# Patient Record
Sex: FEMALE | Race: WHITE | Employment: OTHER | URBAN - METROPOLITAN AREA
[De-identification: names, ages, dates, MRNs, and addresses within clinical notes are randomized per-mention and may not be internally consistent; named-entity substitution may affect disease eponyms.]

---

## 2017-02-08 PROBLEM — F51.01 PRIMARY INSOMNIA: Status: ACTIVE | Noted: 2017-02-08

## 2017-02-08 PROBLEM — E78.2 MIXED HYPERLIPIDEMIA: Status: ACTIVE | Noted: 2017-02-08

## 2017-02-08 PROBLEM — M85.80 OSTEOPENIA: Status: ACTIVE | Noted: 2017-02-08

## 2017-02-08 PROBLEM — I10 ESSENTIAL HYPERTENSION: Status: ACTIVE | Noted: 2017-02-08

## 2017-02-08 PROBLEM — I25.10 CORONARY ARTERY DISEASE INVOLVING NATIVE CORONARY ARTERY OF NATIVE HEART WITHOUT ANGINA PECTORIS: Status: ACTIVE | Noted: 2017-02-08

## 2017-02-08 PROBLEM — R73.03 PREDIABETES: Status: ACTIVE | Noted: 2017-02-08

## 2017-02-08 PROBLEM — E03.8 SUBCLINICAL HYPOTHYROIDISM: Status: ACTIVE | Noted: 2017-02-08

## 2017-02-10 ENCOUNTER — HOSPITAL ENCOUNTER (OUTPATIENT)
Dept: ULTRASOUND IMAGING | Age: 77
Discharge: HOME | End: 2017-02-10
Attending: INTERNAL MEDICINE
Payer: MEDICARE

## 2017-02-10 DIAGNOSIS — E03.8 SUBCLINICAL HYPOTHYROIDISM: ICD-10-CM

## 2017-02-10 PROCEDURE — 76536 US EXAM OF HEAD AND NECK: CPT

## 2017-02-24 ENCOUNTER — HOSPITAL ENCOUNTER (OUTPATIENT)
Dept: MAMMOGRAPHY | Age: 77
Discharge: HOME | End: 2017-02-24
Attending: INTERNAL MEDICINE
Payer: MEDICARE

## 2017-02-24 DIAGNOSIS — M85.80 OSTEOPENIA: ICD-10-CM

## 2017-02-24 DIAGNOSIS — M85.9 DISORDER OF BONE DENSITY AND STRUCTURE, UNSPECIFIED: ICD-10-CM

## 2017-02-24 PROCEDURE — 77080 DXA BONE DENSITY AXIAL: CPT

## 2017-08-08 PROBLEM — R73.9 HYPERGLYCEMIA: Status: ACTIVE | Noted: 2017-02-08

## 2017-08-09 ENCOUNTER — PATIENT OUTREACH (OUTPATIENT)
Dept: CASE MANAGEMENT | Age: 77
End: 2017-08-09

## 2017-08-10 ENCOUNTER — PATIENT OUTREACH (OUTPATIENT)
Dept: CASE MANAGEMENT | Age: 77
End: 2017-08-10

## 2017-08-10 NOTE — PROGRESS NOTES
[employer] Work [facility] [employer] received referral from [facility] / [facility] BRIGID re: options for respite for caring for spouse. CM reviewed record and also discussed case with RN BRIGID yesterday. CM attempted pt on both home and mobile phone listed. Left general vm on home number requesting returned call. CM will attempt tmrw if no call by that time. This note will not be viewable in [address]

## 2017-08-11 ENCOUNTER — PATIENT OUTREACH (OUTPATIENT)
Dept: CASE MANAGEMENT | Age: 77
End: 2017-08-11

## 2017-08-18 ENCOUNTER — PATIENT OUTREACH (OUTPATIENT)
Dept: CASE MANAGEMENT | Age: 77
End: 2017-08-18

## 2017-08-18 NOTE — Clinical Note
DELMYI only. Can't really do too much at this point other than support it seems. Let me know of any questions!

## 2017-08-18 NOTE — PROGRESS NOTES
Social Work CM followed up with pt as planned re: thoughts on options with spouse caregiving. Pt shared she did receive a vm from the [address] [place] [address] on Aging but missed their call and will have to call them back. Pt is hopeful it may be news that they are working through the waitlist and pt is eligible. Cm advised did get verification with [employer] that due to her spouse serving in reserves only, not eligible for any senior care services (aid  & attendance). Pt's granddaughter has continued to be of help and pt reports she has talked with the family about need support as things move forward. Pt did not have any additional concerns or questions at this time, only noting that the challenge is the spouse worrying anytime pt is absent. CM verified that pt has contact info and encouraged to call if can assist further. Cm not planning additional outreaches but will keep on caseload for another week or two in case call comes in from pt. This note will not be viewable in [address]

## 2017-08-23 ENCOUNTER — PATIENT OUTREACH (OUTPATIENT)
Dept: CASE MANAGEMENT | Age: 77
End: 2017-08-23

## 2017-08-23 NOTE — PROGRESS NOTES
CM reviewed record. CM noted appointment with cardiologist on 9/21; which patient reported is when it would be determined if she could have eye surgery or not. Patient has not called back [employer] in regards to respite voucher. CM informed patient if she could help by calling herself, she would be, but the agencies do not allow us to call, patient stated understanding. Patient reported she would help patient lay down for a nap and try to call today. CM encouraged patient to call, because it may take some time to arrange the agency to provide the respite and if the surgery is planned for next month it is best to start now. Patient agreed to call CM back if issues. Patient thanked CM for calling. This note will not be viewable in [address]

## 2018-02-08 PROBLEM — M85.89 OSTEOPENIA OF MULTIPLE SITES: Status: ACTIVE | Noted: 2017-02-08

## 2018-02-08 PROBLEM — F51.01 PRIMARY INSOMNIA: Status: RESOLVED | Noted: 2017-02-08 | Resolved: 2018-02-08

## 2019-03-13 ENCOUNTER — HOSPITAL ENCOUNTER (OUTPATIENT)
Dept: MAMMOGRAPHY | Age: 79
Discharge: HOME | End: 2019-03-13
Attending: INTERNAL MEDICINE
Payer: MEDICARE

## 2019-03-13 DIAGNOSIS — M85.89 OSTEOPENIA OF MULTIPLE SITES: ICD-10-CM

## 2019-03-13 PROCEDURE — 77080 DXA BONE DENSITY AXIAL: CPT

## 2019-06-28 NOTE — PROGRESS NOTES
Ambulatory Care Coordination  Social Work Assessment   Referral from which TONY CM: Oscar Perez   Previously referred? If so, reason and brief outcome No   Reason for current referral: Any assistance for pt that is primary caregiver for spouse who has dementia. Pt needs to have her cataract surgery and concerned about care for spouse while done. Income information (if needed): Didnt get exact amount but denied any financial hardships at this time   Sources of Support: Pt and spouse have family nearby and reports their granddaughter is a big help to them. Also good [employer] family   ACP set up? Needs information? Didnt inquire   Medication Cost assistance needed? Denied   Referral to CLTC/Medicaid needed? Not at this time   Referral to Medicare Extra Help/LIS program needed? n/a   Small home repair needed? n/a   MOW referral? No   Any other concerns/questions? After talking with pt it seems the barrier to getting help/respite for caring for spouse is that spouse is not comfortable with anyone else besides pt, particularly when it comes to bathing and toileting. Granddaughter assist as much as possible but spouse is resistant with personal hygiene items. [employer] members have come to sit with spouse to offer some respite but spouse still worries when pt is absent from the room at any time and will obsess over this. Pt is currently on the waitlist through the Caregiver support program for an aide. Next steps: CM will check with [employer] to verify not eligible for benefits due to spouse being in [employer], [employer] and [employer] reserves only. CM offered idea of having both pt and granddaughter help with bathing or dressing; this allows pt to still be present but spouse to adjust to someone else helping and then could slowly ease more time with the granddaughter in and see how spouse reacts. Pt reports she will attempt this. CM provided contact information.   CM will follow up in one week to touch base and see if has any Delroy Andrews calling for status of orders. The nurse is now saying they want venous doppler to rule out DVT. Call transfer to nurse.    questions or other ideas to assist.  At this time, pt still plans to have her surgery but needs to sort out care first.        This note will not be viewable in [address]

## 2020-02-13 PROBLEM — E03.8 SUBCLINICAL HYPOTHYROIDISM: Status: RESOLVED | Noted: 2017-02-08 | Resolved: 2020-02-13

## 2021-05-18 PROBLEM — M12.811 RIGHT ROTATOR CUFF TEAR ARTHROPATHY: Status: ACTIVE | Noted: 2021-05-18

## 2021-05-18 PROBLEM — M75.101 RIGHT ROTATOR CUFF TEAR ARTHROPATHY: Status: ACTIVE | Noted: 2021-05-18

## 2021-06-23 PROBLEM — M19.011 OSTEOARTHRITIS OF RIGHT SHOULDER: Status: ACTIVE | Noted: 2021-06-23

## 2021-06-30 ENCOUNTER — HOSPITAL ENCOUNTER (OUTPATIENT)
Dept: SURGERY | Age: 81
Discharge: HOME | End: 2021-06-30
Attending: ORTHOPAEDIC SURGERY
Payer: MEDICARE

## 2021-06-30 VITALS
TEMPERATURE: 97 F | BODY MASS INDEX: 23.76 KG/M2 | SYSTOLIC BLOOD PRESSURE: 116 MMHG | HEIGHT: 62 IN | WEIGHT: 129.1 LBS | DIASTOLIC BLOOD PRESSURE: 63 MMHG | OXYGEN SATURATION: 95 % | RESPIRATION RATE: 16 BRPM | HEART RATE: 63 BPM

## 2021-06-30 LAB
ANION GAP SERPL CALC-SCNC: 3 MMOL/L (ref 7–16)
BACTERIA SPEC CULT: ABNORMAL
BUN SERPL-MCNC: 21 MG/DL (ref 8–23)
CALCIUM SERPL-MCNC: 9.3 MG/DL (ref 8.3–10.4)
CHLORIDE SERPL-SCNC: 108 MMOL/L (ref 98–107)
CO2 SERPL-SCNC: 31 MMOL/L (ref 21–32)
CREAT SERPL-MCNC: 0.96 MG/DL (ref 0.6–1)
ERYTHROCYTE [DISTWIDTH] IN BLOOD BY AUTOMATED COUNT: 14.8 % (ref 11.9–14.6)
GLUCOSE SERPL-MCNC: 115 MG/DL (ref 65–100)
HCT VFR BLD AUTO: 37.3 % (ref 35.8–46.3)
HGB BLD-MCNC: 11.6 G/DL (ref 11.7–15.4)
MCH RBC QN AUTO: 27.2 PG (ref 26.1–32.9)
MCHC RBC AUTO-ENTMCNC: 31.1 G/DL (ref 31.4–35)
MCV RBC AUTO: 87.6 FL (ref 79.6–97.8)
NRBC # BLD: 0 K/UL (ref 0–0.2)
PLATELET # BLD AUTO: 230 K/UL (ref 150–450)
PMV BLD AUTO: 10.4 FL (ref 9.4–12.3)
POTASSIUM SERPL-SCNC: 4.3 MMOL/L (ref 3.5–5.1)
RBC # BLD AUTO: 4.26 M/UL (ref 4.05–5.2)
SERVICE CMNT-IMP: ABNORMAL
SODIUM SERPL-SCNC: 142 MMOL/L (ref 136–145)
WBC # BLD AUTO: 4.3 K/UL (ref 4.3–11.1)

## 2021-06-30 PROCEDURE — 80048 BASIC METABOLIC PNL TOTAL CA: CPT

## 2021-06-30 PROCEDURE — 36415 COLL VENOUS BLD VENIPUNCTURE: CPT

## 2021-06-30 PROCEDURE — 77030027138 HC INCENT SPIROMETER -A

## 2021-06-30 PROCEDURE — 87641 MR-STAPH DNA AMP PROBE: CPT

## 2021-06-30 PROCEDURE — 85027 COMPLETE CBC AUTOMATED: CPT

## 2021-06-30 NOTE — PERIOP NOTES
The following records have been requested from [facility]:       [address]    Please send Last 2 EKG tracings via fax to [phone]. Thank you!

## 2021-06-30 NOTE — PERIOP NOTES
PLEASE CONTINUE TAKING ALL PRESCRIPTION MEDICATIONS UP TO THE DAY OF SURGERY UNLESS OTHERWISE DIRECTED BELOW. DISCONTINUE all vitamins and supplements 7 days prior to surgery. DISCONTINUE Non-Steriodal Anti-Inflammatory (NSAIDS) such as Advil and Aleve 5 days prior to surgery. Home Medications to take  the day of surgery   Carvedilol, omeprazole if needed            Home Medications   to Hold   Stop vitamins and supplements 7 days prior to surgery         Comments   Bring Incentive spirometer and Elizabeth-hex wash to the hospital on the day of surgery. On the day before surgery please take Acetaminophen 1000mg in the morning and then again before bed. You may substitute for Tylenol 650 mg. Please do not bring home medications with you on the day of surgery unless otherwise directed by your nurse. If you are instructed to bring home medications, please give them to your nurse as they will be administered by the nursing staff. If you have any questions, please call Doyle Magaña [phone]. A copy of this note was provided to the patient for reference.

## 2021-06-30 NOTE — ADVANCED PRACTICE NURSE
Total Joint Surgery Preoperative Chart Review      Patient ID:  Beata Lester  [id number]  50 y.o.  1940  Surgeon: Dr Hi Amaya  Date of Surgery: 7/9/2021  Procedure: Total Right Shoulder Arthroplasty  Primary Care Physician: Rafi Marques [phone]  Specialty Physician(s):      Subjective:   Beata Lester is a 78 y.o. WHITE/NON- female who presents for preoperative evaluation for Total Right Shoulder arthroplasty. This is a preoperative chart review note based on data collected by the nurse at the surgical Pre-Assessment visit. Past Medical History:   Diagnosis Date    Adverse effect of anesthesia     Difficulty awakening     Coronary artery disease     1 stent in 2009, Dr. Bernabe Diamond follows     COVID-19 vaccine series completed 02/26/2021    Pfizer vaccine     GERD (gastroesophageal reflux disease)     Managed with as needed meds     Hyperlipidemia     Hypertension     managed with meds     Insomnia     LBBB (left bundle branch block)     Multiple thyroid nodules     No meds     Multiple thyroid nodules     Osteopenia     Prediabetes     Managed with diet       Past Surgical History:   Procedure Laterality Date    HX APPENDECTOMY  age 13   [de-identified] BLADDER REPAIR      bladder tac    HX COLONOSCOPY  2004 and 2014    HX CORONARY STENT PLACEMENT  2009    HX HYSTERECTOMY  in her 46s    ovaries intact    HX ORTHOPAEDIC      right foot ORIF     Family History   Problem Relation Age of Onset    Diabetes Mother     Coronary Artery Disease Father     Thyroid Disease Maternal Grandmother     Thyroid Cancer Neg Hx       Social History     Tobacco Use    Smoking status: Never Smoker    Smokeless tobacco: Never Used   Substance Use Topics    Alcohol use: No       Prior to Admission medications    Medication Sig Start Date End Date Taking? Authorizing Provider   omeprazole (PRILOSEC) 40 mg capsule Take 1 Capsule by mouth daily for 30 days.   Patient taking differently: Take 40 mg by mouth daily as needed. 6/7/21 7/7/21 Yes Ricci WONG PA-C   simvastatin (ZOCOR) 40 mg tablet TAKE ONE TABLET BY MOUTH EACH NIGHT AT BEDTIME 8/13/20  Yes Flora Woodruff MD   carvediloL (COREG) 12.5 mg tablet TAKE ONE TABLET BY MOUTH TWICE A DAY WITH MEALS  Patient taking differently: 6.25 mg. TAKE ONE TABLET BY MOUTH TWICE A DAY WITH MEALS 2/13/20  Yes Flora Woodruff MD   calcium-cholecalciferol, D3, (CALTRATE 600+D) tablet Take 1 Tablet by mouth daily. 9/29/15  Yes Provider, Historical   aspirin delayed-release 81 mg tablet Take 81 mg by mouth nightly. Yes Provider, Historical     Allergies   Allergen Reactions    Norvasc [Amlodipine] Shortness of Breath and Cough    Lisinopril Cough          Objective:     Physical Exam:   Patient Vitals for the past 24 hrs:   Temp Pulse Resp BP SpO2   06/30/21 1026 97 °F (36.1 °C) 63 16 116/63 95 %       ECG:    EKG Results     None          Data Review:   Labs:   No results found for this or any previous visit (from the past 24 hour(s)).     Respiratory meds:  DENIES     FAMILY PRESENT:             NO     PAST SLEEP STUDY:                   DENIES  HX OF ARLEN:                                      DENIES  ARLEN assessment:                                               SLEEPS ON SIDE            Loud snoring:                                                           DENIES  Witnessed apnea or wakening gasping or choking:        DENIES         Awakens with headaches:                                               DENIES  Morning or daytime tiredness/ sleepiness:                          TIRED  Dry mouth or sore throat in morning:            YES                                              Davila stage:  4                                   SACS score:4  Stop Bang   STOP-BANG  Does the patient snore loudly (louder than talking or loud enough to be heard through closed doors)?: No  Does the patient often feel tired, fatigued, or sleepy during the daytime, even after a \"good\" night's sleep?: Yes  Has anyone ever observed the patient stop breathing during their sleep? : No  Does the patient have or are they being treated for high blood pressure?: No  Is the patient's BMI greater than 35?: Yes  Is your neck circumference greater than 17 inches (Male) or 16 inches (Female)?: No  Is the patient older than 48?: Yes  Is the patient male?: No  ARLEN Score: 3  Has the patient been referred to Sleep Medicine?: No  Has the patient previously been diagnosed with Obstructive Sleep Apnea?: No                              Problem List:  )  Patient Active Problem List   Diagnosis Code    Coronary artery disease involving native coronary artery of native heart without angina pectoris I25.10    Essential hypertension I10    Mixed hyperlipidemia E78.2    Prediabetes R73.03    Osteopenia of multiple sites M85.89    Multiple thyroid nodules E04.2    Right rotator cuff tear arthropathy M75.101, M12.811    Osteoarthritis of right shoulder M19.011       Total Joint Surgery Pre-Assessment Recommendations:           Continuous saturation monitoring UPON ARRIVAL TO FLOOR. Heated high flow lung expansion x 24 hours and prn.     Signed By: TO Titus    June 30, 2021

## 2021-06-30 NOTE — PERIOP NOTES
Patient verified name and . Order for consent not found in EHR. Type 3 surgery, PAT walk in assessment complete. Labs per surgeon: no orders received. Labs per anesthesia protocol: CBC,BMP; results pending. Type and Screen DOS. EKG: Found in care everywhere done 21. Last EKG tracings x 2 requested from [address] [place] [address] cardiology. Patient's second Covid vaccine done 21. Copy of Covid vaccination card scanned into EHR. Cardiology clearance office note dated 21 and Echo dated 10/3/17 found in care everywhere if needed for anesthesia reference. MRSA/MSSA swab collected; pharmacy to review and dose antibiotic as appropriate. Hospital approved surgical skin cleanser and instructions to return bottle on DOS given per hospital policy. Patient provided with handouts including Guide to Surgery, Pain Management, Hand Hygiene, Blood Transfusion Education, and [facility] Brochure. Patient answered medical/surgical history questions at their best of ability. All prior to admission medications documented in [facility]. Original medication prescription bottle not visualized during patient appointment. Patient instructed to hold all vitamins 3 weeks prior to surgery and NSAIDS 5 days prior to surgery. Patient teach back successful and patient demonstrates knowledge of instruction.

## 2021-06-30 NOTE — PERIOP NOTES
Received EKG's dated 6/14/21 & 9/21/17 from [facility]. Anesthesia to review EKG's. Records scanned in under media tab in EHR.

## 2021-07-01 ENCOUNTER — HOSPITAL ENCOUNTER (OUTPATIENT)
Dept: MAMMOGRAPHY | Age: 81
Discharge: HOME | End: 2021-07-01
Attending: INTERNAL MEDICINE
Payer: MEDICARE

## 2021-07-01 DIAGNOSIS — M85.89 OSTEOPENIA OF MULTIPLE SITES: ICD-10-CM

## 2021-07-01 PROCEDURE — 77080 DXA BONE DENSITY AXIAL: CPT

## 2021-07-01 NOTE — PROGRESS NOTES
Spoke with patient advised per  Bones have worsened and her fracture risk is now high enough to warrant treatment to lower the risk for a fracture. If she's interested the options are a weekly pill called Fosamax or a twice a year injection called Prolia. Pt expressed understanding and chose Fosamax informed her would be sent to her pharmacy.

## 2021-07-01 NOTE — PROGRESS NOTES
Bones have worsened and her fracture risk is now high enough to warrant treatment to lower the risk for a fracture. If she's interested the options are a weekly pill called Fosamax or a twice a year injection called Prolia.

## 2021-07-08 ENCOUNTER — ANESTHESIA EVENT (OUTPATIENT)
Dept: SURGERY | Age: 81
DRG: 483 | End: 2021-07-08
Payer: MEDICARE

## 2021-07-09 ENCOUNTER — HOSPITAL ENCOUNTER (INPATIENT)
Age: 81
LOS: 2 days | Discharge: HOME | DRG: 483 | End: 2021-07-11
Attending: ORTHOPAEDIC SURGERY | Admitting: ORTHOPAEDIC SURGERY
Payer: MEDICARE

## 2021-07-09 ENCOUNTER — APPOINTMENT (OUTPATIENT)
Dept: GENERAL RADIOLOGY | Age: 81
DRG: 483 | End: 2021-07-09
Attending: ORTHOPAEDIC SURGERY
Payer: MEDICARE

## 2021-07-09 ENCOUNTER — ANESTHESIA (OUTPATIENT)
Dept: SURGERY | Age: 81
DRG: 483 | End: 2021-07-09
Payer: MEDICARE

## 2021-07-09 PROBLEM — M19.019 SHOULDER ARTHRITIS: Status: ACTIVE | Noted: 2021-07-09

## 2021-07-09 LAB
ABO + RH BLD: NORMAL
BLOOD GROUP ANTIBODIES SERPL: NORMAL
SPECIMEN EXP DATE BLD: NORMAL

## 2021-07-09 PROCEDURE — 74011250636 HC RX REV CODE- 250/636: Performed by: ANESTHESIOLOGY

## 2021-07-09 PROCEDURE — 74011000258 HC RX REV CODE- 258: Performed by: NURSE ANESTHETIST, CERTIFIED REGISTERED

## 2021-07-09 PROCEDURE — 74011000258 HC RX REV CODE- 258: Performed by: ORTHOPAEDIC SURGERY

## 2021-07-09 PROCEDURE — 23472 RECONSTRUCT SHOULDER JOINT: CPT | Performed by: ORTHOPAEDIC SURGERY

## 2021-07-09 PROCEDURE — 77030036638 HC ACC KT GPS KNE V2 EXAC -D: Performed by: ORTHOPAEDIC SURGERY

## 2021-07-09 PROCEDURE — 74011000250 HC RX REV CODE- 250: Performed by: NURSE ANESTHETIST, CERTIFIED REGISTERED

## 2021-07-09 PROCEDURE — 23472 RECONSTRUCT SHOULDER JOINT: CPT | Performed by: PHYSICIAN ASSISTANT

## 2021-07-09 PROCEDURE — 77030011283 HC ELECTRD NDL COVD -A: Performed by: ORTHOPAEDIC SURGERY

## 2021-07-09 PROCEDURE — 77010033711 HC HIGH FLOW OXYGEN

## 2021-07-09 PROCEDURE — 76060000037 HC ANESTHESIA 3 TO 3.5 HR: Performed by: ORTHOPAEDIC SURGERY

## 2021-07-09 PROCEDURE — 76010000133 HC OR TIME 3 TO 3.5 HR: Performed by: ORTHOPAEDIC SURGERY

## 2021-07-09 PROCEDURE — C1776 JOINT DEVICE (IMPLANTABLE): HCPCS | Performed by: ORTHOPAEDIC SURGERY

## 2021-07-09 PROCEDURE — 74011250637 HC RX REV CODE- 250/637: Performed by: ORTHOPAEDIC SURGERY

## 2021-07-09 PROCEDURE — 76010010054 HC POST OP PAIN BLOCK: Performed by: ORTHOPAEDIC SURGERY

## 2021-07-09 PROCEDURE — 77030031139 HC SUT VCRL2 J&J -A: Performed by: ORTHOPAEDIC SURGERY

## 2021-07-09 PROCEDURE — 77030039147 HC PWDR HEMSTS SURGICEL JNJ -D: Performed by: ORTHOPAEDIC SURGERY

## 2021-07-09 PROCEDURE — 74011000250 HC RX REV CODE- 250: Performed by: ANESTHESIOLOGY

## 2021-07-09 PROCEDURE — 77030037088 HC TUBE ENDOTRACH ORAL NSL COVD-A: Performed by: NURSE ANESTHETIST, CERTIFIED REGISTERED

## 2021-07-09 PROCEDURE — 76210000006 HC OR PH I REC 0.5 TO 1 HR: Performed by: ORTHOPAEDIC SURGERY

## 2021-07-09 PROCEDURE — 77030021668 HC NEB PREFIL KT VYRM -A

## 2021-07-09 PROCEDURE — 77030008841 HC WRE K MCRA -A: Performed by: ORTHOPAEDIC SURGERY

## 2021-07-09 PROCEDURE — 74011250637 HC RX REV CODE- 250/637: Performed by: ANESTHESIOLOGY

## 2021-07-09 PROCEDURE — 86901 BLOOD TYPING SEROLOGIC RH(D): CPT

## 2021-07-09 PROCEDURE — 74011250636 HC RX REV CODE- 250/636: Performed by: NURSE ANESTHETIST, CERTIFIED REGISTERED

## 2021-07-09 PROCEDURE — 2709999900 HC NON-CHARGEABLE SUPPLY: Performed by: ORTHOPAEDIC SURGERY

## 2021-07-09 PROCEDURE — 77030002933 HC SUT MCRYL J&J -A: Performed by: ORTHOPAEDIC SURGERY

## 2021-07-09 PROCEDURE — 73030 X-RAY EXAM OF SHOULDER: CPT

## 2021-07-09 PROCEDURE — 3E0T33Z INTRODUCTION OF ANTI-INFLAMMATORY INTO PERIPHERAL NERVES AND PLEXI, PERCUTANEOUS APPROACH: ICD-10-PCS | Performed by: ANESTHESIOLOGY

## 2021-07-09 PROCEDURE — 77010033678 HC OXYGEN DAILY

## 2021-07-09 PROCEDURE — 77030018673: Performed by: ORTHOPAEDIC SURGERY

## 2021-07-09 PROCEDURE — 77030040830 HC CATH URETH FOL MDII -A: Performed by: ORTHOPAEDIC SURGERY

## 2021-07-09 PROCEDURE — 77030018547 HC SUT ETHBND1 J&J -B: Performed by: ORTHOPAEDIC SURGERY

## 2021-07-09 PROCEDURE — 77030039266 HC ADH SKN EXOFIN S2SG -A: Performed by: ORTHOPAEDIC SURGERY

## 2021-07-09 PROCEDURE — 65270000029 HC RM PRIVATE

## 2021-07-09 PROCEDURE — 0RRJ00Z REPLACEMENT OF RIGHT SHOULDER JOINT WITH REVERSE BALL AND SOCKET SYNTHETIC SUBSTITUTE, OPEN APPROACH: ICD-10-PCS | Performed by: ORTHOPAEDIC SURGERY

## 2021-07-09 PROCEDURE — 3E0T3BZ INTRODUCTION OF ANESTHETIC AGENT INTO PERIPHERAL NERVES AND PLEXI, PERCUTANEOUS APPROACH: ICD-10-PCS | Performed by: ANESTHESIOLOGY

## 2021-07-09 PROCEDURE — 77030039425 HC BLD LARYNG TRULITE DISP TELE -A: Performed by: NURSE ANESTHETIST, CERTIFIED REGISTERED

## 2021-07-09 PROCEDURE — 76942 ECHO GUIDE FOR BIOPSY: CPT | Performed by: ORTHOPAEDIC SURGERY

## 2021-07-09 PROCEDURE — 77030006835 HC BLD SAW SAG STRY -B: Performed by: ORTHOPAEDIC SURGERY

## 2021-07-09 PROCEDURE — 77030003602 HC NDL NRV BLK BBMI -B: Performed by: NURSE ANESTHETIST, CERTIFIED REGISTERED

## 2021-07-09 PROCEDURE — 74011000250 HC RX REV CODE- 250: Performed by: ORTHOPAEDIC SURGERY

## 2021-07-09 PROCEDURE — 74011250636 HC RX REV CODE- 250/636: Performed by: ORTHOPAEDIC SURGERY

## 2021-07-09 PROCEDURE — 77030040922 HC BLNKT HYPOTHRM STRY -A: Performed by: NURSE ANESTHETIST, CERTIFIED REGISTERED

## 2021-07-09 PROCEDURE — 77030002934 HC SUT MCRYL J&J -B: Performed by: ORTHOPAEDIC SURGERY

## 2021-07-09 PROCEDURE — 94762 N-INVAS EAR/PLS OXIMTRY CONT: CPT

## 2021-07-09 DEVICE — EQUINOXE REVERSE 36MM HUMERAL LINER +0: Type: IMPLANTABLE DEVICE | Site: SHOULDER | Status: FUNCTIONAL

## 2021-07-09 DEVICE — SHOULDER S3 TOT ADV REVRS -- IMPL CAPPED S3: Type: IMPLANTABLE DEVICE | Status: FUNCTIONAL

## 2021-07-09 DEVICE — KIT BNE SCR L22MM DIA4.5MM GLEN SHLDR BLK COMPR LOK CAP REV: Type: IMPLANTABLE DEVICE | Site: SHOULDER | Status: FUNCTIONAL

## 2021-07-09 DEVICE — SCR BNE LCK GLENOSPHERE -- EQUINOXE: Type: IMPLANTABLE DEVICE | Site: SHOULDER | Status: FUNCTIONAL

## 2021-07-09 DEVICE — IMPLANTABLE DEVICE
Type: IMPLANTABLE DEVICE | Site: SHOULDER | Status: FUNCTIONAL
Brand: EQUINOXE

## 2021-07-09 DEVICE — BASEPLATE GLEN AUG REVERSED SM RT SUP/POST SHLDR: Type: IMPLANTABLE DEVICE | Site: SHOULDER | Status: FUNCTIONAL

## 2021-07-09 DEVICE — SCR TORQUE DEFINING KIT -- EQUINOXE: Type: IMPLANTABLE DEVICE | Site: SHOULDER | Status: FUNCTIONAL

## 2021-07-09 DEVICE — TRAY HUM ADPT REV +0 -- EQUINOXE: Type: IMPLANTABLE DEVICE | Site: SHOULDER | Status: FUNCTIONAL

## 2021-07-09 DEVICE — STEM HUM DIA11MM SHLDR PRI PRESSFIT EQUINOXE: Type: IMPLANTABLE DEVICE | Site: SHOULDER | Status: FUNCTIONAL

## 2021-07-09 DEVICE — SPHERE GLEN SM 36 MM SHLDR GLENOSPHERE RVS EXP EQUINOXE: Type: IMPLANTABLE DEVICE | Site: SHOULDER | Status: FUNCTIONAL

## 2021-07-09 RX ORDER — CARVEDILOL 6.25 MG/1
6.25 TABLET ORAL 2 TIMES DAILY WITH MEALS
Status: DISCONTINUED | OUTPATIENT
Start: 2021-07-09 | End: 2021-07-11 | Stop reason: HOSPADM

## 2021-07-09 RX ORDER — LIDOCAINE HYDROCHLORIDE 10 MG/ML
0.1 INJECTION INFILTRATION; PERINEURAL AS NEEDED
Status: DISCONTINUED | OUTPATIENT
Start: 2021-07-09 | End: 2021-07-09 | Stop reason: HOSPADM

## 2021-07-09 RX ORDER — SODIUM CHLORIDE 0.9 % (FLUSH) 0.9 %
5-40 SYRINGE (ML) INJECTION EVERY 8 HOURS
Status: DISCONTINUED | OUTPATIENT
Start: 2021-07-09 | End: 2021-07-11 | Stop reason: HOSPADM

## 2021-07-09 RX ORDER — HYDROMORPHONE HYDROCHLORIDE 2 MG/ML
0.5 INJECTION, SOLUTION INTRAMUSCULAR; INTRAVENOUS; SUBCUTANEOUS
Status: DISCONTINUED | OUTPATIENT
Start: 2021-07-09 | End: 2021-07-09 | Stop reason: HOSPADM

## 2021-07-09 RX ORDER — BUPIVACAINE HYDROCHLORIDE 5 MG/ML
INJECTION, SOLUTION EPIDURAL; INTRACAUDAL
Status: COMPLETED | OUTPATIENT
Start: 2021-07-09 | End: 2021-07-09

## 2021-07-09 RX ORDER — MIDAZOLAM HYDROCHLORIDE 1 MG/ML
2 INJECTION, SOLUTION INTRAMUSCULAR; INTRAVENOUS ONCE
Status: DISCONTINUED | OUTPATIENT
Start: 2021-07-09 | End: 2021-07-09 | Stop reason: HOSPADM

## 2021-07-09 RX ORDER — PROPOFOL 10 MG/ML
INJECTION, EMULSION INTRAVENOUS AS NEEDED
Status: DISCONTINUED | OUTPATIENT
Start: 2021-07-09 | End: 2021-07-09 | Stop reason: HOSPADM

## 2021-07-09 RX ORDER — SODIUM CHLORIDE 9 MG/ML
100 INJECTION, SOLUTION INTRAVENOUS CONTINUOUS
Status: DISCONTINUED | OUTPATIENT
Start: 2021-07-09 | End: 2021-07-11 | Stop reason: HOSPADM

## 2021-07-09 RX ORDER — SODIUM CHLORIDE, SODIUM LACTATE, POTASSIUM CHLORIDE, CALCIUM CHLORIDE 600; 310; 30; 20 MG/100ML; MG/100ML; MG/100ML; MG/100ML
50 INJECTION, SOLUTION INTRAVENOUS CONTINUOUS
Status: DISCONTINUED | OUTPATIENT
Start: 2021-07-09 | End: 2021-07-09 | Stop reason: HOSPADM

## 2021-07-09 RX ORDER — ROPIVACAINE HYDROCHLORIDE 5 MG/ML
INJECTION, SOLUTION EPIDURAL; INFILTRATION; PERINEURAL AS NEEDED
Status: DISCONTINUED | OUTPATIENT
Start: 2021-07-09 | End: 2021-07-09 | Stop reason: HOSPADM

## 2021-07-09 RX ORDER — DIPHENHYDRAMINE HCL 25 MG
25 CAPSULE ORAL
Status: DISCONTINUED | OUTPATIENT
Start: 2021-07-09 | End: 2021-07-11 | Stop reason: HOSPADM

## 2021-07-09 RX ORDER — EPINEPHRINE 1 MG/ML
INJECTION, SOLUTION, CONCENTRATE INTRAVENOUS AS NEEDED
Status: DISCONTINUED | OUTPATIENT
Start: 2021-07-09 | End: 2021-07-09 | Stop reason: HOSPADM

## 2021-07-09 RX ORDER — CEFAZOLIN SODIUM/WATER 2 G/20 ML
2 SYRINGE (ML) INTRAVENOUS ONCE
Status: DISCONTINUED | OUTPATIENT
Start: 2021-07-09 | End: 2021-07-09 | Stop reason: HOSPADM

## 2021-07-09 RX ORDER — ATORVASTATIN CALCIUM 10 MG/1
20 TABLET, FILM COATED ORAL
Status: DISCONTINUED | OUTPATIENT
Start: 2021-07-09 | End: 2021-07-11 | Stop reason: HOSPADM

## 2021-07-09 RX ORDER — ONDANSETRON 2 MG/ML
INJECTION INTRAMUSCULAR; INTRAVENOUS AS NEEDED
Status: DISCONTINUED | OUTPATIENT
Start: 2021-07-09 | End: 2021-07-09 | Stop reason: HOSPADM

## 2021-07-09 RX ORDER — OXYCODONE HYDROCHLORIDE 5 MG/1
5 TABLET ORAL
Status: DISCONTINUED | OUTPATIENT
Start: 2021-07-09 | End: 2021-07-09 | Stop reason: HOSPADM

## 2021-07-09 RX ORDER — AMOXICILLIN 250 MG
1 CAPSULE ORAL
Status: DISCONTINUED | OUTPATIENT
Start: 2021-07-09 | End: 2021-07-11 | Stop reason: HOSPADM

## 2021-07-09 RX ORDER — ONDANSETRON 4 MG/1
4 TABLET, ORALLY DISINTEGRATING ORAL
Status: DISCONTINUED | OUTPATIENT
Start: 2021-07-09 | End: 2021-07-09

## 2021-07-09 RX ORDER — HYDROMORPHONE HYDROCHLORIDE 1 MG/ML
.5-1 INJECTION, SOLUTION INTRAMUSCULAR; INTRAVENOUS; SUBCUTANEOUS
Status: DISCONTINUED | OUTPATIENT
Start: 2021-07-09 | End: 2021-07-11 | Stop reason: HOSPADM

## 2021-07-09 RX ORDER — ALBUTEROL SULFATE 0.83 MG/ML
2.5 SOLUTION RESPIRATORY (INHALATION) AS NEEDED
Status: DISCONTINUED | OUTPATIENT
Start: 2021-07-09 | End: 2021-07-09 | Stop reason: HOSPADM

## 2021-07-09 RX ORDER — MIDAZOLAM HYDROCHLORIDE 1 MG/ML
2 INJECTION, SOLUTION INTRAMUSCULAR; INTRAVENOUS
Status: COMPLETED | OUTPATIENT
Start: 2021-07-09 | End: 2021-07-09

## 2021-07-09 RX ORDER — ACETAMINOPHEN 650 MG/1
650 SUPPOSITORY RECTAL ONCE
Status: DISCONTINUED | OUTPATIENT
Start: 2021-07-09 | End: 2021-07-09 | Stop reason: SDUPTHER

## 2021-07-09 RX ORDER — ACETAMINOPHEN 325 MG/1
975 TABLET ORAL ONCE
Status: DISCONTINUED | OUTPATIENT
Start: 2021-07-09 | End: 2021-07-09 | Stop reason: SDUPTHER

## 2021-07-09 RX ORDER — AMOXICILLIN 250 MG
1 CAPSULE ORAL DAILY
Status: DISCONTINUED | OUTPATIENT
Start: 2021-07-10 | End: 2021-07-09

## 2021-07-09 RX ORDER — ROCURONIUM BROMIDE 10 MG/ML
INJECTION, SOLUTION INTRAVENOUS AS NEEDED
Status: DISCONTINUED | OUTPATIENT
Start: 2021-07-09 | End: 2021-07-09 | Stop reason: HOSPADM

## 2021-07-09 RX ORDER — CEFAZOLIN SODIUM/WATER 2 G/20 ML
2 SYRINGE (ML) INTRAVENOUS EVERY 8 HOURS
Status: COMPLETED | OUTPATIENT
Start: 2021-07-09 | End: 2021-07-10

## 2021-07-09 RX ORDER — VANCOMYCIN HYDROCHLORIDE 1 G/20ML
INJECTION, POWDER, LYOPHILIZED, FOR SOLUTION INTRAVENOUS AS NEEDED
Status: DISCONTINUED | OUTPATIENT
Start: 2021-07-09 | End: 2021-07-09 | Stop reason: HOSPADM

## 2021-07-09 RX ORDER — NALOXONE HYDROCHLORIDE 0.4 MG/ML
0.4 INJECTION, SOLUTION INTRAMUSCULAR; INTRAVENOUS; SUBCUTANEOUS
Status: DISCONTINUED | OUTPATIENT
Start: 2021-07-09 | End: 2021-07-11 | Stop reason: HOSPADM

## 2021-07-09 RX ORDER — ASPIRIN 325 MG
325 TABLET, DELAYED RELEASE (ENTERIC COATED) ORAL DAILY
Status: DISCONTINUED | OUTPATIENT
Start: 2021-07-10 | End: 2021-07-11 | Stop reason: HOSPADM

## 2021-07-09 RX ORDER — SODIUM CHLORIDE, SODIUM LACTATE, POTASSIUM CHLORIDE, CALCIUM CHLORIDE 600; 310; 30; 20 MG/100ML; MG/100ML; MG/100ML; MG/100ML
75 INJECTION, SOLUTION INTRAVENOUS CONTINUOUS
Status: DISCONTINUED | OUTPATIENT
Start: 2021-07-09 | End: 2021-07-09 | Stop reason: HOSPADM

## 2021-07-09 RX ORDER — OXYCODONE AND ACETAMINOPHEN 7.5; 325 MG/1; MG/1
1-2 TABLET ORAL
Status: DISCONTINUED | OUTPATIENT
Start: 2021-07-09 | End: 2021-07-11 | Stop reason: HOSPADM

## 2021-07-09 RX ORDER — ONDANSETRON 2 MG/ML
4 INJECTION INTRAMUSCULAR; INTRAVENOUS
Status: DISCONTINUED | OUTPATIENT
Start: 2021-07-09 | End: 2021-07-11 | Stop reason: HOSPADM

## 2021-07-09 RX ORDER — LIDOCAINE HYDROCHLORIDE 20 MG/ML
INJECTION, SOLUTION EPIDURAL; INFILTRATION; INTRACAUDAL; PERINEURAL AS NEEDED
Status: DISCONTINUED | OUTPATIENT
Start: 2021-07-09 | End: 2021-07-09 | Stop reason: HOSPADM

## 2021-07-09 RX ORDER — FENTANYL CITRATE 50 UG/ML
100 INJECTION, SOLUTION INTRAMUSCULAR; INTRAVENOUS ONCE
Status: COMPLETED | OUTPATIENT
Start: 2021-07-09 | End: 2021-07-09

## 2021-07-09 RX ORDER — ACETAMINOPHEN 500 MG
1000 TABLET ORAL ONCE
Status: COMPLETED | OUTPATIENT
Start: 2021-07-09 | End: 2021-07-09

## 2021-07-09 RX ORDER — EPINEPHRINE 1 MG/ML
INJECTION, SOLUTION, CONCENTRATE INTRAVENOUS
Status: COMPLETED | OUTPATIENT
Start: 2021-07-09 | End: 2021-07-09

## 2021-07-09 RX ORDER — SODIUM CHLORIDE 0.9 % (FLUSH) 0.9 %
5-40 SYRINGE (ML) INJECTION AS NEEDED
Status: DISCONTINUED | OUTPATIENT
Start: 2021-07-09 | End: 2021-07-11 | Stop reason: HOSPADM

## 2021-07-09 RX ADMIN — ONDANSETRON 4 MG: 2 INJECTION INTRAMUSCULAR; INTRAVENOUS at 14:16

## 2021-07-09 RX ADMIN — PHENYLEPHRINE HYDROCHLORIDE 50 MCG: 10 INJECTION INTRAVENOUS at 14:06

## 2021-07-09 RX ADMIN — ROCURONIUM BROMIDE 20 MG: 10 INJECTION, SOLUTION INTRAVENOUS at 14:45

## 2021-07-09 RX ADMIN — MIDAZOLAM 1 MG: 1 INJECTION INTRAMUSCULAR; INTRAVENOUS at 11:37

## 2021-07-09 RX ADMIN — Medication 10 ML: at 23:00

## 2021-07-09 RX ADMIN — ROCURONIUM BROMIDE 50 MG: 10 INJECTION, SOLUTION INTRAVENOUS at 12:32

## 2021-07-09 RX ADMIN — TRANEXAMIC ACID 1 G: 100 INJECTION, SOLUTION INTRAVENOUS at 13:00

## 2021-07-09 RX ADMIN — SODIUM CHLORIDE, SODIUM LACTATE, POTASSIUM CHLORIDE, AND CALCIUM CHLORIDE: 600; 310; 30; 20 INJECTION, SOLUTION INTRAVENOUS at 13:50

## 2021-07-09 RX ADMIN — Medication 10 ML: at 17:19

## 2021-07-09 RX ADMIN — PHENYLEPHRINE HYDROCHLORIDE 100 MCG: 10 INJECTION INTRAVENOUS at 12:56

## 2021-07-09 RX ADMIN — ACETAMINOPHEN 1000 MG: 500 TABLET, FILM COATED ORAL at 10:36

## 2021-07-09 RX ADMIN — PHENYLEPHRINE HYDROCHLORIDE 10 MCG/MIN: 10 INJECTION INTRAVENOUS at 13:01

## 2021-07-09 RX ADMIN — OXYCODONE HYDROCHLORIDE AND ACETAMINOPHEN 1 TABLET: 7.5; 325 TABLET ORAL at 22:20

## 2021-07-09 RX ADMIN — LIDOCAINE HYDROCHLORIDE 60 MG: 20 INJECTION, SOLUTION EPIDURAL; INFILTRATION; INTRACAUDAL; PERINEURAL at 12:32

## 2021-07-09 RX ADMIN — PHENYLEPHRINE HYDROCHLORIDE 100 MCG: 10 INJECTION INTRAVENOUS at 13:44

## 2021-07-09 RX ADMIN — CARVEDILOL 6.25 MG: 6.25 TABLET, FILM COATED ORAL at 17:16

## 2021-07-09 RX ADMIN — FENTANYL CITRATE 50 MCG: 50 INJECTION INTRAMUSCULAR; INTRAVENOUS at 11:38

## 2021-07-09 RX ADMIN — ATORVASTATIN CALCIUM 20 MG: 10 TABLET, FILM COATED ORAL at 22:20

## 2021-07-09 RX ADMIN — EPINEPHRINE 0.1 MG: 1 INJECTION, SOLUTION, CONCENTRATE INTRAVENOUS at 11:41

## 2021-07-09 RX ADMIN — SODIUM CHLORIDE, SODIUM LACTATE, POTASSIUM CHLORIDE, AND CALCIUM CHLORIDE 75 ML/HR: 600; 310; 30; 20 INJECTION, SOLUTION INTRAVENOUS at 11:03

## 2021-07-09 RX ADMIN — PROPOFOL 130 MG: 10 INJECTION, EMULSION INTRAVENOUS at 12:32

## 2021-07-09 RX ADMIN — Medication 3 AMPULE: at 10:36

## 2021-07-09 RX ADMIN — CEFAZOLIN SODIUM 2 G: 100 INJECTION, POWDER, LYOPHILIZED, FOR SOLUTION INTRAVENOUS at 17:16

## 2021-07-09 RX ADMIN — BUPIVACAINE HYDROCHLORIDE 20 ML: 5 INJECTION, SOLUTION EPIDURAL; INTRACAUDAL; PERINEURAL at 11:41

## 2021-07-09 RX ADMIN — SUGAMMADEX 200 MG: 100 INJECTION, SOLUTION INTRAVENOUS at 15:30

## 2021-07-09 NOTE — PROGRESS NOTES
07/09/21 1748   Oxygen Therapy   O2 Sat (%) 96 %   Pulse via Oximetry 84 beats per minute   O2 Device Nasal cannula   O2 Flow Rate (L/min) 2 l/min    Pt working on IS. Pt encouraged to do 10 breaths per hour while awake on IS. Good NPC. No respiratory distress noted at this time. No complications noted at this time.

## 2021-07-09 NOTE — PROGRESS NOTES
Received to room from PACU. Alert and oriented x3. RUE honeycomb dressing clean dry and intact with ecchymosis noted. Numbness, finger extension weak in RUE. Discussed diet and pain control. Oriented to room and bed functions. Bed locked, in lowest position, call light within reach.

## 2021-07-09 NOTE — H&P
History and Physical    Patient: Kym Ritter MRN: [id number]  SSN: [id number]    YOB: 1940  Age: 78 y.o. Sex: female      Subjective:      Kym Ritter is a 78 y.o. female who Has failed conservative care including activity modification, injections and other medication treatments. After failure of conservative treatment and exhausting all of those options she would like to proceed with shoulder replacement. .     Past Medical History:   Diagnosis Date    Adverse effect of anesthesia     Difficulty awakening     Coronary artery disease     1 stent in 2009, Dr. Audrey Pierre follows     COVID-19 vaccine series completed 02/26/2021    Pfizer vaccine     GERD (gastroesophageal reflux disease)     Managed with as needed meds     Hyperlipidemia     Hypertension     managed with meds     Insomnia     LBBB (left bundle branch block)     Multiple thyroid nodules     per endocrinology office note 8/19/19- nodules have resolved, pt has a small thyroid cyst    Multiple thyroid nodules     Osteopenia     Prediabetes     Managed with diet      Past Surgical History:   Procedure Laterality Date    HX APPENDECTOMY  age 13   [de-identified] BLADDER REPAIR      bladder tac    HX COLONOSCOPY  2004 and 2014    HX CORONARY STENT PLACEMENT  2009    HX HYSTERECTOMY  in her 46s    ovaries intact    HX ORTHOPAEDIC      right foot ORIF      Family History   Problem Relation Age of Onset    Diabetes Mother     Coronary Artery Disease Father     Thyroid Disease Maternal Grandmother     Thyroid Cancer Neg Hx      Social History     Tobacco Use    Smoking status: Never Smoker    Smokeless tobacco: Never Used   Substance Use Topics    Alcohol use: No      Prior to Admission medications    Medication Sig Start Date End Date Taking?  Authorizing Provider   simvastatin (ZOCOR) 40 mg tablet TAKE ONE TABLET BY MOUTH EACH NIGHT AT BEDTIME 8/13/20  Yes Andrea Ravi MD   carvediloL (COREG) 12.5 mg tablet TAKE ONE TABLET BY MOUTH TWICE A DAY WITH MEALS  Patient taking differently: 6.25 mg. TAKE ONE TABLET BY MOUTH TWICE A DAY WITH MEALS 2/13/20  Yes Ryan Zhao MD   calcium-cholecalciferol, D3, (CALTRATE 600+D) tablet Take 1 Tablet by mouth daily. 9/29/15  Yes Provider, Historical   aspirin delayed-release 81 mg tablet Take 81 mg by mouth nightly. Yes Provider, Historical   aspirin (ASPIRIN) 325 mg tablet Take 1 Tablet by mouth two (2) times a day for 7 days. To start after surgery  Patient not taking: Reported on 7/9/2021 7/7/21 7/14/21  Yesy Easley MD   naloxegoL (MOVANTIK) 25 mg tab tablet Take 1 Tablet by mouth Daily (before breakfast). To start after surgery  Indications: opiate pain medication causing severe constipation  Patient not taking: Reported on 7/9/2021 7/7/21   Yesy Easley MD   senna-docusate (PERICOLACE) 8.6-50 mg per tablet Take 1 Tablet by mouth daily. To start after surgery  Indications: constipation  Patient not taking: Reported on 7/9/2021 7/7/21   Yesy Easley MD   ondansetron (ZOFRAN ODT) 4 mg disintegrating tablet 1 Tablet by SubLINGual route every six (6) hours as needed for Nausea or Nausea or Vomiting. To start after surgery  Indications: prevent nausea and vomiting after surgery  Patient not taking: Reported on 7/9/2021 7/7/21   Yesy Easley MD   alendronate (FOSAMAX) 70 mg tablet Take 1 Tablet by mouth every seven (7) days. Patient not taking: Reported on 7/9/2021 7/1/21   Ryan Zhao MD        Allergies   Allergen Reactions    Norvasc [Amlodipine] Shortness of Breath and Cough    Lisinopril Cough       Review of Systems:  Pertinent items are noted in the History of Present Illness.     Objective:     Vitals:    07/09/21 1145 07/09/21 1150 07/09/21 1155 07/09/21 1200   BP: (!) 155/67 (!) 154/71 (!) 166/74 (!) 193/84   Pulse: (!) 58 60 68 70   Resp: 16 16 16 16   Temp:       SpO2: 96% 96% 96% 96%   Weight:            Physical Exam:  GEN: General appearance is that of a healthy patient, alert and oriented, in no distress. WDWN. HEENT:  Normocephalic, atraumatic. Extraocular muscles are intact. Neck:  Supple, no lymphadenopathy. Heart:  Regular pulse exam on all extremities. Good color and warmth noted. Lungs:  Normal non-labored breathing with no obvious shortness of breath. Abdomen:  WNL's. Skin:  No signs of rash or bruising. Pysch: Answers questions appropriately, AO X 3. MSK:  Cervical spine: No tenderness. Appropriate active motion. Negative Spurling's maneuver. SHOULDER   Right (involved)  Left   Skin Intact Intact   Radial Pulses 2+ symmetrical  2+ symmetrical   Myotomes Normal Normal   Dermatomes  Normal Normal   ROM Almost symmetric. Still has good active motion Full   Strength Weakness in the scapular plane otherwise fairly normal No weakness   Atrophy None noted None noted   Effusion/Swelling  None None   Palpation Mild lateral shoulder and crepitance No Tenderness   Bicep Tendon Rupture  Negative Negative   Bear Hug, Belly Press Negative Not tested   Crossed Arm Adduction Test Not tested Not tested   Instability/Ant. Apprehension Test None  None   Impingement Limited Mildly limited              Assessment:     Hospital Problems  Date Reviewed: 6/9/2021        Codes Class Noted POA    * (Principal) Right rotator cuff tear arthropathy ICD-10-CM: M75.101, M12.811  ICD-9-CM: 716.81  5/18/2021 Yes              Plan:     The patient desires a right reverse total shoulder replacement. I talked with them extensively about the risks, benefits, reasonable expectations and expected recovery time as well as possible complications including but not limited to bleeding, infection, wear of the components requiring revision, neurovascular injury, instability/dislocations, cosmetic deformity, stiffness, pain, continued problems, DVT, PE, hardware failure,  fracture, heterotopic ossification, MI and other anesthesia related risks, etc.  They seem to understand and wish to proceed.   I gave them education literature and answered their questions.        Signed By: Khushbu Koenig MD     July 9, 2021

## 2021-07-09 NOTE — DISCHARGE INSTRUCTIONS
Total Shoulder Replacement Postoperative Instructions    Returning Home    Care of your incisions: You have absorbable stitches which do not need to be removed. Your incision will be checked at your first visit. 1) Moderate bleeding may occur at the incision sites. This should decrease quickly over time. 2) Leave the dressings in place for 5-7 days. Then dressings may be removed and replaced with fresh gauze if there is any drainage. You may bath or shower but the incisions must be kept clean and dry. You may take your arm out of the sling for showering or bathing but being careful to avoid use of the arm. You may let your arm also hang at your side during showering or at your side during bathing. 3) Watch the wound for increasing redness, tenderness, swelling and pus drainage daily. These can be early signs of infection. Please communicate any concerns. 4) A slight temperature the first few days after surgery is not uncommon. This can be treated with deep breathing and coughing to clear the lungs. However, fevers (anything over 101°F), increasing pain, and swelling at the incisions should be reported immediately. 5) Keep the wounds dry until your first follow-up visit. 6) It is fine to loosen the sling and do elbow straightening and bending with the arm at the side. It is also good to move the wrist and hand. This can be done as much as you desire, but at least three times a day. You are to wear sling at all times except when doing the exercises as above and showers. 7) Deep vein thrombosis (DVT) is a condition in which a blood clot has formed in a deep vein of the leg or arm. This may result in redness, swelling, warmth and/or pain of the leg/arm. Although these are very rare, there are things that can be done to lessen the risk.   It is recommended by your surgeon unless indicated otherwise, after arthroscopy; take an adult aspirin once a day for three weeks after surgery to help prevent the formation of blood clots. (Do not take aspirin against the advice of your medical doctor). Pain Management:    Pain after the surgery will vary from patient to patient. A certain amount of discomfort is normal and should not be alarming. We do recommend starting your prescription pain medications once you begin to experience a moderate amount of pain. If no relief is obtained call the office number on this sheet. Many pain medications contain Tylenol. Do not take additional Tylenol while taking the prescribed pain medication. Pain medications can cause constipation, so keep hydrated and consider over the counter additions like Metamucil or stool softener. Pain medications and antibiotics that are given during the lisa-operative time can cause itching and hives; over the counter Benadryl (25mg every 6 hours) can be helpful in treating this. If your pain is not adequately controlled, contact your physician at the numbers on this sheet. For the first week:  1) Icing for 15 minutes at a time may reduce your pain. Allow at least 30 minutes between ice applications. Some patients may have a cryotherapy cooler which can be used as it was during your stay at the hospital. It is fine to wear a light shirt underneath if needed to prevent skin irritation. 2) Sleeping might be difficult. Some individuals find sleeping in a recliner or inclined with pillows or a foam wedge helpful. 3) Putting a mitten on the hand of the affected shoulder can be helpful since some individuals find that warm hands help decrease pain. During this time nausea and light-headedness are common and should improve in 2-5 days. Drinking fluids and eating may help. If nausea persists, medicine can be prescribed by calling your doctor on the number(s) listed. Follow-up visits  Doctor  Plan on seeing your surgeon 10 days or so after surgery.     Physical Therapy   Physical therapy will be set up on an individualized basis. ? You will want to line up a helper to assist you with your home therapy program for approximately the first 6 weeks. Optimally, this person can come to your first physical therapy visit to have the physical therapist show them how to do the home therapy. The home therapy exercises will need to be done daily for approximately 20 minutes. Your helper doesnt need any medical training; the therapist will show them what they need to know. If you call the office please have us paged instead of leaving a voice mail so that we can immediately take care of your needs.     [address]                 Phone [phone]  [place] [address]                 Fax [phone]            Odessa Jenkins

## 2021-07-09 NOTE — PERIOP NOTES
TRANSFER - OUT REPORT:    Verbal report given to Tere RN(name) on Monserrat Emerson  being transferred to [facility](unit) for routine progression of care       Report consisted of patients Situation, Background, Assessment and   Recommendations(SBAR). Information from the following report(s) SBAR, Kardex, OR Summary, Intake/Output, MAR and Cardiac Rhythm NSR was reviewed with the receiving nurse. Lines:   Peripheral IV 07/09/21 Left;Posterior Hand (Active)   Site Assessment Clean, dry, & intact 07/09/21 1550   Phlebitis Assessment 0 07/09/21 1550   Infiltration Assessment 0 07/09/21 1550   Dressing Status Clean, dry, & intact 07/09/21 1550   Dressing Type Transparent;Tape 07/09/21 1550   Hub Color/Line Status Pink; Infusing 07/09/21 1550        Opportunity for questions and clarification was provided.       Patient transported with:   O2 @ 2 liters

## 2021-07-09 NOTE — PROGRESS NOTES
TRANSFER - IN REPORT:    Verbal report received from Cecilia Blanchard, [facility] on Joce Latham  being received from PACU for routine post - op      Report consisted of patients Situation, Background, Assessment and   Recommendations(SBAR). Information from the following report(s) SBAR, Kardex, OR Summary, Procedure Summary, Intake/Output and MAR was reviewed with the receiving nurse. Opportunity for questions and clarification was provided. Assessment completed upon patients arrival to unit and care assumed.

## 2021-07-09 NOTE — ANESTHESIA PREPROCEDURE EVALUATION
Relevant Problems   No relevant active problems       Anesthetic History   No history of anesthetic complications       Comments: Slow to awaken     Review of Systems / Medical History  Patient summary reviewed and pertinent labs reviewed    Pulmonary  Within defined limits                 Neuro/Psych   Within defined limits           Cardiovascular    Hypertension        Dysrhythmias   CAD and cardiac stents (2009 LAD SHERLYN)    Exercise tolerance: >4 METS     GI/Hepatic/Renal     GERD: well controlled           Endo/Other      Hypothyroidism       Other Findings              Physical Exam    Airway  Mallampati: II  TM Distance: 4 - 6 cm  Neck ROM: normal range of motion   Mouth opening: Normal     Cardiovascular    Rhythm: regular  Rate: normal         Dental    Dentition: Lower partial plate and Full upper dentures     Pulmonary  Breath sounds clear to auscultation               Abdominal         Other Findings            Anesthetic Plan    ASA: 3  Anesthesia type: general      Post-op pain plan if not by surgeon: peripheral nerve block single    Induction: Intravenous  Anesthetic plan and risks discussed with: Patient and Family

## 2021-07-09 NOTE — BRIEF OP NOTE
Brief Postoperative Note    Patient: Kleley Gruber  YOB: 1940  MRN: [id number]    Date of Procedure: 7/9/2021     Pre-Op Diagnosis: Right rotator cuff tear arthropathy [M75.101, M12.811]  Primary osteoarthritis of right shoulder [M19.011]    Post-Op Diagnosis: Same as preoperative diagnosis. Procedure(s):  RIGHT SHOULDER ARTHROPLASTY TOTAL REVERSE      Surgeon(s):  Norma Cedillo MD    Surgical Assistant: Physician Assistant: MARILEE Mcdaniels  Surg Asst-1: Rigoberto Xiong    Anesthesia: General     Estimated Blood Loss (mL): less than 319     Complications: None    Specimens: * No specimens in log *     Implants:   Implant Name Type Inv.  Item Serial No.  Lot No. LRB No. Used Action   STEM HUM [id number] SHLDR AMANDA PRESSFIT EQUINOXE - [id number]  STEM HUM [id number] SHLDR AMANDA PRESSFIT EQUINOXE [id number] [employer]  Right 1 Implanted   BASEPLATE RAAD AUG REVERSED SM RT SUP/POST SHLDR - [id number]  BASEPLATE RAAD AUG REVERSED SM RT SUP/POST [facility] [id number] [employer]WD  Right 1 Implanted   SCR LCK CAP KIT 4.5X30MM RUPERTO -- EQUINOXE - [id number]  SCR LCK CAP KIT 4.5X30MM RUPERTO -- EQUIN[employer]E [id number] [employer]  Right 1 Implanted   KIT BNE SCR L34MM DIA4.5MM RAAD SHLDR RED COMPR ELLIE CAP REV - [id number]  KIT BNE SCR L34MM DIA4.5MM RAAD SHLDR RED COMPR ELLIE CAP REV [id number] [employer]WD  Right 1 Implanted   SCR LCK CAP KIT 4.5X22MM BLK -- EQUINOXE - [id number]  SCR LCK CAP KIT 4.5X22MM BLK -- EQUINOXE [id number] [employer]  Right 1 Implanted   SCR BNE LCK GLENOSPHERE -- EQUINOXE - [id number]  SCR BNE LCK GLENOSPHERE -- [employer]E [id number] [employer]  Right 1 Implanted   SPHERE RAAD SM 36 MM SHLDR GLENOSPHERE RVS EXP EQUINOXE - [id number]  SPHERE RAAD SM 36 MM SHLDR GLENOSPHERE RVS EXP E[employer]IN[employer]E [id number] [employer]  Right 1 Implanted   SCR TORQUE DEFINING KIT -- EQUINOXE - [id number]  SCR TORQUE DEFINING KIT -- [place] [id number] [employer]  Right 1 Implanted   EQUINOXE REVERSE 36MM HUMERAL LINER +0 - [id number]  [facility] REVERSE 36MM HUMERAL LINER +0 [id number] [facility] INC_WD  Right 1 Implanted   TRAY HUM ADPT REV +0 -- E[employer]IN[employer] - [id number]  TRAY HUM ADPT REV +0 -- [facility] [id number] [employer]  Right 1 Implanted       Drains: * No LDAs found *    Findings: See operative note    Electronically Signed by Frank Acuña MD on 7/9/2021 at 3:22 PM

## 2021-07-09 NOTE — OP NOTES
Operative Note    Date of Surgery: 7/9/2021       Preoperative diagnosis:  Right rotator cuff tear arthropathy [M75.101, M12.811]  Primary osteoarthritis of right shoulder [M19.011]    Postoperative diagnosis: Right rotator cuff tear arthropathy [M75.101, M12.811]    Surgeon(s) and Role:     Kevin Villalpando MD - Primary     Assistant: Moni HUNT, assisted during the procedure. She was necessary for patient positioning, wound closure, and assistance with the major portions of the operation. Her presence decreased the operative time and potential complication rate. Anesthesia: General, regional block    Antibiotics:  Ancef 2 grams IV, vancomycin powder 1 gram.    Procedures:  Procedure(s):  RIGHT SHOULDER ARTHROPLASTY TOTAL REVERSE    right Reverse Total Shoulder [id number]    Findings:  1. EUA -flexion passively was 90, abduction 60, external rotation 10  2. Joint -severe glenohumeral arthritic change with rotator cuff tear arthropathy. The subscapularis was intact and of good quality but the supraspinatus and infraspinatus were chronically torn. There was severe superior glenoid wear and loss of cartilage over the humeral head. The biceps tendon was also fairly tendinopathic. Indications / Consent: This is a patient who has continued to have poor function and pain of the right shoulder and at this point it was felt that a reverse shoulder prosthesis was a reasonable option. After previous discussions and treatments using both conservative and/or non-operative treatment options the patient elected to proceed with surgery due to continued symptoms. A review of the risks and benefits, including but not limited to infection, stiffness, injury to nerves and vessels, DVT, PE, MI, need for further operations and other anesthesia related risks was performed with the patient.  After this review and the review of the likely outcome and potential complications of the procedure, preoperative verbal and written consents were obtained. The operative procedure and postoperative course were discussed with the patient in detail and the extremity was marked by the patient and myself. Procedure: The patient was given an anesthetic, placed semi sitting, the head was held in a neutral position, the legs were flexed and pillows were placed under the legs. A canales was placed. Txa 1 gram IV was given by anesthesia. An EUA was performed and noted above. They were then padded and the operative shoulder was prepped with ChloraPrep and draped in the usual fashion. Prior to the beginning of the procedure, a time-out was performed for correct surgical site identification as was marked during the pre-operative meeting. This was confirmed using the written consent and history/physical. Time-out for antibiotic dosing, timing and selection was also performed. The operative arm was connected to an arm london. A longitudinal incision was made from the clavicle over the coracoid and down in the deltopectoral interval. Dissection was carried down through the deltopectoral interval with bovie cauterization as needed. The cephalic vein was identified and freed up and retracted medially. Dissection was carried down to the clavipectoral fascia and conjoined tendon. The subacromial space was then opened and a retractor was then placed under the acromion and under the deltoid. The conjoined tendon was freed up from the subscapularis anteriorly. A finger was placed on the axillary nerve and this position was confirmed. At this point a tonsil was placed into the rotator interval and the area of the subscap was identified superiorly. The pectoralis tendon was then partially incised about 1 cm from its superior border. The biceps tendon was then identified and released from the bicipital groove in an inferior to superior direction. The rotator interval was also released.  The biceps tendon was then secured to the pectoralis tendon with #2 non-absorbable suture and then tenodesed. The three sisters or vessels were coagulated and the subscapularis and capsule was resected away from the proximal humerus with progressive external rotation. A posterior glenoid retractor was placed to push the proximal humerus posteriorly. The anterior and inferior capsule was then dissected from the subscapularis. The subscapularis was pulled up and with a finger on the axillary nerve the capsule on the deep side of the subscapularis was cut so that the subscapularis was freed from the anterior glenoid rim. The capsule was then cut at the inferior glenoid, again with care to protect the axillary nerve running posteriorly. The inferior humeral osteophytes were removed with the rongeur and curved osteotome. Attention was then turned to the proximal humeral cut. With retraction around the proximal humerus a cut was made in the proximal humerus with the CrossLoop cutting guide to remove the articular surface. It was felt that this cut was well placed After the cut had been performed any other further trimming of osteophytes was then accomplished. The proximal humerus canal then had T handled reamers placed until a snug fit was felt in line with the axis of the humerus. At this point the appropriate broaches were then placed. The version was verified on the the handles and it was felt that appropriate version was presentat around 20 degrees. The broach was then removed. At this point the canal was copiously irrigated. A 11 mm stem was selected and hammered into position. A protective plate was then placed over the implant. After this had been done a Darrach retractor was placed on the proximal humerus levering on the posterior glenoid. After this had been done a posterior glenoid retractor was placed. The arm was externally rotated and extended and an anterior glenoid neck retractor was placed. Excessive labrum was removed at this point from around the glenoid.  The glenoid was found to have worn cartilage. The rest of the residual labrum was then removed with a 15 blade and electrocautery. The inferior aspect of the scapula was freed up. The glenoid at this point was able to be seen very clearly front and back. Soft tissue resection was adequate around the glenoid and coracoid for the GPS tracking system preparation. The tracker was then placed on the coracoid and screwed into position with the two screws from the set. It was felt to have good purchase and was stable. The tracker tip was then used to sync the system. Once all points were obtained and felt to be a good representation of the anatomy the center drill was used. Using the targeting guide from the GPS system, the center hole was drilled according to the preoperative plan. The gps reamer was then used to prepare the glenoid for the preplanned implant position. The glenoid was prepared using the GPS system for the preplanned position of the implant. The glenoid implant was selected and bone graft from the humeral head was placed into the peg. The implant was then placed with appropriate orientation on the glenoid. The locking screw guide was then placed over the baseplate. The locking screw holes were then drilled with the appropriate drill bit, using the GPS system. The locking screws were then placed. 3 screws were placed into the base plate with good purchase. Once the baseplate was secured the glenosphere was placed and secured with the center screw. It was screwed down into position and felt to have a very secure bite at this point. The baseplate was then copiously irrigated. Attention was then returned to the humeral component. The humeral component trials were placed and the shoulder reduced. The shoulder reduction was felt to be satisfactorily stable, allowing good motion, with no evidence of instability. The final implant stem, baseplate and poly component were then fixed together.  A small amount of bone graft from the humeral head was placed at the calcar. Vancomycin powder was placed into the humeral shaft. The implant was correctly placed in the humerus with appropriate retroversion. The shoulder was reduced and it was felt an appropriate tension was obtained at this point while still maintaining good range of motion of the shoulder without impinging anywhere. The wound was irrigated well. The soft tissues were injected with a mixture of epi and Naropin along the posterior capsule and along the subcutaneous tissue. The interval was loosely approximated with 2-0 ethibond and then Txa was injected into the empty space. The subcutaneous tissue was closed with 2-0 Monocryl, skin was closed with subcuticular 2-0 monocryl. Derma bond and a sterile dressing was applied on the shoulder. The patient was put in a slight abduction sling and returned to the recovery room in satisfactory condition. There were no known intraoperative complications. All counts were correct. Post-operative plan: Will begin reverse TSA post op protocol. Estimated Blood Loss:   100 ml    Fluids:    see anesthesia notes. Implant:   Implant Name Type Inv.  Item Serial No.  Lot No. LRB No. Used Action   STEM HUM [id number] SHLDR AMANDA PRESSFIT EQUINOXE - [id number]  STEM HUM [id number] SHLDR AMANDA PRESSFIT EQUINOXE [id number] [employer]WD  Right 1 Implanted   BASEPLATE RAAD AUG REVERSED SM RT SUP/POST SHLDR - [id number]  BASEPLATE RAAD AUG REVERSED SM RT SUP/POST [facility] [id number] [employer]_WD  Right 1 Implanted   SCR LCK CAP KIT 4.5X30MM RUPERTO -- EQUINOXE - [id number]  SCR LCK CAP KIT 4.5X30MM RUPERTO -- EQUIN[employer]E [id number] [employer]  Right 1 Implanted   KIT BNE SCR L34MM DIA4.5MM RAAD SHLDR RED COMPR ELLIE CAP REV - [id number]  KIT BNE SCR L34MM DIA4.5MM RAAD SHLDR RED COMPR ELLIE CAP REV [id number] [employer]WD  Right 1 Implanted   SCR LCK CAP KIT 4.5X22MM BLK -- EQUINOXE - [id number]  SCR LCK CAP KIT 4.5X22MM BLK -- EQUINOXE [id number] [employer]  Right 1 Implanted   SCR BNE LCK GLENOSPHERE -- EQUINOXE - [id number]  SCR BNE LCK GLENOSPHERE -- E[employer]INOXE [id number] [employer]  Right 1 Implanted   SPHERE RAAD SM 36 MM SHLDR GLENOSPHERE RVS EXP EQUINOXE - [id number]  SPHERE RAAD SM 36 MM SHLDR GLENOSPHERE RVS EXP [employer] [id number] [employer] [facility]  Right 1 Implanted   SCR TORQUE DEFINING KIT -- EQUINOXE - [id number]  SCR TORQUE DEFINING KIT -- Dwyane Lown [id number] [employer]  Right 1 Implanted   EQUINOXE REVERSE 36MM HUMERAL LINER +0 - [id number]  EQUINOXE REVERSE 36MM HUMERAL LINER +0 [id number] [employer] [facility]  Right 1 Implanted   TRAY HUM ADPT REV +0 -- E[employer]E - [id number]  TRAY HUM ADPT REV +0 -- Dwyane Lown [id number] [employer]  Right 1 Implanted       Closure: Primary    Complications: None    Signed By: Marce Barron MD

## 2021-07-09 NOTE — ANESTHESIA PROCEDURE NOTES
Peripheral Block    Start time: 7/9/2021 11:37 AM  End time: 7/9/2021 11:41 AM  Performed by: Chelly Quintanilla MD  Authorized by: Chelly Quintanilla MD       Pre-procedure: Indications: at surgeon's request and post-op pain management    Preanesthetic Checklist: patient identified, risks and benefits discussed, site marked, timeout performed, anesthesia consent given and patient being monitored    Timeout Time: 11:37 EDT          Block Type:   Block Type:   Interscalene  Laterality:  Right  Monitoring:  Continuous pulse ox, frequent vital sign checks, heart rate, responsive to questions and oxygen  Injection Technique:  Single shot  Procedures: ultrasound guided    Patient Position: supine (head elevated 45 degrees)  Prep: chlorhexidine    Location:  Interscalene  Needle Type:  Stimuplex  Needle Gauge:  20 G  Needle Localization:  Ultrasound guidance  Medication Injected:  Bupivacaine (PF) (MARCAINE) 0.5% injection, 20 mL  EPINEPHrine HCl (PF) (ADRENALIN) 1 mg/mL (1 mL) injection, 0.1 mg  Med Admin Time: 7/9/2021 11:41 AM    Assessment:  Number of attempts:  1  Injection Assessment:  Incremental injection every 5 mL, local visualized surrounding nerve on ultrasound, negative aspiration for blood, no paresthesia, ultrasound image on chart and no intravascular symptoms  Patient tolerance:  Patient tolerated the procedure well with no immediate complications

## 2021-07-09 NOTE — ANESTHESIA POSTPROCEDURE EVALUATION
Procedure(s):  RIGHT SHOULDER ARTHROPLASTY TOTAL REVERSE  .    general    Anesthesia Post Evaluation      Multimodal analgesia: multimodal analgesia used between 6 hours prior to anesthesia start to PACU discharge  Patient location during evaluation: PACU  Patient participation: complete - patient participated  Level of consciousness: awake and alert  Pain management: adequate  Airway patency: patent  Anesthetic complications: no  Cardiovascular status: acceptable  Respiratory status: acceptable  Hydration status: acceptable  Post anesthesia nausea and vomiting:  none      INITIAL Post-op Vital signs:   Vitals Value Taken Time   /72 07/09/21 1605   Temp 36.7 °C (98 °F) 07/09/21 1550   Pulse 65 07/09/21 1605   Resp 16 07/09/21 1605   SpO2 95 % 07/09/21 1605

## 2021-07-10 LAB
HCT VFR BLD AUTO: 32 % (ref 35.8–46.3)
HGB BLD-MCNC: 9.9 G/DL (ref 11.7–15.4)

## 2021-07-10 PROCEDURE — 2709999900 HC NON-CHARGEABLE SUPPLY

## 2021-07-10 PROCEDURE — 36415 COLL VENOUS BLD VENIPUNCTURE: CPT

## 2021-07-10 PROCEDURE — 97165 OT EVAL LOW COMPLEX 30 MIN: CPT

## 2021-07-10 PROCEDURE — 97530 THERAPEUTIC ACTIVITIES: CPT

## 2021-07-10 PROCEDURE — 85018 HEMOGLOBIN: CPT

## 2021-07-10 PROCEDURE — 97535 SELF CARE MNGMENT TRAINING: CPT

## 2021-07-10 PROCEDURE — 74011250637 HC RX REV CODE- 250/637: Performed by: ORTHOPAEDIC SURGERY

## 2021-07-10 PROCEDURE — 65270000029 HC RM PRIVATE

## 2021-07-10 PROCEDURE — 97162 PT EVAL MOD COMPLEX 30 MIN: CPT

## 2021-07-10 PROCEDURE — 74011250636 HC RX REV CODE- 250/636: Performed by: ORTHOPAEDIC SURGERY

## 2021-07-10 PROCEDURE — 99024 POSTOP FOLLOW-UP VISIT: CPT | Performed by: PHYSICIAN ASSISTANT

## 2021-07-10 PROCEDURE — 97110 THERAPEUTIC EXERCISES: CPT

## 2021-07-10 RX ADMIN — ASPIRIN 325 MG: 325 TABLET, COATED ORAL at 08:13

## 2021-07-10 RX ADMIN — OXYCODONE HYDROCHLORIDE AND ACETAMINOPHEN 2 TABLET: 7.5; 325 TABLET ORAL at 03:15

## 2021-07-10 RX ADMIN — Medication 10 ML: at 21:18

## 2021-07-10 RX ADMIN — OXYCODONE HYDROCHLORIDE AND ACETAMINOPHEN 0.5 TABLET: 7.5; 325 TABLET ORAL at 15:46

## 2021-07-10 RX ADMIN — ATORVASTATIN CALCIUM 20 MG: 10 TABLET, FILM COATED ORAL at 21:17

## 2021-07-10 RX ADMIN — OXYCODONE HYDROCHLORIDE AND ACETAMINOPHEN 1 TABLET: 7.5; 325 TABLET ORAL at 21:17

## 2021-07-10 RX ADMIN — CEFAZOLIN SODIUM 2 G: 100 INJECTION, POWDER, LYOPHILIZED, FOR SOLUTION INTRAVENOUS at 02:00

## 2021-07-10 RX ADMIN — OXYCODONE HYDROCHLORIDE AND ACETAMINOPHEN 1 TABLET: 7.5; 325 TABLET ORAL at 08:13

## 2021-07-10 RX ADMIN — Medication 10 ML: at 01:23

## 2021-07-10 NOTE — PROGRESS NOTES
Problem: Mobility Impaired (Adult and Pediatric)  Goal: *Acute Goals and Plan of Care (Insert Text)  Outcome: Progressing Towards Goal  Note: GOALS (1-4 days):  (1.)  Patient will move from supine to sit and sit to supine  in bed with SUPERVISION. (2.)  Patient will transfer from bed to chair and chair to bed with SUPERVISION using the least restrictive device. (3.)  Patient will ambulate with SUPERVISION for 300 feet with the least restrictive device. (4.)  Patient will be safe with shoulder HEP, with caregiver's help, to increase range of motion per MD orders. ________________________________________________________________________________________________      PHYSICAL THERAPY: Initial Assessment, Treatment Day: Day of Assessment, and AM 7/10/2021  INPATIENT: Hospital Day: 2  [place] / [place] / Product Type: Medicare /      NAME/AGE/GENDER: Jennifer Lopez is a 78 y.o. female   PRIMARY DIAGNOSIS: Right rotator cuff tear arthropathy [M75.101, M12.811]  Primary osteoarthritis of right shoulder [M19.011]  Shoulder arthritis [M19.019] Right rotator cuff tear arthropathy Right rotator cuff tear arthropathy  Procedure(s) (LRB):  RIGHT SHOULDER ARTHROPLASTY TOTAL REVERSE   (Right)  1 Day Post-Op  ICD-10: Treatment Diagnosis:   Pain in Right Shoulder (M25.511)  Stiffness of Right Shoulder, Not elsewhere classified (M25.611)  Generalized Muscle Weakness (M62.81)  Other lack of cordination (R27.8)  Difficulty in walking, Not elsewhere classified (R26.2)   Precaution/Allergies:  Norvasc [amlodipine] and Lisinopril      ASSESSMENT:     Ms. Willy Moore presents with stiff & sore right UE along with mildly unsteady functional  mobility. This pt will benefit from follow up therapy to help restore safe function & to establish HEP. This pt has a good DC plan to home with a caregiver.     This section established at most recent assessment   PROBLEM LIST (Impairments causing functional limitations):  Decreased Thomas with Bed Mobility  Decreased Thomas with Transfers  Decreased Thomas with Ambulation   Decreased Thomas with shoulder HEP   INTERVENTIONS PLANNED: (Benefits and precautions of physical therapy have been discussed with the patient.)  Bed Mobility Training  Transfer Training  Gait Training  Therapeutic Exercises per MD orders  Modalities for Pain     TREATMENT PLAN: Frequency/Duration: twice daily for duration of hospital stay  Rehabilitation Potential For Stated Goals: Good     RECOMMENDED REHABILITATION/EQUIPMENT: (at time of discharge pending progress): Continue Skilled Therapy and Outpatient: Physical Therapy. HISTORY:   History of Present Injury/Illness (Reason for Referral):  Reverse right TSA  Past Medical History/Comorbidities:   Ms. Jonathan Orantes  has a past medical history of Adverse effect of anesthesia, Coronary artery disease, COVID-19 vaccine series completed (02/26/2021), GERD (gastroesophageal reflux disease), Hyperlipidemia, Hypertension, Insomnia, LBBB (left bundle branch block), Multiple thyroid nodules, Multiple thyroid nodules, Osteopenia, and Prediabetes. Ms. Jonathan Orantes  has a past surgical history that includes hx hysterectomy (in her 46s); hx appendectomy (age 13); hx bladder repair; hx coronary stent placement (2009); hx colonoscopy (2004 and 2014); and hx orthopaedic.   Social History/Living Environment:   Home Environment: Private residence  # Steps to Enter: 0  One/Two Story Residence: One story  Living Alone: No  Support Systems: Child(dandy)  Patient Expects to be Discharged to[de-identified] House  Current DME Used/Available at Home: None  Prior Level of Function/Work/Activity:  Pt was independent without an assistive device prior to this admission   Number of Personal Factors/Comorbidities that affect the Plan of Care: 3+: HIGH COMPLEXITY   EXAMINATION:   Most Recent Physical Functioning:   Gross Assessment:  AROM: Within functional limits (left UE & both LE's)  Strength: Within functional limits (left UE & both LE's)  Coordination: Within functional limits (left UE & both LE's)                    Balance:  Sitting: Intact; Without support  Standing: Impaired; Without support Bed Mobility:  Supine to Sit: Contact guard assistance  Sit to Supine:  (NT)  Scooting: Contact guard assistance       Transfers:  Sit to Stand: Contact guard assistance  Stand to Sit: Contact guard assistance  Bed to Chair: Contact guard assistance  Gait:     Speed/Gertrudis: Delayed  Gait Abnormalities: Decreased step clearance (mild sway)  Distance (ft): 150 Feet (ft)  Ambulation - Level of Assistance: Contact guard assistance   Functional Mobility:         Gait/Ambulation:  cga        Transfers:  cga        Bed Mobility:  cga   Body Structures Involved:  Joints  Muscles Body Functions Affected:  Sensory/Pain  Movement Related Activities and Participation Affected:  General Tasks and Demands  Mobility   Number of elements that affect the Plan of Care: 4+: HIGH COMPLEXITY   CLINICAL PRESENTATION:   Presentation: Evolving clinical presentation with changing clinical characteristics: MODERATE COMPLEXITY   CLINICAL DECISION MAKIN [facility] [address] [address] AM-PAC 6 Clicks   Basic Mobility Inpatient Short Form  How much difficulty does the patient currently have. .. Unable A Lot A Little None   1. Turning over in bed (including adjusting bedclothes, sheets and blankets)? [] 1   [] 2   [x] 3   [] 4   2. Sitting down on and standing up from a chair with arms ( e.g., wheelchair, bedside commode, etc.)   [] 1   [] 2   [x] 3   [] 4   3. Moving from lying on back to sitting on the side of the bed? [] 1   [] 2   [x] 3   [] 4   How much help from another person does the patient currently need. .. Total A Lot A Little None   4. Moving to and from a bed to a chair (including a wheelchair)? [] 1   [] 2   [x] 3   [] 4   5. Need to walk in hospital room? [] 1   [] 2   [x] 3   [] 4   6.   Climbing 3-5 steps with a railing? [x] 1   [] 2   [] 3   [] 4   © 2007, Trustees of [address] [place] [address] [address] [address], under license to [employer]. All rights reserved      Score:  Initial: 16 Most Recent: X (Date: -- )    Interpretation of Tool:  Represents activities that are increasingly more difficult (i.e. Bed mobility, Transfers, Gait). Medical Necessity:     Patient is expected to demonstrate progress in   strength, range of motion, balance, coordination, and functional technique   to   decrease assistance required with bed mobility, transfers & gait  . Reason for Services/Other Comments:  Patient continues to require skilled intervention due to   Pt not safe with functional mobility & HEP  . Use of outcome tool(s) and clinical judgement create a POC that gives a: Questionable prediction of patient's progress: MODERATE COMPLEXITY            TREATMENT:   (In addition to Assessment/Re-Assessment sessions the following treatments were rendered)   Pre-treatment Symptoms/Complaints:  fatigue  Pain: Initial: numeric scale  Pain Intensity 1: 3  Pain Location 1: Shoulder  Pain Orientation 1: Right  Pain Intervention(s) 1: Cold pack, Repositioned  Post Session:  3/10     Therapeutic Exercise: (12 Minutes):  Exercises per grid below to improve mobility and dynamic movement of arm - right to improve functional endurance . Required minimal verbal cues to promote proper body alignment and promote proper body mechanics.     Assessment     Date:  7/10 Date:   Date:     ACTIVITY/EXERCISE AM PM AM PM AM PM   Gripping 15        Wrist Flexion/Extension 15        Wrist Ulnar/Radial Deviation         Pronation/Supination 15aa        Elbow Flexion/Extension 15aa        Shoulder Flexion/Extension         Shoulder AB/ADduction         Shoulder IR/ER         Pulleys         Pendulums 15p        Shrugs         Isometric:                 Flexion         Extension         ABduction         ADduction         Biceps/Triceps                  B = bilateral; AA = active assistive; A = active; P = passive  Education:  [x]  Home Exercises  [x]  Sling Application   [x]  Movement Precautions   []  Pulleys   []  Use of Ice   []  Other:   Treatment/Session Assessment:    Response to Treatment:  pt was easily fatigued with activity but participated well  Interdisciplinary Collaboration:   Registered Nurse  After treatment position/precautions:   Up in chair  Bed/Chair-wheels locked  Caregiver at bedside  Call light within reach  RN notified  Family at bedside   Compliance with Program/Exercises: Will assess as treatment progresses. Recommendations/Intent for next treatment session:  Treatment next visit will focus on increasing Ms. Meyer's independence with bed mobility, transfers, gait training, strength/ROM exercises, modalities for pain, and patient education.    Total Treatment Duration:  PT Patient Time In/Time Out  Time In: 1050  Time Out: [address], PT

## 2021-07-10 NOTE — PROGRESS NOTES
Pt ambulated to chair for breakfast, pt feeling weak and dizzy, BP taken and stable, pt back to bed with help of OT and feeling better.

## 2021-07-10 NOTE — PROGRESS NOTES
07/09/21 2037   Oxygen Therapy   O2 Sat (%) 96 %   Pulse via Oximetry 70 beats per minute   O2 Device Heated; Hi flow nasal cannula   O2 Flow Rate (L/min) 35 l/min   O2 Temperature 87.8 °F (31 °C)   FIO2 (%) 21 %   Pt connected to c/s monitor. Alarms on and functioning- set per protocol. Pt working on IS, encourage to keep practicing. Set up on Airvo at settings above, patient could not tolerate 40L stated it was too strong of flow. No distress noted at this time.

## 2021-07-10 NOTE — PROGRESS NOTES
July 10, 2021         Post Op day: 1 Day Post-OpProcedure(s) (LRB):  RIGHT SHOULDER ARTHROPLASTY TOTAL REVERSE   (Right)      Admit Date: 2021  Admit Diagnosis: Right rotator cuff tear arthropathy [M75.101, M12.811]  Primary osteoarthritis of right shoulder [M19.011]  Shoulder arthritis [M19.019]    LAB:    Recent Results (from the past 24 hour(s))   TYPE & SCREEN    Collection Time: 21 11:01 AM   Result Value Ref Range    Crossmatch Expiration 2021,2359     ABO/Rh(D) A POSITIVE     Antibody screen NEG    HGB & HCT    Collection Time: 07/10/21  4:34 AM   Result Value Ref Range    HGB 9.9 (L) 11.7 - 15.4 g/dL    HCT 32.0 (L) 35.8 - 46.3 %     Vital Signs:    Patient Vitals for the past 8 hrs:   BP Temp Pulse Resp SpO2   07/10/21 0733 (!) 101/49 98.1 °F (36.7 °C) 68 16 91 %   07/10/21 0323 123/63 98.7 °F (37.1 °C) 73 18 93 %     Temp (24hrs), Av.2 °F (36.8 °C), Min:97.5 °F (36.4 °C), Max:98.7 °F (37.1 °C)    Body mass index is 23.41 kg/m². Pain Control:   Pain Assessment  Pain Scale 1: Numeric (0 - 10)  Pain Intensity 1: 4  Pain Location 1: Shoulder  Pain Orientation 1: Right  Pain Description 1: Aching  Pain Intervention(s) 1: Rest    Subjective: Doing well, No complaints, No SOB, No Chest Pain, No nausea or vomiting     Objective: Vital Signs are Stable, No Acute Distress, Alert and Oriented, Dressing is dry,  Neurovascular exam is normal.       PT/OT:         Activity Response:  Tolerated well  Assistive Device: Fall prevention device                Wieght Bearing Status: WBAT    Meds:  [unfilled]  [unfilled]  [unfilled]    Assessment:   Patient Active Problem List   Diagnosis Code    Coronary artery disease involving native coronary artery of native heart without angina pectoris I25.10    Essential hypertension I10    Mixed hyperlipidemia E78.2    Prediabetes R73.03    Osteopenia of multiple sites M85.89    Multiple thyroid nodules E04.2    Right rotator cuff tear arthropathy M75.101, M12.811    Osteoarthritis of right shoulder M19.011    Shoulder arthritis M19.019             Plan: Continue Physical Therapy, Monitor labs, try ambulating about every hour with assistance to prevent tingling in her feet. This was present prior to her surgery and is not new. Hopefully home tomorrow.         Signed By: MARILEE Lacy

## 2021-07-10 NOTE — PROGRESS NOTES
Problem: Self Care Deficits Care Plan (Adult)  Goal: *Acute Goals and Plan of Care (Insert Text)  Outcome: Resolved/Met  Note: 1. Patient will maintain NWB status and shoulder precautions per MD orders for entire treatment session with min verbal/tactile cues from therapist. Bandar Gallardo MET 7/10/2021    2. Patient will complete upper body bathing and dressing with mod to max  assist using adaptive dressing techniques as needed. GOAL MET 7/10/2021    3. Patient and family will demonstrate understanding of sling don and doffing. GOAL MET 7/10/2021    4. Patient will perform ADL functional mobility in room with CGA. Blake Gama GOAL MET 7/10/2021      Timeframe: 2 visits        OCCUPATIONAL THERAPY: Initial Assessment, Daily Note, Discharge, and PM 7/10/2021  INPATIENT: OT Visit Days: 1  [place] / Product Type: Medicare /      NAME/AGE/GENDER: Kym Ritter is a 78 y.o. female   PRIMARY DIAGNOSIS:  Right rotator cuff tear arthropathy [M75.101, M12.811]  Primary osteoarthritis of right shoulder [M19.011]  Shoulder arthritis [M19.019] Right rotator cuff tear arthropathy Right rotator cuff tear arthropathy  Procedure(s) (LRB):  RIGHT SHOULDER ARTHROPLASTY TOTAL REVERSE   (Right)  1 Day Post-Op  ICD-10: Treatment Diagnosis:    Pain in Right Shoulder (M25.511)  Stiffness of Right Shoulder, Not elsewhere classified (M25.611)  Other lack of cordination (R27.8)  Difficulty in walking, Not elsewhere classified (R26.2)  Other abnormalities of gait and mobility (R26.89)   Precautions/Allergies:     Norvasc [amlodipine] and Lisinopril      ASSESSMENT:     Ms. Cordelia Stone presents sitting up in recliner wanting to do her shower. She stood and ambulated to the bathroom with hand held assist. She sat on toilet and urinated. She did her own hygiene. She was min assist sit to stand from the low commode. She transferred into the shower. Her 2 daughters were present. OT educated on doffing sling. Patient showered, dried and dressed using one handed technique. She donned her sling max assist. She wanted to walk around the room a bit she was initiatlly hand held assist but then SBA. She returned to recliner. OT answered all questions from Ms. Meyer and her daughters. They have a raised toilet seat at home and a tranfers tub bench for her tub. She met all her goals. Will discharge OT. This section established at most recent assessment   PROBLEM LIST (Impairments causing functional limitations):  Decreased ADL/Functional Activities  Decreased Transfer Abilities  Decreased Ambulation Ability/Technique  Decreased Balance  Decreased Flexibility/Joint Mobility  Decreased Knowledge of Precautions   INTERVENTIONS PLANNED: (Benefits and precautions of occupational therapy have been discussed with the patient.)  Activities of daily living training  Adaptive equipment training  Balance training  Clothing management  Donning&doffing training  [place] tech training  Hygiene training     TREATMENT PLAN: Frequency/Duration: Follow patient 1 time to address above goals. Rehabilitation Potential For Stated Goals: Good     REHAB RECOMMENDATIONS (at time of discharge pending progress):    Placement: It is my opinion, based on this patient's performance to date, that Ms. Meyer may benefit from being discharged with NO further skilled Occupational therapy indicated at this time. Equipment:   None at this time              OCCUPATIONAL PROFILE AND HISTORY:   History of Present Injury/Illness (Reason for Referral):  See H and p  Past Medical History/Comorbidities:   Ms. Houston Kennedy  has a past medical history of Adverse effect of anesthesia, Coronary artery disease, COVID-19 vaccine series completed (02/26/2021), GERD (gastroesophageal reflux disease), Hyperlipidemia, Hypertension, Insomnia, LBBB (left bundle branch block), Multiple thyroid nodules, Multiple thyroid nodules, Osteopenia, and Prediabetes.   Ms. Houston Kennedy  has a past surgical history that includes hx hysterectomy (in her 46s); hx appendectomy (age 13); hx bladder repair; hx coronary stent placement (2009); hx colonoscopy (2004 and 2014); and hx orthopaedic. Social History/Living Environment:   Home Environment: Private residence  # Steps to Enter: 0  One/Two Story Residence: One story  Living Alone: No  Support Systems: Child(dandy)  Patient Expects to be Discharged to[de-identified] [employer]  Current DME Used/Available at Home: Raised toilet seat, Tub transfer bench  Tub or Shower Type: Tub/Shower combination  Prior Level of Function/Work/Activity:  Mod I     Number of Personal Factors/Comorbidities that affect the Plan of Care: Brief history (0):  LOW COMPLEXITY   ASSESSMENT OF OCCUPATIONAL PERFORMANCE[de-identified]   Activities of Daily Living:   Basic ADLs (From Assessment) Complex ADLs (From Assessment)   Feeding: Setup  Oral Facial Hygiene/Grooming: Minimum assistance  Bathing: Moderate assistance  Upper Body Dressing: Maximum assistance  Lower Body Dressing: Maximum assistance  Toileting: Moderate assistance     Grooming/Bathing/Dressing Activities of Daily Living   Grooming  Grooming Assistance: Supervision  Position Performed: Seated in chair  Washing Face: Supervision Cognitive Retraining  Safety/Judgement: Awareness of environment; Fall prevention   Upper Body Bathing  Bathing Assistance: Minimum assistance; Moderate assistance  Position Performed: Seated in chair  Adaptive Equipment: Tub bench     Lower Body Bathing  Bathing Assistance: Contact guard assistance;Minimum assistance; Moderate assistance  Perineal  : Contact guard assistance  Position Performed: Standing  Lower Body : Minimum assistance; Moderate assistance  Position Performed: Seated in chair  Adaptive Equipment: Grab bar;Tub bench     Upper Body Dressing Assistance  Dressing Assistance: Maximum assistance  Orthotics(Brace): Maximum assistance; Total assistance (dependent) (sling don and doff)  Front Opened Shirt: Maximum assistance Functional Transfers  Bathroom Mobility: Contact guard assistance  Toilet Transfer : Contact guard assistance;Minimum assistance  Shower Transfer: Contact guard assistance   Lower Body Dressing Assistance  Dressing Assistance: Moderate assistance;Maximum assistance  Underpants: Moderate assistance;Maximum assistance Bed/Mat Mobility  Supine to Sit: Contact guard assistance  Sit to Supine:  (NT)  Sit to Stand: Contact guard assistance  Stand to Sit: Contact guard assistance  Bed to Chair: Contact guard assistance  Scooting: Contact guard assistance     Most Recent Physical Functioning:   Gross Assessment:                  Posture:     Balance:  Sitting: Intact  Standing: Impaired;Pull to stand; With support  Standing - Static: Good  Standing - Dynamic : Fair Bed Mobility:  Supine to Sit: Contact guard assistance  Sit to Supine:  (NT)  Scooting: Contact guard assistance  Wheelchair Mobility:     Transfers:  Sit to Stand: Contact guard assistance  Stand to Sit: Contact guard assistance  Bed to Chair: Contact guard assistance            Patient Vitals for the past 6 hrs:   BP SpO2 Pulse   07/10/21 1316 (!) 123/52 90 % 76       Mental Status  Neurologic State: Alert, Appropriate for age  Orientation Level: Appropriate for age  Cognition: Appropriate decision making, Appropriate for age attention/concentration, Appropriate safety awareness, Follows commands  Perception: Appears intact  Perseveration: No perseveration noted  Safety/Judgement: Awareness of environment, Fall prevention            LLE Assessment  LLE Assessment (WDL): Exception to WDL RLE Assessment  RLE Assessment (WDL): Exceptions to WDL           Physical Skills Involved:  Strength  Activity Tolerance  Pain (acute) Cognitive Skills Affected (resulting in the inability to perform in a timely and safe manner):  none Psychosocial Skills Affected:  Habits/Routines  Self-Awareness   Number of elements that affect the Plan of Care: 3-5:  MODERATE COMPLEXITY   CLINICAL DECISION MAKIN[address] [place] [address] AM-PAC 6 Clicks   Daily Activity Inpatient Short Form  How much help from another person does the patient currently need. .. Total A Lot A Little None   1. Putting on and taking off regular lower body clothing? [] 1   [x] 2   [] 3   [] 4   2. Bathing (including washing, rinsing, drying)? [] 1   [x] 2   [] 3   [] 4   3. Toileting, which includes using toilet, bedpan or urinal?   [] 1   [x] 2   [] 3   [] 4   4. Putting on and taking off regular upper body clothing? [] 1   [x] 2   [] 3   [] 4   5. Taking care of personal grooming such as brushing teeth? [] 1   [] 2   [x] 3   [] 4   6. Eating meals? [] 1   [] 2   [] 3   [x] 4   © , Trustees of [address] [place] [address], under license to [employer]. All rights reserved      Score:  Initial: 15 Most Recent:  15 discharge    Interpretation of Tool:  Represents activities that are increasingly more difficult (i.e. Bed mobility, Transfers, Gait). Use of outcome tool(s) and clinical judgement create a POC that gives a: LOW COMPLEXITY         TREATMENT:   (In addition to Assessment/Re-Assessment sessions the following treatments were rendered)     Pre-treatment Symptoms/Complaints:    Pain: Initial:   Pain Intensity 1: 4  Pain Location 1: Shoulder  Pain Orientation 1: Right  Pain Intervention(s) 1: Shower  Post Session:  4/10 nurse notified     Self Care: (40): Procedure(s) (per grid) utilized to improve and/or restore self-care/home management as related to dressing, bathing, toileting, grooming, and functional mobility and oswald dressing techniques .  Required moderate visual, verbal, manual, and tactile cueing to facilitate activities of daily living skills, compensatory activities, and   .    Assessment/Reassessment  completed    Braces/Orthotics/Lines/Etc:   O2 Device: None (Room air)  Treatment/Session Assessment:    Response to Treatment:  tolerated well  Interdisciplinary Collaboration:   Physical Therapist  Occupational Therapist  Registered Nurse  After treatment position/precautions:   Up in chair  Bed/Chair-wheels locked  Bed in low position  Call light within reach  RN notified  Family at bedside   Compliance with Program/Exercises: Compliant all of the time.    Discharge OT  Total Treatment Duration:40  OT Patient Time In/Time Out  Time In: 1450  Time Out: [address]  OT

## 2021-07-10 NOTE — PROGRESS NOTES
OT note: late entry, attempted to see patient 3 times this am. She declined shower. Will check back this afternoon for potential shower.  She plans to spend the night.   hannah dumas OTR/ELMA

## 2021-07-10 NOTE — PROGRESS NOTES
Care Management Interventions  PCP Verified by CM: Yes  Current Support Network: Other  Discharge Location  Discharge Placement: Home with family assistance  78-yr-old F with TSA with Dr. Lashonda Cruz. Chart screened by  for discharge planning. Pt will follow-up with Dr. Lashonda Cruz after discharge. Dr. Lashonda Cruz will arrange any needed outpatient therapy. No needs identified at this time. Please consult  if any new issues arise.

## 2021-07-10 NOTE — PROGRESS NOTES
Problem: Mobility Impaired (Adult and Pediatric)  Goal: *Acute Goals and Plan of Care (Insert Text)  Outcome: Progressing Towards Goal  Note: GOALS (1-4 days):  (1.)  Patient will move from supine to sit and sit to supine  in bed with SUPERVISION. (2.)  Patient will transfer from bed to chair and chair to bed with SUPERVISION using the least restrictive device. (3.)  Patient will ambulate with SUPERVISION for 300 feet with the least restrictive device. (4.)  Patient will be safe with shoulder HEP, with caregiver's help, to increase range of motion per MD orders. ________________________________________________________________________________________________      PHYSICAL THERAPY: Daily Note, Treatment Day: Day of Assessment and PM 7/10/2021  INPATIENT: Hospital Day: 2  [place] / Product Type: Medicare /      NAME/AGE/GENDER: Joce Latham is a 78 y.o. female   PRIMARY DIAGNOSIS: Right rotator cuff tear arthropathy [M75.101, M12.811]  Primary osteoarthritis of right shoulder [M19.011]  Shoulder arthritis [M19.019] Right rotator cuff tear arthropathy Right rotator cuff tear arthropathy  Procedure(s) (LRB):  RIGHT SHOULDER ARTHROPLASTY TOTAL REVERSE   (Right)  1 Day Post-Op  ICD-10: Treatment Diagnosis:   · Pain in Right Shoulder (M25.511)  · Stiffness of Right Shoulder, Not elsewhere classified (M25.611)  · Generalized Muscle Weakness (M62.81)  · Other lack of cordination (R27.8)  · Difficulty in walking, Not elsewhere classified (R26.2)   Precaution/Allergies:  Norvasc [amlodipine] and Lisinopril      ASSESSMENT:     Ms. Juan Luis Contreras presents with stiff & sore right UE along with mildly unsteady functional  mobility. This pt will benefit from follow up therapy to help restore safe function & to establish HEP. This pt has a good DC plan to home with a caregiver. In pm session pt reviewed HEP with caregiver.  Pt's daughter was shown how to assist pt with elbow, forearm & pendulum exercises. Daughter also reviewed how to apply ultra-sling to pt & encouraged daughter to help pt through 1 more session of exercises later this pm.  Pt's gait still unsteady. This section established at most recent assessment   PROBLEM LIST (Impairments causing functional limitations):  1. Decreased [place] with Bed Mobility  2. Decreased [place] with Transfers  3. Decreased [place] with Ambulation   4. Decreased [place] with shoulder HEP   INTERVENTIONS PLANNED: (Benefits and precautions of physical therapy have been discussed with the patient.)  1. Bed Mobility Training  2. Transfer Training  3. Gait Training  4. Therapeutic Exercises per MD orders  5. Modalities for Pain     TREATMENT PLAN: Frequency/Duration: twice daily for duration of hospital stay  Rehabilitation Potential For Stated Goals: Good     RECOMMENDED REHABILITATION/EQUIPMENT: (at time of discharge pending progress): Continue Skilled Therapy and Outpatient: Physical Therapy. HISTORY:   History of Present Injury/Illness (Reason for Referral):  Reverse right TSA  Past Medical History/Comorbidities:   Ms. Edy Titus  has a past medical history of Adverse effect of anesthesia, Coronary artery disease, COVID-19 vaccine series completed (02/26/2021), GERD (gastroesophageal reflux disease), Hyperlipidemia, Hypertension, Insomnia, LBBB (left bundle branch block), Multiple thyroid nodules, Multiple thyroid nodules, Osteopenia, and Prediabetes. Ms. Edy Titus  has a past surgical history that includes hx hysterectomy (in her 46s); hx appendectomy (age 13); hx bladder repair; hx coronary stent placement (2009); hx colonoscopy (2004 and 2014); and hx orthopaedic.   Social History/Living Environment:   Home Environment: Private residence  # Steps to Enter: 0  One/Two Story Residence: One story  Living Alone: No  Support Systems: Child(dandy)  Patient Expects to be Discharged to[de-identified] House  Current DME Used/Available at Home: Raised toilet seat, Tub transfer bench  Tub or Shower Type: Tub/Shower combination  Prior Level of Function/Work/Activity:  Pt was independent without an assistive device prior to this admission   Number of Personal Factors/Comorbidities that affect the Plan of Care: 3+: HIGH COMPLEXITY   EXAMINATION:   Most Recent Physical Functioning:   Gross Assessment:  AROM: Within functional limits (left UE & both LE's)  Strength: Within functional limits (left UE & both LE's)  Coordination: Within functional limits (left UE & both LE's)                    Balance:  Sitting: Intact; Without support  Standing: Impaired; With support (walker) Bed Mobility:  Supine to Sit:  (NT)  Sit to Supine:  (NT)  Scooting: Contact guard assistance       Transfers:  Sit to Stand: Contact guard assistance  Stand to Sit: Contact guard assistance  Bed to Chair: Contact guard assistance  Duration: 23 Minutes (extra time to work through activity noted)  Gait:     Speed/Gertrudis: Delayed  Step Length: Left shortened;Right shortened  Gait Abnormalities: Decreased step clearance (mild sway)  Distance (ft): 200 Feet (ft)  Assistive Device:  (none)  Ambulation - Level of Assistance: Contact guard assistance   Functional Mobility:         Gait/Ambulation:  cga        Transfers:  cga        Bed Mobility:  NT   Body Structures Involved:  1. Joints  2. Muscles Body Functions Affected:  1. Sensory/Pain  2. Movement Related Activities and Participation Affected:  1. General Tasks and Demands  2. Mobility   Number of elements that affect the Plan of Care: 4+: HIGH COMPLEXITY   CLINICAL PRESENTATION:   Presentation: Evolving clinical presentation with changing clinical characteristics: MODERATE COMPLEXITY   CLINICAL DECISION MAKIN [facility] Short Form  How much difficulty does the patient currently have. .. Unable A Lot A Little None   1. Turning over in bed (including adjusting bedclothes, sheets and blankets)? [] 1   [] 2   [x] 3   [] 4   2. Sitting down on and standing up from a chair with arms ( e.g., wheelchair, bedside commode, etc.)   [] 1   [] 2   [x] 3   [] 4   3. Moving from lying on back to sitting on the side of the bed? [] 1   [] 2   [x] 3   [] 4   How much help from another person does the patient currently need. .. Total A Lot A Little None   4. Moving to and from a bed to a chair (including a wheelchair)? [] 1   [] 2   [x] 3   [] 4   5. Need to walk in hospital room? [] 1   [] 2   [x] 3   [] 4   6. Climbing 3-5 steps with a railing? [x] 1   [] 2   [] 3   [] 4   © 2007, Trustees of [address] [place] [address], under license to [employer]. All rights reserved      Score:  Initial: 16 Most Recent: X (Date: -- )    Interpretation of Tool:  Represents activities that are increasingly more difficult (i.e. Bed mobility, Transfers, Gait). Medical Necessity:     · Patient is expected to demonstrate progress in   · strength, range of motion, balance, coordination, and functional technique  ·  to   · decrease assistance required with bed mobility, transfers & gait  · .  Reason for Services/Other Comments:  · Patient continues to require skilled intervention due to   · Pt not safe with functional mobility & HEP  · .    Use of outcome tool(s) and clinical judgement create a POC that gives a: Questionable prediction of patient's progress: MODERATE COMPLEXITY            TREATMENT:   (In addition to Assessment/Re-Assessment sessions the following treatments were rendered)   Pre-treatment Symptoms/Complaints:  \" my back hurts more than my shoulder \"  Pain: Initial: numeric scale  Pain Intensity 1: 3  Pain Location 1: Shoulder  Pain Orientation 1: Right  Pain Intervention(s) 1: Cold pack, Repositioned  Post Session:  3/10     Therapeutic Activity: (  23 Minutes (extra time to work through activity noted) ):  Therapeutic activities including exercises noted (written guidelines provided) along with family education, transfers & progressive gait training to improve mobility, strength, balance, coordination and dynamic movement of arm - right and leg - bilateral to improve functional endurance & ROM. Date:  7/10 Date:   Date:     ACTIVITY/EXERCISE AM PM AM PM AM PM   Gripping 15 20       Wrist Flexion/Extension 15 20       Wrist Ulnar/Radial Deviation         Pronation/Supination 15aa 20aa       Elbow Flexion/Extension 15aa 20aa       Shoulder Flexion/Extension         Shoulder AB/ADduction         Shoulder IR/ER         Pulleys         Pendulums 15p 20p       Shrugs         Isometric:                 Flexion         Extension         ABduction         ADduction         Biceps/Triceps                  B = bilateral; AA = active assistive; A = active; P = passive  Education:  [x]  Home Exercises  [x]  Sling Application   [x]  Movement Precautions   []  Pulleys   [x]  Use of Ice   []  Other:   Treatment/Session Assessment:    · Response to Treatment:  Pt tolerated exercises better this pm  · Interdisciplinary Collaboration:   o Registered Nurse  · After treatment position/precautions:   o Up in chair  o Bed/Chair-wheels locked  o Caregiver at bedside  o Call light within reach  o RN notified  o Family at bedside   · Compliance with Program/Exercises: Will assess as treatment progresses. · Recommendations/Intent for next treatment session:  Treatment next visit will focus on increasing Ms. Meyer's independence with bed mobility, transfers, gait training, strength/ROM exercises, modalities for pain, and patient education.    Total Treatment Duration:  PT Patient Time In/Time Out  Time In: 1556  Time Out: [address] David Trinidad PT

## 2021-07-11 VITALS
OXYGEN SATURATION: 93 % | RESPIRATION RATE: 16 BRPM | TEMPERATURE: 98.2 F | DIASTOLIC BLOOD PRESSURE: 56 MMHG | SYSTOLIC BLOOD PRESSURE: 107 MMHG | HEART RATE: 80 BPM | WEIGHT: 128 LBS | BODY MASS INDEX: 23.41 KG/M2

## 2021-07-11 PROBLEM — Z96.611 S/P REVERSE TOTAL SHOULDER ARTHROPLASTY, RIGHT: Status: ACTIVE | Noted: 2021-07-11

## 2021-07-11 LAB
HCT VFR BLD AUTO: 31.3 % (ref 35.8–46.3)
HGB BLD-MCNC: 10.2 G/DL (ref 11.7–15.4)

## 2021-07-11 PROCEDURE — 85018 HEMOGLOBIN: CPT

## 2021-07-11 PROCEDURE — 97530 THERAPEUTIC ACTIVITIES: CPT

## 2021-07-11 PROCEDURE — 74011250637 HC RX REV CODE- 250/637: Performed by: ORTHOPAEDIC SURGERY

## 2021-07-11 PROCEDURE — 74011250636 HC RX REV CODE- 250/636: Performed by: ORTHOPAEDIC SURGERY

## 2021-07-11 PROCEDURE — 36415 COLL VENOUS BLD VENIPUNCTURE: CPT

## 2021-07-11 RX ADMIN — Medication 10 ML: at 06:00

## 2021-07-11 RX ADMIN — OXYCODONE HYDROCHLORIDE AND ACETAMINOPHEN 1 TABLET: 7.5; 325 TABLET ORAL at 08:56

## 2021-07-11 RX ADMIN — HYDROMORPHONE HYDROCHLORIDE 1 MG: 1 INJECTION, SOLUTION INTRAMUSCULAR; INTRAVENOUS; SUBCUTANEOUS at 00:27

## 2021-07-11 RX ADMIN — CARVEDILOL 6.25 MG: 6.25 TABLET, FILM COATED ORAL at 08:56

## 2021-07-11 RX ADMIN — ASPIRIN 325 MG: 325 TABLET, COATED ORAL at 08:56

## 2021-07-11 NOTE — PROGRESS NOTES
2021         Post Op day: 2 Days Post-Op   Admit Diagnosis: Right rotator cuff tear arthropathy [M75.101, M12.811]; Primary osteoarthritis of right shoulder [M19.011]; Shoulder arthritis [M19.019]  LAB:    Recent Results (from the past 24 hour(s))   HGB & HCT    Collection Time: 21  4:54 AM   Result Value Ref Range    HGB 10.2 (L) 11.7 - 15.4 g/dL    HCT 31.3 (L) 35.8 - 46.3 %     Vital Signs:    Patient Vitals for the past 8 hrs:   BP Temp Pulse Resp SpO2   21 0300 123/73 99.3 °F (37.4 °C) 95 16 95 %     Temp (24hrs), Av.8 °F (37.1 °C), Min:97.6 °F (36.4 °C), Max:99.7 °F (37.6 °C)    Pain Control:   Pain Assessment  Pain Scale 1: FLACC  Pain Intensity 1: 0  Pain Onset 1: at rest  Pain Location 1: Shoulder  Pain Orientation 1: Right  Pain Description 1: Aching  Pain Intervention(s) 1: Medication (see MAR)  Subjective: Doing well, normal recovery experienced. Objective:  No Acute Distress, Alert and Oriented, Neurovascular exam is normal       Assessment:   Patient Active Problem List   Diagnosis Code    Coronary artery disease involving native coronary artery of native heart without angina pectoris I25.10    Essential hypertension I10    Mixed hyperlipidemia E78.2    Prediabetes R73.03    Osteopenia of multiple sites M85.89    Multiple thyroid nodules E04.2    Right rotator cuff tear arthropathy M75.101, M12.811    Osteoarthritis of right shoulder M19.011    Shoulder arthritis M19.019       Status Post Procedure(s) (LRB):  RIGHT SHOULDER ARTHROPLASTY TOTAL REVERSE   (Right)        Plan: Continue Physical Therapy, discharge home anticipated today.    Signed By: Summer Jauregui MD

## 2021-07-11 NOTE — DISCHARGE SUMMARY
DC Summary:    Patient ID:  Kym Query  [id number]   1 y.o.  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 Disposition: Home    Patient Instructions:   Current Discharge Medication List      CONTINUE these medications which have NOT CHANGED    Details   simvastatin (ZOCOR) 40 mg tablet TAKE ONE TABLET BY MOUTH EACH NIGHT AT BEDTIME  Qty: 90 Tab, Refills: 2    Associated Diagnoses: Mixed hyperlipidemia; Coronary artery disease involving native coronary artery of native heart without angina pectoris      carvediloL (COREG) 12.5 mg tablet TAKE ONE TABLET BY MOUTH TWICE A DAY WITH MEALS  Qty: 180 Tab, Refills: 2    Associated Diagnoses: Coronary artery disease involving native coronary artery of native heart without angina pectoris; Essential hypertension      calcium-cholecalciferol, D3, (CALTRATE 600+D) tablet Take 1 Tablet by mouth daily. aspirin delayed-release 81 mg tablet Take 81 mg by mouth nightly. Associated Diagnoses: Essential hypertension, hypertension with unspecified goal      oxyCODONE-acetaminophen (PERCOCET 7.5) 7.5-325 mg per tablet Take 1-2 Tablets by mouth every four to six (4-6) hours as needed for Pain for up to 3 days. Max Daily Amount: 12 Tablets.   Qty: 40 Tablet, Refills: 0    Associated Diagnoses: Status post reverse total shoulder replacement, right      naloxegoL (MOVANTIK) 25 mg tab tablet Take 1 Tablet by mouth Daily (before breakfast). To start after surgery  Indications: opiate pain medication causing severe constipation  Qty: 7 Tablet, Refills: 1    Associated Diagnoses: Constipation due to pain medication      senna-docusate (PERICOLACE) 8.6-50 mg per tablet Take 1 Tablet by mouth daily. To start after surgery  Indications: constipation  Qty: 21 Tablet, Refills: 0    Associated Diagnoses: Constipation due to pain medication      ondansetron (ZOFRAN ODT) 4 mg disintegrating tablet 1 Tablet by SubLINGual route every six (6) hours as needed for Nausea or Nausea or Vomiting. To start after surgery  Indications: prevent nausea and vomiting after surgery  Qty: 20 Tablet, Refills: 0    Associated Diagnoses: Nausea after anesthesia, initial encounter      alendronate (FOSAMAX) 70 mg tablet Take 1 Tablet by mouth every seven (7) days. Qty: 12 Tablet, Refills: 3         STOP taking these medications       aspirin (ASPIRIN) 325 mg tablet Comments:   Reason for Stopping:               Diet: Reference my discharge instructions. Activity: Reference my discharge instructions. Follow-up Appointments   Procedures    FOLLOW UP VISIT Appointment in: Two Weeks     Standing Status:   Standing     Number of Occurrences:   1     Order Specific Question:   Appointment in     Answer: Two Weeks        Total time discharging patient took greater than 30 minutes.     SignedTiti Fournier MD  July 11, 2021  10:45 AM

## 2021-07-11 NOTE — PROGRESS NOTES
Problem: Mobility Impaired (Adult and Pediatric)  Goal: *Acute Goals and Plan of Care (Insert Text)  Outcome: Progressing Towards Goal  Note: GOALS (1-4 days):  (1.)  Patient will move from supine to sit and sit to supine  in bed with SUPERVISION. (2.)  Patient will transfer from bed to chair and chair to bed with SUPERVISION using the least restrictive device. (3.)  Patient will ambulate with SUPERVISION for 300 feet with the least restrictive device. (4.)  Patient will be safe with shoulder HEP, with caregiver's help, to increase range of motion per MD orders. Met 7/11  ________________________________________________________________________________________________      PHYSICAL THERAPY: Daily Note and AM 7/11/2021  INPATIENT: Hospital Day: 3  [place] / Product Type: Medicare /      NAME/AGE/GENDER: Sage Patrick is a 78 y.o. female   PRIMARY DIAGNOSIS: Right rotator cuff tear arthropathy [M75.101, M12.811]  Primary osteoarthritis of right shoulder [M19.011]  Shoulder arthritis [M19.019] S/P reverse total shoulder arthroplasty, right S/P reverse total shoulder arthroplasty, right  Procedure(s) (LRB):  RIGHT SHOULDER ARTHROPLASTY TOTAL REVERSE   (Right)  2 Days Post-Op  ICD-10: Treatment Diagnosis:   · Pain in Right Shoulder (M25.511)  · Stiffness of Right Shoulder, Not elsewhere classified (M25.611)  · Generalized Muscle Weakness (M62.81)  · Other lack of cordination (R27.8)  · Difficulty in walking, Not elsewhere classified (R26.2)   Precaution/Allergies:  Norvasc [amlodipine] and Lisinopril      ASSESSMENT:     Ms. Jeannette Rodriguez presents with stiff & sore right UE along with mildly unsteady functional  mobility. This pt will benefit from follow up therapy to help restore safe function & to establish HEP. This pt has a good DC plan to home with a caregiver.   Pt with improved gait steadiness this AM. PT provided cueing and suggestions while caregiver practiced HEP with pt. Inc cueing for body mechanics, positioning, and repetitions required. Inc time to work through exercises while discussing home mgmt and form. Pt with fair tolerance, limited most today by low back pain. Educated on mgmt of pain using heat/ice/exercise. Also educated on discussing medical mgmt of this with RN at discharge. This section established at most recent assessment   PROBLEM LIST (Impairments causing functional limitations):  1. Decreased [place] with Bed Mobility  2. Decreased [place] with Transfers  3. Decreased [place] with Ambulation   4. Decreased [place] with shoulder HEP   INTERVENTIONS PLANNED: (Benefits and precautions of physical therapy have been discussed with the patient.)  1. Bed Mobility Training  2. Transfer Training  3. Gait Training  4. Therapeutic Exercises per MD orders  5. Modalities for Pain     TREATMENT PLAN: Frequency/Duration: twice daily for duration of hospital stay  Rehabilitation Potential For Stated Goals: Good     RECOMMENDED REHABILITATION/EQUIPMENT: (at time of discharge pending progress): Continue Skilled Therapy and Outpatient: Physical Therapy. HISTORY:   History of Present Injury/Illness (Reason for Referral):  Reverse right TSA  Past Medical History/Comorbidities:   Ms. Alice Marie  has a past medical history of Adverse effect of anesthesia, Coronary artery disease, COVID-19 vaccine series completed (02/26/2021), GERD (gastroesophageal reflux disease), Hyperlipidemia, Hypertension, Insomnia, LBBB (left bundle branch block), Multiple thyroid nodules, Multiple thyroid nodules, Osteopenia, and Prediabetes. Ms. Alice Marie  has a past surgical history that includes hx hysterectomy (in her 46s); hx appendectomy (age 13); hx bladder repair; hx coronary stent placement (2009); hx colonoscopy (2004 and 2014); and hx orthopaedic.   Social History/Living Environment:   Home Environment: Private residence  # Steps to Enter: 0  One/Two Story Residence: One story  Living Alone: No  Support Systems: Child(dandy)  Patient Expects to be Discharged to[de-identified] House  Current DME Used/Available at Home: Raised toilet seat, Tub transfer bench  Tub or Shower Type: Tub/Shower combination  Prior Level of Function/Work/Activity:  Pt was independent without an assistive device prior to this admission   Number of Personal Factors/Comorbidities that affect the Plan of Care: 3+: HIGH COMPLEXITY   EXAMINATION:   Most Recent Physical Functioning:   Gross Assessment:  AROM: Generally decreased, functional (L UE and B LEs)  Strength: Generally decreased, functional (L UE and B LEs)                    Balance:  Sitting: Intact  Standing: Intact  Standing - Static: Good  Standing - Dynamic : Fair Bed Mobility:          Transfers:  Sit to Stand: Minimum assistance  Stand to Sit: Contact guard assistance  Gait:     Speed/Gertrudis: Slow  Step Length: Right shortened;Left shortened  Gait Abnormalities: Decreased step clearance  Distance (ft): 200 Feet (ft)  Ambulation - Level of Assistance: Stand-by assistance   Functional Mobility:         Gait/Ambulation:  cga        Transfers:  cga        Bed Mobility:  NT   Body Structures Involved:  1. Joints  2. Muscles Body Functions Affected:  1. Sensory/Pain  2. Movement Related Activities and Participation Affected:  1. General Tasks and Demands  2. Mobility   Number of elements that affect the Plan of Care: 4+: HIGH COMPLEXITY   CLINICAL PRESENTATION:   Presentation: Evolving clinical presentation with changing clinical characteristics: MODERATE COMPLEXITY   CLINICAL DECISION MAKIN [facility] Short Form  How much difficulty does the patient currently have. .. Unable A Lot A Little None   1. Turning over in bed (including adjusting bedclothes, sheets and blankets)? [] 1   [] 2   [x] 3   [] 4   2.   Sitting down on and standing up from a chair with arms ( e.g., wheelchair, bedside commode, etc.) [] 1   [] 2   [x] 3   [] 4   3. Moving from lying on back to sitting on the side of the bed? [] 1   [] 2   [x] 3   [] 4   How much help from another person does the patient currently need. .. Total A Lot A Little None   4. Moving to and from a bed to a chair (including a wheelchair)? [] 1   [] 2   [x] 3   [] 4   5. Need to walk in hospital room? [] 1   [] 2   [x] 3   [] 4   6. Climbing 3-5 steps with a railing? [x] 1   [] 2   [] 3   [] 4   © 2007, Trustees of [address] [place] [address] [address] [address], under license to [employer]. All rights reserved      Score:  Initial: 16 Most Recent: X (Date: -- )    Interpretation of Tool:  Represents activities that are increasingly more difficult (i.e. Bed mobility, Transfers, Gait). Medical Necessity:     · Patient is expected to demonstrate progress in   · strength, range of motion, balance, coordination, and functional technique  ·  to   · decrease assistance required with bed mobility, transfers & gait  · .  Reason for Services/Other Comments:  · Patient continues to require skilled intervention due to   · Pt not safe with functional mobility & HEP  · . Use of outcome tool(s) and clinical judgement create a POC that gives a: Questionable prediction of patient's progress: MODERATE COMPLEXITY            TREATMENT:   (In addition to Assessment/Re-Assessment sessions the following treatments were rendered)   Pre-treatment Symptoms/Complaints:  \" my back hurts more than my shoulder \"  Pain: Initial: numeric scale  Pain Intensity 1: 6  Pain Location 1: Back  Pain Orientation 1: Lower  Pain Intervention(s) 1: Exercise (education)  Post Session:  5/10 (low back)     Therapeutic Activity: (    25):   Therapeutic activities including exercises noted (written guidelines provided) along with family education to include body mechanics, exercise form, frequency of HEP, and progressive gait training to improve mobility, strength, balance, coordination and dynamic movement of arm - right and leg - bilateral to improve functional endurance & ROM. Date:  7/10 Date:  7/11 Date:     ACTIVITY/EXERCISE AM PM AM PM AM PM   Gripping 15 20 20      Wrist Flexion/Extension 15 20 20      Wrist Ulnar/Radial Deviation         Pronation/Supination 15aa 20aa 20aa      Elbow Flexion/Extension 15aa 20aa 20aa      Shoulder Flexion/Extension         Shoulder AB/ADduction         Shoulder IR/ER         Pulleys         Pendulums 15p 20p 20p      Shrugs   20      Isometric:                 Flexion         Extension         ABduction         ADduction         Biceps/Triceps                  B = bilateral; AA = active assistive; A = active; P = passive  Education:  [x]  Home Exercises  [x]  Sling Application   [x]  Movement Precautions   []  Pulleys   [x]  Use of Ice   []  Other:   Treatment/Session Assessment:    · Response to Treatment:  Pt tolerated exercises better this pm  · Interdisciplinary Collaboration:   o Physical Therapist  o Registered Nurse  · After treatment position/precautions:   o Up in chair  o Bed/Chair-wheels locked  o Caregiver at bedside  o Call light within reach  o RN notified  o Family at bedside   · Compliance with Program/Exercises: Will assess as treatment progresses. · Recommendations/Intent for next treatment session:  Treatment next visit will focus on increasing Ms. Meyer's independence with bed mobility, transfers, gait training, strength/ROM exercises, modalities for pain, and patient education.    Total Treatment Duration:  PT Patient Time In/Time Out  Time In: 0942  Time Out: [address] AMANDA Savage

## 2021-07-11 NOTE — PROGRESS NOTES
Patient resting quietly, alert and oriented, no distress noted. Right shoulder dressing c/d/i, sling in place. Neurovascular and peripheral vascular checks WNL. Bed low and locked position. Fresh ice placed on shoulder. Call light within reach. Patient instructed to call for assistance, verbalizes understanding. Nursing assessment complete.

## 2021-07-11 NOTE — PROGRESS NOTES
Has follow up appt scheduled with Dr Danette Samuels. Prescriptions were sent to pharmacy. I have reviewed discharge instructions with the patient and daughter. The patient and daughter verbalized understanding. Taken to car via wheelchair.

## 2021-07-20 PROBLEM — Z01.810 PREOP CARDIOVASCULAR EXAM: Status: ACTIVE | Noted: 2021-06-10

## 2021-07-29 NOTE — PROGRESS NOTES
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 Frequency/Duration: 2 times a week for 90 Day(s) MEDICAL/REFERRING DIAGNOSIS:  Osteoarthritis of right shoulder, unspecified osteoarthritis type [M19.011]  S/P reverse total shoulder arthroplasty, right [Z96.611]   DATE OF ONSET: Patient underwent R reverse total shoulder on 2021  REFERRING PHYSICIAN: Juana Hall MD MD Orders: Evaluate and Treat   Reverse Total Shoulder Replacement- Dr. Marleni Long    1-3 weeks (2021- 2021): sling 24 hours unless sitting still- wean out of sling at 4 weeks as tolerated; pendulums; AROM elbow, wrist, and hand; PROM shoulder; supine external rotation 20-30 degrees, supine forward elevation 120-130 degrees; may progress to AAROM;  No internal rotation   4-6 weeks (2021 - 2021): sling when out of home/ traveling/ sleeping; pendulums; AAROM with passive stretching to above limits; supine external rotation gradually increase to full; supine forward elevation as tolerated; internal rotation progress to full; progress full AROM as tolerated; begin isometric deltoid contraction, postural work, upper trapezius relaxation, active scapular retraction and depression  6-8 weeks (2021 - 9/3/2021): pendulums; scapular mobilizations; internal rotation progress to full; begin resistance strengthening, maintain ROM.    Return MD Appointment: 8/17/2021     Pre-treatment Symptoms/Complaints:  Patient with minimal to no R shoulder pain, but reports pain in R flank from abduction pillow. MD removed pillow at last follow up appointment and pain is starting to get some better. Pain: Initial: Pain Intensity 1: 2  Pain Location 1: Flank  Pain Orientation 1: Right  Post Session:  4/10 R flank   Medications Last Reviewed:  7/30/2021  Updated Objective Findings:  See evaluation note from today   TREATMENT:   THERAPEUTIC EXERCISE: (10 minutes):  Exercises per grid below to improve mobility, strength, coordination and dynamic movement of shoulder - right to improve functional lifting, reaching and overhead activites. Required moderate visual, verbal and manual cues to promote proper body alignment, promote proper body posture and promote proper body mechanics. Progressed resistance, range, repetitions and complexity of movement as indicated. Date:  7/30/2021 Date:   Date:     Activity/Exercise Parameters Parameters Parameters   Wand external rotation Supine, x 10     AAROM shoulder flexion Supine, hands clasped, x 10     Scapular retraction X 10     Shrugs X 10     Pendulums reviewed                   Time spent with patient reviewing proper muscle recruitment and technique with exercises. MANUAL THERAPY: (15 minutes): Joint mobilization and Soft tissue mobilization was utilized and necessary because of the patient's restricted joint motion, painful spasm, loss of articular motion and restricted motion of soft tissue   Supine PROM to R shoulder all directions per MD guidelines   Gentle oscillation to decrease pain and improve relaxation    MODALITIES: (14 minutes):      vasopneumatic compression to R shoulder with patient in sitting to decrease pain and swelling     HEP: As above; handouts given to patient for all exercises.     Treatment/Session Summary:    · Response to Treatment: Patient with good understanding of HEP and progression of therapy. .  · Baseline vitals (7/30/2021): BP: 125/82  · Communication/Consultation:  Spoke at length with patient and her daughter about plan of care, precautions with surgery, use of R UE, and progression of care. · Equipment provided today:  Patient given handout with above exercises for home  · Recommendations/Intent for next treatment session: Next visit will focus on pain control, manual therapy and modalities as needed, progression per MD guidelines.     Total Treatment Billable Duration:  59 minutes: 20 evaluation, 15 manual, 10 therapeutic exercise, 14 vasopneumatic compression  PT Patient Time In/Time Out  Time In: 1100  Time Out: [address], PT

## 2021-07-29 NOTE — THERAPY EVALUATION
Zoie Meyer  : 1940  [place]  Secondary: [address] [facility] at [facility]  [address], [address] [place] [address], [address]  [phone]   [phone]       OUTPATIENT PHYSICAL THERAPY:Initial Assessment 2021    ICD-10: Treatment Diagnosis: pain in right shoulder (M25.511)                Treatment Diagnosis 2: osteoarthritis right shoulder (M19.011)                Treatment Diagnosis 3: s/p reverse total shoulder arthroplasty (Q72.130)  Precautions: hypertension, follow MD guidelines  Allergies: Norvasc [amlodipine] and Lisinopril   TREATMENT PLAN:  Effective Dates: 2021 TO 10/28/2021 (90 days). Frequency/Duration: 2 times a week for 90 Day(s) MEDICAL/REFERRING DIAGNOSIS:  Osteoarthritis of right shoulder, unspecified osteoarthritis type [M19.011]  S/P reverse total shoulder arthroplasty, right [Z96.611]   DATE OF ONSET: Patient underwent R reverse total shoulder on 2021  REFERRING PHYSICIAN: Katelyn Chatterjee MD MD Orders: Evaluate and Treat   Reverse Total Shoulder Replacement- Dr. Davis Lines    1-3 weeks (2021- 2021): sling 24 hours unless sitting still- wean out of sling at 4 weeks as tolerated; pendulums; AROM elbow, wrist, and hand; PROM shoulder; supine external rotation 20-30 degrees, supine forward elevation 120-130 degrees; may progress to AAROM;  No internal rotation   4-6 weeks (2021 - 2021): sling when out of home/ traveling/ sleeping; pendulums; AAROM with passive stretching to above limits; supine external rotation gradually increase to full; supine forward elevation as tolerated; internal rotation progress to full; progress full AROM as tolerated; begin isometric deltoid contraction, postural work, upper trapezius relaxation, active scapular retraction and depression  6-8 weeks (2021 - 9/3/2021): pendulums; scapular mobilizations; internal rotation progress to full; begin resistance strengthening, maintain ROM. Return MD Appointment: 8/17/2021     INITIAL ASSESSMENT:  Ms. Lucero Callejas is a 78 y.o. female presenting to physical therapy with complaints of R shoulder pain and stiffness s/p R reverse total shoulder arthroplasty on 7/9/2021. She has minimal to no complaints of R shoulder pain, but reports R flank pain from wearing the abduction pillow. Patient compliant with sling and MD removed abduction pillow at follow up appointment. Patient lives by herself and is eager to return to her prior level of activities including caring for herself, cleaning her house, and gardening. Patient presents with increased pain, decreased strength, decreased ROM, decreased flexibility, impaired posture, impaired overhead reach, impaired transfer ability, decreased activity tolerance, and overall impaired functional mobility. Patient is a good candidate for skilled physical therapy interventions to include manual therapy, therapeutic exercise, transfer training, postural re-education, body mechanics training, and pain modalities as needed. PROBLEM LIST (Impacting functional limitations):  1. Decreased Strength  2. Decreased ADL/Functional Activities  3. Increased Pain  4. Decreased Activity Tolerance  5. Decreased Pacing Skills  6. Decreased Work Simplification/Energy Conservation Techniques  7. Decreased Flexibility/Joint Mobility  8. Edema/Girth INTERVENTIONS PLANNED: (Treatment may consist of any combination of the following)  1. Bed Mobility  2. Cold  3. Cryotherapy  4. Electrical Stimulation  5. Family Education  6. Heat  7. Home Exercise Program (HEP)  8. Manual Therapy  9. Neuromuscular Re-education/Strengthening  10. Range of Motion (ROM)  11. Therapeutic Activites  12. Therapeutic Exercise/Strengthening  13. Transfer Training     GOALS: (Goals have been discussed and agreed upon with patient.)  Short-Term Functional Goals: Time Frame: 7/30/2021 to 9/10/2021  1.  Patient demonstrates independence with home exercise program without verbal cueing provided by therapist.   2. Patient will report no more than 1/10 R shoulder or flank pain at rest in order to demonstrate improved self pain control and tolerance. 3. Patient will be educated in and demonstrate improved upright posture including decreased forward head and shoulders to improve clearance for overhead activities. 4. Patient will improve R shoulder passive external rotation to at least 45 degrees in order to demonstrate progress towards discharge goals. Discharge Goals: Time Frame: 7/30/2021 to 10/28/2021  1. Patient will improve R shoulder forward elevation to 145 degrees in order to improve ability to reach overhead/ into cabinets at home. 2. Patient will improve R shoulder functional internal and external rotation to L5 and C7, respectively, in order to improve ability to wash hair and don pants. 3. Patient will report minimal to no R shoulder pain with daily activities and self care in order to demonstrate improved activity tolerance. 4. Patient will be able to return to light housework and gardening with minimal to no complaints in order to return to prior hobbies. 5. Patient will improve Disability of the Arm, Shoulder, and Hand score to 30/55 from 45/55. Outcome Measure: Tool Used: Disabilities of the Arm, Shoulder and Hand (DASH) Questionnaire - Quick Version  Score:  Initial: 45/55  Most Recent: X/55 (Date: -- )   Interpretation of Score: The DASH is designed to measure the activities of daily living in person's with upper extremity dysfunction or pain. Each section is scored on a 1-5 scale, 5 representing the greatest disability. The scores of each section are added together for a total score of 55.         Medical Necessity:   · Patient is expected to demonstrate progress in strength, range of motion, coordination and functional technique to increase independence with self care and daily activities and improve safety during reaching, lifting, and overhead activities. · Skilled intervention continues to be required due to R shoulder pain and stiffness s/p reverse TSA. Reason for Services/Other Comments:  · Patient continues to require skilled intervention due to decreased strength and function s/p reverse total shoulder. Total Evaluation Duration: 20 minutes    Rehabilitation Potential For Stated Goals: Good  Regarding Calos Meyer's therapy, I certify that the treatment plan above will be carried out by a therapist or under their direction. Thank you for this referral,  Chapis Jade PT     Referring Physician Signature: Alice Ayoub MD _______________________________ Date _____________             PAIN/SUBJECTIVE:    Initial: Pain Intensity 1: 2  Pain Location 1: Flank  Pain Orientation 1: Right  Post Session:  4/10 R flank    HISTORY:    History of Injury/Illness (Reason for Referral):  Ms. Sunny Bob is a 78 y.o. female presenting to physical therapy with complaints of R shoulder pain and stiffness s/p R reverse total shoulder arthroplasty on 7/9/2021. She has minimal to no complaints of R shoulder pain, but reports R flank pain from wearing the abduction pillow. Patient compliant with sling and MD removed abduction pillow at follow up appointment. Patient lives by herself and is eager to return to her prior level of activities including caring for herself, cleaning her house, and gardening. Patient presents with increased pain, decreased strength, decreased ROM, decreased flexibility, impaired posture, impaired overhead reach, impaired transfer ability, decreased activity tolerance, and overall impaired functional mobility.   Past Medical History/Comorbidities:   Ms. Sunny Bob  has a past medical history of Adverse effect of anesthesia, Coronary artery disease, COVID-19 vaccine series completed (02/26/2021), GERD (gastroesophageal reflux disease), Hyperlipidemia, Hypertension, Insomnia, LBBB (left bundle branch block), Multiple thyroid nodules, Multiple thyroid nodules, Osteopenia, and Prediabetes. Ms. Bonita Ganser  has a past surgical history that includes hx hysterectomy (in her 46s); hx appendectomy (age 13); hx bladder repair; hx coronary stent placement (2009); hx colonoscopy (2004 and 2014); and hx orthopaedic. Social History/Living Environment:     Patient lives alone. Prior Level of Function/Work/Activity:  Retired. Dominant Side:         RIGHT    Ambulatory/Rehab Services H2 Model Falls Risk Assessment    Risk Factors:       No Risk Factors Identified Ability to Rise from Chair:       (1)  Pushes up, successful in one attempt    Falls Prevention Plan:       No modifications necessary    Total: (5 or greater = High Risk): 1    ©2010 [facility] [employer]. All Rights Reserved. [facility] Patent #[id number]. Federal Law prohibits the replication, distribution or use without written permission from [facility] [employer]    Current Medications:        Current Outpatient Medications:     simvastatin (ZOCOR) 40 mg tablet, TAKE ONE TABLET BY MOUTH EACH NIGHT AT BEDTIME, Disp: 90 Tablet, Rfl: 1    omeprazole (PRILOSEC) 40 mg capsule, Take 40 mg by mouth daily. , Disp: , Rfl:     naloxegoL (MOVANTIK) 25 mg tab tablet, Take 1 Tablet by mouth Daily (before breakfast). To start after surgery  Indications: opiate pain medication causing severe constipation (Patient not taking: Reported on 7/9/2021), Disp: 7 Tablet, Rfl: 1    senna-docusate (PERICOLACE) 8.6-50 mg per tablet, Take 1 Tablet by mouth daily. To start after surgery  Indications: constipation (Patient not taking: Reported on 7/9/2021), Disp: 21 Tablet, Rfl: 0    ondansetron (ZOFRAN ODT) 4 mg disintegrating tablet, 1 Tablet by SubLINGual route every six (6) hours as needed for Nausea or Nausea or Vomiting.  To start after surgery  Indications: prevent nausea and vomiting after surgery (Patient not taking: Reported on 7/9/2021), Disp: 20 Tablet, Rfl: 0    alendronate (FOSAMAX) 70 mg tablet, Take 1 Tablet by mouth every seven (7) days. (Patient not taking: Reported on 7/9/2021), Disp: 12 Tablet, Rfl: 3    carvediloL (COREG) 12.5 mg tablet, TAKE ONE TABLET BY MOUTH TWICE A DAY WITH MEALS (Patient not taking: Reported on 7/22/2021), Disp: 180 Tab, Rfl: 2    calcium-cholecalciferol, D3, (CALTRATE 600+D) tablet, Take 1 Tablet by mouth daily. , Disp: , Rfl:     aspirin delayed-release 81 mg tablet, Take 81 mg by mouth nightly., Disp: , Rfl:     Date Last Reviewed:  7/30/2021    Number of Personal Factors/Comorbidities that affect the Plan of Care:  Based on age and co-morbidities 1-2: MODERATE COMPLEXITY    EXAMINATION:    Patient denies any headaches, changes in vision, dizziness, vertigo, nausea, drop attacks, black outs, tinnitus, dysphagia, dysarthria, LE symptoms or bowel/bladder dysfunction. Observation/Orthostatic Postural Assessment:          Patient with slight forward head, rounded shoulders, R shoulder forward with scapular protraction, increased thoracic kyphosis. Sling don R shoulder. Incision site healing well with no signs/ symptoms of infection or DVT noted. Palpation:          Moderate tenderness to palpation of gross R shoulder and incision site. ROM:        NT = noted tested  AROM/ PROM Left (degrees) Right (degrees)   Shoulder Flexion 150 115   Shoulder Abduction 140 70   Shoulder Internal Rotation  70 To belly   Functional Internal Rotation T10 NT   Shoulder External Rotation 55 0   Functional External Rotation C7 with limited shoulder flexion NT     Strength:     Motion Tested Left   (*/5) Right  (*/5)   Shoulder Flexion 4+ NT   Scaption 4+ NT   Shoulder Abduction 4+ NT   Shoulder Internal Rotation  4+ NT   Shoulder External Rotation 4+ NT   Elbow Flexion 4+ NT   Elbow Extension 4+ NT     Special Tests:         None performed due to acuity of surgery  Passive Accessory Motion:         None performed due to acuity of surgery  Neurological Screen: Myotomes: Key muscle strength testing through L UE is [facility]. Dermatomes: Sensation to light touch for bilateral UE is intact from C4 to T2. Reflexes: NT  Functional Mobility:         Patient able to dress/ bathe on her own with slight modifications and increased time required. Unable to reach overhead. Patient eager to return to prior activity level including caring for herself, her house, and gardening. Balance:          Sitting and standing balance grossly intact. Body Structures Involved:  1. Bones  2. Joints  3. Muscles  4. Ligaments Body Functions Affected:  1. Sensory/Pain  2. Neuromusculoskeletal  3. Movement Related Activities and Participation Affected:  1. General Tasks and Demands  2. Mobility  3. Self Care  4. Domestic Life  5.  Community, Social and [place]    Number of elements (examined above) that affect the Plan of Care: 1-2: LOW COMPLEXITY    CLINICAL PRESENTATION:    Presentation:   Stable and uncomplicated: LOW COMPLEXITY    CLINICAL DECISION MAKING:    Use of outcome tool(s) and clinical judgement create a POC that gives a: Clear prediction of patient's progress: LOW COMPLEXITY

## 2021-07-30 ENCOUNTER — HOSPITAL ENCOUNTER (OUTPATIENT)
Dept: PHYSICAL THERAPY | Age: 81
Discharge: HOME | End: 2021-07-30
Attending: ORTHOPAEDIC SURGERY
Payer: MEDICARE

## 2021-07-30 DIAGNOSIS — M19.011 OSTEOARTHRITIS OF RIGHT SHOULDER, UNSPECIFIED OSTEOARTHRITIS TYPE: ICD-10-CM

## 2021-07-30 DIAGNOSIS — Z96.611 S/P REVERSE TOTAL SHOULDER ARTHROPLASTY, RIGHT: ICD-10-CM

## 2021-07-30 PROCEDURE — 97110 THERAPEUTIC EXERCISES: CPT

## 2021-07-30 PROCEDURE — 97140 MANUAL THERAPY 1/> REGIONS: CPT

## 2021-07-30 PROCEDURE — 97161 PT EVAL LOW COMPLEX 20 MIN: CPT

## 2021-07-30 PROCEDURE — 97016 VASOPNEUMATIC DEVICE THERAPY: CPT

## 2021-08-02 ENCOUNTER — HOSPITAL ENCOUNTER (OUTPATIENT)
Dept: PHYSICAL THERAPY | Age: 81
Discharge: HOME | End: 2021-08-02
Attending: ORTHOPAEDIC SURGERY
Payer: MEDICARE

## 2021-08-02 PROCEDURE — 97110 THERAPEUTIC EXERCISES: CPT

## 2021-08-02 PROCEDURE — 97140 MANUAL THERAPY 1/> REGIONS: CPT

## 2021-08-02 NOTE — PROGRESS NOTES
Eliz Meyer  : 1940  [place]  Secondary: [address] [facility]  [address], [address] [facility], [address]  [phone]   [phone]      OUTPATIENT PHYSICAL THERAPY: Daily Treatment Note 2021    ICD-10: Treatment Diagnosis: pain in right shoulder (M25.511)                Treatment Diagnosis 2: osteoarthritis right shoulder (M19.011)                Treatment Diagnosis 3: s/p reverse total shoulder arthroplasty (C13.369)  Precautions: hypertension, follow MD guidelines  Allergies: Norvasc [amlodipine] and Lisinopril   TREATMENT PLAN:  Effective Dates: 2021 TO 10/28/2021 (90 days). Frequency/Duration: 2 times a week for 90 Day(s) MEDICAL/REFERRING DIAGNOSIS:  Primary osteoarthritis, right shoulder [M19.011]  Presence of right artificial shoulder joint [Z96.611]   DATE OF ONSET: Patient underwent R reverse total shoulder on 2021  REFERRING PHYSICIAN: Cheko Galvan MD MD Orders: Evaluate and Treat   Reverse Total Shoulder Replacement- Dr. Yannick Rowland    1-3 weeks (2021- 2021): sling 24 hours unless sitting still- wean out of sling at 4 weeks as tolerated; pendulums; AROM elbow, wrist, and hand; PROM shoulder; supine external rotation 20-30 degrees, supine forward elevation 120-130 degrees; may progress to AAROM;  No internal rotation   4-6 weeks (2021 - 2021): sling when out of home/ traveling/ sleeping; pendulums; AAROM with passive stretching to above limits; supine external rotation gradually increase to full; supine forward elevation as tolerated; internal rotation progress to full; progress full AROM as tolerated; begin isometric deltoid contraction, postural work, upper trapezius relaxation, active scapular retraction and depression  6-8 weeks (2021 - 9/3/2021): pendulums; scapular mobilizations; internal rotation progress to full; begin resistance strengthening, maintain ROM.     Return MD Appointment: 8/17/2021     Pre-treatment Symptoms/Complaints:  Patient reports no pain at this time. .    Pain: Initial: Pain Intensity 1: 0  Pain Location 1: Shoulder  Pain Orientation 1: Right  Post Session:  0/10    Medications Last Reviewed:  8/2/2021  Updated Objective Findings:  130 degrees shoulder flexion passively. 30 degrees ER passively   TREATMENT:   THERAPEUTIC EXERCISE: (30 minutes):  Exercises per grid below to improve mobility, strength, coordination and dynamic movement of shoulder - right to improve functional lifting, reaching and overhead activites. Required moderate visual, verbal and manual cues to promote proper body alignment, promote proper body posture and promote proper body mechanics. Progressed resistance, range, repetitions and complexity of movement as indicated. Date:  7/30/2021 Date:  8-2-21 Date:     Activity/Exercise Parameters Parameters Parameters   Wand external rotation Supine, x 10 Supine  2 x 10    AAROM shoulder flexion Supine, hands clasped, x 10 Supine hands clasp 2 x 10    Scapular retraction X 10 3 x 10    Shrugs X 10 3 x 10    Pendulums reviewed CW/CCW  X 2 min each    Elbow flexion/extension   3 x 10     ex  Ball 2 x 20      Time spent with patient reviewing proper muscle recruitment and technique with exercises. MANUAL THERAPY: (23 minutes): Joint mobilization and Soft tissue mobilization was utilized and necessary because of the patient's restricted joint motion, painful spasm, loss of articular motion and restricted motion of soft tissue   Supine PROM to R shoulder all directions per MD guidelines   Gentle oscillation to decrease pain and improve relaxation    MODALITIES: (0 minutes):      vasopneumatic compression to R shoulder with patient in sitting to decrease pain and swelling     HEP: As above; handouts given to patient for all exercises. Treatment/Session Summary:    · Response to Treatment:  No C/OS with exercises. Required cues for exercise technique. · Baseline vitals (7/30/2021): BP: 125/82  · Communication/Consultation:  HEP. Safety limits  · Equipment provided today:  None today  · Recommendations/Intent for next treatment session: Next visit will focus on pain control, manual therapy and modalities as needed, progression per MD guidelines.     Total Treatment Billable Duration:  53 minutes:   PT Patient Time In/Time Out  Time In: 1227  Time Out: 1139 Herbert Zuniga PTA

## 2021-08-05 ENCOUNTER — HOSPITAL ENCOUNTER (OUTPATIENT)
Dept: PHYSICAL THERAPY | Age: 81
Discharge: HOME | End: 2021-08-05
Attending: ORTHOPAEDIC SURGERY
Payer: MEDICARE

## 2021-08-05 PROCEDURE — 97110 THERAPEUTIC EXERCISES: CPT

## 2021-08-05 PROCEDURE — 97140 MANUAL THERAPY 1/> REGIONS: CPT

## 2021-08-05 NOTE — PROGRESS NOTES
Gordy Meyer  : 1940  [place]  Secondary: [address] [facility] at [facility]  [address], [address] [facility], [address]  [phone]   [phone]      OUTPATIENT PHYSICAL THERAPY: Daily Treatment Note 2021    ICD-10: Treatment Diagnosis: pain in right shoulder (M25.511)                Treatment Diagnosis 2: osteoarthritis right shoulder (M19.011)                Treatment Diagnosis 3: s/p reverse total shoulder arthroplasty (X59.316)  Precautions: hypertension, follow MD guidelines  Allergies: Norvasc [amlodipine] and Lisinopril   TREATMENT PLAN:  Effective Dates: 2021 TO 10/28/2021 (90 days). Frequency/Duration: 2 times a week for 90 Day(s) MEDICAL/REFERRING DIAGNOSIS:  Primary osteoarthritis, right shoulder [M19.011]  Presence of right artificial shoulder joint [Z96.611]   DATE OF ONSET: Patient underwent R reverse total shoulder on 2021  REFERRING PHYSICIAN: Norma Cedillo MD MD Orders: Evaluate and Treat   Reverse Total Shoulder Replacement- Dr. Lottie Roque    1-3 weeks (2021- 2021): sling 24 hours unless sitting still- wean out of sling at 4 weeks as tolerated; pendulums; AROM elbow, wrist, and hand; PROM shoulder; supine external rotation 20-30 degrees, supine forward elevation 120-130 degrees; may progress to AAROM;  No internal rotation   4-6 weeks (2021 - 2021): sling when out of home/ traveling/ sleeping; pendulums; AAROM with passive stretching to above limits; supine external rotation gradually increase to full; supine forward elevation as tolerated; internal rotation progress to full; progress full AROM as tolerated; begin isometric deltoid contraction, postural work, upper trapezius relaxation, active scapular retraction and depression  6-8 weeks (2021 - 9/3/2021): pendulums; scapular mobilizations; internal rotation progress to full; begin resistance strengthening, maintain ROM.     Return MD Appointment: 8/17/2021     Pre-treatment Symptoms/Complaints:  Patient reports slightly increased soreness due to increased exercises at home. Pain: Initial: Pain Intensity 1: 3  Pain Location 1: Shoulder  Pain Orientation 1: Right  Post Session:  0/10    Medications Last Reviewed:  8/5/2021  Updated Objective Findings:  130 degrees shoulder flexion passively. 30 degrees ER passively   TREATMENT:   THERAPEUTIC EXERCISE: (30 minutes):  Exercises per grid below to improve mobility, strength, coordination and dynamic movement of shoulder - right to improve functional lifting, reaching and overhead activites. Required moderate visual, verbal and manual cues to promote proper body alignment, promote proper body posture and promote proper body mechanics. Progressed resistance, range, repetitions and complexity of movement as indicated. Date:  7/30/2021 Date:  8-2-21 Date:  8-5-21   Activity/Exercise Parameters Parameters Parameters   Wand external rotation Supine, x 10 Supine  2 x 10 Supine 2 x 10   AAROM shoulder flexion Supine, hands clasped, x 10 Supine hands clasp 2 x 10 Supine hands clasp 2 x 10   Scapular retraction X 10 3 x 10 1 x 30  With roll 1 x 10   Shrugs X 10 3 x 10 1 x 30   Pendulums reviewed CW/CCW  X 2 min each CW/CCW x 2 min each   Elbow flexion/extension   3 x 10 1 x 20    ex  Ball 2 x 20 Ball x 2 minutes     Time spent with patient reviewing proper muscle recruitment and technique with exercises.      MANUAL THERAPY: (23 minutes): Joint mobilization and Soft tissue mobilization was utilized and necessary because of the patient's restricted joint motion, painful spasm, loss of articular motion and restricted motion of soft tissue   Supine PROM to R shoulder all directions per MD guidelines   Gentle oscillation to decrease pain and improve relaxation    MODALITIES: (0 minutes):      vasopneumatic compression to R shoulder with patient in sitting to decrease pain and swelling     HEP: As above; handouts given to patient for all exercises. Treatment/Session Summary:    · Response to Treatment:  Difficulty relaxing during PROM. · Baseline vitals (7/30/2021): BP: 125/82  · Communication/Consultation:  HEP. Safety limits  · Equipment provided today:  None today  · Recommendations/Intent for next treatment session: Next visit will focus on pain control, manual therapy and modalities as needed, progression per MD guidelines.     Total Treatment Billable Duration:  53 minutes:   PT Patient Time In/Time Out  Time In: 1226  Time Out: [address] Herbert Zuniga PTA

## 2021-08-09 ENCOUNTER — HOSPITAL ENCOUNTER (OUTPATIENT)
Dept: PHYSICAL THERAPY | Age: 81
Discharge: HOME | End: 2021-08-09
Attending: ORTHOPAEDIC SURGERY
Payer: MEDICARE

## 2021-08-09 PROCEDURE — 97140 MANUAL THERAPY 1/> REGIONS: CPT

## 2021-08-09 PROCEDURE — 97110 THERAPEUTIC EXERCISES: CPT

## 2021-08-09 NOTE — PROGRESS NOTES
Calos Meyer  : 1940  [place]  Secondary: [address] [facility] at [facility]  [address], [address] [facility]", [address]  [phone]   [phone]      OUTPATIENT PHYSICAL THERAPY: Daily Treatment Note 2021    ICD-10: Treatment Diagnosis: pain in right shoulder (M25.511)                Treatment Diagnosis 2: osteoarthritis right shoulder (M19.011)                Treatment Diagnosis 3: s/p reverse total shoulder arthroplasty (O37.770)  Precautions: hypertension, follow MD guidelines  Allergies: Norvasc [amlodipine] and Lisinopril   TREATMENT PLAN:  Effective Dates: 2021 TO 10/28/2021 (90 days). Frequency/Duration: 2 times a week for 90 Day(s) MEDICAL/REFERRING DIAGNOSIS:  Primary osteoarthritis, right shoulder [M19.011]  Presence of right artificial shoulder joint [Z96.611]   DATE OF ONSET: Patient underwent R reverse total shoulder on 2021  REFERRING PHYSICIAN: Alice Ayoub MD MD Orders: Evaluate and Treat   Reverse Total Shoulder Replacement- Dr. Kyle Jolly    1-3 weeks (2021- 2021): sling 24 hours unless sitting still- wean out of sling at 4 weeks as tolerated; pendulums; AROM elbow, wrist, and hand; PROM shoulder; supine external rotation 20-30 degrees, supine forward elevation 120-130 degrees; may progress to AAROM;  No internal rotation   4-6 weeks (2021 - 2021): sling when out of home/ traveling/ sleeping; pendulums; AAROM with passive stretching to above limits; supine external rotation gradually increase to full; supine forward elevation as tolerated; internal rotation progress to full; progress full AROM as tolerated; begin isometric deltoid contraction, postural work, upper trapezius relaxation, active scapular retraction and depression  6-8 weeks (2021 - 9/3/2021): pendulums; scapular mobilizations; internal rotation progress to full; begin resistance strengthening, maintain ROM.     Return MD Appointment: 8/17/2021     Pre-treatment Symptoms/Complaints:  Patient reports slightly increased soreness due to increased exercises at home. Pain: Initial: Pain Intensity 1: 3  Pain Location 1: Shoulder  Pain Orientation 1: Right  Post Session:  0/10    Medications Last Reviewed:  8/9/2021  Updated Objective Findings:  130 degrees shoulder flexion passively. 30 degrees ER passively   TREATMENT:   THERAPEUTIC EXERCISE: (30 minutes):  Exercises per grid below to improve mobility, strength, coordination and dynamic movement of shoulder - right to improve functional lifting, reaching and overhead activites. Required moderate visual, verbal and manual cues to promote proper body alignment, promote proper body posture and promote proper body mechanics. Progressed resistance, range, repetitions and complexity of movement as indicated. Date:  8-9-21 Date:  8-2-21 Date:  8-5-21   Activity/Exercise Parameters Parameters Parameters   Wand external rotation Supine, 3 x 10 Supine  2 x 10 Supine 2 x 10   AAROM shoulder flexion Supine, hands clasped, x 10  Wand x 10 Supine hands clasp 2 x 10 Supine hands clasp 2 x 10   Scapular retraction 1 x 30 roll 3 x 10 1 x 30  With roll 1 x 10   Shrugs 1 x 30 3 x 10 1 x 30   Pendulums 2 x 10  CW/CCW  X 2 min each CW/CCW x 2 min each   Elbow flexion/extension  1 x 30 3 x 10 1 x 20    ex  Ball 2 x 20 Ball x 2 minutes   Pulleys  2 minutes        Time spent with patient reviewing proper muscle recruitment and technique with exercises.      MANUAL THERAPY: (23 minutes): Joint mobilization and Soft tissue mobilization was utilized and necessary because of the patient's restricted joint motion, painful spasm, loss of articular motion and restricted motion of soft tissue   Supine PROM to R shoulder all directions per MD guidelines   Gentle oscillation to decrease pain and improve relaxation    MODALITIES: (0 minutes):      vasopneumatic compression to R shoulder with patient in sitting to decrease pain and swelling     HEP: As above; handouts given to patient for all exercises. Treatment/Session Summary:    · Response to Treatment:  Difficulty relaxing during PROM. Some difficulty with pulleys. Will hold for now  · Baseline vitals (7/30/2021): BP: 125/82  · Communication/Consultation:  HEP. Safety limits  · Equipment provided today:  None today  · Recommendations/Intent for next treatment session: Next visit will focus on pain control, manual therapy and modalities as needed, progression per MD guidelines.     Total Treatment Billable Duration:  53 minutes:   PT Patient Time In/Time Out  Time In: 5457  Time Out: [address], PTA

## 2021-08-11 ENCOUNTER — HOSPITAL ENCOUNTER (OUTPATIENT)
Dept: PHYSICAL THERAPY | Age: 81
Discharge: HOME | End: 2021-08-11
Attending: ORTHOPAEDIC SURGERY
Payer: MEDICARE

## 2021-08-11 PROCEDURE — 97110 THERAPEUTIC EXERCISES: CPT

## 2021-08-11 PROCEDURE — 97140 MANUAL THERAPY 1/> REGIONS: CPT

## 2021-08-11 NOTE — PROGRESS NOTES
Keerthi Meyer  : 1940  [place]  Secondary: [address] [facility] at [facility]  [address] [place], [address] [place] [address], [address]  [phone]   [phone]      OUTPATIENT PHYSICAL THERAPY: Daily Treatment Note 2021    ICD-10: Treatment Diagnosis: pain in right shoulder (M25.511)                Treatment Diagnosis 2: osteoarthritis right shoulder (M19.011)                Treatment Diagnosis 3: s/p reverse total shoulder arthroplasty (U85.882)  Precautions: hypertension, follow MD guidelines  Allergies: Norvasc [amlodipine] and Lisinopril   TREATMENT PLAN:  Effective Dates: 2021 TO 10/28/2021 (90 days). Frequency/Duration: 2 times a week for 90 Day(s) MEDICAL/REFERRING DIAGNOSIS:  Primary osteoarthritis, right shoulder [M19.011]  Presence of right artificial shoulder joint [Z96.611]   DATE OF ONSET: Patient underwent R reverse total shoulder on 2021  REFERRING PHYSICIAN: Vernell Morris MD MD Orders: Evaluate and Treat   Reverse Total Shoulder Replacement- Dr. Marinell Gitelman    1-3 weeks (2021- 2021): sling 24 hours unless sitting still- wean out of sling at 4 weeks as tolerated; pendulums; AROM elbow, wrist, and hand; PROM shoulder; supine external rotation 20-30 degrees, supine forward elevation 120-130 degrees; may progress to AAROM;  No internal rotation   4-6 weeks (2021 - 2021): sling when out of home/ traveling/ sleeping; pendulums; AAROM with passive stretching to above limits; supine external rotation gradually increase to full; supine forward elevation as tolerated; internal rotation progress to full; progress full AROM as tolerated; begin isometric deltoid contraction, postural work, upper trapezius relaxation, active scapular retraction and depression  6-8 weeks (2021 - 9/3/2021): pendulums; scapular mobilizations; internal rotation progress to full; begin resistance strengthening, maintain ROM.     Return MD Appointment: 8/17/2021     Pre-treatment Symptoms/Complaints:  Patient reports  increased soreness due to using her arm more. Pain: Initial: Pain Intensity 1: 4  Pain Location 1: Shoulder  Pain Orientation 1: Right  Post Session:  2/10    Medications Last Reviewed:  8/11/2021  Updated Objective Findings:  132 degrees flexion and 34 degrees ER passively   TREATMENT:   THERAPEUTIC EXERCISE: (30 minutes):  Exercises per grid below to improve mobility, strength, coordination and dynamic movement of shoulder - right to improve functional lifting, reaching and overhead activites. Required moderate visual, verbal and manual cues to promote proper body alignment, promote proper body posture and promote proper body mechanics. Progressed resistance, range, repetitions and complexity of movement as indicated. Date:  8-9-21 Date:  8-11-21 Date:  8-5-21   Activity/Exercise Parameters Parameters Parameters   Wand external rotation Supine, 3 x 10 Supine  2 x 10 Supine 2 x 10   AAROM shoulder flexion Supine, hands clasped, x 10  Wand x 10 Wand 2 x 10 Supine hands clasp 2 x 10   Scapular retraction 1 x 30 roll 3 x 10 1 x 30  With roll 1 x 10   Shrugs 1 x 30 3 x 10 1 x 30   Pendulums 2 x 10  CW/CCW  X 20 each CW/CCW x 2 min each   Elbow flexion/extension  1 x 30 3 x 10 1 x 20    ex  Ball 2 x 20 Ball x 2 minutes   Pulleys  2 minutes      Isometrics   Flex/abd/ext 10 x 5 sec    Scapular depression  3 x 10      Time spent with patient reviewing proper muscle recruitment and technique with exercises.      MANUAL THERAPY: (25 minutes): Joint mobilization and Soft tissue mobilization was utilized and necessary because of the patient's restricted joint motion, painful spasm, loss of articular motion and restricted motion of soft tissue   Supine PROM to R shoulder all directions per MD guidelines   Gentle oscillation to decrease pain and improve relaxation   Soft tissue mobilization to posterior shoulder,upper trap and lateral arm avoiding scar    MODALITIES: (0 minutes):      vasopneumatic compression to R shoulder with patient in sitting to decrease pain and swelling     HEP: As above; handouts given to patient for all exercises. Treatment/Session Summary:    · Response to Treatment:  Difficulty relaxing during PROM. Less tight and sore after treatment  · Baseline vitals (7/30/2021): BP: 125/82  · Communication/Consultation:  HEP. Safety limits  · Equipment provided today:  None today  · Recommendations/Intent for next treatment session: Next visit will focus on pain control, manual therapy and modalities as needed, progression per MD guidelines.     Total Treatment Billable Duration:  55 minutes:   PT Patient Time In/Time Out  Time In: 8301  Time Out: [address], PTA

## 2021-08-16 ENCOUNTER — HOSPITAL ENCOUNTER (OUTPATIENT)
Dept: PHYSICAL THERAPY | Age: 81
Discharge: HOME | End: 2021-08-16
Attending: ORTHOPAEDIC SURGERY
Payer: MEDICARE

## 2021-08-16 PROCEDURE — 97140 MANUAL THERAPY 1/> REGIONS: CPT

## 2021-08-16 PROCEDURE — 97110 THERAPEUTIC EXERCISES: CPT

## 2021-08-16 NOTE — PROGRESS NOTES
Sarai Meyer  : 1940  [place]  Secondary: [address] [facility] at [facility]  [address], [address] [facility], [address]  [phone]   [phone]      OUTPATIENT PHYSICAL THERAPY: Daily Treatment Note 2021    ICD-10: Treatment Diagnosis: pain in right shoulder (M25.511)                Treatment Diagnosis 2: osteoarthritis right shoulder (M19.011)                Treatment Diagnosis 3: s/p reverse total shoulder arthroplasty (K37.673)  Precautions: hypertension, follow MD guidelines  Allergies: Norvasc [amlodipine] and Lisinopril   TREATMENT PLAN:  Effective Dates: 2021 TO 10/28/2021 (90 days). Frequency/Duration: 2 times a week for 90 Day(s) MEDICAL/REFERRING DIAGNOSIS:  Primary osteoarthritis, right shoulder [M19.011]  Presence of right artificial shoulder joint [Z96.611]   DATE OF ONSET: Patient underwent R reverse total shoulder on 2021  REFERRING PHYSICIAN: Chris Barragan MD MD Orders: Evaluate and Treat   Reverse Total Shoulder Replacement- Dr. Izabel Delgado    1-3 weeks (2021- 2021): sling 24 hours unless sitting still- wean out of sling at 4 weeks as tolerated; pendulums; AROM elbow, wrist, and hand; PROM shoulder; supine external rotation 20-30 degrees, supine forward elevation 120-130 degrees; may progress to AAROM;  No internal rotation   4-6 weeks (2021 - 2021): sling when out of home/ traveling/ sleeping; pendulums; AAROM with passive stretching to above limits; supine external rotation gradually increase to full; supine forward elevation as tolerated; internal rotation progress to full; progress full AROM as tolerated; begin isometric deltoid contraction, postural work, upper trapezius relaxation, active scapular retraction and depression  6-8 weeks (2021 - 9/3/2021): pendulums; scapular mobilizations; internal rotation progress to full; begin resistance strengthening, maintain ROM.     Return MD Appointment: 8/17/2021     Pre-treatment Symptoms/Complaints:  Patient reports steady progress and no pain today. Pain: Initial: Pain Intensity 1: 0  Pain Location 1: Shoulder  Pain Orientation 1: Right  Post Session:  0/10    Medications Last Reviewed:  8/16/2021  Updated Objective Findings:  ROM: right shoulder flex 145 deg, abd 106 and er 40 deg   TREATMENT:   THERAPEUTIC EXERCISE: (38 minutes):  Exercises per grid below to improve mobility, strength, coordination and dynamic movement of shoulder - right to improve functional lifting, reaching and overhead activites. Required moderate visual, verbal and manual cues to promote proper body alignment, promote proper body posture and promote proper body mechanics. Progressed resistance, range, repetitions and complexity of movement as indicated. Date:  8-9-21 Date:  8-11-21 Date:  8-16-21   Activity/Exercise Parameters Parameters Parameters   Wand external rotation Supine, 3 x 10 Supine  2 x 10 Supine 3 x 10   AAROM shoulder flexion Supine, hands clasped, x 10  Wand x 10 Wand 2 x 10 Wand 2 x 10   Scapular retraction 1 x 30 roll 3 x 10 1 x 30  With heat   Shrugs 1 x 30 3 x 10 1 x 30 with heat   Pendulums 2 x 10  CW/CCW  X 20 each CW/CCW x 2 min each   Elbow flexion/extension  1 x 30 3 x 10 1 x 30 with heat    ex  Ball 2 x 20 Ball x 2 minutes   Pulleys  2 minutes      Isometrics   Flex/abd/ext 10 x 5 sec Reviewed    Scapular depression  3 x 10 3 x 10 with heat   Scaption    Supine 2 x 10  Seated 1 x 10           Time spent with patient reviewing proper muscle recruitment and technique with exercises.      MANUAL THERAPY: (15 minutes): Joint mobilization and Soft tissue mobilization was utilized and necessary because of the patient's restricted joint motion, painful spasm, loss of articular motion and restricted motion of soft tissue   Supine PROM to R shoulder all directions per MD guidelines   Gentle oscillation to decrease pain and improve relaxation   Soft tissue mobilization to posterior shoulder,upper trap and lateral arm avoiding scar  Not today     MODALITIES: (0 minutes):      vasopneumatic compression to R shoulder with patient in sitting to decrease pain and swelling     HEP: As above; handouts given to patient for all exercises. Treatment/Session Summary:    · Response to Treatment:  Tolerated session well. progressing ROM . good understanding of HEP. Jaelus Lopez · Baseline vitals (7/30/2021): BP: 125/82  · Communication/Consultation:  Progression of exercises  · Equipment provided today:  None today  · Recommendations/Intent for next treatment session: Next visit will focus on pain control, manual therapy and modalities as needed, progression per MD guidelines.     Total Treatment Billable Duration:  53 minutes:   PT Patient Time In/Time Out  Time In: 1227  Time Out: 1139 Herbert Zuniga PTA

## 2021-08-16 NOTE — PROGRESS NOTES
Arina Schneider  : 1940 [facility]  [address] [place], [address] [place] [address], Carl carvajal, [address] [place] [address] Street  [phone]   [phone]      OUTPATIENT PHYSICAL THERAPY: PHYSICIAN COMMUNICATION    REFERRING PHYSICIAN: Noemy Pérez MD  Return Physician Appointment: 2021  MEDICAL/REFERRING DIAGNOSIS:  · Primary osteoarthritis, right shoulder [M19.011]  · Presence of right artificial shoulder joint [Z96.611]  ATTENDANCE: Arina Schneider has attended 6 sessions of therapy from 2021 to 2021. ASSESSMENT:DATE: 2021    PROGRESS: Arina Schneider has made significant progress since starting physical therapy. ROM today measured 145 degrees flexion, 106 degrees abduction, 40 degrees external rotation, internal rotation to belly. She has minimal to no complaints of pain and is very motivated with her exercise program.    Please advise any specific activities or exercises you would like patient to perform or avoid. RECOMMENDATIONS: Continue therapy 2 times a week through certification period in order to progress function and meet stated goals. Thank you for this referral, and please do not hesitate to contact me at the number listed above if you have any questions.     Malcolm Mujica, PT, DPT

## 2021-08-20 ENCOUNTER — HOSPITAL ENCOUNTER (OUTPATIENT)
Dept: PHYSICAL THERAPY | Age: 81
Discharge: HOME | End: 2021-08-20
Attending: ORTHOPAEDIC SURGERY
Payer: MEDICARE

## 2021-08-20 PROCEDURE — 97110 THERAPEUTIC EXERCISES: CPT

## 2021-08-20 PROCEDURE — 97140 MANUAL THERAPY 1/> REGIONS: CPT

## 2021-08-20 NOTE — PROGRESS NOTES
Justus Meyer  : 1940  [place]  Secondary: [address] [facility]  [address], [address] 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 Frequency/Duration: 2 times a week for 90 Day(s) MEDICAL/REFERRING DIAGNOSIS:  Primary osteoarthritis, right shoulder [M19.011]  Presence of right artificial shoulder joint [Z96.611]   DATE OF ONSET: Patient underwent R reverse total shoulder on 2021  REFERRING PHYSICIAN: Baldo Madrigal MD MD Orders: Evaluate and Treat   Reverse Total Shoulder Replacement- Dr. Tanya Verduzco    1-3 weeks (2021- 2021): sling 24 hours unless sitting still- wean out of sling at 4 weeks as tolerated; pendulums; AROM elbow, wrist, and hand; PROM shoulder; supine external rotation 20-30 degrees, supine forward elevation 120-130 degrees; may progress to AAROM;  No internal rotation   4-6 weeks (2021 - 2021): sling when out of home/ traveling/ sleeping; pendulums; AAROM with passive stretching to above limits; supine external rotation gradually increase to full; supine forward elevation as tolerated; internal rotation progress to full; progress full AROM as tolerated; begin isometric deltoid contraction, postural work, upper trapezius relaxation, active scapular retraction and depression  6-8 weeks (2021 - 9/3/2021): pendulums; scapular mobilizations; internal rotation progress to full; begin resistance strengthening, maintain ROM.     Return MD Appointment: 8/17/2021     Pre-treatment Symptoms/Complaints:  Patient states her MD is pleased with her progress. Continues to report no pain. Pain: Initial:    Post Session:  0/10    Medications Last Reviewed:  8/20/2021  Updated Objective Findings:  PROM flexion 147 dgrees   TREATMENT:   THERAPEUTIC EXERCISE: (30 minutes):  Exercises per grid below to improve mobility, strength, coordination and dynamic movement of shoulder - right to improve functional lifting, reaching and overhead activites. Required moderate visual, verbal and manual cues to promote proper body alignment, promote proper body posture and promote proper body mechanics. Progressed resistance, range, repetitions and complexity of movement as indicated. Date:  8-20-21 Date:  8-11-21 Date:  8-16-21   Activity/Exercise Parameters Parameters Parameters   Wand external rotation Supine, 2 x 10 Supine  2 x 10 Supine 3 x 10   AAROM shoulder flexion Wand 2 x 10 Wand 2 x 10 Wand 2 x 10   Scapular retraction 1 x 30  3 x 10 1 x 30  With heat   Shrugs 1 x 30 3 x 10 1 x 30 with heat   Pendulums Held due to eye surgery  CW/CCW  X 20 each CW/CCW x 2 min each   Elbow flexion/extension  1 x 30 3 x 10 1 x 30 with heat    ex  Ball 2 x 20 Ball x 2 minutes   Pulleys  2 x 2 minutes      Isometrics   Flex/abd/ext 10 x 5 sec Reviewed    Scapular depression  3 x 10 3 x 10 with heat   Scaption  Seated 2 x 10   Supine 2 x 10  Seated 1 x 10           Time spent with patient reviewing proper muscle recruitment and technique with exercises.      MANUAL THERAPY: (13 minutes): Joint mobilization and Soft tissue mobilization was utilized and necessary because of the patient's restricted joint motion, painful spasm, loss of articular motion and restricted motion of soft tissue   Supine PROM to R shoulder all directions per MD guidelines   Gentle oscillation to decrease pain and improve relaxation   Soft tissue mobilization to posterior shoulder,upper trap and lateral arm avoiding scar  Not today     MODALITIES: (0 minutes):      vasopneumatic compression to R shoulder with patient in sitting to decrease pain and swelling     HEP: As above; handouts given to patient for all exercises. Treatment/Session Summary:    · Response to Treatment:  Tolerated session well. progressing ROM . good understanding of HEP. Fredy Wynne · Baseline vitals (7/30/2021): BP: 125/82  · Communication/Consultation:  Progression of exercises  · Equipment provided today:  None today  · Recommendations/Intent for next treatment session: Next visit will focus on pain control, manual therapy and modalities as needed, progression per MD guidelines.     Total Treatment Billable Duration:  43 minutes:   PT Patient Time In/Time Out  Time In: 1102  Time Out: MICHAEL Miguel

## 2021-08-23 ENCOUNTER — HOSPITAL ENCOUNTER (OUTPATIENT)
Dept: PHYSICAL THERAPY | Age: 81
Discharge: HOME | End: 2021-08-23
Attending: ORTHOPAEDIC SURGERY
Payer: MEDICARE

## 2021-08-23 PROCEDURE — 97110 THERAPEUTIC EXERCISES: CPT

## 2021-08-23 PROCEDURE — 97140 MANUAL THERAPY 1/> REGIONS: CPT

## 2021-08-23 NOTE — PROGRESS NOTES
Fransisca Meyer  : 1940  [place]  Secondary: [address] [facility]  [address], [address] [facility], [address]  [phone]   [phone]       OUTPATIENT PHYSICAL THERAPY:Progress Report 2021    ICD-10: Treatment Diagnosis: pain in right shoulder (M25.511)                Treatment Diagnosis 2: osteoarthritis right shoulder (M19.011)                Treatment Diagnosis 3: s/p reverse total shoulder arthroplasty (O21.191)  Precautions: hypertension, follow MD guidelines  Allergies: Norvasc [amlodipine] and Lisinopril   TREATMENT PLAN:  Effective Dates: 2021 TO 10/28/2021 (90 days). Frequency/Duration: 2 times a week for 90 Day(s) MEDICAL/REFERRING DIAGNOSIS:  Primary osteoarthritis, right shoulder [M19.011]  Presence of right artificial shoulder joint [Z96.611]   DATE OF ONSET: Patient underwent R reverse total shoulder on 2021  REFERRING PHYSICIAN: Augie Grewal MD MD Orders: Evaluate and Treat   Reverse Total Shoulder Replacement- Dr. Mariola Begum    1-3 weeks (2021- 2021): sling 24 hours unless sitting still- wean out of sling at 4 weeks as tolerated; pendulums; AROM elbow, wrist, and hand; PROM shoulder; supine external rotation 20-30 degrees, supine forward elevation 120-130 degrees; may progress to AAROM;  No internal rotation   4-6 weeks (2021 - 2021): sling when out of home/ traveling/ sleeping; pendulums; AAROM with passive stretching to above limits; supine external rotation gradually increase to full; supine forward elevation as tolerated; internal rotation progress to full; progress full AROM as tolerated; begin isometric deltoid contraction, postural work, upper trapezius relaxation, active scapular retraction and depression  6-8 weeks (2021 - 9/3/2021): pendulums; scapular mobilizations; internal rotation progress to full; begin resistance strengthening, maintain ROM.    Return MD Appointment: 9/28/2021     INITIAL ASSESSMENT:  Ms. Willy Moore is a 78 y.o. female presenting to physical therapy with complaints of R shoulder pain and stiffness s/p R reverse total shoulder arthroplasty on 7/9/2021. She has minimal to no complaints of R shoulder pain, but reports R flank pain from wearing the abduction pillow. Patient compliant with sling and MD removed abduction pillow at follow up appointment. Patient lives by herself and is eager to return to her prior level of activities including caring for herself, cleaning her house, and gardening. Patient presents with increased pain, decreased strength, decreased ROM, decreased flexibility, impaired posture, impaired overhead reach, impaired transfer ability, decreased activity tolerance, and overall impaired functional mobility. Patient is a good candidate for skilled physical therapy interventions to include manual therapy, therapeutic exercise, transfer training, postural re-education, body mechanics training, and pain modalities as needed. PROGRESS NOTE 8/23/2021: Patient has been seen for 8 sessions of physical therapy from 7/30/2021 to 8/23/2021. She reports feeling about 65% better since starting therapy with improving motion, being able to use her R UE, and more independence. She feels she needs to continue working on strength and reaching overhead. Patient is improving with ROM and overall functional use of her R UE. She is progressing as expected and can start gentle resistance/ strengthening. She has met some of her goals, is consistent with her HEP, and would benefit from continued skilled therapy to address remaining goals and deficits. PROBLEM LIST (Impacting functional limitations):  1. Decreased Strength  2. Decreased ADL/Functional Activities  3. Increased Pain  4. Decreased Activity Tolerance  5. Decreased Pacing Skills  6. Decreased Work Simplification/Energy Conservation Techniques  7.  Decreased Flexibility/Joint Mobility  8. Edema/Girth INTERVENTIONS PLANNED: (Treatment may consist of any combination of the following)  1. Bed Mobility  2. Cold  3. Cryotherapy  4. Electrical Stimulation  5. Family Education  6. Heat  7. Home Exercise Program (HEP)  8. Manual Therapy  9. Neuromuscular Re-education/Strengthening  10. Range of Motion (ROM)  11. Therapeutic Activites  12. Therapeutic Exercise/Strengthening  13. Transfer Training     GOALS: (Goals have been discussed and agreed upon with patient.)  Short-Term Functional Goals: Time Frame: 7/30/2021 to 9/10/2021  1. Patient demonstrates independence with home exercise program without verbal cueing provided by therapist. -GOAL MET  2. Patient will report no more than 1/10 R shoulder or flank pain at rest in order to demonstrate improved self pain control and tolerance. -GOAL MET  3. Patient will be educated in and demonstrate improved upright posture including decreased forward head and shoulders to improve clearance for overhead activities. -GOAL MET  4. Patient will improve R shoulder passive external rotation to at least 45 degrees in order to demonstrate progress towards discharge goals. -ONGOINO  Discharge Goals: Time Frame: 7/30/2021 to 10/28/2021  1. Patient will improve R shoulder forward elevation to 145 degrees in order to improve ability to reach overhead/ into cabinets at home. -ONGOING  2. Patient will improve R shoulder functional internal and external rotation to L5 and C7, respectively, in order to improve ability to wash hair and don pants. -ONGOING  3. Patient will report minimal to no R shoulder pain with daily activities and self care in order to demonstrate improved activity tolerance. -GOAL MET  4. Patient will be able to return to light housework and gardening with minimal to no complaints in order to return to prior hobbies. -ONGOING  5. Patient will improve Disability of the Arm, Shoulder, and Hand score to 30/55 from 45/55.  -ONGOING    Outcome Measure: Tool Used: Disabilities of the Arm, Shoulder and Hand (DASH) Questionnaire - Quick Version  Score:  Initial: 45/55  Most Recent: 33/55 (Date: 8/23/2021 )   Interpretation of Score: The DASH is designed to measure the activities of daily living in person's with upper extremity dysfunction or pain. Each section is scored on a 1-5 scale, 5 representing the greatest disability. The scores of each section are added together for a total score of 55. UPDATED OBJECTIVE FINDINGS:    Palpation:          Minimal (from moderate) tenderness to palpation of gross R shoulder and incision site. ROM:        NT = noted tested  AROM/ PROM Left (degrees) Right (degrees)   Shoulder Flexion 150 145 (from 115)   Shoulder Abduction 140 120 (from 70)   Shoulder Internal Rotation  70 70 (from to belly)   Functional Internal Rotation T10 To R SI joint   (from NT)   Shoulder External Rotation 55 25 (from 0)   Functional External Rotation C7 with limited shoulder flexion To R ear, limited shoulder flexion (from NT)     Strength: Motion Tested Left   (*/5) Right  (*/5)   Shoulder Flexion 4+ 3+ (from NT)   Scaption 4+ 3+ (from NT)   Shoulder Abduction 4+ 4- (from NT)   Shoulder Internal Rotation  4+ 3+ (from NT)   Shoulder External Rotation 4+ 3+ (from NT)   Elbow Flexion 4+ 4+ (from NT)   Elbow Extension 4+ 4+ (from NT)     Passive Accessory Motion:         None performed due to acuity of surgery  Neurological Screen:              Myotomes: Key muscle strength testing through L UE is [facility]. Dermatomes: Sensation to light touch for bilateral UE is intact from C4 to T2. Reflexes: NT  Functional Mobility:         Patient able to dress/ bathe on her own with slight modifications and increased time required. Unable to reach overhead. Patient eager to return to prior activity level including caring for herself, her house, and gardening. Balance:          Sitting and standing balance grossly intact.         Medical Necessity: · Patient is expected to demonstrate progress in strength, range of motion, coordination and functional technique to increase independence with self care and daily activities and improve safety during reaching, lifting, and overhead activities. · Skilled intervention continues to be required due to R shoulder pain and stiffness s/p reverse TSA. Reason for Services/Other Comments:  · Patient continues to require skilled intervention due to decreased strength and function s/p reverse total shoulder. Rehabilitation Potential For Stated Goals: Good  Regarding Kar Meyer's therapy, I certify that the treatment plan above will be carried out by a therapist or under their direction. Thank you for this referral,  Felicia Lopez PT     Referring Physician Signature: Sara Haines MD No Signature is Required for this note.

## 2021-08-23 NOTE — PROGRESS NOTES
Brandyn Meyer  : 1940  [place]  Secondary: [address] [facility]  [address], [address] [facility], [address]  [phone]   [phone]      OUTPATIENT PHYSICAL THERAPY: Daily Treatment Note 2021    ICD-10: Treatment Diagnosis: pain in right shoulder (M25.511)                Treatment Diagnosis 2: osteoarthritis right shoulder (M19.011)                Treatment Diagnosis 3: s/p reverse total shoulder arthroplasty (L77.820)  Precautions: hypertension, follow MD guidelines  Allergies: Norvasc [amlodipine] and Lisinopril   TREATMENT PLAN:  Effective Dates: 2021 TO 10/28/2021 (90 days). Frequency/Duration: 2 times a week for 90 Day(s) MEDICAL/REFERRING DIAGNOSIS:  Primary osteoarthritis, right shoulder [M19.011]  Presence of right artificial shoulder joint [Z96.611]   DATE OF ONSET: Patient underwent R reverse total shoulder on 2021  REFERRING PHYSICIAN: Carlito June MD MD Orders: Evaluate and Treat   Reverse Total Shoulder Replacement- Dr. Naveen Chavez    1-3 weeks (2021- 2021): sling 24 hours unless sitting still- wean out of sling at 4 weeks as tolerated; pendulums; AROM elbow, wrist, and hand; PROM shoulder; supine external rotation 20-30 degrees, supine forward elevation 120-130 degrees; may progress to AAROM;  No internal rotation   4-6 weeks (2021 - 2021): sling when out of home/ traveling/ sleeping; pendulums; AAROM with passive stretching to above limits; supine external rotation gradually increase to full; supine forward elevation as tolerated; internal rotation progress to full; progress full AROM as tolerated; begin isometric deltoid contraction, postural work, upper trapezius relaxation, active scapular retraction and depression  6-8 weeks (2021 - 9/3/2021): pendulums; scapular mobilizations; internal rotation progress to full; begin resistance strengthening, maintain ROM.     Return MD Appointment: 9/28/2021     Pre-treatment Symptoms/Complaints:  Patient with no complaints of pain and pleased with her progress. Starting to use her R UE more at home as able. Pain: Initial: Pain Intensity 1: 0  Pain Location 1: Shoulder  Pain Orientation 1: Right  Post Session:  0/10    Medications Last Reviewed:  8/23/2021  Updated Objective Findings:  See Progress Note from today   TREATMENT:   THERAPEUTIC EXERCISE: (40 minutes):  Exercises per grid below to improve mobility, strength, coordination and dynamic movement of shoulder - right to improve functional lifting, reaching and overhead activites. Required moderate visual, verbal and manual cues to promote proper body alignment, promote proper body posture and promote proper body mechanics. Progressed resistance, range, repetitions and complexity of movement as indicated. Date:  8-20-21 Date:  8/23/2021 Date:  8-16-21   Activity/Exercise Parameters Parameters Parameters   Wand external rotation Supine, 2 x 10 Supine, 10 x 10 seconds Supine 3 x 10   AAROM shoulder flexion Wand 2 x 10 Supine active, x 10  Seated, x 6 Wand 2 x 10   Scapular retraction 1 x 30  --- 1 x 30  With heat   Shrugs 1 x 30 --- 1 x 30 with heat   Pendulums Held due to eye surgery  --- CW/CCW x 2 min each   Elbow flexion/extension  1 x 30 Yellow band, x 10 each 1 x 30 with heat    ex  --- Ball x 2 minutes   Pulleys  2 x 2 minutes  reviewed    Isometrics   Internal and external rotations, manual resistance, 5 x 10 seconds each Reviewed    Scapular depression  --- 3 x 10 with heat   Scaption  Seated 2 x 10  Seated, x 10 Supine 2 x 10  Seated 1 x 10   Sleeper stretch --- Internal and external rotation, 5 x 10 seconds each    Band row --- Yellow, x 10    Band low row --- Yellow, x 10    Band biceps --- Yellow, x 10    Band triceps --- Yellow, x 10      Time spent with patient reviewing proper muscle recruitment and technique with exercises.      MANUAL THERAPY: (15 minutes): Joint mobilization and Soft tissue mobilization was utilized and necessary because of the patient's restricted joint motion, painful spasm, loss of articular motion and restricted motion of soft tissue   Supine PROM to R shoulder all directions per MD guidelines   Gentle oscillation to decrease pain and improve relaxation    MODALITIES: (0 minutes):      vasopneumatic compression to R shoulder with patient in sitting to decrease pain and swelling     HEP: As above; handouts given to patient for all exercises. Treatment/Session Summary:    · Response to Treatment:  Good tolerance for new exercises with some fatigue by end of session, but no complaints of pain. Please assess incision site next session regarding need for scar mobilization. .   · Baseline vitals (7/30/2021): BP: 125/82  · Communication/Consultation:  Reviewed and progressed HEP  · Equipment provided today:  Patient given handout with progressed HEP  · Recommendations/Intent for next treatment session: Next visit will focus on pain control, manual therapy and modalities as needed, progression per MD guidelines.     Total Treatment Billable Duration:  55 minutes   PT Patient Time In/Time Out  Time In: 6242  Time Out: 431 Kulwinder Gant PT

## 2021-08-25 ENCOUNTER — HOSPITAL ENCOUNTER (OUTPATIENT)
Dept: PHYSICAL THERAPY | Age: 81
Discharge: HOME | End: 2021-08-25
Attending: ORTHOPAEDIC SURGERY
Payer: MEDICARE

## 2021-08-25 PROCEDURE — 97140 MANUAL THERAPY 1/> REGIONS: CPT

## 2021-08-25 PROCEDURE — 97110 THERAPEUTIC EXERCISES: CPT

## 2021-08-25 NOTE — PROGRESS NOTES
Zoie Meyer  : 1940  [place]  Secondary: [address] [facility]  [address], [address] [facility], [address]  [phone]   [phone]      OUTPATIENT PHYSICAL THERAPY: Daily Treatment Note 2021    ICD-10: Treatment Diagnosis: pain in right shoulder (M25.511)                Treatment Diagnosis 2: osteoarthritis right shoulder (M19.011)                Treatment Diagnosis 3: s/p reverse total shoulder arthroplasty (K83.753)  Precautions: hypertension, follow MD guidelines  Allergies: Norvasc [amlodipine] and Lisinopril   TREATMENT PLAN:  Effective Dates: 2021 TO 10/28/2021 (90 days). Frequency/Duration: 2 times a week for 90 Day(s) MEDICAL/REFERRING DIAGNOSIS:  Primary osteoarthritis, right shoulder [M19.011]  Presence of right artificial shoulder joint [Z96.611]   DATE OF ONSET: Patient underwent R reverse total shoulder on 2021  REFERRING PHYSICIAN: Katelyn Chatterjee MD MD Orders: Evaluate and Treat   Reverse Total Shoulder Replacement- Dr. Davis Lines    1-3 weeks (2021- 2021): sling 24 hours unless sitting still- wean out of sling at 4 weeks as tolerated; pendulums; AROM elbow, wrist, and hand; PROM shoulder; supine external rotation 20-30 degrees, supine forward elevation 120-130 degrees; may progress to AAROM;  No internal rotation   4-6 weeks (2021 - 2021): sling when out of home/ traveling/ sleeping; pendulums; AAROM with passive stretching to above limits; supine external rotation gradually increase to full; supine forward elevation as tolerated; internal rotation progress to full; progress full AROM as tolerated; begin isometric deltoid contraction, postural work, upper trapezius relaxation, active scapular retraction and depression  6-8 weeks (2021 - 9/3/2021): pendulums; scapular mobilizations; internal rotation progress to full; begin resistance strengthening, maintain ROM.     Return MD Appointment: 9/28/2021     Pre-treatment Symptoms/Complaints:  Patient with no complaints of pain. Reports trying her exercises at home and doing well. Pain: Initial: Pain Intensity 1: 0  Pain Location 1: Shoulder  Pain Orientation 1: Right  Post Session:  0/10    Medications Last Reviewed:  8/25/2021  Updated Objective Findings:  cuing for scapular positioning with exercises   TREATMENT:   THERAPEUTIC EXERCISE: (38 minutes):  Exercises per grid below to improve mobility, strength, coordination and dynamic movement of shoulder - right to improve functional lifting, reaching and overhead activites. Required moderate visual, verbal and manual cues to promote proper body alignment, promote proper body posture and promote proper body mechanics. Progressed resistance, range, repetitions and complexity of movement as indicated.      Date:  8-20-21 Date:  8/23/2021 Date:  8/25/2021   Activity/Exercise Parameters Parameters Parameters   Wand external rotation Supine, 2 x 10 Supine, 10 x 10 seconds Supine2 x 10   AAROM shoulder flexion Wand 2 x 10 Supine active, x 10  Seated, x 6 Wand 2 x 10   Scapular retraction 1 x 30  --- ---   Shrugs 1 x 30 --- ---   Elbow flexion/extension  1 x 30 Yellow band, x 10 each ---   Pulleys  2 x 2 minutes  reviewed Scaption x 10   Isometrics   Internal and external rotations, manual resistance, 5 x 10 seconds each Internal, external, abduction, flexion, extension, x 5 each    Scaption  Seated 2 x 10  Seated, x 10 Supine 2 x 10  Seated 1 x 10   Sleeper stretch --- Internal and external rotation, 5 x 10 seconds each ---   Band row --- Yellow, x 10 Yellow, 2 x 10   Band low row --- Yellow, x 10 Yellow, 2 x 10   Band biceps --- Yellow, x 10 ---   Band triceps --- Yellow, x 10 ---   Seated ball roll flexion --- --- X 10   Band walk outs --- --- Internal and external rotation, yellow, x 10 each   Finger ladder --- --- X 10     Time spent with patient reviewing proper muscle recruitment and technique with exercises. MANUAL THERAPY: (15 minutes): Joint mobilization and Soft tissue mobilization was utilized and necessary because of the patient's restricted joint motion, painful spasm, loss of articular motion and restricted motion of soft tissue   Supine PROM to R shoulder all directions per MD guidelines   Gentle oscillation to decrease pain and improve relaxation    MODALITIES: (0 minutes):      none today     HEP: As above; handouts given to patient for all exercises. Treatment/Session Summary:    · Response to Treatment:  Patient with good tolerance for exercises with improved ROM. Some fatigue by end of session. May add scar mobilization next session. .   · Baseline vitals (7/30/2021): BP: 125/82  · Communication/Consultation:  None today  · Equipment provided today:  None today  · Recommendations/Intent for next treatment session: Next visit will focus on pain control, manual therapy and modalities as needed, progression per MD guidelines.     Total Treatment Billable Duration:  53 minutes   PT Patient Time In/Time Out  Time In: 6595  Time Out: 8018  Aiden Castaneda, PT

## 2021-08-30 ENCOUNTER — HOSPITAL ENCOUNTER (OUTPATIENT)
Dept: PHYSICAL THERAPY | Age: 81
Discharge: HOME | End: 2021-08-30
Attending: ORTHOPAEDIC SURGERY
Payer: MEDICARE

## 2021-08-30 PROCEDURE — 97140 MANUAL THERAPY 1/> REGIONS: CPT

## 2021-08-30 PROCEDURE — 97110 THERAPEUTIC EXERCISES: CPT

## 2021-08-30 NOTE — PROGRESS NOTES
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 Frequency/Duration: 2 times a week for 90 Day(s) MEDICAL/REFERRING DIAGNOSIS:  Primary osteoarthritis, right shoulder [M19.011]  Presence of right artificial shoulder joint [Z96.611]   DATE OF ONSET: Patient underwent R reverse total shoulder on 2021  REFERRING PHYSICIAN: Justo Singh MD MD Orders: Evaluate and Treat   Reverse Total Shoulder Replacement- Dr. Cammie Guerra    1-3 weeks (2021- 2021): sling 24 hours unless sitting still- wean out of sling at 4 weeks as tolerated; pendulums; AROM elbow, wrist, and hand; PROM shoulder; supine external rotation 20-30 degrees, supine forward elevation 120-130 degrees; may progress to AAROM;  No internal rotation   4-6 weeks (2021 - 2021): sling when out of home/ traveling/ sleeping; pendulums; AAROM with passive stretching to above limits; supine external rotation gradually increase to full; supine forward elevation as tolerated; internal rotation progress to full; progress full AROM as tolerated; begin isometric deltoid contraction, postural work, upper trapezius relaxation, active scapular retraction and depression  6-8 weeks (2021 - 9/3/2021): pendulums; scapular mobilizations; internal rotation progress to full; begin resistance strengthening, maintain ROM.     Return MD Appointment: 9/28/2021     Pre-treatment Symptoms/Complaints:  Patient reports trying to get her house back in order this weekend and using her arm more than she has. Some soreness at night and a little today. Patient reports \"no pain, just sore\". Pain: Initial: Pain Intensity 1: 5  Pain Location 1: Shoulder  Pain Orientation 1: Right  Post Session:  2/10 soreness   Medications Last Reviewed:  8/30/2021  Updated Objective Findings:  Better control with AROM exercises with less shoulder hike noted    TREATMENT:   THERAPEUTIC EXERCISE: (30 minutes):  Exercises per grid below to improve mobility, strength, coordination and dynamic movement of shoulder - right to improve functional lifting, reaching and overhead activites. Required moderate visual, verbal and manual cues to promote proper body alignment, promote proper body posture and promote proper body mechanics. Progressed resistance, range, repetitions and complexity of movement as indicated.      Date:  8/30/2021 Date:  8/23/2021 Date:  8/25/2021   Activity/Exercise Parameters Parameters Parameters   UBE 1 minute forward, 1 minute backwards     Wand external rotation --- Supine, 10 x 10 seconds Supine2 x 10   AAROM shoulder flexion Active, x 10 Supine active, x 10  Seated, x 6 Wand 2 x 10   Pulleys  --- reviewed Scaption x 10   Isometrics  --- Internal and external rotations, manual resistance, 5 x 10 seconds each Internal, external, abduction, flexion, extension, x 5 each    Scaption  Seated, x 10 Seated, x 10 Supine 2 x 10  Seated 1 x 10   Sleeper stretch nternal and external rotation, 10 x 10 seconds each Internal and external rotation, 5 x 10 seconds each ---   Band row Red, 2 x 10 Yellow, x 10 Yellow, 2 x 10   Band low row Red, 2 x 10 Yellow, x 10 Yellow, 2 x 10   Band biceps Yellow, 2 x 10 Yellow, x 10 ---   Band triceps Yellow, 2 x 10 Yellow, x 10 ---   Seated ball roll flexion --- --- X 10   Band walk outs Internal and external rotation, yellow, 2 x 10 each --- Internal and external rotation, yellow, x 10 each   Finger ladder X 10 --- X 10     Time spent with patient reviewing proper muscle recruitment and technique with exercises. MANUAL THERAPY: (24 minutes): Joint mobilization and Soft tissue mobilization was utilized and necessary because of the patient's restricted joint motion, painful spasm, loss of articular motion and restricted motion of soft tissue   Supine PROM to R shoulder all directions per MD guidelines   Gentle oscillation to decrease pain and improve relaxation   Scar mobilization over incision site with and without suction cup to decrease adhesions   Soft tissue mobilization to R upper arm to decrease tightness and spasms    MODALITIES: (0 minutes):      none today     HEP: As above; handouts given to patient for all exercises. Treatment/Session Summary:    · Response to Treatment:  Decreased soreness and tightness at end of session. Improving overall with activity and ROM. Caren Flores · Baseline vitals (7/30/2021): BP: 125/82  · Communication/Consultation:  None today  · Equipment provided today:  None today  · Recommendations/Intent for next treatment session: Next visit will focus on pain control, manual therapy and modalities as needed, progression per MD guidelines.     Total Treatment Billable Duration:  54 minutes   PT Patient Time In/Time Out  Time In: 1330  Time Out: 834 Lashell Cordero, PT

## 2021-09-03 ENCOUNTER — HOSPITAL ENCOUNTER (OUTPATIENT)
Dept: PHYSICAL THERAPY | Age: 81
Discharge: HOME | End: 2021-09-03
Attending: ORTHOPAEDIC SURGERY
Payer: MEDICARE

## 2021-09-03 PROCEDURE — 97110 THERAPEUTIC EXERCISES: CPT

## 2021-09-03 PROCEDURE — 97140 MANUAL THERAPY 1/> REGIONS: CPT

## 2021-09-03 NOTE — PROGRESS NOTES
Amauri Meyer  : 1940  [place]  Secondary: [address] [facility] at [facility]  [address], [address] [facility], [address]  [phone]   [phone]      OUTPATIENT PHYSICAL THERAPY: Daily Treatment Note 9/3/2021    ICD-10: Treatment Diagnosis: pain in right shoulder (M25.511)                Treatment Diagnosis 2: osteoarthritis right shoulder (M19.011)                Treatment Diagnosis 3: s/p reverse total shoulder arthroplasty (L79.281)  Precautions: hypertension, follow MD guidelines  Allergies: Norvasc [amlodipine] and Lisinopril   TREATMENT PLAN:  Effective Dates: 2021 TO 10/28/2021 (90 days). Frequency/Duration: 2 times a week for 90 Day(s) MEDICAL/REFERRING DIAGNOSIS:  Primary osteoarthritis, right shoulder [M19.011]  Presence of right artificial shoulder joint [Z96.611]   DATE OF ONSET: Patient underwent R reverse total shoulder on 2021  REFERRING PHYSICIAN: Yesy Easley MD MD Orders: Evaluate and Treat   Reverse Total Shoulder Replacement- Dr. Rosalinda Sheffield    1-3 weeks (2021- 2021): sling 24 hours unless sitting still- wean out of sling at 4 weeks as tolerated; pendulums; AROM elbow, wrist, and hand; PROM shoulder; supine external rotation 20-30 degrees, supine forward elevation 120-130 degrees; may progress to AAROM;  No internal rotation   4-6 weeks (2021 - 2021): sling when out of home/ traveling/ sleeping; pendulums; AAROM with passive stretching to above limits; supine external rotation gradually increase to full; supine forward elevation as tolerated; internal rotation progress to full; progress full AROM as tolerated; begin isometric deltoid contraction, postural work, upper trapezius relaxation, active scapular retraction and depression  6-8 weeks (2021 - 9/3/2021): pendulums; scapular mobilizations; internal rotation progress to full; begin resistance strengthening, maintain ROM.     Return MD Appointment: 9/28/2021     Pre-treatment Symptoms/Complaints:  Patient reports some soreness after last session and with moving some items in her home, but has been cautious and not lifting much since her cataract surgery Wednesday. Pain: Initial: Pain Intensity 1: 2  Pain Location 1: Shoulder  Pain Orientation 1: Right  Post Session:  2/10 soreness   Medications Last Reviewed:  9/3/2021  Updated Objective Findings:  Better control with AROM exercises with less shoulder hike noted    TREATMENT:   THERAPEUTIC EXERCISE: (25 minutes):  Exercises per grid below to improve mobility, strength, coordination and dynamic movement of shoulder - right to improve functional lifting, reaching and overhead activites. Required moderate visual, verbal and manual cues to promote proper body alignment, promote proper body posture and promote proper body mechanics. Progressed resistance, range, repetitions and complexity of movement as indicated.      Date:  8/30/2021 Date:  9/3/2021 Date:  8/25/2021   Activity/Exercise Parameters Parameters Parameters   UBE 1 minute forward, 1 minute backwards ---    Wand external rotation --- --- Supine2 x 10   AAROM shoulder flexion Active, x 10 Supine active, x 10 Wand 2 x 10   Pulleys  --- --- Scaption x 10   Isometrics  --- --- Internal, external, abduction, flexion, extension, x 5 each    Scaption  Seated, x 10 Seated, 2 x 10 Supine 2 x 10  Seated 1 x 10   Sleeper stretch internal and external rotation, 10 x 10 seconds each --- ---   Band row Red, 2 x 10 Red, 2 x 10 Yellow, 2 x 10   Band low row Red, 2 x 10 Red, 2 x 10 Yellow, 2 x 10   Band biceps Yellow, 2 x 10 Yellow, 2 x 10 ---   Band triceps Yellow, 2 x 10 Yellow, 2 x 10 ---   Seated ball roll flexion --- --- X 10   Band walk outs Internal and external rotation, yellow, 2 x 10 each Internal and external rotation, red, x 10 each Internal and external rotation, yellow, x 10 each   Finger ladder X 10 X 10 X 10   Internal rotation stretch --- Adduction only x 10  Full x 10      Time spent with patient reviewing proper muscle recruitment and technique with exercises. MANUAL THERAPY: (28 minutes): Joint mobilization and Soft tissue mobilization was utilized and necessary because of the patient's restricted joint motion, painful spasm, loss of articular motion and restricted motion of soft tissue   Supine PROM to R shoulder all directions per MD guidelines   Gentle oscillation to decrease pain and improve relaxation   Scar mobilization over incision site with and without suction cup to decrease adhesions   Soft tissue mobilization to R upper arm to decrease tightness and spasms    MODALITIES: (0 minutes):      none today     HEP: As above; handouts given to patient for all exercises. Treatment/Session Summary:    · Response to Treatment:  Patient making significant progress. Good ROM noted and would benefit from continued strengthening to improve active overhead reach and ability to comb her hair. .   · Baseline vitals (7/30/2021): BP: 125/82  · Communication/Consultation:  None today  · Equipment provided today:  None today  · Recommendations/Intent for next treatment session: Next visit will focus on pain control, manual therapy and modalities as needed, progression per MD guidelines.     Total Treatment Billable Duration:  53 minutes   PT Patient Time In/Time Out  Time In: 1332  Time Out: 834 Lashell Cordero, PT

## 2021-09-07 ENCOUNTER — HOSPITAL ENCOUNTER (OUTPATIENT)
Dept: PHYSICAL THERAPY | Age: 81
Discharge: HOME | End: 2021-09-07
Attending: ORTHOPAEDIC SURGERY
Payer: MEDICARE

## 2021-09-07 PROCEDURE — 97110 THERAPEUTIC EXERCISES: CPT

## 2021-09-07 PROCEDURE — 97140 MANUAL THERAPY 1/> REGIONS: CPT

## 2021-09-07 NOTE — PROGRESS NOTES
Sue Meyer  : 1940  [place]  Secondary: [address] [facility] at [facility]  [address], [address] [facility], [address]  [phone]   [phone]      OUTPATIENT PHYSICAL THERAPY: Daily Treatment Note 2021    ICD-10: Treatment Diagnosis: pain in right shoulder (M25.511)                Treatment Diagnosis 2: osteoarthritis right shoulder (M19.011)                Treatment Diagnosis 3: s/p reverse total shoulder arthroplasty (P61.255)  Precautions: hypertension, follow MD guidelines  Allergies: Norvasc [amlodipine] and Lisinopril   TREATMENT PLAN:  Effective Dates: 2021 TO 10/28/2021 (90 days). Frequency/Duration: 2 times a week for 90 Day(s) MEDICAL/REFERRING DIAGNOSIS:  Primary osteoarthritis, right shoulder [M19.011]  Presence of right artificial shoulder joint [Z96.611]   DATE OF ONSET: Patient underwent R reverse total shoulder on 2021  REFERRING PHYSICIAN: Heavenly Rodriguez MD MD Orders: Evaluate and Treat   Reverse Total Shoulder Replacement- Dr. Rosalba Andrade    1-3 weeks (2021- 2021): sling 24 hours unless sitting still- wean out of sling at 4 weeks as tolerated; pendulums; AROM elbow, wrist, and hand; PROM shoulder; supine external rotation 20-30 degrees, supine forward elevation 120-130 degrees; may progress to AAROM;  No internal rotation   4-6 weeks (2021 - 2021): sling when out of home/ traveling/ sleeping; pendulums; AAROM with passive stretching to above limits; supine external rotation gradually increase to full; supine forward elevation as tolerated; internal rotation progress to full; progress full AROM as tolerated; begin isometric deltoid contraction, postural work, upper trapezius relaxation, active scapular retraction and depression  6-8 weeks (2021 - 9/3/2021): pendulums; scapular mobilizations; internal rotation progress to full; begin resistance strengthening, maintain ROM.     Return MD Appointment: 9/28/2021     Pre-treatment Symptoms/Complaints:  Patient reports using her arm more past few days including some improvement combing her hair. Pain: Initial: Pain Intensity 1: 0  Pain Location 1: Shoulder  Pain Orientation 1: Right  Post Session:  0/10    Medications Last Reviewed:  9/7/2021  Updated Objective Findings:  Improved functional ROM    TREATMENT:   THERAPEUTIC EXERCISE: (30 minutes):  Exercises per grid below to improve mobility, strength, coordination and dynamic movement of shoulder - right to improve functional lifting, reaching and overhead activites. Required moderate visual, verbal and manual cues to promote proper body alignment, promote proper body posture and promote proper body mechanics. Progressed resistance, range, repetitions and complexity of movement as indicated. Date:  8/30/2021 Date:  9/3/2021 Date:  9-7-21   Activity/Exercise Parameters Parameters Parameters   UBE 1 minute forward, 1 minute backwards ---    Wand external rotation --- --- Supine2 x 10   AAROM shoulder flexion Active, x 10 Supine active, x 10 Wand 2 x 10  Press 2 x 10   Pulleys  --- ---    Isometrics  --- ---    Scaption  Seated, x 10 Seated, 2 x 10 Supine 2 x 10  Standing 2 x 10   Sleeper stretch internal and external rotation, 10 x 10 seconds each --- ---   Band row Red, 2 x 10 Red, 2 x 10 Red  2 x 15   Band low row Red, 2 x 10 Red, 2 x 10 Red , 2 x 10   Band biceps Yellow, 2 x 10 Yellow, 2 x 10 Red 2 x 10   Band triceps Yellow, 2 x 10 Yellow, 2 x 10 Red 2 x 10   Seated ball roll flexion --- ---    Band walk outs Internal and external rotation, yellow, 2 x 10 each Internal and external rotation, red, x 10 each Internal and external rotation, yellow, 2 x 10 each   Finger ladder X 10 X 10 X 10   Internal rotation stretch --- Adduction only x 10  Full x 10 Cane 2 x 10     Time spent with patient reviewing proper muscle recruitment and technique with exercises.      MANUAL THERAPY: (24 minutes): Joint mobilization and Soft tissue mobilization was utilized and necessary because of the patient's restricted joint motion, painful spasm, loss of articular motion and restricted motion of soft tissue   Supine PROM to R shoulder all directions per MD guidelines   Gentle oscillation to decrease pain and improve relaxation   Scar mobilization over incision site with and without suction cup to decrease adhesions   Soft tissue mobilization to R upper arm to decrease tightness and spasms    MODALITIES: (0 minutes):      none today     HEP: As above; handouts given to patient for all exercises. Treatment/Session Summary:    · Response to Treatment:  Slight fatigue after exercises. .   · Baseline vitals (7/30/2021): BP: 125/82  · Communication/Consultation:  None today  · Equipment provided today:  None today  · Recommendations/Intent for next treatment session: Next visit will focus on pain control, manual therapy and modalities as needed, progression per MD guidelines.     Total Treatment Billable Duration:  54 minutes   PT Patient Time In/Time Out  Time In: 1328  Time Out: [address], PTA

## 2021-09-09 ENCOUNTER — HOSPITAL ENCOUNTER (OUTPATIENT)
Dept: PHYSICAL THERAPY | Age: 81
Discharge: HOME | End: 2021-09-09
Attending: ORTHOPAEDIC SURGERY
Payer: MEDICARE

## 2021-09-09 PROCEDURE — 97110 THERAPEUTIC EXERCISES: CPT

## 2021-09-09 PROCEDURE — 97140 MANUAL THERAPY 1/> REGIONS: CPT

## 2021-09-09 NOTE — PROGRESS NOTES
Jorge Meyer  : 1940  [place]  Secondary: [address] [facility] at [facility]  [address], [address] [place] [address], [address]  [phone]   [phone]      OUTPATIENT PHYSICAL THERAPY: Daily Treatment Note 2021    ICD-10: Treatment Diagnosis: pain in right shoulder (M25.511)                Treatment Diagnosis 2: osteoarthritis right shoulder (M19.011)                Treatment Diagnosis 3: s/p reverse total shoulder arthroplasty (E09.680)  Precautions: hypertension, follow MD guidelines  Allergies: Norvasc [amlodipine] and Lisinopril   TREATMENT PLAN:  Effective Dates: 2021 TO 10/28/2021 (90 days). Frequency/Duration: 2 times a week for 90 Day(s) MEDICAL/REFERRING DIAGNOSIS:  Primary osteoarthritis, right shoulder [M19.011]  Presence of right artificial shoulder joint [Z96.611]   DATE OF ONSET: Patient underwent R reverse total shoulder on 2021  REFERRING PHYSICIAN: Cindy Guzman MD MD Orders: Evaluate and Treat   Reverse Total Shoulder Replacement- Dr. Dulce Velásquez    1-3 weeks (2021- 2021): sling 24 hours unless sitting still- wean out of sling at 4 weeks as tolerated; pendulums; AROM elbow, wrist, and hand; PROM shoulder; supine external rotation 20-30 degrees, supine forward elevation 120-130 degrees; may progress to AAROM;  No internal rotation   4-6 weeks (2021 - 2021): sling when out of home/ traveling/ sleeping; pendulums; AAROM with passive stretching to above limits; supine external rotation gradually increase to full; supine forward elevation as tolerated; internal rotation progress to full; progress full AROM as tolerated; begin isometric deltoid contraction, postural work, upper trapezius relaxation, active scapular retraction and depression  6-8 weeks (2021 - 9/3/2021): pendulums; scapular mobilizations; internal rotation progress to full; begin resistance strengthening, maintain ROM.     Return MD Appointment: 9/28/2021     Pre-treatment Symptoms/Complaints:  Patient reports feeling better everyday and starting to use her arm more during the day. States she has been able to wash some windows and blinds with some soreness/ fatigue. Pain: Initial: Pain Intensity 1: 0  Pain Location 1: Shoulder  Pain Orientation 1: Right  Post Session:  0/10    Medications Last Reviewed:  9/9/2021  Updated Objective Findings:  less shoulder hike with overhead activities    TREATMENT:   THERAPEUTIC EXERCISE: (38 minutes):  Exercises per grid below to improve mobility, strength, coordination and dynamic movement of shoulder - right to improve functional lifting, reaching and overhead activites. Required moderate visual, verbal and manual cues to promote proper body alignment, promote proper body posture and promote proper body mechanics. Progressed resistance, range, repetitions and complexity of movement as indicated.      Date:  9/9/2021 Date:  9/3/2021 Date:  9-7-21   Activity/Exercise Parameters Parameters Parameters   UBE 2 minutes forward, 2 minutes backwards ---    Wand external rotation --- --- Supine 2 x 10   AAROM shoulder flexion Active, x 10 Supine active, x 10 Wand 2 x 10  Press 2 x 10   Scaption  Supine, 2 x 10  Seated 2 x 10 Seated, 2 x 10 Supine 2 x 10  Standing 2 x 10   Sleeper stretch --- --- ---   Band row Red, 2 x 12 Red, 2 x 10 Red  2 x 15   Band low row Red, 2 x 12 Red, 2 x 10 Red , 2 x 10   Band biceps Red, 2 x 10 Yellow, 2 x 10 Red 2 x 10   Band triceps Red, 2 x 10 Yellow, 2 x 10 Red 2 x 10   Band shoulder extension Yellow, x 10     Band shoulder flexion Yellow, to should height, x 10     Band walk outs Internal and external rotation, Red, 2 x 10 each Internal and external rotation, red, x 10 each Internal and external rotation, yellow, 2 x 10 each   Finger ladder X 10 X 10 X 10   Internal rotation stretch Strap, x 10 Adduction only x 10  Full x 10 Cane 2 x 10   Sidelying external rotation 2 x 10     TRX stretch Flexion x 10  Pec/ external rotation x 5     Wall slides X 10       Time spent with patient reviewing proper muscle recruitment and technique with exercises. MANUAL THERAPY: (15 minutes): Joint mobilization and Soft tissue mobilization was utilized and necessary because of the patient's restricted joint motion, painful spasm, loss of articular motion and restricted motion of soft tissue   Supine PROM to R shoulder all directions per MD guidelines   Gentle oscillation to decrease pain and improve relaxation   Scar mobilization over incision site with and without suction cup to decrease adhesions   Soft tissue mobilization to R upper arm to decrease tightness and spasms    MODALITIES: (0 minutes):      none today     HEP: As above; handouts given to patient for all exercises. Treatment/Session Summary:    · Response to Treatment:  Improving with strenght and exercise tolerance. Some fatigue at end of session, but no pain. Improving with AROM overhead. · Baseline vitals (7/30/2021): BP: 125/82  · Communication/Consultation:  None today  · Equipment provided today:  None today  · Recommendations/Intent for next treatment session: Next visit will focus on pain control, manual therapy and modalities as needed, progression per MD guidelines.     Total Treatment Billable Duration:  53 minutes   PT Patient Time In/Time Out  Time In: 1322  Time Out: [address], PT

## 2021-09-14 ENCOUNTER — HOSPITAL ENCOUNTER (OUTPATIENT)
Dept: PHYSICAL THERAPY | Age: 81
Discharge: HOME | End: 2021-09-14
Attending: ORTHOPAEDIC SURGERY
Payer: MEDICARE

## 2021-09-14 PROCEDURE — 97140 MANUAL THERAPY 1/> REGIONS: CPT

## 2021-09-14 PROCEDURE — 97110 THERAPEUTIC EXERCISES: CPT

## 2021-09-14 NOTE — PROGRESS NOTES
Baldo Meyer  : 1940  [place]  Secondary: [address] 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 Unruly Talbert    1-3 weeks (2021- 2021): sling 24 hours unless sitting still- wean out of sling at 4 weeks as tolerated; pendulums; AROM elbow, wrist, and hand; PROM shoulder; supine external rotation 20-30 degrees, supine forward elevation 120-130 degrees; may progress to AAROM;  No internal rotation   4-6 weeks (2021 - 2021): sling when out of home/ traveling/ sleeping; pendulums; AAROM with passive stretching to above limits; supine external rotation gradually increase to full; supine forward elevation as tolerated; internal rotation progress to full; progress full AROM as tolerated; begin isometric deltoid contraction, postural work, upper trapezius relaxation, active scapular retraction and depression  6-8 weeks (2021 - 9/3/2021): pendulums; scapular mobilizations; internal rotation progress to full; begin resistance strengthening, maintain ROM.     Return MD Appointment: 9/28/2021     Pre-treatment Symptoms/Complaints:  Patient reports feeling better and is using her arm more with ADL'S    Pain: Initial: Pain Intensity 1: 0  Pain Location 1: Shoulder  Pain Orientation 1: Right  Post Session:  0/10    Medications Last Reviewed:  9/14/2021  Updated Objective Findings:  Improved functional ROM    TREATMENT:   THERAPEUTIC EXERCISE: (40 minutes):  Exercises per grid below to improve mobility, strength, coordination and dynamic movement of shoulder - right to improve functional lifting, reaching and overhead activites. Required moderate visual, verbal and manual cues to promote proper body alignment, promote proper body posture and promote proper body mechanics. Progressed resistance, range, repetitions and complexity of movement as indicated.      Date:  9/9/2021 Date:  9/14/2021 Date:  9-7-21   Activity/Exercise Parameters Parameters Parameters   UBE 2 minutes forward, 2 minutes backwards 3 minutes forward and 3 minutes reverse    Wand external rotation --- --- Supine 2 x 10   AAROM shoulder flexion Active, x 10 Active 2 x 10 Wand 2 x 10  Press 2 x 10   Scaption  Supine, 2 x 10  Seated 2 x 10 Standing 2 x 10 Supine 2 x 10  Standing 2 x 10   Sleeper stretch --- --- ---   Band row Green 3 x 10 Red, 2 x 10 Red  2 x 15   Band low row Red,3 x 10 Red, 2 x 10 Red , 2 x 10   Band biceps Red, 2 x 10 Yellow, 2 x 10 Red 2 x 10   Band triceps Red, 2 x 10 Yellow, 2 x 10 Red 2 x 10   Band shoulder extension Yellow, 2 x 10     Band shoulder flexion Yellow, to should height, x 10     Band walk outs Internal and external rotation, Red, 2 x 10 each Internal and external rotation, red, x 10 each Internal and external rotation, yellow, 2 x 10 each   Finger ladder X 10 X 10 X 10   Internal rotation stretch Strap, 2 x 10 Adduction only x 10  Full x 10 Cane 2 x 10   Sidelying external rotation 2 x 10     TRX stretch      Wall slides X 10       Time spent with patient reviewing proper muscle recruitment and technique with exercises. MANUAL THERAPY: (13 minutes): Joint mobilization and Soft tissue mobilization was utilized and necessary because of the patient's restricted joint motion, painful spasm, loss of articular motion and restricted motion of soft tissue   Supine PROM to R shoulder all directions per MD guidelines   Gentle oscillation to decrease pain and improve relaxation   Scar mobilization over incision site with and without suction cup to decrease adhesions Not today   Soft tissue mobilization to R upper arm to decrease tightness and spasms Not today    MODALITIES: (0 minutes):      none today     HEP: As above; handouts given to patient for all exercises. Treatment/Session Summary:    · Response to Treatment:  Imprved ROM and functional strength. .   · Baseline vitals (7/30/2021): BP: 125/82  · Communication/Consultation:  None today  · Equipment provided today:  None today  · Recommendations/Intent for next treatment session: Next visit will focus on pain control, manual therapy and modalities as needed, progression per MD guidelines.     Total Treatment Billable Duration:  53 minutes   PT Patient Time In/Time Out  Time In: 1329  Time Out: [address], PTA

## 2021-09-16 ENCOUNTER — HOSPITAL ENCOUNTER (OUTPATIENT)
Dept: PHYSICAL THERAPY | Age: 81
Discharge: HOME | End: 2021-09-16
Attending: ORTHOPAEDIC SURGERY
Payer: MEDICARE

## 2021-09-16 PROCEDURE — 97140 MANUAL THERAPY 1/> REGIONS: CPT

## 2021-09-16 PROCEDURE — 97110 THERAPEUTIC EXERCISES: CPT

## 2021-09-16 NOTE — PROGRESS NOTES
Aileen Meyer  : 1940  [place]  Secondary: [address] [facility] at [facility]  [address], [address] [facility], [facility], [address] [place] [address]  [phone]   [phone]      OUTPATIENT PHYSICAL THERAPY: Daily Treatment Note 2021    ICD-10: Treatment Diagnosis: pain in right shoulder (M25.511)                Treatment Diagnosis 2: osteoarthritis right shoulder (M19.011)                Treatment Diagnosis 3: s/p reverse total shoulder arthroplasty (J04.052)  Precautions: hypertension, follow MD guidelines  Allergies: Norvasc [amlodipine] and Lisinopril   TREATMENT PLAN:  Effective Dates: 2021 TO 10/28/2021 (90 days). Frequency/Duration: 2 times a week for 90 Day(s) MEDICAL/REFERRING DIAGNOSIS:  Primary osteoarthritis, right shoulder [M19.011]  Presence of right artificial shoulder joint [Z96.611]   DATE OF ONSET: Patient underwent R reverse total shoulder on 2021  REFERRING PHYSICIAN: Shruthi Hui MD MD Orders: Evaluate and Treat   Reverse Total Shoulder Replacement- Dr. Iftikhar Alicia    1-3 weeks (2021- 2021): sling 24 hours unless sitting still- wean out of sling at 4 weeks as tolerated; pendulums; AROM elbow, wrist, and hand; PROM shoulder; supine external rotation 20-30 degrees, supine forward elevation 120-130 degrees; may progress to AAROM;  No internal rotation   4-6 weeks (2021 - 2021): sling when out of home/ traveling/ sleeping; pendulums; AAROM with passive stretching to above limits; supine external rotation gradually increase to full; supine forward elevation as tolerated; internal rotation progress to full; progress full AROM as tolerated; begin isometric deltoid contraction, postural work, upper trapezius relaxation, active scapular retraction and depression  6-8 weeks (2021 - 9/3/2021): pendulums; scapular mobilizations; internal rotation progress to full; begin resistance strengthening, maintain ROM.     Return MD Appointment: 9/28/2021     Pre-treatment Symptoms/Complaints:  Patient reports having some increased pain yesterday and some times at night (6-7/10). States she feels better now with no complaints of pain. Pain: Initial: Pain Intensity 1: 0  Pain Location 1: Shoulder  Pain Orientation 1: Right  Post Session:  0/10    Medications Last Reviewed:  9/16/2021  Updated Objective Findings:  See Progress Note from today    TREATMENT:   THERAPEUTIC EXERCISE: (40 minutes):  Exercises per grid below to improve mobility, strength, coordination and dynamic movement of shoulder - right to improve functional lifting, reaching and overhead activites. Required moderate visual, verbal and manual cues to promote proper body alignment, promote proper body posture and promote proper body mechanics. Progressed resistance, range, repetitions and complexity of movement as indicated.      Date:  9/9/2021 Date:  9/14/2021 Date:  9/16/2021   Activity/Exercise Parameters Parameters Parameters   UBE 2 minutes forward, 2 minutes backwards 3 minutes forward and 3 minutes reverse ---   Wand external rotation --- --- Supine 2 x 10   Active shoulder flexion Active, x 10 Active 2 x 10 Supine x 10  Seated 2 x 10   Scaption  Supine, 2 x 10  Seated 2 x 10 Standing 2 x 10 Standing 2 x 10   Band row Green 3 x 10 Red, 2 x 10 Green, 3 x 10   Band low row Red,3 x 10 Red, 2 x 10 Red , 3 x 10   Band biceps Red, 2 x 10 Yellow, 2 x 10 ---   Band triceps Red, 2 x 10 Yellow, 2 x 10 ---   Band shoulder extension Yellow, 2 x 10  Yellow, 2 x 10   Band shoulder flexion Yellow, to should height, x 10  Yellow, 2 x 10   Band walk outs Internal and external rotation, Red, 2 x 10 each Internal and external rotation, red, x 10 each Internal and external rotation, Red, 2 x 10 each   Finger ladder X 10 X 10 ---   Internal rotation stretch Strap, 2 x 10 Adduction only x 10  Full x 10 Adduction only x 10  Full x 10   Sidelying external rotation 2 x 10 ---   TRX stretch   ---   Wall slides X 10  2 x 10   Bilateral external rotation --- --- Yellow, 2 x 10   Wall ball roll --- --- X 10     Time spent with patient reviewing proper muscle recruitment and technique with exercises. MANUAL THERAPY: (13 minutes): Joint mobilization and Soft tissue mobilization was utilized and necessary because of the patient's restricted joint motion, painful spasm, loss of articular motion and restricted motion of soft tissue   Supine PROM to R shoulder all directions per MD guidelines   Gentle oscillation to decrease pain and improve relaxation   Scar mobilization over incision site with and without suction cup to decrease adhesions- Not today   Soft tissue mobilization to R upper arm to decrease tightness and spasms- Not today    MODALITIES: (0 minutes):      none today     HEP: As above; handouts given to patient for all exercises. Treatment/Session Summary:    · Response to Treatment:  Much improved overhead reach and use of R UE. Continues to fatigue fairly quickly, but making good progress in all areas. · Baseline vitals (7/30/2021): BP: 125/82  · Communication/Consultation:  Advised patient to try some static holds at end range active flexion to help with endurance  · Equipment provided today:  None today  · Recommendations/Intent for next treatment session: Next visit will focus on pain control, manual therapy and modalities as needed, progression per MD guidelines.     Total Treatment Billable Duration:  53 minutes   PT Patient Time In/Time Out  Time In: 1330  Time Out: [address] [facility], [id number], PT

## 2021-09-16 NOTE — PROGRESS NOTES
Aileen Meyer  : 1940  [place]  Secondary: [address] [facility]  [address] [place], [address] [facility], [address]  [phone]   [phone]       OUTPATIENT PHYSICAL THERAPY:Progress Report 2021    ICD-10: Treatment Diagnosis: pain in right shoulder (M25.511)                Treatment Diagnosis 2: osteoarthritis right shoulder (M19.011)                Treatment Diagnosis 3: s/p reverse total shoulder arthroplasty (D00.008)  Precautions: hypertension, follow MD guidelines  Allergies: Norvasc [amlodipine] and Lisinopril   TREATMENT PLAN:  Effective Dates: 2021 TO 10/28/2021 (90 days). Frequency/Duration: 2 times a week for 90 Day(s) MEDICAL/REFERRING DIAGNOSIS:  Primary osteoarthritis, right shoulder [M19.011]  Presence of right artificial shoulder joint [Z96.611]   DATE OF ONSET: Patient underwent R reverse total shoulder on 2021  REFERRING PHYSICIAN: Shruthi Hui MD MD Orders: Evaluate and Treat   Reverse Total Shoulder Replacement- Dr. Iftikhar Alicia    1-3 weeks (2021- 2021): sling 24 hours unless sitting still- wean out of sling at 4 weeks as tolerated; pendulums; AROM elbow, wrist, and hand; PROM shoulder; supine external rotation 20-30 degrees, supine forward elevation 120-130 degrees; may progress to AAROM;  No internal rotation   4-6 weeks (2021 - 2021): sling when out of home/ traveling/ sleeping; pendulums; AAROM with passive stretching to above limits; supine external rotation gradually increase to full; supine forward elevation as tolerated; internal rotation progress to full; progress full AROM as tolerated; begin isometric deltoid contraction, postural work, upper trapezius relaxation, active scapular retraction and depression  6-8 weeks (2021 - 9/3/2021): pendulums; scapular mobilizations; internal rotation progress to full; begin resistance strengthening, maintain ROM.    Return MD Appointment: 9/28/2021     INITIAL ASSESSMENT:  Ms. Kenia De Souza is a 78 y.o. female presenting to physical therapy with complaints of R shoulder pain and stiffness s/p R reverse total shoulder arthroplasty on 7/9/2021. She has minimal to no complaints of R shoulder pain, but reports R flank pain from wearing the abduction pillow. Patient compliant with sling and MD removed abduction pillow at follow up appointment. Patient lives by herself and is eager to return to her prior level of activities including caring for herself, cleaning her house, and gardening. Patient presents with increased pain, decreased strength, decreased ROM, decreased flexibility, impaired posture, impaired overhead reach, impaired transfer ability, decreased activity tolerance, and overall impaired functional mobility. Patient is a good candidate for skilled physical therapy interventions to include manual therapy, therapeutic exercise, transfer training, postural re-education, body mechanics training, and pain modalities as needed. PROGRESS NOTE 9/16/2021: Patient has been seen for 15 sessions of physical therapy from 7/30/2021 to 9/16/2021. She reports feeling about 70% better since starting therapy with improving motion, being able to use her R UE, raising her arm better, and having more independence. She feels she needs to work on reaching overhead and behind her back and continue working on strength. Patient is improving with ROM and functional use of her R UE and gradually returning to prior activities. She has met some of her goals, is consistent with her HEP, and would benefit from continued skilled therapy to address remaining goals and deficits. PROBLEM LIST (Impacting functional limitations):  1. Decreased Strength  2. Decreased ADL/Functional Activities  3. Increased Pain  4. Decreased Activity Tolerance  5. Decreased Pacing Skills  6. Decreased Work Simplification/Energy Conservation Techniques  7.  Decreased Flexibility/Joint Mobility  8. Edema/Girth INTERVENTIONS PLANNED: (Treatment may consist of any combination of the following)  1. Bed Mobility  2. Cold  3. Cryotherapy  4. Electrical Stimulation  5. Family Education  6. Heat  7. Home Exercise Program (HEP)  8. Manual Therapy  9. Neuromuscular Re-education/Strengthening  10. Range of Motion (ROM)  11. Therapeutic Activites  12. Therapeutic Exercise/Strengthening  13. Transfer Training     GOALS: (Goals have been discussed and agreed upon with patient.)  Short-Term Functional Goals: Time Frame: 7/30/2021 to 9/10/2021  1. Patient demonstrates independence with home exercise program without verbal cueing provided by therapist. -GOAL MET  2. Patient will report no more than 1/10 R shoulder or flank pain at rest in order to demonstrate improved self pain control and tolerance. -GOAL MET  3. Patient will be educated in and demonstrate improved upright posture including decreased forward head and shoulders to improve clearance for overhead activities. -GOAL MET  4. Patient will improve R shoulder passive external rotation to at least 45 degrees in order to demonstrate progress towards discharge goals. -ONGOING  Discharge Goals: Time Frame: 7/30/2021 to 10/28/2021  1. Patient will improve R shoulder forward elevation to 145 degrees in order to improve ability to reach overhead/ into cabinets at home. -ONGOING  2. Patient will improve R shoulder functional internal and external rotation to L5 and C7, respectively, in order to improve ability to wash hair and don pants. -ONGOING  3. Patient will report minimal to no R shoulder pain with daily activities and self care in order to demonstrate improved activity tolerance. -GOAL MET  4. Patient will be able to return to light housework and gardening with minimal to no complaints in order to return to prior hobbies. -ONGOING  5. Patient will improve Disability of the Arm, Shoulder, and Hand score to 30/55 from 45/55.  -GOAL MET    Outcome Measure: Tool Used: Disabilities of the Arm, Shoulder and Hand (DASH) Questionnaire - Quick Version  Score:  Initial: 45/55  Most Recent: 33/55 (Date: 8/23/2021 )                       24/55 (Date: 9/16/2021)   Interpretation of Score: The DASH is designed to measure the activities of daily living in person's with upper extremity dysfunction or pain. Each section is scored on a 1-5 scale, 5 representing the greatest disability. The scores of each section are added together for a total score of 55. UPDATED OBJECTIVE FINDINGS:    Palpation:          Minimal (from moderate) tenderness to palpation of gross R shoulder and incision site. ROM:        NT = noted tested  AROM/ PROM Left (degrees) Right (degrees)   Shoulder Flexion 150 145 (from 115)   Shoulder Abduction 140 120 (from 70)   Shoulder Internal Rotation  70 70 (from to belly)   Functional Internal Rotation T10 To R SI joint   (from NT)   Shoulder External Rotation 55 40 (from 0)   Functional External Rotation C7 with limited shoulder flexion To back of head, limited shoulder flexion (from NT)     Strength: Motion Tested Left   (*/5) Right  (*/5)   Shoulder Flexion 4+ 4  (from 3+)   Scaption 4+ 4 (from 3+)   Shoulder Abduction 4+ 4+ (from 4-)   Shoulder Internal Rotation  4+ 4- (from 3+)   Shoulder External Rotation 4+ 3- (from 3+)   Elbow Flexion 4+ 5 (from 4+)   Elbow Extension 4+ 5 (from 4+)     Functional Mobility:         Patient able to dress/ bathe on her own with slight modifications and increased time required. Unable to reach overhead. Patient eager to return to prior activity level including caring for herself, her house, and gardening. Balance:          Sitting and standing balance grossly intact.       Medical Necessity:   · Patient is expected to demonstrate progress in strength, range of motion, coordination and functional technique to increase independence with self care and daily activities and improve safety during reaching, lifting, and overhead activities. · Skilled intervention continues to be required due to R shoulder pain and stiffness s/p reverse TSA. Reason for Services/Other Comments:  · Patient continues to require skilled intervention due to decreased strength and function s/p reverse total shoulder. Rehabilitation Potential For Stated Goals: Good  Regarding Aileen Meyer's therapy, I certify that the treatment plan above will be carried out by a therapist or under their direction. Thank you for this referral,  Veronica Johnson PT     Referring Physician Signature: Shruthi Hui MD No Signature is Required for this note.

## 2021-09-21 ENCOUNTER — HOSPITAL ENCOUNTER (OUTPATIENT)
Dept: PHYSICAL THERAPY | Age: 81
Discharge: HOME | End: 2021-09-21
Attending: ORTHOPAEDIC SURGERY
Payer: MEDICARE

## 2021-09-21 PROCEDURE — 97110 THERAPEUTIC EXERCISES: CPT

## 2021-09-21 PROCEDURE — 97140 MANUAL THERAPY 1/> REGIONS: CPT

## 2021-09-21 NOTE — PROGRESS NOTES
Sejal Meyer  : 1940  [place]  Secondary: [address] [facility]  [address], [address], [address]  [phone]   [phone]      OUTPATIENT PHYSICAL THERAPY: Daily Treatment Note 2021    ICD-10: Treatment Diagnosis: pain in right shoulder (M25.511)                Treatment Diagnosis 2: osteoarthritis right shoulder (M19.011)                Treatment Diagnosis 3: s/p reverse total shoulder arthroplasty (Z09.453)  Precautions: hypertension, follow MD guidelines  Allergies: Norvasc [amlodipine] and Lisinopril   TREATMENT PLAN:  Effective Dates: 2021 TO 10/28/2021 (90 days). Frequency/Duration: 2 times a week for 90 Day(s) MEDICAL/REFERRING DIAGNOSIS:  Primary osteoarthritis, right shoulder [M19.011]  Presence of right artificial shoulder joint [Z96.611]   DATE OF ONSET: Patient underwent R reverse total shoulder on 2021  REFERRING PHYSICIAN: Valorie Reilly MD MD Orders: Evaluate and Treat   Reverse Total Shoulder Replacement- Dr. Zohreh Titus    1-3 weeks (2021- 2021): sling 24 hours unless sitting still- wean out of sling at 4 weeks as tolerated; pendulums; AROM elbow, wrist, and hand; PROM shoulder; supine external rotation 20-30 degrees, supine forward elevation 120-130 degrees; may progress to AAROM;  No internal rotation   4-6 weeks (2021 - 2021): sling when out of home/ traveling/ sleeping; pendulums; AAROM with passive stretching to above limits; supine external rotation gradually increase to full; supine forward elevation as tolerated; internal rotation progress to full; progress full AROM as tolerated; begin isometric deltoid contraction, postural work, upper trapezius relaxation, active scapular retraction and depression  6-8 weeks (2021 - 9/3/2021): pendulums; scapular mobilizations; internal rotation progress to full; begin resistance strengthening, maintain ROM.     Return MD Appointment: 9/28/2021     Pre-treatment Symptoms/Complaints:  Patient reports her left shoulder is bothering her more than her right. .    Pain: Initial: Pain Intensity 1: 0  Pain Location 1: Shoulder  Pain Orientation 1: Right  Post Session:  0/10    Medications Last Reviewed:  9/21/2021  Updated Objective Findings:  None Today    TREATMENT:   THERAPEUTIC EXERCISE: (43 minutes):  Exercises per grid below to improve mobility, strength, coordination and dynamic movement of shoulder - right to improve functional lifting, reaching and overhead activites. Required moderate visual, verbal and manual cues to promote proper body alignment, promote proper body posture and promote proper body mechanics. Progressed resistance, range, repetitions and complexity of movement as indicated.      Date:  9/21/2021 Date:  9/14/2021 Date:  9/16/2021   Activity/Exercise Parameters Parameters Parameters   UBE 3 minutes forward, 3 minutes backwards 3 minutes forward and 3 minutes reverse ---   Wand external rotation Supine 1 x 15 --- Supine 2 x 10   Active shoulder flexion Active, 2 x 10 Active 2 x 10 Supine x 10  Seated 2 x 10   Scaption  Standing 1 lb 1 x 10 and 1 x 8 Standing 2 x 10 Standing 2 x 10   Band row Green 3 x 15 Red, 2 x 10 Green, 3 x 10   Band low row Red,3 x 10 Red, 2 x 10 Red , 3 x 10   Band biceps  Yellow, 2 x 10 ---   Band triceps  Yellow, 2 x 10 ---   Band shoulder extension Yellow, 2 x 10  Yellow, 2 x 10   Band shoulder flexion Yellow, to should height, x 10  Yellow, 2 x 10   Band walk outs Internal and external rotation, Red, 2 x 10 each Internal and external rotation, red, x 10 each Internal and external rotation, Red, 2 x 10 each   Finger ladder  X 10 ---   Internal rotation stretch Strap, 2 x 10 Adduction only x 10  Full x 10 Adduction only x 10  Full x 10   Sidelying external rotation 2 x 10  ---   TRX stretch   ---   Wall slides X 10  2 x 10   Bilateral external rotation Yellow 2 x 10 --- Yellow, 2 x 10   Wall ball roll 2 x 10 small ball --- X 10     Time spent with patient reviewing proper muscle recruitment and technique with exercises. MANUAL THERAPY: (11 minutes): Joint mobilization and Soft tissue mobilization was utilized and necessary because of the patient's restricted joint motion, painful spasm, loss of articular motion and restricted motion of soft tissue   Supine PROM to R shoulder all directions per MD guidelines   Gentle oscillation to decrease pain and improve relaxation   Scar mobilization over incision site with and without suction cup to decrease adhesions- Not today   Soft tissue mobilization to R upper arm to decrease tightness and spasms- Not today    MODALITIES: (0 minutes):      none today     HEP: As above; handouts given to patient for all exercises. Treatment/Session Summary:    · Response to Treatment:  Performed exercises well. fatigued after session but no pain    · Baseline vitals (7/30/2021): BP: 125/82  · Communication/Consultation:  None today  · Equipment provided today:  None today  · Recommendations/Intent for next treatment session: Next visit will focus on pain control, manual therapy and modalities as needed, progression per MD guidelines.     Total Treatment Billable Duration:  54 minutes   PT Patient Time In/Time Out  Time In: 1329  Time Out: Wolf 63, PTA

## 2021-09-23 ENCOUNTER — HOSPITAL ENCOUNTER (OUTPATIENT)
Dept: PHYSICAL THERAPY | Age: 81
Discharge: HOME | End: 2021-09-23
Attending: ORTHOPAEDIC SURGERY
Payer: MEDICARE

## 2021-09-23 PROCEDURE — 97140 MANUAL THERAPY 1/> REGIONS: CPT

## 2021-09-23 PROCEDURE — 97110 THERAPEUTIC EXERCISES: CPT

## 2021-09-23 NOTE — PROGRESS NOTES
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 Frequency/Duration: 2 times a week for 90 Day(s) MEDICAL/REFERRING DIAGNOSIS:  Primary osteoarthritis, right shoulder [M19.011]  Presence of right artificial shoulder joint [Z96.611]   DATE OF ONSET: Patient underwent R reverse total shoulder on 2021  REFERRING PHYSICIAN: Chris Barragan MD MD Orders: Evaluate and Treat   Reverse Total Shoulder Replacement- Dr. Izabel Delgado    1-3 weeks (2021- 2021): sling 24 hours unless sitting still- wean out of sling at 4 weeks as tolerated; pendulums; AROM elbow, wrist, and hand; PROM shoulder; supine external rotation 20-30 degrees, supine forward elevation 120-130 degrees; may progress to AAROM;  No internal rotation   4-6 weeks (2021 - 2021): sling when out of home/ traveling/ sleeping; pendulums; AAROM with passive stretching to above limits; supine external rotation gradually increase to full; supine forward elevation as tolerated; internal rotation progress to full; progress full AROM as tolerated; begin isometric deltoid contraction, postural work, upper trapezius relaxation, active scapular retraction and depression  6-8 weeks (2021 - 9/3/2021): pendulums; scapular mobilizations; internal rotation progress to full; begin resistance strengthening, maintain ROM.     Return MD Appointment: 9/28/2021     Pre-treatment Symptoms/Complaints:  Patient with no complaints of pain in her R shoulder. Improving with active R shoulder overhead motion. Pain: Initial: Pain Intensity 1: 0  Pain Location 1: Shoulder  Pain Orientation 1: Right  Post Session:  0/10    Medications Last Reviewed:  9/23/2021  Updated Objective Findings:  Patient with active R shoulder elevation to 145 degrees. Improving with functional internal and external rotations. TREATMENT:   THERAPEUTIC EXERCISE: (43 minutes):  Exercises per grid below to improve mobility, strength, coordination and dynamic movement of shoulder - right to improve functional lifting, reaching and overhead activites. Required moderate visual, verbal and manual cues to promote proper body alignment, promote proper body posture and promote proper body mechanics. Progressed resistance, range, repetitions and complexity of movement as indicated.      Date:  9/21/2021 Date:  9/23/2021 Date:  9/16/2021   Activity/Exercise Parameters Parameters Parameters   UBE 3 minutes forward, 3 minutes backwards Level 3, 3 minutes forward, 3 minutes backward ---   Wand external rotation Supine 1 x 15 --- Supine 2 x 10   Active shoulder flexion Active, 2 x 10 Active 2 x 10 Supine x 10  Seated 2 x 10   Scaption  Standing 1 lb 1 x 10 and 1 x 8 Standing 2 x 10 Standing 2 x 10   Band row Green 3 x 15 Red, 2 x 10 Green, 3 x 10   Band low row Red,3 x 10 Red, 2 x 10 Red , 3 x 10   Band biceps  Yellow, 2 x 10 ---   Band triceps  Yellow, 2 x 10 ---   Band shoulder extension Yellow, 2 x 10 Red, 2 x 10 Yellow, 2 x 10   Band shoulder flexion Yellow, to should height, x 10 Red, 2 x 10 Yellow, 2 x 10   Band walk outs Internal and external rotation, Red, 2 x 10 each Internal and external rotation, red, x 10 each Internal and external rotation, Red, 2 x 10 each   Finger ladder  X 10 ---   Internal rotation stretch Strap, 2 x 10 Adduction only x 10  Full x 10 Adduction only x 10  Full x 10   Sidelying external rotation 2 x 10  ---   TRX stretch   ---   Wall slides X 10 2 x 10 2 x 10   Bilateral external rotation Yellow 2 x 10 --- Yellow, 2 x 10   Wall ball roll 2 x 10 small ball --- X 10      Time spent with patient reviewing proper muscle recruitment and technique with exercises. MANUAL THERAPY: (10 minutes): Joint mobilization and Soft tissue mobilization was utilized and necessary because of the patient's restricted joint motion, painful spasm, loss of articular motion and restricted motion of soft tissue   Supine PROM to R shoulder all directions per MD guidelines   Gentle oscillation to decrease pain and improve relaxation   Scar mobilization over incision site with and without suction cup to decrease adhesions- Not today   Soft tissue mobilization to R upper arm to decrease tightness and spasms- Not today    MODALITIES: (0 minutes):      none today     HEP: As above; handouts given to patient for all exercises. Treatment/Session Summary:    · Response to Treatment:  Improving with activity tolerance and functional motion with R UE. Sees MD Tuesday morning before next session. · Baseline vitals (7/30/2021): BP: 125/82  · Communication/Consultation:  None today  · Equipment provided today:  None today  · Recommendations/Intent for next treatment session: Next visit will focus on pain control, manual therapy and modalities as needed, progression per MD guidelines.     Total Treatment Billable Duration:  53 minutes   PT Patient Time In/Time Out  Time In: 1330  Time Out: Ton 53, PT

## 2021-09-28 ENCOUNTER — HOSPITAL ENCOUNTER (OUTPATIENT)
Dept: PHYSICAL THERAPY | Age: 81
Discharge: HOME | End: 2021-09-28
Attending: ORTHOPAEDIC SURGERY
Payer: MEDICARE

## 2021-09-28 PROCEDURE — 97110 THERAPEUTIC EXERCISES: CPT

## 2021-09-28 PROCEDURE — 97140 MANUAL THERAPY 1/> REGIONS: CPT

## 2021-09-28 NOTE — PROGRESS NOTES
Rohit Meyer  : 1940  [place]  Secondary: [address] [facility]  [address], [address] [place] [address], [address]  [phone]   [phone]      OUTPATIENT PHYSICAL THERAPY: Daily Treatment Note 2021    ICD-10: Treatment Diagnosis: pain in right shoulder (M25.511)                Treatment Diagnosis 2: osteoarthritis right shoulder (M19.011)                Treatment Diagnosis 3: s/p reverse total shoulder arthroplasty (E09.979)  Precautions: hypertension, follow MD guidelines  Allergies: Norvasc [amlodipine] and Lisinopril   TREATMENT PLAN:  Effective Dates: 2021 TO 10/28/2021 (90 days). Frequency/Duration: 2 times a week for 90 Day(s) MEDICAL/REFERRING DIAGNOSIS:  Primary osteoarthritis, right shoulder [M19.011]  Presence of right artificial shoulder joint [Z96.611]   DATE OF ONSET: Patient underwent R reverse total shoulder on 2021  REFERRING PHYSICIAN: Leon Franklin MD MD Orders: Evaluate and Treat   Reverse Total Shoulder Replacement- Dr. Gordon Martino    1-3 weeks (2021- 2021): sling 24 hours unless sitting still- wean out of sling at 4 weeks as tolerated; pendulums; AROM elbow, wrist, and hand; PROM shoulder; supine external rotation 20-30 degrees, supine forward elevation 120-130 degrees; may progress to AAROM;  No internal rotation   4-6 weeks (2021 - 2021): sling when out of home/ traveling/ sleeping; pendulums; AAROM with passive stretching to above limits; supine external rotation gradually increase to full; supine forward elevation as tolerated; internal rotation progress to full; progress full AROM as tolerated; begin isometric deltoid contraction, postural work, upper trapezius relaxation, active scapular retraction and depression  6-8 weeks (2021 - 9/3/2021): pendulums; scapular mobilizations; internal rotation progress to full; begin resistance strengthening, maintain ROM.     Return MD Appointment: 11/30/2021     Pre-treatment Symptoms/Complaints:  Patient saw MD with a good report. No pain with activities, but reports some increased L shoulder/ arm pain/ soreness last week with painting her door, washing windows, and therapy. Pain: Initial: Pain Intensity 1: 0  Pain Location 1: Shoulder  Pain Orientation 1: Right  Post Session:  0/10    Medications Last Reviewed:  9/28/2021  Updated Objective Findings:  None Today    TREATMENT:   THERAPEUTIC EXERCISE: (30 minutes):  Exercises per grid below to improve mobility, strength, coordination and dynamic movement of shoulder - right to improve functional lifting, reaching and overhead activites. Required moderate visual, verbal and manual cues to promote proper body alignment, promote proper body posture and promote proper body mechanics. Progressed resistance, range, repetitions and complexity of movement as indicated.      Date:  9/21/2021 Date:  9/23/2021 Date:  9/28/2021   Activity/Exercise Parameters Parameters Parameters   UBE 3 minutes forward, 3 minutes backwards Level 3, 3 minutes forward, 3 minutes backward Not today- sore after last session   Wand external rotation Supine 1 x 15 --- ---   Active shoulder flexion Active, 2 x 10 Active 2 x 10 2 x 10   Scaption  Standing 1 lb 1 x 10 and 1 x 8 Standing 2 x 10 2 x 10   Band row Green 3 x 15 Red, 2 x 10 Green, 3 x 10   Band low row Red,3 x 10 Red, 2 x 10 ---   Band biceps  Yellow, 2 x 10 ---   Band triceps  Yellow, 2 x 10 ---   Band shoulder extension Yellow, 2 x 10 Red, 2 x 10 Red, 2 x 10   Band shoulder flexion Yellow, to should height, x 10 Red, 2 x 10 Red, 2 x 10   Band walk outs Internal and external rotation, Red, 2 x 10 each Internal and external rotation, red, x 10 each Active Internal and external rotation, Red, x 10 each   Finger ladder  X 10 ---   Internal rotation stretch Strap, 2 x 10 Adduction only x 10  Full x 10 Adduction only x 10  Extension only x 10  Full x 10   Sidelying external rotation 2 x 10  ---   TRX stretch   ---   Wall slides X 10 2 x 10 2 x 10   Bilateral external rotation Yellow 2 x 10 --- ---   Wall ball roll 2 x 10 small ball --- ---      Time spent with patient reviewing proper muscle recruitment and technique with exercises. MANUAL THERAPY: (23 minutes): Joint mobilization and Soft tissue mobilization was utilized and necessary because of the patient's restricted joint motion, painful spasm, loss of articular motion and restricted motion of soft tissue   Supine PROM to R shoulder all directions per MD guidelines   Gentle oscillation to decrease pain and improve relaxation   Scar mobilization over incision site with and without suction cup to decrease adhesions- NOT TODAY   Soft tissue mobilization to R upper arm to decrease tightness and spasms    MODALITIES: (0 minutes):      none today     HEP: As above; handouts given to patient for all exercises. Treatment/Session Summary:    · Response to Treatment:  Patient improving with functional use of R UE. Improving endurance with overhead motion. Good review of HEP and progression of stretching to help with functional rotations  May change to one visit a week starting next week, but plan to discuss with patient at next visit. · Baseline vitals (7/30/2021): BP: 125/82  · Communication/Consultation:  None today  · Equipment provided today:  None today  · Recommendations/Intent for next treatment session: Next visit will focus on pain control, manual therapy and modalities as needed, progression per MD guidelines.     Total Treatment Billable Duration:  53 minutes   PT Patient Time In/Time Out  Time In: 1330  Time Out: [address], PT

## 2021-09-30 ENCOUNTER — HOSPITAL ENCOUNTER (OUTPATIENT)
Dept: PHYSICAL THERAPY | Age: 81
Discharge: HOME | End: 2021-09-30
Attending: ORTHOPAEDIC SURGERY
Payer: MEDICARE

## 2021-09-30 PROCEDURE — 97110 THERAPEUTIC EXERCISES: CPT

## 2021-09-30 PROCEDURE — 97140 MANUAL THERAPY 1/> REGIONS: CPT

## 2021-09-30 NOTE — PROGRESS NOTES
Ady Meyer  : 1940  [place]  Secondary: [address] [facility]  [address], [address] [place] [address], [address]  [phone]   [phone]      OUTPATIENT PHYSICAL THERAPY: Daily Treatment Note 2021    ICD-10: Treatment Diagnosis: pain in right shoulder (M25.511)                Treatment Diagnosis 2: osteoarthritis right shoulder (M19.011)                Treatment Diagnosis 3: s/p reverse total shoulder arthroplasty (Y00.870)  Precautions: hypertension, follow MD guidelines  Allergies: Norvasc [amlodipine] and Lisinopril   TREATMENT PLAN:  Effective Dates: 2021 TO 10/28/2021 (90 days). Frequency/Duration: 2 times a week for 90 Day(s) MEDICAL/REFERRING DIAGNOSIS:  Primary osteoarthritis, right shoulder [M19.011]  Presence of right artificial shoulder joint [Z96.611]   DATE OF ONSET: Patient underwent R reverse total shoulder on 2021  REFERRING PHYSICIAN: Rosalia Juarez MD MD Orders: Evaluate and Treat   Reverse Total Shoulder Replacement- Dr. Dai Wyatt    1-3 weeks (2021- 2021): sling 24 hours unless sitting still- wean out of sling at 4 weeks as tolerated; pendulums; AROM elbow, wrist, and hand; PROM shoulder; supine external rotation 20-30 degrees, supine forward elevation 120-130 degrees; may progress to AAROM;  No internal rotation   4-6 weeks (2021 - 2021): sling when out of home/ traveling/ sleeping; pendulums; AAROM with passive stretching to above limits; supine external rotation gradually increase to full; supine forward elevation as tolerated; internal rotation progress to full; progress full AROM as tolerated; begin isometric deltoid contraction, postural work, upper trapezius relaxation, active scapular retraction and depression  6-8 weeks (2021 - 9/3/2021): pendulums; scapular mobilizations; internal rotation progress to full; begin resistance strengthening, maintain ROM.     Return MD Appointment: 11/30/2021     Pre-treatment Symptoms/Complaints:  Patient reports increased pain in her L shoulder with decreased ROM over the last few days. States her R shoulder is a little sore as well, but is concerned about her L shoulder. Does not recall anything different she has done that could have caused increased pain. Pain: Initial: Pain Intensity 1: 3  Pain Location 1: Shoulder  Pain Orientation 1: Right  Pain Intensity 2: 7  Pain Location 2: Shoulder  Pain Orientation 2: Left  Post Session:  0/10 R shoulder; 1/10 L shoulder   Medications Last Reviewed:  9/30/2021  Updated Objective Findings:  Significant tightness through bilateral upper trapezius, L worse than R. Trigger points in L periscapular musculature    TREATMENT:   THERAPEUTIC EXERCISE: (10 minutes):  Exercises per grid below to improve mobility, strength, coordination and dynamic movement of shoulder - right to improve functional lifting, reaching and overhead activites. Required moderate visual, verbal and manual cues to promote proper body alignment, promote proper body posture and promote proper body mechanics. Progressed resistance, range, repetitions and complexity of movement as indicated.      Date:  9/30/2021 Date:  9/23/2021 Date:  9/28/2021   Activity/Exercise Parameters Parameters Parameters   UBE --- Level 3, 3 minutes forward, 3 minutes backward Not today- sore after last session   Wand external rotation Seated, x 10 bilaterally --- ---   Active shoulder flexion Supine, wand, x 10 Active 2 x 10 2 x 10   Scaption  --- Standing 2 x 10 2 x 10   Band row --- Red, 2 x 10 Green, 3 x 10   Band low row --- Red, 2 x 10 ---   Band biceps --- Yellow, 2 x 10 ---   Band triceps --- Yellow, 2 x 10 ---   Band shoulder extension --- Red, 2 x 10 Red, 2 x 10   Band shoulder flexion --- Red, 2 x 10 Red, 2 x 10   Band walk outs --- Internal and external rotation, red, x 10 each Active Internal and external rotation, Red, x 10 each   Finger ladder --- X 10 ---   Internal rotation stretch --- Adduction only x 10  Full x 10 Adduction only x 10  Extension only x 10  Full x 10   Sidelying external rotation ---  ---   TRX stretch ---  ---   Wall slides --- 2 x 10 2 x 10   Bilateral external rotation --- --- ---   Wall ball roll Ball roll on floor, flexion, x 15 --- ---      Time spent with patient reviewing proper muscle recruitment and technique with exercises. MANUAL THERAPY: (35 minutes): Joint mobilization and Soft tissue mobilization was utilized and necessary because of the patient's restricted joint motion, painful spasm, loss of articular motion and restricted motion of soft tissue   Supine PROM to R shoulder all directions to tolerance   Gentle oscillation to decrease pain and improve relaxation   Scar mobilization over incision site with and without suction cup to decrease adhesions- NOT TODAY   Seated soft tissue mobilization to bilateral upper trapezius, levator scapula, and periscapular musculature to decrease pain and tightness    MODALITIES: (0 minutes):      none today     HEP: As above; handouts given to patient for all exercises. Treatment/Session Summary:    · Response to Treatment:  Much improved active motion of L UE with decreased pain at end of session. Good motion in R shoulder with no pain reported. Advised patient to use heat or ice at home as needed. Plan to continue with strengthening exercises next session if pain is better. · Baseline vitals (7/30/2021): BP: 125/82  · Communication/Consultation:  None today  · Equipment provided today:  None today  · Recommendations/Intent for next treatment session: Next visit will focus on pain control, manual therapy and modalities as needed, progression per MD guidelines.  Plan to return to exercises and strengthening next session if pain has calmed down in bilateral shoulders    Total Treatment Billable Duration:  45 minutes   PT Patient Time In/Time Out  Time In: [place], PT

## 2021-10-05 ENCOUNTER — HOSPITAL ENCOUNTER (OUTPATIENT)
Dept: PHYSICAL THERAPY | Age: 81
Discharge: HOME | End: 2021-10-05
Attending: ORTHOPAEDIC SURGERY
Payer: MEDICARE

## 2021-10-05 PROCEDURE — 97110 THERAPEUTIC EXERCISES: CPT

## 2021-10-05 PROCEDURE — 97140 MANUAL THERAPY 1/> REGIONS: CPT

## 2021-10-05 NOTE — PROGRESS NOTES
Eduar Meyer  : 1940  [place]  Secondary: [facility] at [facility]  [address], [address], [address]  [phone]   [phone]      OUTPATIENT PHYSICAL THERAPY: Daily Treatment Note 10/5/2021    ICD-10: Treatment Diagnosis: pain in right shoulder (M25.511)                Treatment Diagnosis 2: osteoarthritis right shoulder (M19.011)                Treatment Diagnosis 3: s/p reverse total shoulder arthroplasty (O02.629)  Precautions: hypertension, follow MD guidelines  Allergies: Norvasc [amlodipine] and Lisinopril   TREATMENT PLAN:  Effective Dates: 2021 TO 10/28/2021 (90 days). Frequency/Duration: 2 times a week for 90 Day(s) MEDICAL/REFERRING DIAGNOSIS:  Primary osteoarthritis, right shoulder [M19.011]  Presence of right artificial shoulder joint [Z96.611]   DATE OF ONSET: Patient underwent R reverse total shoulder on 2021  REFERRING PHYSICIAN: Lucero Lopez MD MD Orders: Evaluate and Treat   Reverse Total Shoulder Replacement- Dr. Hope Fonseca    1-3 weeks (2021- 2021): sling 24 hours unless sitting still- wean out of sling at 4 weeks as tolerated; pendulums; AROM elbow, wrist, and hand; PROM shoulder; supine external rotation 20-30 degrees, supine forward elevation 120-130 degrees; may progress to AAROM;  No internal rotation   4-6 weeks (2021 - 2021): sling when out of home/ traveling/ sleeping; pendulums; AAROM with passive stretching to above limits; supine external rotation gradually increase to full; supine forward elevation as tolerated; internal rotation progress to full; progress full AROM as tolerated; begin isometric deltoid contraction, postural work, upper trapezius relaxation, active scapular retraction and depression  6-8 weeks (2021 - 9/3/2021): pendulums; scapular mobilizations; internal rotation progress to full; begin resistance strengthening, maintain ROM.     Return MD Appointment: 11/30/2021     Pre-treatment Symptoms/Complaints:  Patient reports feeling better past few days. Pain: Initial: Pain Intensity 1: 1  Pain Location 1: Shoulder  Pain Orientation 1: Right  Post Session:  0/10 R shoulder; 1/10 L shoulder   Medications Last Reviewed:  10/5/2021  Updated Objective Findings:  None Today    TREATMENT:   THERAPEUTIC EXERCISE: (25 minutes):  Exercises per grid below to improve mobility, strength, coordination and dynamic movement of shoulder - right to improve functional lifting, reaching and overhead activites. Required moderate visual, verbal and manual cues to promote proper body alignment, promote proper body posture and promote proper body mechanics. Progressed resistance, range, repetitions and complexity of movement as indicated. Date:  9/30/2021 Date:  10-5-21 Date:  9/28/2021   Activity/Exercise Parameters Parameters Parameters   UBE ---  Not today- sore after last session   Wand external rotation Seated, x 10 bilaterally --- ---   Active shoulder flexion Supine, wand, x 10 Active 1 lb 3 x 10 2 x 10   Scaption  --- Standing 1 lb 2 x 10 2 x 10   Band row --- Green , 3 x 10 Green, 3 x 10   Band low row --- Red, 3 x 10 ---   Band biceps ---  ---   Band triceps ---  ---   Band shoulder extension --- Red, 3 x 10 Red, 2 x 10   Band shoulder flexion --- Red, 3 x 10 Red, 2 x 10   Band walk outs ---  Active Internal and external rotation, Red, x 10 each   Finger ladder --- X 15 ---   Internal rotation stretch ---  Adduction only x 10  Extension only x 10  Full x 10   Sidelying external rotation ---  ---   TRX stretch --- 1 x 10 ---   Wall slides --- 2 x 10 2 x 10   Bilateral external rotation --- --- ---   Wall ball roll Ball roll on floor, flexion, x 15 --- ---      Time spent with patient reviewing proper muscle recruitment and technique with exercises.      MANUAL THERAPY: (30 minutes): Joint mobilization and Soft tissue mobilization was utilized and necessary because of the patient's restricted joint motion, painful spasm, loss of articular motion and restricted motion of soft tissue   Supine PROM to R shoulder all directions to tolerance   Gentle oscillation to decrease pain and improve relaxation   Scar mobilization over incision site with and without suction cup to decrease adhesions- NOT TODAY   Seated soft tissue mobilization to bilateral upper trapezius, levator scapula, and periscapular musculature to decrease pain and tightness    MODALITIES: (0 minutes):      none today     HEP: As above; handouts given to patient for all exercises. Treatment/Session Summary:    · Response to Treatment:  Decreased tightness and improved ROM after treatment    · Baseline vitals (7/30/2021): BP: 125/82  · Communication/Consultation:  None today  · Equipment provided today:  None today  · Recommendations/Intent for next treatment session: Next visit will focus on pain control, manual therapy and modalities as needed, progression per MD guidelines.  Plan to return to exercises and strengthening next session if pain has calmed down in bilateral shoulders    Total Treatment Billable Duration:  55 minutes   PT Patient Time In/Time Out  Time In: 1330  Time Out: [address], PTA

## 2021-10-07 ENCOUNTER — HOSPITAL ENCOUNTER (OUTPATIENT)
Dept: PHYSICAL THERAPY | Age: 81
Discharge: HOME | End: 2021-10-07
Attending: ORTHOPAEDIC SURGERY
Payer: MEDICARE

## 2021-10-07 PROCEDURE — 97140 MANUAL THERAPY 1/> REGIONS: CPT

## 2021-10-07 PROCEDURE — 97110 THERAPEUTIC EXERCISES: CPT

## 2021-10-07 NOTE — PROGRESS NOTES
Gary Meyer  : 1940  [place]  Secondary: [address] [facility] at [facility]  [address] [place], [address] [place] [address], [facility], [address] [place] [address] Street  [phone]   [phone]      OUTPATIENT PHYSICAL THERAPY: Daily Treatment Note 10/7/2021    ICD-10: Treatment Diagnosis: pain in right shoulder (M25.511)                Treatment Diagnosis 2: osteoarthritis right shoulder (M19.011)                Treatment Diagnosis 3: s/p reverse total shoulder arthroplasty (B45.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 Frequency/Duration: 2 times a week for 90 Day(s) MEDICAL/REFERRING DIAGNOSIS:  Primary osteoarthritis, right shoulder [M19.011]  Presence of right artificial shoulder joint [Z96.611]   DATE OF ONSET: Patient underwent R reverse total shoulder on 2021  REFERRING PHYSICIAN: Gisele English MD MD Orders: Evaluate and Treat   Reverse Total Shoulder Replacement- Dr. Romulo Keane    1-3 weeks (2021- 2021): sling 24 hours unless sitting still- wean out of sling at 4 weeks as tolerated; pendulums; AROM elbow, wrist, and hand; PROM shoulder; supine external rotation 20-30 degrees, supine forward elevation 120-130 degrees; may progress to AAROM;  No internal rotation   4-6 weeks (2021 - 2021): sling when out of home/ traveling/ sleeping; pendulums; AAROM with passive stretching to above limits; supine external rotation gradually increase to full; supine forward elevation as tolerated; internal rotation progress to full; progress full AROM as tolerated; begin isometric deltoid contraction, postural work, upper trapezius relaxation, active scapular retraction and depression  6-8 weeks (2021 - 9/3/2021): pendulums; scapular mobilizations; internal rotation progress to full; begin resistance strengthening, maintain ROM.     Return MD Appointment: 11/30/2021     Pre-treatment Symptoms/Complaints:  Patient reports using her right more around the house. Pain: Initial: Pain Intensity 1: 0  Pain Location 1: Shoulder  Pain Orientation 1: Right  Post Session:  0/10 R shoulder; 1/10 L shoulder   Medications Last Reviewed:  10/7/2021  Updated Objective Findings:  None Today    TREATMENT:   THERAPEUTIC EXERCISE: (30 minutes):  Exercises per grid below to improve mobility, strength, coordination and dynamic movement of shoulder - right to improve functional lifting, reaching and overhead activites. Required moderate visual, verbal and manual cues to promote proper body alignment, promote proper body posture and promote proper body mechanics. Progressed resistance, range, repetitions and complexity of movement as indicated. Date:  9/30/2021 Date:  10-5-21 Date:  10-7-21   Activity/Exercise Parameters Parameters Parameters   UBE ---  Not today- sore after last session   Wand external rotation Seated, x 10 bilaterally --- ---   Active shoulder flexion Supine, wand, x 10 Active 1 lb 3 x 10 Supine 1 lb wand 3 x 10   Scaption  --- Standing 1 lb 2 x 10 2 x 10   Band row --- Green , 3 x 10 Green, 3 x 15   Band low row --- Red, 3 x 10 Red 3 x 15   Band biceps ---  ---   Band triceps ---  ---   Band shoulder extension --- Red, 3 x 10 Red, 2 x 10   Band shoulder flexion --- Red, 3 x 10 Red, 2 x 10   Band walk outs ---  Active Internal and external rotation, Red, x 10 each   Finger ladder --- X 15 ---   Internal rotation stretch ---  Adduction only x 10  Extension only x 10  Full x 10   Sidelying external rotation ---  ---   TRX stretch --- 1 x 10 2 x 10   Wall slides --- 2 x 10 2 x 10   Bilateral external rotation --- --- ---   Wall ball roll Ball roll on floor, flexion, x 15 --- On wall x 15      Time spent with patient reviewing proper muscle recruitment and technique with exercises.      MANUAL THERAPY: (25 minutes): Joint mobilization and Soft tissue mobilization was utilized and necessary because of the patient's restricted joint motion, painful spasm, loss of articular motion and restricted motion of soft tissue   Supine PROM to R shoulder all directions to tolerance   Gentle oscillation to decrease pain and improve relaxation   Scar mobilization over incision site with and without suction cup to decrease adhesions- NOT TODAY   Seated soft tissue mobilization to bilateral upper trapezius, levator scapula, and periscapular musculature to decrease pain and tightness    MODALITIES: (0 minutes):      none today     HEP: As above; handouts given to patient for all exercises. Treatment/Session Summary:    · Response to Treatment:  Decreased tightness and improved ROM after treatment    · Baseline vitals (7/30/2021): BP: 125/82  · Communication/Consultation:  None today  · Equipment provided today:  None today  · Recommendations/Intent for next treatment session: Next visit will focus on pain control, manual therapy and modalities as needed, progression per MD guidelines.  Plan to return to exercises and strengthening next session if pain has calmed down in bilateral shoulders    Total Treatment Billable Duration:  55 minutes   PT Patient Time In/Time Out  Time In: 1324  Time Out: [address] W, PTA

## 2021-10-12 ENCOUNTER — HOSPITAL ENCOUNTER (OUTPATIENT)
Dept: PHYSICAL THERAPY | Age: 81
Discharge: HOME | End: 2021-10-12
Attending: ORTHOPAEDIC SURGERY
Payer: MEDICARE

## 2021-10-12 PROCEDURE — 97110 THERAPEUTIC EXERCISES: CPT

## 2021-10-12 PROCEDURE — 97140 MANUAL THERAPY 1/> REGIONS: CPT

## 2021-10-12 NOTE — PROGRESS NOTES
Moni Meyer  : 1940  [place]  Secondary: [address] at [facility]  [address], [address], [place], [address] [place] [address]  [phone]   [phone]      OUTPATIENT PHYSICAL THERAPY: Daily Treatment Note 10/12/2021    ICD-10: Treatment Diagnosis: pain in right shoulder (M25.511)                Treatment Diagnosis 2: osteoarthritis right shoulder (M19.011)                Treatment Diagnosis 3: s/p reverse total shoulder arthroplasty (S59.391)  Precautions: hypertension, follow MD guidelines  Allergies: Norvasc [amlodipine] and Lisinopril   TREATMENT PLAN:  Effective Dates: 2021 TO 10/28/2021 (90 days). Frequency/Duration: 2 times a week for 90 Day(s) MEDICAL/REFERRING DIAGNOSIS:  Primary osteoarthritis, right shoulder [M19.011]  Presence of right artificial shoulder joint [Z96.611]   DATE OF ONSET: Patient underwent R reverse total shoulder on 2021  REFERRING PHYSICIAN: Merry Keenan MD MD Orders: Evaluate and Treat   Reverse Total Shoulder Replacement- Dr. Ava Rucker    1-3 weeks (2021- 2021): sling 24 hours unless sitting still- wean out of sling at 4 weeks as tolerated; pendulums; AROM elbow, wrist, and hand; PROM shoulder; supine external rotation 20-30 degrees, supine forward elevation 120-130 degrees; may progress to AAROM;  No internal rotation   4-6 weeks (2021 - 2021): sling when out of home/ traveling/ sleeping; pendulums; AAROM with passive stretching to above limits; supine external rotation gradually increase to full; supine forward elevation as tolerated; internal rotation progress to full; progress full AROM as tolerated; begin isometric deltoid contraction, postural work, upper trapezius relaxation, active scapular retraction and depression  6-8 weeks (2021 - 9/3/2021): pendulums; scapular mobilizations; internal rotation progress to full; begin resistance strengthening, maintain ROM.     Return MD Appointment: 11/30/2021     Pre-treatment Symptoms/Complaints:  Patient reports decreased pan B shoulders and neck    Pain: Initial: Pain Intensity 1: 0  Pain Location 1: Shoulder  Pain Orientation 1: Right  Post Session:  0/10 R shoulder; 1/10 L shoulder   Medications Last Reviewed:  10/12/2021  Updated Objective Findings:  Improved functional ROM and overall strength    TREATMENT:   THERAPEUTIC EXERCISE: (38 minutes):  Exercises per grid below to improve mobility, strength, coordination and dynamic movement of shoulder - right to improve functional lifting, reaching and overhead activites. Required moderate visual, verbal and manual cues to promote proper body alignment, promote proper body posture and promote proper body mechanics. Progressed resistance, range, repetitions and complexity of movement as indicated.      Date:  10-12-21 Date:  10-5-21 Date:  10-7-21   Activity/Exercise Parameters Parameters Parameters   UBE ---  Not today- sore after last session   Wand external rotation Seated, x 10 bilaterally --- ---   Active shoulder flexion Supine,  1 lb and wand 2 x 10 Active 1 lb 3 x 10 Supine 1 lb wand 3 x 10   Scaption  2 x 10 Standing 1 lb 2 x 10 2 x 10   Band row Green 3 x 15 Green , 3 x 10 Green, 3 x 15   Band low row Red 3 x 15 Red, 3 x 10 Red 3 x 15   Band biceps ---  ---   Band triceps ---  ---   Band shoulder extension Red 3 x 10 Red, 3 x 10 Red, 2 x 10   Band shoulder flexion Red 3 x 10 Red, 3 x 10 Red, 2 x 10   Band walk outs ---  Active Internal and external rotation, Red, x 10 each   Finger ladder 1 x 15 X 15 ---   Internal rotation stretch ---  Adduction only x 10  Extension only x 10  Full x 10   Sidelying external rotation ---  ---   TRX stretch 2 x 10 1 x 10 2 x 10   Wall slides 2 x 10 2 x 10 2 x 10   Bilateral external rotation --- --- ---   Wall ball roll Ball roll on floor, flexion, 2 x 10 --- On wall x 15      Time spent with patient reviewing proper muscle recruitment and technique with exercises. MANUAL THERAPY: (15 minutes): Joint mobilization and Soft tissue mobilization was utilized and necessary because of the patient's restricted joint motion, painful spasm, loss of articular motion and restricted motion of soft tissue   Supine PROM to R shoulder all directions to tolerance   Gentle oscillation to decrease pain and improve relaxation   Scar mobilization over incision site with and without suction cup to decrease adhesions- NOT TODAY   Seated soft tissue mobilization to bilateral upper trapezius, levator scapula, and periscapular musculature to decrease pain and tightness Not today    MODALITIES: (0 minutes):      none today     HEP: As above; handouts given to patient for all exercises. Treatment/Session Summary:    · Response to Treatment:  Decreased tightness and improved ROM after treatment    · Baseline vitals (7/30/2021): BP: 125/82  · Communication/Consultation:  None today  · Equipment provided today:  None today  · Recommendations/Intent for next treatment session: Next visit will focus on pain control, manual therapy and modalities as needed, progression per MD guidelines.  Plan to return to exercises and strengthening next session if pain has calmed down in bilateral shoulders    Total Treatment Billable Duration:  53 minutes   PT Patient Time In/Time Out  Time In: 1330  Time Out: [address] [facility], [facility]

## 2021-10-14 ENCOUNTER — HOSPITAL ENCOUNTER (OUTPATIENT)
Dept: PHYSICAL THERAPY | Age: 81
Discharge: HOME | End: 2021-10-14
Attending: ORTHOPAEDIC SURGERY
Payer: MEDICARE

## 2021-10-14 NOTE — PROGRESS NOTES
[address] at [facility] 10/14/2021      Patient cancelled today's appointment due to not feeling well.        Alvaro Orellana, PT, DPT, OMT-C

## 2021-10-16 ENCOUNTER — HOSPITAL ENCOUNTER (EMERGENCY)
Age: 81
Discharge: HOME | End: 2021-10-17
Attending: EMERGENCY MEDICINE
Payer: MEDICARE

## 2021-10-16 ENCOUNTER — APPOINTMENT (OUTPATIENT)
Dept: GENERAL RADIOLOGY | Age: 81
End: 2021-10-16
Attending: EMERGENCY MEDICINE
Payer: MEDICARE

## 2021-10-16 VITALS
WEIGHT: 128 LBS | TEMPERATURE: 99.5 F | DIASTOLIC BLOOD PRESSURE: 83 MMHG | RESPIRATION RATE: 16 BRPM | BODY MASS INDEX: 23.55 KG/M2 | HEART RATE: 70 BPM | SYSTOLIC BLOOD PRESSURE: 185 MMHG | HEIGHT: 62 IN | OXYGEN SATURATION: 91 %

## 2021-10-16 DIAGNOSIS — S42.211A: Primary | ICD-10-CM

## 2021-10-16 PROCEDURE — 73030 X-RAY EXAM OF SHOULDER: CPT

## 2021-10-16 PROCEDURE — 75810000053 HC SPLINT APPLICATION

## 2021-10-16 PROCEDURE — 99283 EMERGENCY DEPT VISIT LOW MDM: CPT

## 2021-10-16 PROCEDURE — 71045 X-RAY EXAM CHEST 1 VIEW: CPT

## 2021-10-16 RX ORDER — HYDROCODONE BITARTRATE AND ACETAMINOPHEN 5; 325 MG/1; MG/1
0.5 TABLET ORAL
Qty: 8 TABLET | Refills: 0 | Status: SHIPPED | OUTPATIENT
Start: 2021-10-16 | End: 2021-10-27

## 2021-10-17 PROCEDURE — 74011250637 HC RX REV CODE- 250/637: Performed by: EMERGENCY MEDICINE

## 2021-10-17 RX ORDER — HYDROCODONE BITARTRATE AND ACETAMINOPHEN 5; 325 MG/1; MG/1
1 TABLET ORAL ONCE
Status: COMPLETED | OUTPATIENT
Start: 2021-10-17 | End: 2021-10-17

## 2021-10-17 RX ADMIN — HYDROCODONE BITARTRATE AND ACETAMINOPHEN 1 TABLET: 5; 325 TABLET ORAL at 00:56

## 2021-10-17 NOTE — DISCHARGE INSTRUCTIONS
Wear sling and splint. Call orthopedist Monday for appointment to recheck. Recheck sooner worse pain or numbness. Tylenol for mild pain.   Half of a prescription pain pill every 6 hours if needed for stronger pain

## 2021-10-17 NOTE — ED TRIAGE NOTES
Pt coming in for a fall tonight around 45 minutes ago. Brought in by EMS from home. Pt landed on both arms. Pt had a shoulder surgery in July. Pt is taking some cough medicine that makes her sleepy for a cold. Pt complaining of right shoulder pain. Swelling noted by EMS. 50 fentanyl given.  /80, HR 80, , 98% RA. 20g L wrist

## 2021-10-17 NOTE — ED NOTES
I have reviewed discharge instructions with the patient. The patient verbalized understanding. Patient left ED via Discharge Method: ambulatory to Home with family. Opportunity for questions and clarification provided. Patient given 1 scripts. To continue your aftercare when you leave the hospital, you may receive an automated call from our care team to check in on how you are doing. This is a free service and part of our promise to provide the best care and service to meet your aftercare needs.  If you have questions, or wish to unsubscribe from this service please call [phone]. Thank you for Choosing our [facility] Emergency Department.

## 2021-10-17 NOTE — ED PROVIDER NOTES
This is an 64-year-old female who got up from eating dinner was carrying utensils to the kitchen when she reached for a light switch. She was further away from light switch and she fought. She lost her balance and fell. Landed mostly on the right side. Did not hit her head or get knocked out. Complains of left arm pain. She is brought in by EMS. Her daughter is with her. Her daughter lives behind her. Patient lives by herself. No pain other than right arm pain. Past history is significant for some hypertension coronary disease with stenting. She has had hysterectomy and appendectomy. Most importantly, she had a reverse right shoulder arthroplasty about 3 months ago. The history is provided by the patient. Fall  The accident occurred less than 1 hour ago. The fall occurred while standing. She fell from a height of ground level. She landed on hard floor. There was no blood loss. The point of impact was the right shoulder. The pain is moderate. She was ambulatory at the scene. Pertinent negatives include no fever, no numbness, no abdominal pain, no nausea, no vomiting, no headaches, no extremity weakness, no loss of consciousness and no laceration. The risk factors include being elderly. The symptoms are aggravated by use of injured limb.         Past Medical History:   Diagnosis Date    Adverse effect of anesthesia     Difficulty awakening     Coronary artery disease     1 stent in 2009, Dr. Shanell Sung follows     COVID-19 vaccine series completed 02/26/2021    Pfizer vaccine     GERD (gastroesophageal reflux disease)     Managed with as needed meds     Hyperlipidemia     Hypertension     managed with meds     Insomnia     LBBB (left bundle branch block)     Multiple thyroid nodules     per endocrinology office note 8/19/19- nodules have resolved, pt has a small thyroid cyst    Multiple thyroid nodules     Osteopenia     Prediabetes     Managed with diet        Past Surgical History:   Procedure Laterality Date    HX APPENDECTOMY  age 13    HX BLADDER REPAIR      bladder tac    HX COLONOSCOPY  2004 and 2014    HX CORONARY STENT PLACEMENT  2009    HX HYSTERECTOMY  in her 46s    ovaries intact    HX ORTHOPAEDIC      right foot ORIF         Family History:   Problem Relation Age of Onset    Diabetes Mother     Coronary Artery Disease Father     Thyroid Disease Maternal Grandmother     Thyroid Cancer Neg Hx        Social History     Socioeconomic History    Marital status:      Spouse name: Not on file    Number of children: Not on file    Years of education: Not on file    Highest education level: Not on file   Occupational History    Not on file   Tobacco Use    Smoking status: Never Smoker    Smokeless tobacco: Never Used   Vaping Use    Vaping Use: Never used   Substance and Sexual Activity    Alcohol use: No    Drug use: No    Sexual activity: Not on file   Other Topics Concern    Not on file   Social History Narrative    Not on file     Social Determinants of Health     Financial Resource Strain:     Difficulty of Paying Living Expenses:    Food Insecurity:     Worried About Running Out of Food in the Last Year:     [address] in the Last Year:    Transportation Needs:     Lack of Transportation (Medical):  Lack of Transportation (Non-Medical):    Physical Activity:     Days of Exercise per Week:     Minutes of Exercise per Session:    Stress:     Feeling of Stress :    Social Connections:     Frequency of Communication with Friends and Family:     Frequency of Social Gatherings with Friends and Family:     Attends Confucianist Services:     Active Member of Clubs or Organizations:     Attends Club or Organization Meetings:     Marital Status:    Intimate Partner Violence:     Fear of Current or Ex-Partner:     Emotionally Abused:     Physically Abused:     Sexually Abused:           ALLERGIES: Norvasc [amlodipine] and Lisinopril    Review of Systems Constitutional: Negative for chills and fever. Respiratory: Positive for cough. Negative for shortness of breath. Cardiovascular: Negative for chest pain and palpitations. Gastrointestinal: Negative for abdominal pain, diarrhea, nausea and vomiting. Genitourinary: Negative for dysuria and flank pain. Musculoskeletal: Negative for back pain, extremity weakness and neck pain. Skin: Negative for color change and rash. Neurological: Negative for loss of consciousness, syncope, numbness and headaches. All other systems reviewed and are negative. Vitals:    10/16/21 2118   BP: (!) 185/83   Pulse: 70   Resp: 16   Temp: 99.5 °F (37.5 °C)   SpO2: 91%   Weight: 58.1 kg (128 lb)   Height: 5' 2\" (1.575 m)            Physical Exam  Vitals and nursing note reviewed. Constitutional:       Appearance: She is not ill-appearing. HENT:      Head: Normocephalic and atraumatic. Eyes:      Extraocular Movements: Extraocular movements intact. Pupils: Pupils are equal, round, and reactive to light. Cardiovascular:      Rate and Rhythm: Normal rate and regular rhythm. Pulmonary:      Effort: Pulmonary effort is normal.      Breath sounds: Normal breath sounds. Chest:      Chest wall: No tenderness. Musculoskeletal:      Right shoulder: No tenderness or bony tenderness. Right upper arm: Deformity, tenderness and bony tenderness present. Right elbow: Normal range of motion. No tenderness. Right wrist: Normal.      Cervical back: Normal range of motion and neck supple. Comments: 2+ right radial pulse. No tenderness at the elbow or distally. Skin:     General: Skin is warm and dry. Findings: No laceration. Neurological:      Mental Status: She is alert. MDM  Number of Diagnoses or Management Options  Diagnosis management comments: Concern for fracture right upper extremity. Imaging. Nonsyncopal fall.        Amount and/or Complexity of Data Reviewed  Tests in the radiology section of CPT®: ordered and reviewed    Risk of Complications, Morbidity, and/or Mortality  Presenting problems: moderate  Diagnostic procedures: minimal  Management options: low    Patient Progress  Patient progress: stable         Splint, Long Arm    Date/Time: 10/17/2021 12:27 AM  Performed by: Tammy Levine MD  Authorized by: Tammy Levine MD     Consent:     Consent obtained:  Verbal  Pre-procedure details:     Sensation:  Normal  Procedure details:     Laterality:  Left    Location:  Shoulder    Splint type:  Long arm    Supplies:  Ortho-Glass  Post-procedure details:     Pain:  Improved    Sensation:  Normal    Patient tolerance of procedure: Tolerated well, no immediate complications  Comments:      Coaptation splint and sling placed          XR SHOULDER RT AP/LAT MIN 2 V    Result Date: 10/16/2021  EXAM: Right shoulder x-rays. INDICATION: Pain after falling. COMPARISON: Prior right shoulder x-rays on August 17, 2021. TECHNIQUE: 3 views. FINDINGS: Again noted is a right shoulder arthroplasty. There is a new comminuted, minimally displaced fracture in the humeral shaft at the level of the distal arthroplasty stem. No other fractures are identified. Humeral shaft fracture, adjacent to the distal stem of the humeral arthroplasty component. XR CHEST PORT    Result Date: 10/16/2021  EXAM: Chest x-ray. INDICATION: Chest pain. COMPARISON: Prior chest x-ray on March 26, 2015. TECHNIQUE: Frontal view chest x-ray. FINDINGS: The heart is upper normal in size. The lungs are clear. No pneumothorax, vascular congestion or pleural effusion is seen. Again noted is a large hiatal hernia. There is a right shoulder arthroplasty, with a fracture in the humeral shaft at the level of the distal stem as described on an earlier shoulder x-ray report. 1. No acute intrathoracic process. 2. Large hiatal hernia. 3. Right shoulder arthroplasty and humeral shaft fracture.     Discussed with orthopedics they suggested coaptation splint and sling and follow-up in the office to determine need for reparative surgery.

## 2021-10-18 PROBLEM — R05.9 COUGH: Status: ACTIVE | Noted: 2021-10-16

## 2021-10-19 ENCOUNTER — HOSPITAL ENCOUNTER (OUTPATIENT)
Dept: PHYSICAL THERAPY | Age: 81
Discharge: HOME | End: 2021-10-19
Attending: ORTHOPAEDIC SURGERY
Payer: MEDICARE

## 2021-10-19 NOTE — PROGRESS NOTES
Patient fell and fractured her surgical shoulder. Will discontinue at this this time.               Clarice Burton, PTA

## 2021-10-20 ENCOUNTER — APPOINTMENT (OUTPATIENT)
Dept: CT IMAGING | Age: 81
DRG: 643 | End: 2021-10-20
Attending: EMERGENCY MEDICINE
Payer: MEDICARE

## 2021-10-20 ENCOUNTER — HOSPITAL ENCOUNTER (INPATIENT)
Age: 81
LOS: 5 days | Discharge: HOME HEALTH | DRG: 643 | End: 2021-10-27
Attending: EMERGENCY MEDICINE | Admitting: INTERNAL MEDICINE
Payer: MEDICARE

## 2021-10-20 ENCOUNTER — APPOINTMENT (OUTPATIENT)
Dept: GENERAL RADIOLOGY | Age: 81
DRG: 643 | End: 2021-10-20
Attending: EMERGENCY MEDICINE
Payer: MEDICARE

## 2021-10-20 DIAGNOSIS — M97.31XD PERIPROSTHETIC FRACTURE AROUND INTERNAL PROSTHETIC RIGHT SHOULDER JOINT, SUBSEQUENT ENCOUNTER: ICD-10-CM

## 2021-10-20 DIAGNOSIS — D64.9 ANEMIA, UNSPECIFIED TYPE: ICD-10-CM

## 2021-10-20 DIAGNOSIS — E87.1 HYPONATREMIA: ICD-10-CM

## 2021-10-20 DIAGNOSIS — G93.40 ACUTE ENCEPHALOPATHY: Primary | ICD-10-CM

## 2021-10-20 PROBLEM — G93.41 METABOLIC ENCEPHALOPATHY: Status: ACTIVE | Noted: 2021-10-20

## 2021-10-20 LAB
ALBUMIN SERPL-MCNC: 2.9 G/DL (ref 3.2–4.6)
ALBUMIN/GLOB SERPL: 0.9 {RATIO} (ref 1.2–3.5)
ALP SERPL-CCNC: 69 U/L (ref 50–136)
ALT SERPL-CCNC: 31 U/L (ref 12–65)
ANION GAP SERPL CALC-SCNC: 5 MMOL/L (ref 7–16)
AST SERPL-CCNC: 34 U/L (ref 15–37)
ATRIAL RATE: 76 BPM
B PERT DNA SPEC QL NAA+PROBE: NOT DETECTED
BASOPHILS # BLD: 0 K/UL (ref 0–0.2)
BASOPHILS NFR BLD: 0 % (ref 0–2)
BILIRUB SERPL-MCNC: 0.7 MG/DL (ref 0.2–1.1)
BORDETELLA PARAPERTUSSIS PCR, BORPAR: NOT DETECTED
BUN SERPL-MCNC: 17 MG/DL (ref 8–23)
C PNEUM DNA SPEC QL NAA+PROBE: NOT DETECTED
CALCIUM SERPL-MCNC: 8.3 MG/DL (ref 8.3–10.4)
CALCULATED P AXIS, ECG09: 54 DEGREES
CALCULATED R AXIS, ECG10: -68 DEGREES
CALCULATED T AXIS, ECG11: 91 DEGREES
CHLORIDE SERPL-SCNC: 97 MMOL/L (ref 98–107)
CO2 SERPL-SCNC: 27 MMOL/L (ref 21–32)
CREAT SERPL-MCNC: 0.87 MG/DL (ref 0.6–1)
DIAGNOSIS, 93000: NORMAL
DIFFERENTIAL METHOD BLD: ABNORMAL
EOSINOPHIL # BLD: 0 K/UL (ref 0–0.8)
EOSINOPHIL NFR BLD: 0 % (ref 0.5–7.8)
ERYTHROCYTE [DISTWIDTH] IN BLOOD BY AUTOMATED COUNT: 14.2 % (ref 11.9–14.6)
FERRITIN SERPL-MCNC: 63 NG/ML (ref 8–388)
FLUAV SUBTYP SPEC NAA+PROBE: NOT DETECTED
FLUBV RNA SPEC QL NAA+PROBE: NOT DETECTED
GLOBULIN SER CALC-MCNC: 3.4 G/DL (ref 2.3–3.5)
GLUCOSE SERPL-MCNC: 176 MG/DL (ref 65–100)
HADV DNA SPEC QL NAA+PROBE: NOT DETECTED
HCOV 229E RNA SPEC QL NAA+PROBE: NOT DETECTED
HCOV HKU1 RNA SPEC QL NAA+PROBE: NOT DETECTED
HCOV NL63 RNA SPEC QL NAA+PROBE: NOT DETECTED
HCOV OC43 RNA SPEC QL NAA+PROBE: NOT DETECTED
HCT VFR BLD AUTO: 26.3 % (ref 35.8–46.3)
HGB BLD-MCNC: 8.4 G/DL (ref 11.7–15.4)
HMPV RNA SPEC QL NAA+PROBE: NOT DETECTED
HPIV1 RNA SPEC QL NAA+PROBE: NOT DETECTED
HPIV2 RNA SPEC QL NAA+PROBE: NOT DETECTED
HPIV3 RNA SPEC QL NAA+PROBE: NOT DETECTED
HPIV4 RNA SPEC QL NAA+PROBE: NOT DETECTED
IMM GRANULOCYTES # BLD AUTO: 0 K/UL (ref 0–0.5)
IMM GRANULOCYTES NFR BLD AUTO: 0 % (ref 0–5)
IRON SATN MFR SERPL: 8 %
IRON SERPL-MCNC: 25 UG/DL (ref 35–150)
LACTATE SERPL-SCNC: 1.3 MMOL/L (ref 0.4–2)
LACTATE SERPL-SCNC: 1.4 MMOL/L (ref 0.4–2)
LYMPHOCYTES # BLD: 1.2 K/UL (ref 0.5–4.6)
LYMPHOCYTES NFR BLD: 14 % (ref 13–44)
M PNEUMO DNA SPEC QL NAA+PROBE: NOT DETECTED
MCH RBC QN AUTO: 27.6 PG (ref 26.1–32.9)
MCHC RBC AUTO-ENTMCNC: 31.9 G/DL (ref 31.4–35)
MCV RBC AUTO: 86.5 FL (ref 79.6–97.8)
MONOCYTES # BLD: 1.3 K/UL (ref 0.1–1.3)
MONOCYTES NFR BLD: 14 % (ref 4–12)
NEUTS SEG # BLD: 6.4 K/UL (ref 1.7–8.2)
NEUTS SEG NFR BLD: 71 % (ref 43–78)
NRBC # BLD: 0 K/UL (ref 0–0.2)
P-R INTERVAL, ECG05: 176 MS
PLATELET # BLD AUTO: 241 K/UL (ref 150–450)
PMV BLD AUTO: 9.9 FL (ref 9.4–12.3)
POTASSIUM SERPL-SCNC: 3.6 MMOL/L (ref 3.5–5.1)
PROCALCITONIN SERPL-MCNC: 0.05 NG/ML
PROT SERPL-MCNC: 6.3 G/DL (ref 6.3–8.2)
Q-T INTERVAL, ECG07: 410 MS
QRS DURATION, ECG06: 148 MS
QTC CALCULATION (BEZET), ECG08: 461 MS
RBC # BLD AUTO: 3.04 M/UL (ref 4.05–5.2)
RSV RNA SPEC QL NAA+PROBE: NOT DETECTED
RV+EV RNA SPEC QL NAA+PROBE: DETECTED
SARS-COV-2 PCR, COVPCR: NOT DETECTED
SODIUM SERPL-SCNC: 129 MMOL/L (ref 136–145)
STREP,MOLECULAR STRPM: NOT DETECTED
T4 FREE SERPL-MCNC: 1.2 NG/DL (ref 0.78–1.46)
TIBC SERPL-MCNC: 301 UG/DL (ref 250–450)
TSH SERPL DL<=0.005 MIU/L-ACNC: 2.67 UIU/ML (ref 0.36–3.74)
VENTRICULAR RATE, ECG03: 76 BPM
WBC # BLD AUTO: 9.1 K/UL (ref 4.3–11.1)

## 2021-10-20 PROCEDURE — 74011250636 HC RX REV CODE- 250/636: Performed by: EMERGENCY MEDICINE

## 2021-10-20 PROCEDURE — 80053 COMPREHEN METABOLIC PANEL: CPT

## 2021-10-20 PROCEDURE — 84439 ASSAY OF FREE THYROXINE: CPT

## 2021-10-20 PROCEDURE — 51701 INSERT BLADDER CATHETER: CPT

## 2021-10-20 PROCEDURE — 83605 ASSAY OF LACTIC ACID: CPT

## 2021-10-20 PROCEDURE — 93005 ELECTROCARDIOGRAM TRACING: CPT | Performed by: EMERGENCY MEDICINE

## 2021-10-20 PROCEDURE — 99218 HC RM OBSERVATION: CPT

## 2021-10-20 PROCEDURE — 83550 IRON BINDING TEST: CPT

## 2021-10-20 PROCEDURE — 84443 ASSAY THYROID STIM HORMONE: CPT

## 2021-10-20 PROCEDURE — 86580 TB INTRADERMAL TEST: CPT | Performed by: FAMILY MEDICINE

## 2021-10-20 PROCEDURE — 99285 EMERGENCY DEPT VISIT HI MDM: CPT

## 2021-10-20 PROCEDURE — 70450 CT HEAD/BRAIN W/O DYE: CPT

## 2021-10-20 PROCEDURE — 74011000250 HC RX REV CODE- 250: Performed by: FAMILY MEDICINE

## 2021-10-20 PROCEDURE — 36415 COLL VENOUS BLD VENIPUNCTURE: CPT

## 2021-10-20 PROCEDURE — 74011250637 HC RX REV CODE- 250/637: Performed by: FAMILY MEDICINE

## 2021-10-20 PROCEDURE — 0202U NFCT DS 22 TRGT SARS-COV-2: CPT

## 2021-10-20 PROCEDURE — 87651 STREP A DNA AMP PROBE: CPT

## 2021-10-20 PROCEDURE — 85025 COMPLETE CBC W/AUTO DIFF WBC: CPT

## 2021-10-20 PROCEDURE — 84145 PROCALCITONIN (PCT): CPT

## 2021-10-20 PROCEDURE — 71045 X-RAY EXAM CHEST 1 VIEW: CPT

## 2021-10-20 PROCEDURE — 82728 ASSAY OF FERRITIN: CPT

## 2021-10-20 PROCEDURE — 2709999900 HC NON-CHARGEABLE SUPPLY

## 2021-10-20 RX ORDER — ONDANSETRON 2 MG/ML
4 INJECTION INTRAMUSCULAR; INTRAVENOUS
Status: DISCONTINUED | OUTPATIENT
Start: 2021-10-20 | End: 2021-10-27 | Stop reason: HOSPADM

## 2021-10-20 RX ORDER — FLUTICASONE PROPIONATE 50 MCG
2 SPRAY, SUSPENSION (ML) NASAL DAILY
Status: DISCONTINUED | OUTPATIENT
Start: 2021-10-21 | End: 2021-10-27 | Stop reason: HOSPADM

## 2021-10-20 RX ORDER — ASPIRIN 81 MG/1
81 TABLET ORAL
Status: DISCONTINUED | OUTPATIENT
Start: 2021-10-20 | End: 2021-10-27 | Stop reason: HOSPADM

## 2021-10-20 RX ORDER — ACETAMINOPHEN 650 MG/1
650 SUPPOSITORY RECTAL
Status: DISCONTINUED | OUTPATIENT
Start: 2021-10-20 | End: 2021-10-27 | Stop reason: HOSPADM

## 2021-10-20 RX ORDER — FERROUS SULFATE, DRIED 160(50) MG
1 TABLET, EXTENDED RELEASE ORAL DAILY
Status: DISCONTINUED | OUTPATIENT
Start: 2021-10-21 | End: 2021-10-27 | Stop reason: HOSPADM

## 2021-10-20 RX ORDER — HYDROCODONE BITARTRATE AND ACETAMINOPHEN 5; 325 MG/1; MG/1
0.5 TABLET ORAL
Status: DISCONTINUED | OUTPATIENT
Start: 2021-10-20 | End: 2021-10-26 | Stop reason: SDUPTHER

## 2021-10-20 RX ORDER — ACETAMINOPHEN 325 MG/1
650 TABLET ORAL
Status: DISCONTINUED | OUTPATIENT
Start: 2021-10-20 | End: 2021-10-27 | Stop reason: HOSPADM

## 2021-10-20 RX ORDER — ATORVASTATIN CALCIUM 20 MG/1
20 TABLET, FILM COATED ORAL
Status: DISCONTINUED | OUTPATIENT
Start: 2021-10-20 | End: 2021-10-27 | Stop reason: HOSPADM

## 2021-10-20 RX ORDER — ONDANSETRON 4 MG/1
4 TABLET, ORALLY DISINTEGRATING ORAL
Status: DISCONTINUED | OUTPATIENT
Start: 2021-10-20 | End: 2021-10-27 | Stop reason: HOSPADM

## 2021-10-20 RX ORDER — BENZONATATE 100 MG/1
100 CAPSULE ORAL
Status: DISCONTINUED | OUTPATIENT
Start: 2021-10-20 | End: 2021-10-27 | Stop reason: HOSPADM

## 2021-10-20 RX ORDER — PANTOPRAZOLE SODIUM 40 MG/1
40 TABLET, DELAYED RELEASE ORAL DAILY
Status: DISCONTINUED | OUTPATIENT
Start: 2021-10-21 | End: 2021-10-27 | Stop reason: HOSPADM

## 2021-10-20 RX ORDER — POLYETHYLENE GLYCOL 3350 17 G/17G
17 POWDER, FOR SOLUTION ORAL DAILY PRN
Status: DISCONTINUED | OUTPATIENT
Start: 2021-10-20 | End: 2021-10-27 | Stop reason: HOSPADM

## 2021-10-20 RX ADMIN — BENZONATATE 100 MG: 100 CAPSULE ORAL at 21:24

## 2021-10-20 RX ADMIN — ACETAMINOPHEN 650 MG: 325 TABLET ORAL at 21:24

## 2021-10-20 RX ADMIN — TUBERCULIN PURIFIED PROTEIN DERIVATIVE 5 UNITS: 5 INJECTION, SOLUTION INTRADERMAL at 17:25

## 2021-10-20 RX ADMIN — SODIUM CHLORIDE 1000 ML: 900 INJECTION, SOLUTION INTRAVENOUS at 13:26

## 2021-10-20 RX ADMIN — ASPIRIN 81 MG: 81 TABLET ORAL at 21:25

## 2021-10-20 RX ADMIN — ATORVASTATIN CALCIUM 20 MG: 20 TABLET, FILM COATED ORAL at 21:25

## 2021-10-20 NOTE — ED PROVIDER NOTES
Presents with daughter complaining of sore throat and cough. Patient was seen at  and received cough medication which made her dizzy and she fell and broke her humerus on Saturday. This was an unwitnessed fall. Daughter reports that since the fall she has been confused much more so than usual.  She has had a dry cough but denies fever nausea vomiting or diarrhea. Family reports decreased appetite. Patient is also on steroids. She had a shoulder replacement in July and has seen Ortho for her humerus. She is fully vaccinated for Covid. The history is provided by the patient. Cough  This is a new problem. The current episode started more than 2 days ago. The problem has been gradually worsening. The cough is non-productive. There has been no fever. Associated symptoms include confusion. Pertinent negatives include no chest pain, no chills, no sweats, no wheezing, no nausea and no vomiting. She has tried steroids and cough syrup for the symptoms. She is not a smoker.         Past Medical History:   Diagnosis Date    Adverse effect of anesthesia     Difficulty awakening     Coronary artery disease     1 stent in 2009, Dr. Tyler Vallejo follows     COVID-19 vaccine series completed 02/26/2021    Pfizer vaccine     GERD (gastroesophageal reflux disease)     Managed with as needed meds     Hyperlipidemia     Hypertension     managed with meds     Insomnia     LBBB (left bundle branch block)     Multiple thyroid nodules     per endocrinology office note 8/19/19- nodules have resolved, pt has a small thyroid cyst    Multiple thyroid nodules     Osteopenia     Prediabetes     Managed with diet        Past Surgical History:   Procedure Laterality Date    HX APPENDECTOMY  age 13   [de-identified] BLADDER REPAIR      bladder tac    HX COLONOSCOPY  2004 and 2014    HX CORONARY STENT PLACEMENT  2009    HX HYSTERECTOMY  in her 46s    ovaries intact    HX ORTHOPAEDIC      right foot ORIF         Family History: Problem Relation Age of Onset    Diabetes Mother     Coronary Artery Disease Father     Thyroid Disease Maternal Grandmother     Thyroid Cancer Neg Hx        Social History     Socioeconomic History    Marital status:      Spouse name: Not on file    Number of children: Not on file    Years of education: Not on file    Highest education level: Not on file   Occupational History    Not on file   Tobacco Use    Smoking status: Never Smoker    Smokeless tobacco: Never Used   Vaping Use    Vaping Use: Never used   Substance and Sexual Activity    Alcohol use: No    Drug use: No    Sexual activity: Not on file   Other Topics Concern    Not on file   Social History Narrative    Not on file     Social Determinants of Health     Financial Resource Strain:     Difficulty of Paying Living Expenses:    Food Insecurity:     Worried About Running Out of Food in the Last Year:     [address] in the Last Year:    Transportation Needs:     Lack of Transportation (Medical):  Lack of Transportation (Non-Medical):    Physical Activity:     Days of Exercise per Week:     Minutes of Exercise per Session:    Stress:     Feeling of Stress :    Social Connections:     Frequency of Communication with Friends and Family:     Frequency of Social Gatherings with Friends and Family:     Attends Jew Services:     Active Member of Clubs or Organizations:     Attends Club or Organization Meetings:     Marital Status:    Intimate Partner Violence:     Fear of Current or Ex-Partner:     Emotionally Abused:     Physically Abused:     Sexually Abused: ALLERGIES: Norvasc [amlodipine], Promethazine, and Lisinopril    Review of Systems   Unable to perform ROS: Mental status change   Constitutional: Negative for chills and fever. Respiratory: Positive for cough. Negative for wheezing. Cardiovascular: Negative for chest pain. Gastrointestinal: Negative for nausea and vomiting. Psychiatric/Behavioral: Positive for confusion. Vitals:    10/20/21 1038   BP: (!) 100/59   Pulse: 77   Resp: 18   Temp: 98.6 °F (37 °C)   SpO2: 93%   Weight: 58.1 kg (128 lb)   Height: 5' 2\" (1.575 m)            Physical Exam  Vitals and nursing note reviewed. Constitutional:       General: She is not in acute distress. Appearance: Normal appearance. She is well-developed. She is ill-appearing. She is not diaphoretic. HENT:      Head: Normocephalic and atraumatic. Mouth/Throat:      Comments: Hoarse voice  Eyes:      General:         Right eye: No discharge. Left eye: No discharge. Conjunctiva/sclera: Conjunctivae normal.   Cardiovascular:      Rate and Rhythm: Normal rate and regular rhythm. Pulmonary:      Effort: Pulmonary effort is normal. No respiratory distress. Breath sounds: Normal breath sounds. No wheezing. Abdominal:      General: There is no distension. Palpations: Abdomen is soft. Tenderness: There is no abdominal tenderness. Musculoskeletal:         General: Normal range of motion. Cervical back: Normal range of motion and neck supple. Skin:     General: Skin is warm and dry. Capillary Refill: Capillary refill takes less than 2 seconds. Neurological:      General: No focal deficit present. Mental Status: She is alert. Cranial Nerves: No cranial nerve deficit. Psychiatric:         Mood and Affect: Mood normal.         Behavior: Behavior normal.          MDM  Number of Diagnoses or Management Options  Diagnosis management comments: Had Covid test on 10/16 which was negative. Patient seems confused and is at times hallucinating. I spoke with both daughters including the 1 currently staying with her and reports she normally cares for herself and makes her own meals. Patient's   of dementia in April. She was given prednisone (taking) for the cough and Phenergan DM which apparently she is not currently taking. She was also given norco for pain following fall. She could have steroid induced psychosis. Her sodium is slightly low as well as her hgb but neither are low enough to cause current mental status changes. Sepsis workup is neg. D/w hospitalist for admission.          Amount and/or Complexity of Data Reviewed  Clinical lab tests: ordered and reviewed  Tests in the radiology section of CPT®: ordered and reviewed  Review and summarize past medical records: yes  Discuss the patient with other providers: yes  Independent visualization of images, tracings, or specimens: yes (===============================================  ED EKG Interpretation  EKG was interpreted in the absence of a cardiologist.    EKG rhythm:LBBB  Rate: 76  ST Segments: Nonspecific ST segments - NO STEMI      Madhu Kumar MD; 10/20/2021 @2:24 PM================    )    Risk of Complications, Morbidity, and/or Mortality  Presenting problems: high  Diagnostic procedures: minimal  Management options: high    Patient Progress  Patient progress: stable         Procedures

## 2021-10-20 NOTE — ED TRIAGE NOTES
Pt arrives with family via ALIZE Bauman who reports pt has been coughing with congestion for a week. Reports she was tested Friday for covid and it was negative. States she inhaled asphalt smell on Friday and that it is when it started. Pt with hoarse voice in triage. Reports vaccinated for covid. NAD in triage.

## 2021-10-20 NOTE — PROGRESS NOTES
10/20/21 1659   Skin Integumentary   Skin Integumentary (WDL) WDL    Pressure  Injury Documentation No Pressure Injury Noted-Pressure Ulcer Prevention Initiated   Skin Color Appropriate for ethnicity   Skin Condition/Temp Dry; Warm   Skin Integrity Intact   Turgor Epidermis thin w/ loss of subcut tissue   Hair Growth Present   Varicosities Absent   Primary Nurse Kleber Mcqueen, RN and Claudeen Chard, RN performed a dual skin assessment on this patient No impairment noted

## 2021-10-20 NOTE — H&P
Hospitalist Admission History and Physical     NAME:  Rory Labor   Age:  78 y.o.  :   1940   MRN:   [id number]  PCP: Agatha Mullins MD  Consulting MD:  Treatment Team: Attending Provider: Eusebio Dutta DO; Primary Nurse: Osmany Garcia RN    Chief Complaint   Patient presents with    Cough         HPI:   Patient is a 78 y.o. female who presented to the ED for cc cough and sore throat with altered mental status. Hx of recent humeral fracture Saturday following ortho with conservative treatment, HLD, HTN, CAD. Daughter at the bedside who states ever since she was prescribed prednisone and promethazine- dextromethorphan she has been having confusion, unable to walk, and having visual hallucinations.      Vitals - stable    Labs- Hg 8.4 from baseline of 11.6, Na 129,   Past Medical History:   Diagnosis Date    Adverse effect of anesthesia     Difficulty awakening     Coronary artery disease     1 stent in , Dr. Radha Pinon follows     COVID-19 vaccine series completed 2021    Pfizer vaccine     GERD (gastroesophageal reflux disease)     Managed with as needed meds     Hyperlipidemia     Hypertension     managed with meds     Insomnia     LBBB (left bundle branch block)     Multiple thyroid nodules     per endocrinology office note 19- nodules have resolved, pt has a small thyroid cyst    Multiple thyroid nodules     Osteopenia     Prediabetes     Managed with diet         Past Surgical History:   Procedure Laterality Date    HX APPENDECTOMY  age 13   [facility]      bladder tac    HX COLONOSCOPY   and     HX CORONARY STENT PLACEMENT  2009    HX HYSTERECTOMY  in her 46s    ovaries intact    HX ORTHOPAEDIC      right foot ORIF        Family History   Problem Relation Age of Onset    Diabetes Mother     Coronary Artery Disease Father     Thyroid Disease Maternal Grandmother     Thyroid Cancer Neg Hx        Social History     Social History Narrative    Not on file        Social History     Tobacco Use    Smoking status: Never Smoker    Smokeless tobacco: Never Used   Substance Use Topics    Alcohol use: No        Social History     Substance and Sexual Activity   Drug Use No         Allergies   Allergen Reactions    Norvasc [Amlodipine] Shortness of Breath and Cough    Promethazine Vertigo    Lisinopril Cough       Prior to Admission medications    Medication Sig Start Date End Date Taking? Authorizing Provider   predniSONE (DELTASONE) 10 mg tablet Take 10 mg by mouth daily. 10/16/21 10/21/21  Provider, Historical   promethazine-dextromethorphan (PROMETHAZINE-DM) 6.25-15 mg/5 mL syrup Take 5 mL by mouth. 10/16/21   Provider, Historical   HYDROcodone-acetaminophen (NORCO) 5-325 mg per tablet Take 0.5 Tablets by mouth every six (6) hours as needed for Pain for up to 4 days. Max Daily Amount: 2 Tablets. 10/16/21 10/20/21  Tammy Levine MD   Prolensa 0.07 % ophthalmic solution INSTILL 1 DROP IN EYE EVERY DAY TO THE OPERATED EYE BEGINNING 3 DAYS PRIOR TO SURGERY UNTIL GONE. 8/13/21   Provider, Historical   simvastatin (ZOCOR) 40 mg tablet TAKE ONE TABLET BY MOUTH EACH NIGHT AT BEDTIME 7/26/21   Ciara Alvarez MD   omeprazole (PRILOSEC) 40 mg capsule Take 40 mg by mouth daily. Provider, Historical   naloxegoL (MOVANTIK) 25 mg tab tablet Take 1 Tablet by mouth Daily (before breakfast). To start after surgery  Indications: opiate pain medication causing severe constipation  Patient not taking: Reported on 7/9/2021 7/7/21   Ruel Heath MD   senna-docusate (PERICOLACE) 8.6-50 mg per tablet Take 1 Tablet by mouth daily. To start after surgery  Indications: constipation  Patient not taking: Reported on 7/9/2021 7/7/21   Ruel Heath MD   ondansetron (ZOFRAN ODT) 4 mg disintegrating tablet 1 Tablet by SubLINGual route every six (6) hours as needed for Nausea or Nausea or Vomiting.  To start after surgery  Indications: prevent nausea and vomiting after surgery  Patient not taking: Reported on 7/9/2021 7/7/21   Harjit Kaufman MD   alendronate (FOSAMAX) 70 mg tablet Take 1 Tablet by mouth every seven (7) days. Patient not taking: Reported on 7/9/2021 7/1/21   Sulma Lawton MD   carvediloL (COREG) 12.5 mg tablet TAKE ONE TABLET BY MOUTH TWICE A DAY WITH MEALS  Patient not taking: Reported on 7/22/2021 2/13/20   Sulma Lawton MD   calcium-cholecalciferol, D3, (CALTRATE 600+D) tablet Take 1 Tablet by mouth daily. 9/29/15   Provider, Historical   aspirin delayed-release 81 mg tablet Take 81 mg by mouth nightly. Provider, Historical           Review of Systems    Constitutional: NAD, confusion, visual hallucinations. Cough. Sore throat  Eyes:  no change in visual acuity, no photophobia  Ears, nose, mouth, throat, and face: no  Odynphagia, dysphagia, no thrush or exudate, negative for chronic sinus congestion, recurrent headaches  Respiratory: negative for SOB, hemoptysis or cough  Cardiovascular: negative for CP, palpitations, or PND  Gastrointestinal: negative for abdominal pain, no hematemesis, hematochezia or BRBPR  Genitourinary: no urgency, frequency, or dysuria, no nocturia  Integument/breast: negative for skin rash or skin lesions  Hematologic/lymphatic: negative for known bleeding disorder  Musculoskeletal:weakness with difficulty walking  Neurological: negative for lightheadedness, syncope or presyncopal events, no seizure or CVA history  Behavioral/Psych: negative for depression or chronic anxiety,   Endocrine: negative for polydyspia, polyuria or intolerance to heat or cold  Allergic/Immunologic: negative for chronic allergic rhinitis, or known connective tissue disorder      Objective:     Patient Vitals for the past 24 hrs:   Temp Pulse Resp BP SpO2   10/20/21 1038 98.6 °F (37 °C) 77 18 (!) 100/59 93 %        No intake/output data recorded. No intake/output data recorded.     Data Review:   Recent Results (from the past 24 hour(s))   PROCALCITONIN Collection Time: 10/20/21 10:43 AM   Result Value Ref Range    Procalcitonin 0.05 ng/mL   CBC WITH AUTOMATED DIFF    Collection Time: 10/20/21 10:44 AM   Result Value Ref Range    WBC 9.1 4.3 - 11.1 K/uL    RBC 3.04 (L) 4.05 - 5.2 M/uL    HGB 8.4 (L) 11.7 - 15.4 g/dL    HCT 26.3 (L) 35.8 - 46.3 %    MCV 86.5 79.6 - 97.8 FL    MCH 27.6 26.1 - 32.9 PG    MCHC 31.9 31.4 - 35.0 g/dL    RDW 14.2 11.9 - 14.6 %    PLATELET 787 720 - 020 K/uL    MPV 9.9 9.4 - 12.3 FL    ABSOLUTE NRBC 0.00 0.0 - 0.2 K/uL    DF AUTOMATED      NEUTROPHILS 71 43 - 78 %    LYMPHOCYTES 14 13 - 44 %    MONOCYTES 14 (H) 4.0 - 12.0 %    EOSINOPHILS 0 (L) 0.5 - 7.8 %    BASOPHILS 0 0.0 - 2.0 %    IMMATURE GRANULOCYTES 0 0.0 - 5.0 %    ABS. NEUTROPHILS 6.4 1.7 - 8.2 K/UL    ABS. LYMPHOCYTES 1.2 0.5 - 4.6 K/UL    ABS. MONOCYTES 1.3 0.1 - 1.3 K/UL    ABS. EOSINOPHILS 0.0 0.0 - 0.8 K/UL    ABS. BASOPHILS 0.0 0.0 - 0.2 K/UL    ABS. IMM. GRANS. 0.0 0.0 - 0.5 K/UL   METABOLIC PANEL, COMPREHENSIVE    Collection Time: 10/20/21 10:44 AM   Result Value Ref Range    Sodium 129 (L) 136 - 145 mmol/L    Potassium 3.6 3.5 - 5.1 mmol/L    Chloride 97 (L) 98 - 107 mmol/L    CO2 27 21 - 32 mmol/L    Anion gap 5 (L) 7 - 16 mmol/L    Glucose 176 (H) 65 - 100 mg/dL    BUN 17 8 - 23 MG/DL    Creatinine 0.87 0.6 - 1.0 MG/DL    GFR est AA >60 >60 ml/min/1.73m2    GFR est non-AA >60 >60 ml/min/1.73m2    Calcium 8.3 8.3 - 10.4 MG/DL    Bilirubin, total 0.7 0.2 - 1.1 MG/DL    ALT (SGPT) 31 12 - 65 U/L    AST (SGOT) 34 15 - 37 U/L    Alk.  phosphatase 69 50 - 136 U/L    Protein, total 6.3 6.3 - 8.2 g/dL    Albumin 2.9 (L) 3.2 - 4.6 g/dL    Globulin 3.4 2.3 - 3.5 g/dL    A-G Ratio 0.9 (L) 1.2 - 3.5     LACTIC ACID    Collection Time: 10/20/21 10:44 AM   Result Value Ref Range    Lactic acid 1.4 0.4 - 2.0 MMOL/L   EKG, 12 LEAD, INITIAL    Collection Time: 10/20/21 10:51 AM   Result Value Ref Range    Ventricular Rate 76 BPM    Atrial Rate 76 BPM    P-R Interval 176 ms    QRS Duration 148 ms    Q-T Interval 410 ms    QTC Calculation (Bezet) 461 ms    Calculated P Axis 54 degrees    Calculated R Axis -68 degrees    Calculated T Axis 91 degrees    Diagnosis       Sinus rhythm with Premature atrial complexes  Left axis deviation  Left bundle branch block  Abnormal ECG  When compared with ECG of 26-MAR-2015 17:33,  Premature atrial complexes are now Present  Confirmed by Hari Camacho ([id number]) on 10/20/2021 11:39:56 AM     STREP, GROUP A, DIDIER    Collection Time: 10/20/21  2:04 PM    Specimen: Throat   Result Value Ref Range    Strep, Molecular Not detected         Physical Exam:     General:  Alert, cooperative, dry cough. .   Eyes:  Conjunctivae/corneas clear. Ears:  Normal TMs and external ear canals both ears. Nose: Nares normal.   Mouth/Throat: Wet tongue. Posterior pharynx with erythema   Neck:  no JVD. Back:   deferred   Lungs:   Coarse air movement, but clear   Heart:  Regular rate and rhythm, S1, S2 normal   Abdomen:   Soft, non-tender. Bowel sounds normal.    Extremities: Right arm in sling   Pulses: 2+ and symmetric all extremities. Skin: No obvious lesions   Lymph nodes: Cervical, supraclavicular, and axillary nodes normal.   Neurologic: Able to tell me the location, year and who the president is. Assessment and Plan     Principal Problem:    Metabolic encephalopathy (32/23/3463)    Active Problems:    Coronary artery disease involving native coronary artery of native heart without angina pectoris (2/8/2017)      Essential hypertension (2/8/2017)      Mixed hyperlipidemia (2/8/2017)      Multiple thyroid nodules ()      Dyslipidemia (8/16/2016)      Overview: Last Assessment & Plan:       Formatting of this note might be different from the original.      LDL 85. Continue statin for heart protection      Cough (39/34/3723)    Metabolic encephalopathy - No obvious confusion on exam and infectious work up so far negative.  Daughter states started as soon as she took her prednisone and cough medications. Has been off of her promethazine/dextromethorphan for three days. Last steroid dose on 10/19. Will stop the steroids and cough medication. Respiratory viral panel pending. Strep negative. TSH/T4 pending. PRN tessalon perles. PRN albuterol. Start flonase since erythema/cough could be postnasal drip. Normocytic anemia - Order Iron studies. No obvious bleeding. Hyponatremia - recheck in AM. Given 1L bolus in ER    ASA/statin    PPI    PRN Norco    Daughter wishes patient to be FULL CODE    Likely can dc tomorrow if no further confusion or hallucinations.      DVT prophylaxis - SCDs    Signed By: Fredo Licona DO   October 20, 2021

## 2021-10-21 ENCOUNTER — APPOINTMENT (OUTPATIENT)
Dept: PHYSICAL THERAPY | Age: 81
End: 2021-10-21
Attending: ORTHOPAEDIC SURGERY
Payer: MEDICARE

## 2021-10-21 PROBLEM — J04.0 LARYNGITIS: Status: ACTIVE | Noted: 2021-10-21

## 2021-10-21 PROBLEM — R44.1 VISUAL HALLUCINATIONS: Status: ACTIVE | Noted: 2021-10-20

## 2021-10-21 PROBLEM — D64.9 NORMOCYTIC ANEMIA: Status: ACTIVE | Noted: 2021-10-21

## 2021-10-21 PROBLEM — Z96.619: Status: ACTIVE | Noted: 2021-10-21

## 2021-10-21 PROBLEM — R05.8 DRY COUGH: Status: ACTIVE | Noted: 2021-10-16

## 2021-10-21 PROBLEM — E87.1 ACUTE HYPONATREMIA: Status: ACTIVE | Noted: 2021-10-21

## 2021-10-21 PROBLEM — M97.8XXA: Status: ACTIVE | Noted: 2021-10-21

## 2021-10-21 LAB
ANION GAP SERPL CALC-SCNC: 6 MMOL/L (ref 7–16)
ANION GAP SERPL CALC-SCNC: 7 MMOL/L (ref 7–16)
APPEARANCE UR: CLEAR
BACTERIA URNS QL MICRO: ABNORMAL /HPF
BILIRUB UR QL: NEGATIVE
BUN SERPL-MCNC: 13 MG/DL (ref 8–23)
BUN SERPL-MCNC: 16 MG/DL (ref 8–23)
CALCIUM SERPL-MCNC: 8.1 MG/DL (ref 8.3–10.4)
CALCIUM SERPL-MCNC: 8.5 MG/DL (ref 8.3–10.4)
CHLORIDE SERPL-SCNC: 90 MMOL/L (ref 98–107)
CHLORIDE SERPL-SCNC: 95 MMOL/L (ref 98–107)
CO2 SERPL-SCNC: 25 MMOL/L (ref 21–32)
CO2 SERPL-SCNC: 27 MMOL/L (ref 21–32)
COLOR UR: YELLOW
CREAT SERPL-MCNC: 0.56 MG/DL (ref 0.6–1)
CREAT SERPL-MCNC: 0.59 MG/DL (ref 0.6–1)
EPI CELLS #/AREA URNS HPF: ABNORMAL /HPF
ERYTHROCYTE [DISTWIDTH] IN BLOOD BY AUTOMATED COUNT: 14.3 % (ref 11.9–14.6)
GLUCOSE SERPL-MCNC: 122 MG/DL (ref 65–100)
GLUCOSE SERPL-MCNC: 134 MG/DL (ref 65–100)
GLUCOSE UR STRIP.AUTO-MCNC: NEGATIVE MG/DL
HCT VFR BLD AUTO: 24 % (ref 35.8–46.3)
HGB BLD-MCNC: 7.7 G/DL (ref 11.7–15.4)
HGB UR QL STRIP: ABNORMAL
KETONES UR QL STRIP.AUTO: NEGATIVE MG/DL
LEUKOCYTE ESTERASE UR QL STRIP.AUTO: NEGATIVE
MCH RBC QN AUTO: 27.9 PG (ref 26.1–32.9)
MCHC RBC AUTO-ENTMCNC: 32.1 G/DL (ref 31.4–35)
MCV RBC AUTO: 87 FL (ref 79.6–97.8)
MM INDURATION POC: 0 MM (ref 0–5)
NITRITE UR QL STRIP.AUTO: NEGATIVE
NRBC # BLD: 0 K/UL (ref 0–0.2)
OSMOLALITY UR: 488 MOSM/KG H2O (ref 50–1400)
OTHER OBSERVATIONS,UCOM: ABNORMAL
PH UR STRIP: 6.5 [PH] (ref 5–9)
PLATELET # BLD AUTO: 209 K/UL (ref 150–450)
PMV BLD AUTO: 10.1 FL (ref 9.4–12.3)
POTASSIUM SERPL-SCNC: 3.7 MMOL/L (ref 3.5–5.1)
POTASSIUM SERPL-SCNC: 3.8 MMOL/L (ref 3.5–5.1)
PPD POC: NEGATIVE NEGATIVE
PROT UR STRIP-MCNC: ABNORMAL MG/DL
RBC # BLD AUTO: 2.76 M/UL (ref 4.05–5.2)
RBC #/AREA URNS HPF: ABNORMAL /HPF
SODIUM SERPL-SCNC: 123 MMOL/L (ref 136–145)
SODIUM SERPL-SCNC: 123 MMOL/L (ref 136–145)
SODIUM SERPL-SCNC: 127 MMOL/L (ref 136–145)
SODIUM UR-SCNC: 80 MMOL/L
SP GR UR REFRACTOMETRY: 1.01 (ref 1–1.02)
UROBILINOGEN UR QL STRIP.AUTO: 1 EU/DL (ref 0.2–1)
WBC # BLD AUTO: 9 K/UL (ref 4.3–11.1)
WBC URNS QL MICRO: ABNORMAL /HPF

## 2021-10-21 PROCEDURE — 97162 PT EVAL MOD COMPLEX 30 MIN: CPT

## 2021-10-21 PROCEDURE — 83935 ASSAY OF URINE OSMOLALITY: CPT

## 2021-10-21 PROCEDURE — 74011250637 HC RX REV CODE- 250/637: Performed by: FAMILY MEDICINE

## 2021-10-21 PROCEDURE — 97535 SELF CARE MNGMENT TRAINING: CPT

## 2021-10-21 PROCEDURE — 2709999900 HC NON-CHARGEABLE SUPPLY

## 2021-10-21 PROCEDURE — 81001 URINALYSIS AUTO W/SCOPE: CPT

## 2021-10-21 PROCEDURE — 99218 HC RM OBSERVATION: CPT

## 2021-10-21 PROCEDURE — 85027 COMPLETE CBC AUTOMATED: CPT

## 2021-10-21 PROCEDURE — 97530 THERAPEUTIC ACTIVITIES: CPT

## 2021-10-21 PROCEDURE — 87086 URINE CULTURE/COLONY COUNT: CPT

## 2021-10-21 PROCEDURE — 97112 NEUROMUSCULAR REEDUCATION: CPT

## 2021-10-21 PROCEDURE — 51798 US URINE CAPACITY MEASURE: CPT

## 2021-10-21 PROCEDURE — 80048 BASIC METABOLIC PNL TOTAL CA: CPT

## 2021-10-21 PROCEDURE — 77030040830 HC CATH URETH FOL MDII -A

## 2021-10-21 PROCEDURE — 84300 ASSAY OF URINE SODIUM: CPT

## 2021-10-21 PROCEDURE — 84295 ASSAY OF SERUM SODIUM: CPT

## 2021-10-21 PROCEDURE — 36415 COLL VENOUS BLD VENIPUNCTURE: CPT

## 2021-10-21 PROCEDURE — 97165 OT EVAL LOW COMPLEX 30 MIN: CPT

## 2021-10-21 PROCEDURE — 74011250637 HC RX REV CODE- 250/637: Performed by: INTERNAL MEDICINE

## 2021-10-21 PROCEDURE — 74011250636 HC RX REV CODE- 250/636: Performed by: INTERNAL MEDICINE

## 2021-10-21 RX ORDER — SODIUM CHLORIDE 9 MG/ML
150 INJECTION, SOLUTION INTRAVENOUS CONTINUOUS
Status: DISCONTINUED | OUTPATIENT
Start: 2021-10-21 | End: 2021-10-21

## 2021-10-21 RX ORDER — GUAIFENESIN 100 MG/5ML
100 SOLUTION ORAL
Status: DISCONTINUED | OUTPATIENT
Start: 2021-10-21 | End: 2021-10-27 | Stop reason: HOSPADM

## 2021-10-21 RX ADMIN — HYDROCODONE BITARTRATE AND ACETAMINOPHEN 0.5 TABLET: 5; 325 TABLET ORAL at 22:19

## 2021-10-21 RX ADMIN — GUAIFENESIN 100 MG: 200 SOLUTION ORAL at 08:25

## 2021-10-21 RX ADMIN — Medication 2 PACKET: at 18:25

## 2021-10-21 RX ADMIN — Medication 1 TABLET: at 08:25

## 2021-10-21 RX ADMIN — PANTOPRAZOLE SODIUM 40 MG: 40 TABLET, DELAYED RELEASE ORAL at 08:25

## 2021-10-21 RX ADMIN — ACETAMINOPHEN 650 MG: 325 TABLET ORAL at 16:56

## 2021-10-21 RX ADMIN — FLUTICASONE PROPIONATE 2 SPRAY: 50 SPRAY, METERED NASAL at 08:25

## 2021-10-21 RX ADMIN — GUAIFENESIN 100 MG: 200 SOLUTION ORAL at 22:26

## 2021-10-21 RX ADMIN — BENZONATATE 100 MG: 100 CAPSULE ORAL at 11:43

## 2021-10-21 RX ADMIN — GUAIFENESIN 100 MG: 200 SOLUTION ORAL at 03:54

## 2021-10-21 RX ADMIN — ASPIRIN 81 MG: 81 TABLET ORAL at 22:19

## 2021-10-21 RX ADMIN — SODIUM CHLORIDE 150 ML/HR: 900 INJECTION, SOLUTION INTRAVENOUS at 12:56

## 2021-10-21 RX ADMIN — GUAIFENESIN 100 MG: 200 SOLUTION ORAL at 16:56

## 2021-10-21 RX ADMIN — POLYETHYLENE GLYCOL 3350 17 G: 17 POWDER, FOR SOLUTION ORAL at 12:32

## 2021-10-21 RX ADMIN — ATORVASTATIN CALCIUM 20 MG: 20 TABLET, FILM COATED ORAL at 22:19

## 2021-10-21 NOTE — PROGRESS NOTES
Problem: Falls - Risk of  Goal: *Absence of Falls  Description: Document Chioma Nguyen Fall Risk and appropriate interventions in the flowsheet.   Outcome: Progressing Towards Goal  Note: Fall Risk Interventions:  Mobility Interventions: Patient to call before getting OOB         Medication Interventions: Patient to call before getting OOB    Elimination Interventions: Call light in reach    History of Falls Interventions: Bed/chair exit alarm         Problem: Patient Education: Go to Patient Education Activity  Goal: Patient/Family Education  Outcome: Progressing Towards Goal

## 2021-10-21 NOTE — PROGRESS NOTES
CM chart review. PT/OT recommending STR at discharge. CM spoke with patient and daughter at bedside. Patient declines STR at this time. She plans to return home with family assistance, and daughter states family will be with patient 24/7. CM also offered home health referral; pt/daughter decline at this time. CM will remain available to assist with discharge needs that may arise.

## 2021-10-21 NOTE — PROGRESS NOTES
Hospitalist Progress Note   Admit Date:  10/20/2021 10:44 AM   LOS: 0 days   Name:  Donald Palmer   Age:  78 y.o. Sex:  female  :  1940   MRN:  [id number]     Chief Complaint: Hallucinations  Reason(s) for Admission: Metabolic encephalopathy [Q03.09]     Hospital Course & Interval History:   78 y.o. female who presented to the ED for cc cough and sore throat with altered mental status. Hx of recent humeral fracture Saturday following ortho with conservative treatment, HLD, HTN, CAD. Daughter at the bedside who states ever since she was prescribed prednisone and promethazine- dextromethorphan she has been having confusion, unable to walk, and having visual hallucinations. Subjective (10/21/21):  Per daughters, the patient continues to have visual hallucinations. Patient complains of dry cough and hoarseness. Denies right shoulder pain. Denies CP/SOB. Denies F/C. Denies N/V/D. Review of systems negative except stated above.     Assessment & Plan:     Principal Problem:    Acute hyponatremia (10/21/2021)  - 10/20 Na 129  - 10/21 Na 127  - Start normal saline @ 150 ml/hr  - Check bladder scan to make sure no retention  - Repeat BMP at 1600  - Check urine studies  - Check UA    Active Problems:    Visual hallucinations (10/20/2021)  - I suspect this is due to Promethazine and possible sodium level  - Check UA  - Promethazine stopped  - Continue to monitor      Laryngitis (10/21/2021)  - Due to rhinovirus on RSP  - Symptomatic care      Dry cough (10/16/2021)  - Due to rhinovirus on RSP  - Symptomatic care      Normocytic anemia (10/21/2021)  - Hgb down to 7.7 today  - I suspect this is dilutional  - No s/s of active bleeding  - Check CBC daily      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 (10/21/2021)  - No acute inpatient issues  - Follows with ortho as outpatient      Essential hypertension (2017)  - Stable  - Continue to monitor      S/P reverse total shoulder arthroplasty, 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)    Diet:  ADULT DIET Easy to Chew; Low Fat/Low Chol/High Fiber/2 gm Na  DVT PPx: SCDs  Code status: Full Code    Hospital Problems as of 10/21/2021 Date Reviewed: 10/19/2021        Codes Class Noted - Resolved POA    * (Principal) Acute hyponatremia ICD-10-CM: E87.1  ICD-9-CM: 276.1  10/21/2021 - Present Yes        Laryngitis ICD-10-CM: J04.0  ICD-9-CM: 464.00  10/21/2021 - Present Yes        Visual hallucinations ICD-10-CM: R44.1  ICD-9-CM: 368.16  10/20/2021 - Present Yes        Normocytic anemia ICD-10-CM: D64.9  ICD-9-CM: 285.9  10/21/2021 - Present Yes        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 Latrelle Fallow  ICD-9-CM: 996.44, V43.61  10/21/2021 - Present Yes        Dry cough ICD-10-CM: R05.8  ICD-9-CM: 786.2  10/16/2021 - Present Yes        Essential hypertension ICD-10-CM: I10  ICD-9-CM: 401.9  2/8/2017 - Present Yes        Multiple thyroid nodules ICD-10-CM: E04.2  ICD-9-CM: 241.1  Unknown - Present Yes        Coronary artery disease involving native coronary artery of native heart without angina pectoris ICD-10-CM: I25.10  ICD-9-CM: 414.01  2/8/2017 - Present Yes        S/P reverse total shoulder arthroplasty, right ICD-10-CM: Z96.611  ICD-9-CM: V43.61  7/11/2021 - Present Yes        Mixed hyperlipidemia ICD-10-CM: E78.2  ICD-9-CM: 272.2  2/8/2017 - Present Yes        Dyslipidemia ICD-10-CM: E78.5  ICD-9-CM: 272.4  8/16/2016 - Present Yes    Overview Signed 7/20/2021 11:21 AM by Gabrielle Aguayo     Last Assessment & Plan:   Formatting of this note might be different from the original.  LDL 85.  Continue statin for heart protection                   Objective:     Patient Vitals for the past 24 hrs:   Temp Pulse Resp BP SpO2   10/21/21 0746 98.7 °F (37.1 °C) 81 16 (!) 119/98 91 %   10/21/21 0355 98.5 °F (36.9 °C) 70 16 (!) 140/73 94 %   10/20/21 2302 99.1 °F (37.3 °C) 90 20 (!) 147/86 93 %   10/20/21 1903 97.7 °F (36.5 °C) 78 14 139/60 96 %   10/20/21 1648 98.5 °F (36.9 °C) 69 18 137/67 98 %   10/20/21 1602  75 17 131/60 92 %   10/20/21 1541  68 (!) 35 (!) 110/57 98 %   10/20/21 1523  78 18 (!) 125/56 92 %   10/20/21 1502  74 17 121/89 (!) 89 %   10/20/21 1442  74 12 (!) 123/56 95 %   10/20/21 1422  80 24 (!) 166/65 93 %   10/20/21 1403  75 (!) 32  96 %   10/20/21 1402  75 (!) 31 (!) 122/55 90 %   10/20/21 1342     96 %   10/20/21 1341  73 24 (!) 120/59 92 %   10/20/21 1326  74 30  95 %   10/20/21 1324  76  128/63    10/20/21 1100     95 %   10/20/21 1054  79 16 137/74    10/20/21 1038 98.6 °F (37 °C) 77 18 (!) 100/59 93 %     Oxygen Therapy  O2 Sat (%): 91 % (10/21/21 0746)  Pulse via Oximetry: 75 beats per minute (10/20/21 1602)  O2 Device: None (Room air) (10/21/21 0710)    Estimated body mass index is 23.41 kg/m² as calculated from the following:    Height as of this encounter: 5' 2\" (1.575 m). Weight as of this encounter: 58.1 kg (128 lb). No intake or output data in the 24 hours ending 10/21/21 1034      Physical Exam:   General:    Well nourished. No overt distress  Head:  Normocephalic, atraumatic  Eyes:  Sclerae appear normal.  Pupils equally round. ENT:  Nares appear normal, no drainage. Moist oral mucosa  Neck:  No restricted ROM. Trachea midline   CV:   RRR. No m/r/g. No jugular venous distension. Lungs:   CTAB. No wheezing, rhonchi, or rales. Respirations even, unlabored  Abdomen: Bowel sounds present. Soft, nontender, nondistended. Extremities: No cyanosis or clubbing. No edema  Skin:     No rashes and normal coloration. Warm and dry. Neuro: Cranial nerves II-XII grossly intact. Sensation intact  Psych: Normal mood and affect.   Alert and oriented x2-3    I have reviewed ordered lab tests and independently visualized imaging below:    Last 24hr Labs:  Recent Results (from the past 24 hour(s))   PROCALCITONIN    Collection Time: 10/20/21 10:43 AM   Result Value Ref Range Procalcitonin 0.05 ng/mL   TRANSFERRIN SATURATION    Collection Time: 10/20/21 10:43 AM   Result Value Ref Range    Iron 25 (L) 35 - 150 ug/dL    TIBC 301 250 - 450 ug/dL    Transferrin Saturation 8 (L) >20 %   FERRITIN    Collection Time: 10/20/21 10:43 AM   Result Value Ref Range    Ferritin 63 8 - 388 NG/ML   T4, FREE    Collection Time: 10/20/21 10:43 AM   Result Value Ref Range    T4, Free 1.2 0.78 - 1.46 NG/DL   TSH 3RD GENERATION    Collection Time: 10/20/21 10:43 AM   Result Value Ref Range    TSH 2.670 0.358 - 3.740 uIU/mL   CBC WITH AUTOMATED DIFF    Collection Time: 10/20/21 10:44 AM   Result Value Ref Range    WBC 9.1 4.3 - 11.1 K/uL    RBC 3.04 (L) 4.05 - 5.2 M/uL    HGB 8.4 (L) 11.7 - 15.4 g/dL    HCT 26.3 (L) 35.8 - 46.3 %    MCV 86.5 79.6 - 97.8 FL    MCH 27.6 26.1 - 32.9 PG    MCHC 31.9 31.4 - 35.0 g/dL    RDW 14.2 11.9 - 14.6 %    PLATELET 071 105 - 684 K/uL    MPV 9.9 9.4 - 12.3 FL    ABSOLUTE NRBC 0.00 0.0 - 0.2 K/uL    DF AUTOMATED      NEUTROPHILS 71 43 - 78 %    LYMPHOCYTES 14 13 - 44 %    MONOCYTES 14 (H) 4.0 - 12.0 %    EOSINOPHILS 0 (L) 0.5 - 7.8 %    BASOPHILS 0 0.0 - 2.0 %    IMMATURE GRANULOCYTES 0 0.0 - 5.0 %    ABS. NEUTROPHILS 6.4 1.7 - 8.2 K/UL    ABS. LYMPHOCYTES 1.2 0.5 - 4.6 K/UL    ABS. MONOCYTES 1.3 0.1 - 1.3 K/UL    ABS. EOSINOPHILS 0.0 0.0 - 0.8 K/UL    ABS. BASOPHILS 0.0 0.0 - 0.2 K/UL    ABS. IMM.  GRANS. 0.0 0.0 - 0.5 K/UL   METABOLIC PANEL, COMPREHENSIVE    Collection Time: 10/20/21 10:44 AM   Result Value Ref Range    Sodium 129 (L) 136 - 145 mmol/L    Potassium 3.6 3.5 - 5.1 mmol/L    Chloride 97 (L) 98 - 107 mmol/L    CO2 27 21 - 32 mmol/L    Anion gap 5 (L) 7 - 16 mmol/L    Glucose 176 (H) 65 - 100 mg/dL    BUN 17 8 - 23 MG/DL    Creatinine 0.87 0.6 - 1.0 MG/DL    GFR est AA >60 >60 ml/min/1.73m2    GFR est non-AA >60 >60 ml/min/1.73m2    Calcium 8.3 8.3 - 10.4 MG/DL    Bilirubin, total 0.7 0.2 - 1.1 MG/DL    ALT (SGPT) 31 12 - 65 U/L    AST (SGOT) 34 15 - 37 U/L Alk. phosphatase 69 50 - 136 U/L    Protein, total 6.3 6.3 - 8.2 g/dL    Albumin 2.9 (L) 3.2 - 4.6 g/dL    Globulin 3.4 2.3 - 3.5 g/dL    A-G Ratio 0.9 (L) 1.2 - 3.5     LACTIC ACID    Collection Time: 10/20/21 10:44 AM   Result Value Ref Range    Lactic acid 1.4 0.4 - 2.0 MMOL/L   EKG, 12 LEAD, INITIAL    Collection Time: 10/20/21 10:51 AM   Result Value Ref Range    Ventricular Rate 76 BPM    Atrial Rate 76 BPM    P-R Interval 176 ms    QRS Duration 148 ms    Q-T Interval 410 ms    QTC Calculation (Bezet) 461 ms    Calculated P Axis 54 degrees    Calculated R Axis -68 degrees    Calculated T Axis 91 degrees    Diagnosis       Sinus rhythm with Premature atrial complexes  Left axis deviation  Left bundle branch block  Abnormal ECG  When compared with ECG of 26-MAR-2015 17:33,  Premature atrial complexes are now Present  Confirmed by Liss Delaney ([id number]) on 10/20/2021 11:39:56 AM     STREP, GROUP A, DIDIER    Collection Time: 10/20/21  2:04 PM    Specimen: Throat   Result Value Ref Range    Strep, Molecular Not detected     RESPIRATORY VIRUS PANEL W/COVID-19, PCR    Collection Time: 10/20/21  2:35 PM    Specimen: Nasopharyngeal   Result Value Ref Range    Adenovirus NOT DETECTED NOTDET      Coronavirus 229E NOT DETECTED NOTDET      Coronavirus HKU1 NOT DETECTED NOTDET      Coronavirus CVNL63 NOT DETECTED NOTDET      Coronavirus OC43 NOT DETECTED NOTDET      SARS-CoV-2, PCR NOT DETECTED NOTDET      Metapneumovirus NOT DETECTED NOTDET      Rhinovirus and Enterovirus Detected (A) NOTDET      Influenza A NOT DETECTED NOTDET      Influenza B NOT DETECTED NOTDET      Parainfluenza 1 NOT DETECTED NOTDET      Parainfluenza 2 NOT DETECTED NOTDET      Parainfluenza 3 NOT DETECTED NOTDET      Parainfluenza virus 4 NOT DETECTED NOTDET      RSV by PCR NOT DETECTED NOTDET      B. parapertussis, PCR NOT DETECTED NOTDET      Bordetella pertussis - PCR NOT DETECTED NOTDET      Chlamydophila pneumoniae DNA, QL, PCR NOT DETECTED NOTDET      Mycoplasma pneumoniae DNA, QL, PCR NOT DETECTED NOTDET     LACTIC ACID    Collection Time: 10/20/21  5:28 PM   Result Value Ref Range    Lactic acid 1.3 0.4 - 2.0 MMOL/L   METABOLIC PANEL, BASIC    Collection Time: 10/21/21  5:36 AM   Result Value Ref Range    Sodium 127 (L) 136 - 145 mmol/L    Potassium 3.7 3.5 - 5.1 mmol/L    Chloride 95 (L) 98 - 107 mmol/L    CO2 25 21 - 32 mmol/L    Anion gap 7 7 - 16 mmol/L    Glucose 122 (H) 65 - 100 mg/dL    BUN 16 8 - 23 MG/DL    Creatinine 0.56 (L) 0.6 - 1.0 MG/DL    GFR est AA >60 >60 ml/min/1.73m2    GFR est non-AA >60 >60 ml/min/1.73m2    Calcium 8.5 8.3 - 10.4 MG/DL   CBC W/O DIFF    Collection Time: 10/21/21  5:36 AM   Result Value Ref Range    WBC 9.0 4.3 - 11.1 K/uL    RBC 2.76 (L) 4.05 - 5.2 M/uL    HGB 7.7 (L) 11.7 - 15.4 g/dL    HCT 24.0 (L) 35.8 - 46.3 %    MCV 87.0 79.6 - 97.8 FL    MCH 27.9 26.1 - 32.9 PG    MCHC 32.1 31.4 - 35.0 g/dL    RDW 14.3 11.9 - 14.6 %    PLATELET 798 229 - 337 K/uL    MPV 10.1 9.4 - 12.3 FL    ABSOLUTE NRBC 0.00 0.0 - 0.2 K/uL       All Micro Results     Procedure Component Value Units Date/Time    RESPIRATORY VIRUS PANEL W/COVID-19, PCR [[id number]]  (Abnormal) Collected: 10/20/21 1435    Order Status: Completed Specimen: Nasopharyngeal Updated: 10/20/21 1605     Adenovirus NOT DETECTED        Coronavirus 229E NOT DETECTED        Coronavirus HKU1 NOT DETECTED        Coronavirus CVNL63 NOT DETECTED        Coronavirus OC43 NOT DETECTED        SARS-CoV-2, PCR NOT DETECTED        Metapneumovirus NOT DETECTED        Rhinovirus and Enterovirus Detected        Influenza A NOT DETECTED        Influenza B NOT DETECTED        Parainfluenza 1 NOT DETECTED        Parainfluenza 2 NOT DETECTED        Parainfluenza 3 NOT DETECTED        Parainfluenza virus 4 NOT DETECTED        RSV by PCR NOT DETECTED        B. parapertussis, PCR NOT DETECTED        Bordetella pertussis - PCR NOT DETECTED        Chlamydophila pneumoniae DNA, QL, PCR NOT DETECTED        Mycoplasma pneumoniae DNA, QL, PCR NOT DETECTED       STREP, GROUP A, DIDIER [[id number]] Collected: 10/20/21 1404    Order Status: Completed Specimen: Throat Updated: 10/20/21 1429     Strep, Molecular Not detected        Comment: Negative for Strep A nucleic acid  Methodology: Isothermal Nucleic Acid Amplification               SARS-CoV-2 Lab Results  \"Novel Coronavirus\" Test: No results found for: COV2NT   \"Emergent Disease\" Test: No results found for: EDPR  \"SARS-COV-2\" Test: No results found for: XGCOVT  \"Precision Labs\" Test: No results found for: RSLT  Rapid Test: No results found for: COVR       Imaging:  CT HEAD WO CONT    Result Date: 10/20/2021  History: unwitnessed fall and confusion Exam: CT head without contrast Technique: Thin section axial CT images were obtained from the skullbase through the vertex. Radiation dose reduction techniques were used for this study. Our CT scanners use one or all of the following: Automated exposure control, adjustment of the mA and/or kV according to patient size, use of iterative reconstruction. Findings: The ventricles are normal in size, shape, and position. There is generalized cerebral atrophy with chronic appearing white matter change in the corona radiata and centrum semiovale. No acute intracranial hemorrhage. No extra-axial fluid collection is present. There is no mass or mass-effect. The basilar cisterns are patent. The paranasal sinuses and mastoid air cells are clear. Chronic appearing white matter change without acute intracranial abnormality. XR CHEST PORT    Result Date: 10/20/2021  History: Chest pain, congestion, cough, 8 days duration Exam: portable chest Comparison: 10/16/2021 Findings: The lungs are clear. The mediastinal contour and osseous structures are normal. IMPRESSIONs: No acute findings. No results found for this visit on 10/20/21.     Current Meds:  Current Facility-Administered Medications   Medication Dose Route Frequency    guaiFENesin (ROBITUSSIN) 100 mg/5 mL oral liquid 100 mg  100 mg Oral Q4H PRN    acetaminophen (TYLENOL) tablet 650 mg  650 mg Oral Q6H PRN    Or    acetaminophen (TYLENOL) suppository 650 mg  650 mg Rectal Q6H PRN    polyethylene glycol (MIRALAX) packet 17 g  17 g Oral DAILY PRN    ondansetron (ZOFRAN ODT) tablet 4 mg  4 mg Oral Q8H PRN    Or    ondansetron (ZOFRAN) injection 4 mg  4 mg IntraVENous Q6H PRN    aspirin delayed-release tablet 81 mg  81 mg Oral QHS    calcium-vitamin D (OS-CHLOÉ +D3) 500 mg-200 unit per tablet 1 Tablet  1 Tablet Oral DAILY    HYDROcodone-acetaminophen (NORCO) 5-325 mg per tablet 0.5 Tablet  0.5 Tablet Oral Q6H PRN    pantoprazole (PROTONIX) tablet 40 mg  40 mg Oral DAILY    atorvastatin (LIPITOR) tablet 20 mg  20 mg Oral QHS    fluticasone propionate (FLONASE) 50 mcg/actuation nasal spray 2 Spray  2 Spray Both Nostrils DAILY    tuberculin injection 5 Units  5 Units IntraDERMal ONCE    benzonatate (TESSALON) capsule 100 mg  100 mg Oral TID PRN       Discharge Plan:     Home in 1-2 days    Signed By: Eduardo Owens DO     October 21, 2021

## 2021-10-21 NOTE — PROGRESS NOTES
ACUTE OT GOALS:  (Developed with and agreed upon by patient and/or caregiver.)  1. Patient will complete lower body bathing and dressing with MIN A and adaptive equipment as needed. 2.Patient will complete upper body bathing and dressing with MIN A and adaptive equipment as needed. 3. Patient will complete toileting with MIN A.   4. Patient will tolerate 25 minutes of OT treatment with 1-2 rest breaks to increase activity tolerance for ADLs. 5. Patient will complete functional transfers with MIN A and adaptive equipment as needed. 6. Patient will complete functional activity with SUPERVISION and adaptive equipment as needed. Timeframe: 7 visits     OCCUPATIONAL THERAPY ASSESSMENT: Initial Assessment and Daily Note OT Treatment Day # 1    Krystal Wagner is a 78 y.o. female   PRIMARY DIAGNOSIS: Acute hyponatremia  Metabolic encephalopathy [I23.16]       Reason for Referral:    ICD-10: Treatment Diagnosis: Generalized Muscle Weakness (M62.81)  Other lack of cordination (R27.8)  Difficulty in walking, Not elsewhere classified (R26.2)  OBSERV[place] / [place] / Product Type: Medicare /   ASSESSMENT:     REHAB RECOMMENDATIONS:   Recommendation to date pending progress:  Setting:   Short-term Rehab  Equipment:    To Be Determined     PRIOR LEVEL OF FUNCTION:  (Prior to Hospitalization)  INITIAL/CURRENT LEVEL OF FUNCTION:  (Based on today's evaluation)   Bathing:   Independent  Dressing:   Independent  Feeding/Grooming:   Independent  Toileting:   Independent  Functional Mobility:   Independent Bathing:   Maximal Assistance  Dressing:   Maximal Assistance  Feeding/Grooming:   Minimal Assistance  Toileting:   Maximal Assistance  Functional Mobility:   Moderate Assistance x 2     ASSESSMENT:  Ms. Cleo Garza presented to the hospital with AMS. Last Saturday, pt has a fall resulting in a R humerus fx.  Ortho attempting conservative treatment at this time--pt is NWB RUE with lopez brace and sling for comfort. At baseline, pt is independent for ADLs and IADLs. Pt has supportive family that live close to her. Today, pt was received supine in bed with daughter at bedside. Completed bed mobility Franki x2. Sat EOB CGA for static sitting and Franki for dynamic sitting balance. MaxA for LB dressing. Franki for grooming task EOB. Sit>stand modA x2. 100 [employer] Honor x2 for SPT to chair. Pt with orthostatic hypotension during session--BPs listed below. Pt reported feeling very lightheaded in standing. BP increased and symptoms resided once sitting. RN notified. Graham Verma currently demonstrates overall deficits in strength, balance, activity tolerance, and ADL performance. Patient would benefit from skilled OT services at this time in order to address functional deficits and OT goals stated above. SUBJECTIVE:   Ms. Ru Arnett states, \"I feel shaky. \"    SOCIAL HISTORY/LIVING ENVIRONMENT: lives alone in a one level home with one step to enter, tub shower with a chair (did not use chair), independent for ADLs and IADLs, family live very close by and assist patient as needed   Support Systems: Child(dandy)    OBJECTIVE:     PAIN: VITAL SIGNS: LINES/DRAINS:   Pre Treatment: Pain Screen  Pain Scale 1: Numeric (0 - 10)  Pain Intensity 1: 0  Post Treatment: no change  Vital Signs  O2 Device: None (Room air)  Additional Blood Pressure/Pulse Data  Pulse 2: 98  BP 2: (!) 70/32  MAP 2 (Calculated): (!) 45  Patient Position 2: Standing  BP 3: 101/69  MAP 3 (Calculated): 80  Patient Position 3: Sitting  BP 4: 112/63  MAP 4 (Calculated): 79  Patient Position 4: Sitting;Post activity Purewick  O2 Device: None (Room air)     GROSS EVALUATION:  BUEs Within Functional Limits Abnormal/ Functional Abnormal/ Non-Functional (see comments) Not Tested Comments:   AROM [] [x] [] [x] LUE WFL for ADLs, RUE not tested due to humerus fx   PROM [] [] [] [x]    Strength [] [x] [] [] Weakness observed in LUE, RUE not tested due to humerus fx Balance [] [x] [] [] Fair sitting, fair- standing    Posture [] [x] [] []    Sensation [x] [] [] []    Coordination [] [x] [] []    Tone [] [] [] [x]    Edema [] [] [] [x]    Activity Tolerance [] [x] [] []     [] [] [] []      COGNITION/  PERCEPTION: Intact Impaired   (see comments) Comments:   Orientation [x] []    Vision [x] []    Hearing [x] []    Judgment/ Insight [] [x] Decreased safety awareness    Attention [x] []    Memory [x] []    Command Following [x] []    Emotional Regulation [x] []     [] []      ACTIVITIES OF DAILY LIVING: I Mod I S SBA CGA Min Mod Max Total NT Comments   BASIC ADLs:              Bathing/ Showering [] [] [] [] [] [] [] [] [] [x]    Toileting [] [] [] [] [] [] [] [] [] [x]    Dressing [] [] [] [] [] [] [] [x] [] [] Socks    Feeding [] [] [] [] [] [] [] [] [] [x]    Grooming [] [] [] [] [] [x] [] [] [] [] Washed face and brushed hair,  iCntia for sitting balance    Personal Device Care [] [] [] [] [] [] [] [] [] [x]    Functional Mobility [] [] [] [] [] [] [x] [] [] [] x2 for SPT to chair, limited by hypotension    I=Independent, Mod I=Modified Independent, S=Supervision, SBA=Standby Assistance, CGA=Contact Guard Assistance,   Min=Minimal Assistance, Mod=Moderate Assistance, Max=Maximal Assistance, Total=Total Assistance, NT=Not Tested    MOBILITY: I Mod I S SBA CGA Min Mod Max Total  NT x2 Comments:   Supine to sit [] [] [] [] [] [x] [] [] [] [] [x]    Sit to supine [] [] [] [] [] [] [] [] [] [x] [] Left sitting in the chair    Sit to stand [] [] [] [] [] [] [x] [] [] [] [x]    Bed to chair [] [] [] [] [] [] [x] [] [] [] [x] SPT   I=Independent, Mod I=Modified Independent, S=Supervision, SBA=Standby Assistance, CGA=Contact Guard Assistance,   Min=Minimal Assistance, Mod=Moderate Assistance, Max=Maximal Assistance, Total=Total Assistance, NT=Not Tested    [address] [facility] [address] [address] AM-PAC 6 Clicks   Daily Activity Inpatient Short Form        How much help from another person does the patient currently need. .. Total A Lot A Little None   1. Putting on and taking off regular lower body clothing? [] 1   [x] 2   [] 3   [] 4   2. Bathing (including washing, rinsing, drying)? [] 1   [x] 2   [] 3   [] 4   3. Toileting, which includes using toilet, bedpan or urinal?   [] 1   [x] 2   [] 3   [] 4   4. Putting on and taking off regular upper body clothing? [] 1   [x] 2   [] 3   [] 4   5. Taking care of personal grooming such as brushing teeth? [] 1   [] 2   [x] 3   [] 4   6. Eating meals? [] 1   [] 2   [x] 3   [] 4   © 2007, Trustees of [address] [place] [address] [address] [id number], under license to [employer]. All rights reserved     Score:  Initial: 14 completed on 10/21/21 Most Recent: X (Date: -- )   Interpretation of Tool:  Represents activities that are increasingly more difficult (i.e. Bed mobility, Transfers, Gait). PLAN:   FREQUENCY/DURATION: OT Plan of Care: 3 times/week for duration of hospital stay or until stated goals are met, whichever comes first.    PROBLEM LIST:   (Skilled intervention is medically necessary to address:)  1. Decreased ADL/Functional Activities  2. Decreased Activity Tolerance  3. Decreased AROM/PROM  4. Decreased Balance  5. Decreased Cognition  6. Decreased Coordination  7. Decreased Strength  8. Decreased Transfer Abilities   INTERVENTIONS PLANNED:   (Benefits and precautions of occupational therapy have been discussed with the patient.)  1. Self Care Training  2. Therapeutic Activity  3. Therapeutic Exercise/HEP  4. Neuromuscular Re-education  5.  Education     TREATMENT:     EVALUATION: Low Complexity : (Untimed Charge)    TREATMENT:   (     )  Co-Treatment PT/OT necessary due to patient's decreased overall endurance/tolerance levels, as well as need for high level skilled assistance to complete functional transfers/mobility and functional tasks  Self Care (10 Minutes): Self care including Lower Body Dressing and Grooming to increase independence and decrease level of assistance required. Neuromuscular Re-education (10 Minutes): Neuromuscular Re-education included Balance Training, Coordination training, Postural training, Sitting balance training and Standing balance training to improve Balance, Coordination, Functional Mobility and Postural Control.     TREATMENT GRID:  N/A    AFTER TREATMENT POSITION/PRECAUTIONS:  Alarm Activated, Chair, RN notified and Visitors at bedside    INTERDISCIPLINARY COLLABORATION:  RN/PCT, PT/PTA and OT/DUMONT    TOTAL TREATMENT DURATION:  OT Patient Time In/Time Out  Time In: 1030  Time Out: [address], OT

## 2021-10-21 NOTE — PROGRESS NOTES
ACUTE PHYSICAL THERAPY GOALS:  (Developed with and agreed upon by patient and/or caregiver.)  (1.) Graham Verma will move from supine to sit and sit to supine , scoot up and down and roll side to side with CONTACT GUARD ASSIST within 7 treatment day(s). (2.) Graham Verma will transfer from bed to chair and chair to bed with CONTACT GUARD ASSIST using the least restrictive device within 7 treatment day(s). (3.) Graham Verma will ambulate with CONTACT GUARD ASSIST for 100+ feet with the least restrictive device within 7 treatment day(s). (4.) Graham Verma will perform standing static and dynamic balance activities x 15 minutes with CONTACT GUARD ASSIST to improve safety within 7 treatment day(s). (5.) Graham Verma will perform therapeutic exercises x 15 min for HEP with SUPERVISION to improve strength, endurance, and functional mobility within 7 treatment day(s).      PHYSICAL THERAPY ASSESSMENT: Initial Assessment PT Treatment Day # 1    Graham Verma is a 78 y.o. female   PRIMARY DIAGNOSIS: Acute hyponatremia  Metabolic encephalopathy [I24.16]     Reason for Referral:   ICD-10: Treatment Diagnosis: Generalized Muscle Weakness (M62.81)  Other lack of cordination (R27.8)  Difficulty in walking, Not elsewhere classified (R26.2)  Other abnormalities of gait and mobility (R26.89)  History of falling (Z91.81)  Dizziness and Giddiness (R42)  OBSERVATION: [place] / [place] / Product Type: Medicare /     ASSESSMENT:     REHAB RECOMMENDATIONS:   Recommendation to date pending progress:  Setting:   Short-term Rehab  Equipment:    To Be Determined     PRIOR LEVEL OF FUNCTION:  (Prior to Hospitalization) INITIAL/CURRENT LEVEL OF FUNCTION:  (Most Recently Demonstrated)   Bed Mobility:   Independent  Sit to Stand:   Independent  Transfers:   Independent  Gait/Mobility:   Independent Bed Mobility:   Moderate Assistance  Sit to Stand:   Moderate Assistance x 2  Transfers:   Moderate Assistance x 2  Gait/Mobility:   Unable to perform     ASSESSMENT:  Ms. Morelia Larson is an 78year old F who presents to hospital with acute hyponatremia, metabolic encephalopathy. 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 Non-operative conservative management pursued, pt NWB LUE, to wear sling for comfort and placed in Poe brace. This date pt performs mobility including bed mobility with Mod A, sit <> stand transfers Mod Ax2, steps to bedside chair Mod Ax2. Pt with significantly limited standing tolerance, as she feels quite dizzy and weak. Unsteady on her feet. Due to dizziness BP assessed, pt found to be significantly hypotensive with standing. See chart below:    Position BP Heartrate   Sitting /67 mm Hg 75 bpm   Standing 70/32 mm Hg 98 bpm   Sitting - immediately follow standing (EOB) 90/56 mm Hg 91 bpm   Sitting - after getting to chair 101/64 mm Hg 80 bpm   Sitting in chair, reclined  112/63 mm Hg 70 bpm     RN notified of pt response to mobility/standing. Pt presents as functioning below her baseline, with deficits in mobility including transfers, gait, balance, and activity tolerance. Pt will benefit from skilled therapy services to address stated deficits to promote return to highest level of function, independence, and safety. Will continue to follow. SUBJECTIVE:   Ms. Morelia Larson states, \"I just feel weak. \"    SOCIAL HISTORY/LIVING ENVIRONMENT: Lives alone, daughters live nearby and are very supportive. Typically no use of DME. Independent.   Support Systems: Child(dandy)  OBJECTIVE:     PAIN: VITAL SIGNS: LINES/DRAINS:   Pre Treatment: Pain Screen  Pain Scale 1: Numeric (0 - 10)  Pain Intensity 1: 0  Post Treatment: 0/10 Vital Signs  BP: 120/67  MAP (Calculated): 85  BP Patient Position: Sitting  More BP/Pulse rows needed?: Yes  O2 Device: None (Room air)  Additional Blood Pressure/Pulse Data  Pulse 2: 98  BP 2: (!) 70/32  MAP 2 (Calculated): (!) 45  Patient Position 2: Standing  BP 3: 101/69  MAP 3 (Calculated): 80  Patient Position 3: Sitting  BP 4: 112/63  MAP 4 (Calculated): 79  Patient Position 4: Sitting;Post activity Purewick  O2 Device: None (Room air)     GROSS EVALUATION:  BLE Within Functional Limits Abnormal/ Functional Abnormal/ Non-Functional (see comments) Not Tested Comments:   AROM [x] [] [] []    PROM [x] [] [] []    Strength [] [x] [] []  generally weak   Balance [] [x] [] []  fair sitting; poor standing   Posture [] [x] [] []  forward head, rounded shoulders   Sensation [x] [] [] []    Coordination [x] [] [] []    Tone [] [] [] [x]    Edema [] [] [] [x]    Activity Tolerance [] [] [x] []  significantly limited due to hypotension    [] [] [] []      COGNITION/  PERCEPTION: Intact Impaired   (see comments) Comments:   Orientation [x] []    Vision [x] []    Hearing [x] []    Command Following [x] []    Safety Awareness [x] []     [] []      MOBILITY: I Mod I S SBA CGA Min Mod Max Total  NT x2 Comments:   Bed Mobility    Rolling [] [] [] [] [] [] [x] [] [] [] []    Supine to Sit [] [] [] [] [] [] [x] [] [] [] []    Scooting [] [] [] [] [] [x] [x] [] [] [] []    Sit to Supine [] [] [] [] [] [] [x] [] [] [] []    Transfers    Sit to Stand [] [] [] [] [] [] [x] [] [] [] [x]    Bed to Chair [] [] [] [] [] [] [x] [x] [] [] [x]    Stand to Sit [] [] [] [] [] [] [x] [] [] [] [x]    I=Independent, Mod I=Modified Independent, S=Supervision, SBA=Standby Assistance, CGA=Contact Guard Assistance,   Min=Minimal Assistance, Mod=Moderate Assistance, Max=Maximal Assistance, Total=Total Assistance, NT=Not Tested  GAIT: I Mod I S SBA CGA Min Mod Max Total  NT x2 Comments:   Level of Assistance [] [] [] [] [] [] [] [] [] [x] []    Distance N/A    DME N/A    Gait Quality N/A    Weightbearing Status N/A     I=Independent, Mod I=Modified Independent, S=Supervision, SBA=Standby Assistance, CGA=Contact Guard Assistance,   Min=Minimal Assistance, Mod=Moderate Assistance, Max=Maximal Assistance, Total=Total Assistance, NT=Not [facility]       How much difficulty does the patient currently have. .. Unable A Lot A Little None   1. Turning over in bed (including adjusting bedclothes, sheets and blankets)? [] 1   [] 2   [x] 3   [] 4   2. Sitting down on and standing up from a chair with arms ( e.g., wheelchair, bedside commode, etc.)   [] 1   [x] 2   [] 3   [] 4   3. Moving from lying on back to sitting on the side of the bed? [] 1   [x] 2   [] 3   [] 4   How much help from another person does the patient currently need. .. Total A Lot A Little None   4. Moving to and from a bed to a chair (including a wheelchair)? [] 1   [x] 2   [] 3   [] 4   5. Need to walk in hospital room? [] 1   [x] 2   [] 3   [] 4   6. Climbing 3-5 steps with a railing? [] 1   [x] 2   [] 3   [] 4   © 2007, Trustees of [facility] [employer], under license to [employer]. All rights reserved     Score:  Initial: 13 Most Recent: X (Date: -- )    Interpretation of Tool:  Represents activities that are increasingly more difficult (i.e. Bed mobility, Transfers, Gait). PLAN:   FREQUENCY/DURATION: PT Plan of Care: 3 times/week for duration of hospital stay or until stated goals are met, whichever comes first.    PROBLEM LIST:   (Skilled intervention is medically necessary to address:)  1. Decreased ADL/Functional Activities  2. Decreased Activity Tolerance  3. Decreased Balance  4. Decreased Coordination  5. Decreased Gait Ability  6. Decreased Strength  7. Decreased Transfer Abilities   INTERVENTIONS PLANNED:   (Benefits and precautions of physical therapy have been discussed with the patient.)  1. Therapeutic Activity  2. Therapeutic Exercise/HEP  3. Neuromuscular Re-education  4. Gait Training  5.  Education     TREATMENT:     EVALUATION: Moderate Complexity : (Untimed Charge)    TREATMENT:   (     )  Co-Treatment PT/OT necessary due to patient's decreased overall endurance/tolerance levels, as well as need for high level skilled assistance to complete functional transfers/mobility and functional tasks  Therapeutic Activity (30 Minutes): Therapeutic activity included Supine to Sit, Scooting, Transfer Training, Sitting balance  and Standing balance to improve functional Mobility, Strength and Activity tolerance.     TREATMENT GRID:  N/A    AFTER TREATMENT POSITION/PRECAUTIONS:  Alarm Activated, Chair, Needs within reach, RN notified, Visitors at bedside and BLE reclined/elevated    INTERDISCIPLINARY COLLABORATION:  RN/PCT, PT/PTA and OT/DUMONT    TOTAL TREATMENT DURATION:  PT Patient Time In/Time Out  Time In: 1030  Time Out: Jeison Mcduffie PT

## 2021-10-21 NOTE — PROGRESS NOTES
Problem: Falls - Risk of  Goal: *Absence of Falls  Description: Document Vitor Hamilton Fall Risk and appropriate interventions in the flowsheet.   Outcome: Progressing Towards Goal  Note: Fall Risk Interventions:  Mobility Interventions: Patient to call before getting OOB, Strengthening exercises (ROM-active/passive), Utilize walker, cane, or other assistive device, Bed/chair exit alarm         Medication Interventions: Patient to call before getting OOB, Teach patient to arise slowly, Bed/chair exit alarm    Elimination Interventions: Call light in reach, Bed/chair exit alarm, Patient to call for help with toileting needs    History of Falls Interventions: Bed/chair exit alarm, Room close to nurse's station, Vital signs minimum Q4HRs X 24 hrs (comment for end date)

## 2021-10-21 NOTE — PROGRESS NOTES
CM spoke with patient and daughter, Robyn Thacker ([phone]), at bedside for discharge planning. Demographics verified. Patient lives alone in one level home with no steps to enter. She is independent with ADLs at baseline, ambulates without assistive devices, and drives. Patient s/p fall with R humerus fx on 10/16; sling in place. She had a previous R TSA in July and was receiving outpatient PT [facility] prior to fall. Patient has no hx of home health or STR. DME: wheelchair, raised toilet seat, shower chair    PCP: Dr. Alphonse Gonzalez; last visit 6/2021    Rx: [facility], [address]    Discharge plan: Patient plans to return home with family assistance at discharge. PT/OT evals pending. CM will follow for recommendations. Care Management Interventions  PCP Verified by CM:  Yes (Dr. Alphonse Gonzalez)  Mode of Transport at Discharge: Self  Discharge Durable Medical Equipment: No  Physical Therapy Consult: Yes  Occupational Therapy Consult: Yes  Support Systems: Child(dandy)  Confirm Follow Up Transport: Family  Discharge Location  Discharge Placement: Home

## 2021-10-22 LAB
ANION GAP SERPL CALC-SCNC: 7 MMOL/L (ref 7–16)
BUN SERPL-MCNC: 17 MG/DL (ref 8–23)
CALCIUM SERPL-MCNC: 8.2 MG/DL (ref 8.3–10.4)
CHLORIDE SERPL-SCNC: 87 MMOL/L (ref 98–107)
CK SERPL-CCNC: 1156 U/L (ref 21–215)
CO2 SERPL-SCNC: 26 MMOL/L (ref 21–32)
CORTIS AM PEAK SERPL-MCNC: 38 UG/DL (ref 7–25)
CREAT SERPL-MCNC: 0.48 MG/DL (ref 0.6–1)
ERYTHROCYTE [DISTWIDTH] IN BLOOD BY AUTOMATED COUNT: 13.7 % (ref 11.9–14.6)
GLUCOSE SERPL-MCNC: 133 MG/DL (ref 65–100)
HCT VFR BLD AUTO: 23.9 % (ref 35.8–46.3)
HGB BLD-MCNC: 7.7 G/DL (ref 11.7–15.4)
MCH RBC QN AUTO: 27.4 PG (ref 26.1–32.9)
MCHC RBC AUTO-ENTMCNC: 32.2 G/DL (ref 31.4–35)
MCV RBC AUTO: 85.1 FL (ref 79.6–97.8)
MM INDURATION POC: 0 MM (ref 0–5)
NRBC # BLD: 0 K/UL (ref 0–0.2)
OSMOLALITY SERPL: 253 MOSM/KG H2O (ref 280–301)
PLATELET # BLD AUTO: 242 K/UL (ref 150–450)
PMV BLD AUTO: 9.9 FL (ref 9.4–12.3)
POTASSIUM SERPL-SCNC: 3.5 MMOL/L (ref 3.5–5.1)
PPD POC: NEGATIVE NEGATIVE
RBC # BLD AUTO: 2.81 M/UL (ref 4.05–5.2)
SODIUM SERPL-SCNC: 112 MMOL/L (ref 136–145)
SODIUM SERPL-SCNC: 113 MMOL/L (ref 136–145)
SODIUM SERPL-SCNC: 117 MMOL/L (ref 136–145)
SODIUM SERPL-SCNC: 117 MMOL/L (ref 136–145)
SODIUM SERPL-SCNC: 120 MMOL/L (ref 136–145)
URATE SERPL-MCNC: 1.6 MG/DL (ref 2.6–6)
WBC # BLD AUTO: 9.7 K/UL (ref 4.3–11.1)

## 2021-10-22 PROCEDURE — 84295 ASSAY OF SERUM SODIUM: CPT

## 2021-10-22 PROCEDURE — 99218 HC RM OBSERVATION: CPT

## 2021-10-22 PROCEDURE — 74011250637 HC RX REV CODE- 250/637: Performed by: FAMILY MEDICINE

## 2021-10-22 PROCEDURE — 76937 US GUIDE VASCULAR ACCESS: CPT

## 2021-10-22 PROCEDURE — 77030041974 HC CATH SYS PERIPH TELE -B

## 2021-10-22 PROCEDURE — 82550 ASSAY OF CK (CPK): CPT

## 2021-10-22 PROCEDURE — 97112 NEUROMUSCULAR REEDUCATION: CPT

## 2021-10-22 PROCEDURE — 97535 SELF CARE MNGMENT TRAINING: CPT

## 2021-10-22 PROCEDURE — 36415 COLL VENOUS BLD VENIPUNCTURE: CPT

## 2021-10-22 PROCEDURE — 84550 ASSAY OF BLOOD/URIC ACID: CPT

## 2021-10-22 PROCEDURE — 85027 COMPLETE CBC AUTOMATED: CPT

## 2021-10-22 PROCEDURE — 65610000001 HC ROOM ICU GENERAL

## 2021-10-22 PROCEDURE — 74011250637 HC RX REV CODE- 250/637: Performed by: INTERNAL MEDICINE

## 2021-10-22 PROCEDURE — 83930 ASSAY OF BLOOD OSMOLALITY: CPT

## 2021-10-22 PROCEDURE — 74011250636 HC RX REV CODE- 250/636: Performed by: INTERNAL MEDICINE

## 2021-10-22 PROCEDURE — 2709999900 HC NON-CHARGEABLE SUPPLY

## 2021-10-22 PROCEDURE — 97530 THERAPEUTIC ACTIVITIES: CPT

## 2021-10-22 PROCEDURE — 77030020847 HC STATLOK BARD -A

## 2021-10-22 PROCEDURE — 74011250636 HC RX REV CODE- 250/636: Performed by: EMERGENCY MEDICINE

## 2021-10-22 PROCEDURE — 82533 TOTAL CORTISOL: CPT

## 2021-10-22 PROCEDURE — 80048 BASIC METABOLIC PNL TOTAL CA: CPT

## 2021-10-22 RX ORDER — SODIUM CHLORIDE 0.9 % (FLUSH) 0.9 %
5-40 SYRINGE (ML) INJECTION AS NEEDED
Status: DISCONTINUED | OUTPATIENT
Start: 2021-10-22 | End: 2021-10-27 | Stop reason: HOSPADM

## 2021-10-22 RX ORDER — SODIUM CHLORIDE 0.9 % (FLUSH) 0.9 %
5-40 SYRINGE (ML) INJECTION EVERY 8 HOURS
Status: DISCONTINUED | OUTPATIENT
Start: 2021-10-22 | End: 2021-10-27 | Stop reason: HOSPADM

## 2021-10-22 RX ORDER — DESMOPRESSIN ACETATE 4 UG/ML
1 INJECTION, SOLUTION INTRAVENOUS; SUBCUTANEOUS EVERY 6 HOURS
Status: DISCONTINUED | OUTPATIENT
Start: 2021-10-23 | End: 2021-10-22

## 2021-10-22 RX ORDER — DESMOPRESSIN ACETATE 4 UG/ML
2 INJECTION, SOLUTION INTRAVENOUS; SUBCUTANEOUS ONCE
Status: DISCONTINUED | OUTPATIENT
Start: 2021-10-22 | End: 2021-10-22

## 2021-10-22 RX ORDER — HYDRALAZINE HYDROCHLORIDE 20 MG/ML
20 INJECTION INTRAMUSCULAR; INTRAVENOUS
Status: DISCONTINUED | OUTPATIENT
Start: 2021-10-22 | End: 2021-10-27 | Stop reason: HOSPADM

## 2021-10-22 RX ORDER — 3% SODIUM CHLORIDE 3 G/100ML
50 INJECTION, SOLUTION INTRAVENOUS CONTINUOUS
Status: DISCONTINUED | OUTPATIENT
Start: 2021-10-22 | End: 2021-10-22

## 2021-10-22 RX ORDER — 3% SODIUM CHLORIDE 3 G/100ML
40 INJECTION, SOLUTION INTRAVENOUS CONTINUOUS
Status: DISCONTINUED | OUTPATIENT
Start: 2021-10-22 | End: 2021-10-23

## 2021-10-22 RX ADMIN — Medication 2 PACKET: at 08:41

## 2021-10-22 RX ADMIN — ASPIRIN 81 MG: 81 TABLET ORAL at 21:25

## 2021-10-22 RX ADMIN — GUAIFENESIN 100 MG: 200 SOLUTION ORAL at 20:28

## 2021-10-22 RX ADMIN — Medication 2 PACKET: at 16:56

## 2021-10-22 RX ADMIN — Medication 2 PACKET: at 21:25

## 2021-10-22 RX ADMIN — HYDROCODONE BITARTRATE AND ACETAMINOPHEN 0.5 TABLET: 5; 325 TABLET ORAL at 10:17

## 2021-10-22 RX ADMIN — SODIUM CHLORIDE 50 ML/HR: 3 INJECTION, SOLUTION INTRAVENOUS at 15:09

## 2021-10-22 RX ADMIN — PANTOPRAZOLE SODIUM 40 MG: 40 TABLET, DELAYED RELEASE ORAL at 08:41

## 2021-10-22 RX ADMIN — GUAIFENESIN 100 MG: 200 SOLUTION ORAL at 16:56

## 2021-10-22 RX ADMIN — Medication 1 TABLET: at 08:41

## 2021-10-22 RX ADMIN — Medication 10 ML: at 21:26

## 2021-10-22 RX ADMIN — GUAIFENESIN 100 MG: 200 SOLUTION ORAL at 08:41

## 2021-10-22 RX ADMIN — POLYETHYLENE GLYCOL 3350 17 G: 17 POWDER, FOR SOLUTION ORAL at 08:41

## 2021-10-22 RX ADMIN — ATORVASTATIN CALCIUM 20 MG: 20 TABLET, FILM COATED ORAL at 21:25

## 2021-10-22 RX ADMIN — HYDRALAZINE HYDROCHLORIDE 20 MG: 20 INJECTION INTRAMUSCULAR; INTRAVENOUS at 20:40

## 2021-10-22 RX ADMIN — GUAIFENESIN 100 MG: 200 SOLUTION ORAL at 12:55

## 2021-10-22 RX ADMIN — HYDROCODONE BITARTRATE AND ACETAMINOPHEN 0.5 TABLET: 5; 325 TABLET ORAL at 20:29

## 2021-10-22 RX ADMIN — FLUTICASONE PROPIONATE 2 SPRAY: 50 SPRAY, METERED NASAL at 08:41

## 2021-10-22 NOTE — PROGRESS NOTES
ACUTE PHYSICAL THERAPY GOALS:  (Developed with and agreed upon by patient and/or caregiver.)  (1.) Pascual Mathews will move from supine to sit and sit to supine , scoot up and down and roll side to side with CONTACT GUARD ASSIST within 7 treatment day(s). (2.) Pascual Mathews will transfer from bed to chair and chair to bed with CONTACT GUARD ASSIST using the least restrictive device within 7 treatment day(s). (3.) Pascual Mathews will ambulate with CONTACT GUARD ASSIST for 100+ feet with the least restrictive device within 7 treatment day(s). (4.) Pascual Mathews will perform standing static and dynamic balance activities x 15 minutes with CONTACT GUARD ASSIST to improve safety within 7 treatment day(s). (5.) Pascual Mathews will perform therapeutic exercises x 15 min for HEP with SUPERVISION to improve strength, endurance, and functional mobility within 7 treatment day(s). PHYSICAL THERAPY: Daily Note and AM Treatment Day # 2    Pascual Mathews is a 78 y.o. female   PRIMARY DIAGNOSIS: Acute hyponatremia  Metabolic encephalopathy [I34.03]  Acute hyponatremia [E87.1]         ASSESSMENT:     REHAB RECOMMENDATIONS: CURRENT LEVEL OF FUNCTION:  (Most Recently Demonstrated)   Recommendation to date pending progress:  Setting:   Short-term Rehab  Equipment:    To Be Determined Bed Mobility:   Minimal Assistance x 2  Sit to Stand:   Minimal Assistance x 2  Transfers:   Minimal Assistance x 2  Gait/Mobility:   Minimal Assistance x 2     ASSESSMENT:  Ms. Kacey Moreira is making slow, steady progress towards PT goals. Daughters at bedside report patient has been hallucinating but currently she is A&O x 4 although presents slightly withdrawn and flat today. Patient requires min assist x 2 for all mobility and was able to ambulate 25' with HHA. No complaints of dizziness or lightheadedness today. Will continue efforts.      SUBJECTIVE:   Ms. Kacey Moreira states, \"Okay\"    SOCIAL HISTORY/ LIVING ENVIRONMENT: See initial evaluation  Support Systems: Child(dandy)  OBJECTIVE:     PAIN: VITAL SIGNS: LINES/DRAINS:   Pre Treatment: Pain Screen  Pain Scale 1: FLACC  Pain Intensity 1: 0  Post Treatment: 0   None  O2 Device: None (Room air)     MOBILITY: I Mod I S SBA CGA Min Mod Max Total  NT x2 Comments:   Bed Mobility    Rolling [] [] [] [] [] [] [] [] [] [] []    Supine to Sit [] [] [] [] [] [x] [] [] [] [] [x]    Scooting [] [] [] [] [] [] [] [] [] [] []    Sit to Supine [] [] [] [] [] [x] [] [] [] [] [x]    Transfers    Sit to Stand [] [] [] [] [] [x] [] [] [] [] [x]    Bed to Chair [] [] [] [] [] [] [] [] [] [] []    Stand to Sit [] [] [] [] [] [x] [] [] [] [] [x]    I=Independent, Mod I=Modified Independent, S=Supervision, SBA=Standby Assistance, CGA=Contact Guard Assistance,   Min=Minimal Assistance, Mod=Moderate Assistance, Max=Maximal Assistance, Total=Total Assistance, NT=Not Tested    BALANCE: Good Fair+ Fair Fair- Poor NT Comments   Sitting Static [x] [] [] [] [] []    Sitting Dynamic [] [] [] [] [] []              Standing Static [] [] [x] [] [] []    Standing Dynamic [] [] [x] [] [] []      GAIT: I Mod I S SBA CGA Min Mod Max Total  NT x2 Comments:   Level of Assistance [] [] [] [] [] [x] [] [] [] [] [x]    Distance 25'    DME HHA    Gait Quality     Weightbearing  Status NWB RUE     I=Independent, Mod I=Modified Independent, S=Supervision, SBA=Standby Assistance, CGA=Contact Guard Assistance,   Min=Minimal Assistance, Mod=Moderate Assistance, Max=Maximal Assistance, Total=Total Assistance, NT=Not Tested    PLAN:   FREQUENCY/DURATION: PT Plan of Care: 3 times/week for duration of hospital stay or until stated goals are met, whichever comes first.  TREATMENT:     TREATMENT:   ($$ Therapeutic Activity: 23-37 mins    )  Co-Treatment PT/OT necessary due to patient's decreased overall endurance/tolerance levels, as well as need for high level skilled assistance to complete functional transfers/mobility and functional tasks  Therapeutic Activity (23 Minutes): Therapeutic activity included Supine to Sit, Sit to Supine, Scooting, Transfer Training, Ambulation on level ground, Sitting balance  and Standing balance to improve functional Mobility, Strength and Activity tolerance.     TREATMENT GRID:  N/A    AFTER TREATMENT POSITION/PRECAUTIONS:  Alarm Activated, Bed, Needs within reach, RN notified and Visitors at bedside    INTERDISCIPLINARY COLLABORATION:  RN/PCT, PT/PTA and OT/DUMONT    TOTAL TREATMENT DURATION:  PT Patient Time In/Time Out  Time In: 1134  Time Out: 65 Sheryl Lazo PTA

## 2021-10-22 NOTE — PROGRESS NOTES
Hourly rounds complete this shift. Patient alert and oriented to person and place. No new complaints at this time. Bed in low, locked position, call light and bedside table within reach. Patient in chair resting. Family at bedside. Prescribed medications given. IV clean, dry, and intact. Will continue to monitor. Report given to night shift nurse.

## 2021-10-22 NOTE — CONSULTS
GEN GENERIC H&P/CONSULT    Subjective:     78 y. o. female with Hx of recent humeral fracture  HLD, HTN, CAD. She presented with C/O cough and sore throat with altered mental status type confusion, unable to walk, and visual hallucinations.   She was recently prescribed prednisone and promethazine   Renal consult requested for hyponatremia. Her S. Na went further down when she was given IVF with NS      Ref. Range 10/20/2021 10:44 10/21/2021 05:36 10/21/2021 16:02 10/21/2021 19:43 10/21/2021 23:52 10/22/2021 06:01 10/22/2021 08:18   Sodium Latest Ref Range: 136 - 145 mmol/L 129 (L) 127 (L) 123 (L) 123 (L) 120 (L) 120 (L) 120 (L)      Ref. Range 10/22/2021 06:01   Sodium Latest Ref Range: 136 - 145 mmol/L 120 (L)   Potassium Latest Ref Range: 3.5 - 5.1 mmol/L 3.5   Chloride Latest Ref Range: 98 - 107 mmol/L 87 (L)   CO2 Latest Ref Range: 21 - 32 mmol/L 26   Anion gap Latest Ref Range: 7 - 16 mmol/L 7   Glucose Latest Ref Range: 65 - 100 mg/dL 133 (H)   BUN Latest Ref Range: 8 - 23 MG/DL 17   Creatinine Latest Ref Range: 0.6 - 1.0 MG/DL 0.48 (L)   Calcium Latest Ref Range: 8.3 - 10.4 MG/DL 8.2 (L)   GFR est non-AA Latest Ref Range: >60 ml/min/1.73m2 >60   GFR est AA Latest Ref Range: >60 ml/min/1.73m2 >60      Ref. Range 10/20/2021 10:44 10/20/2021 17:28   Lactic acid Latest Ref Range: 0.4 - 2.0 MMOL/L 1.4 1.3      Ref. Range 10/21/2021 15:18   Sodium,urine random Latest Units: MMOL/L 80   Osmolality,urine Latest Ref Range: 50 - 1,400 MOSM/kg H2O 488      Ref. Range 10/22/2021 08:18   Cortisol, a.m. Latest Ref Range: 7 - 25 ug/dL 38.0 (H)      Ref.  Range 10/20/2021 10:43   T4, Free Latest Ref Range: 0.78 - 1.46 NG/DL 1.2   TSH Latest Ref Range: 0.358 - 3.740 uIU/mL 2.670       Past Medical History:   Diagnosis Date    Adverse effect of anesthesia     Difficulty awakening     Coronary artery disease     1 stent in 2009, Dr. Lia Deleon follows     COVID-19 vaccine series completed 02/26/2021    Pfizer vaccine    [facility] GERD (gastroesophageal reflux disease)     Managed with as needed meds     Hyperlipidemia     Hypertension     managed with meds     Insomnia     LBBB (left bundle branch block)     Multiple thyroid nodules     per endocrinology office note 8/19/19- nodules have resolved, pt has a small thyroid cyst    Multiple thyroid nodules     Osteopenia     Prediabetes     Managed with diet       Past Surgical History:   Procedure Laterality Date    HX APPENDECTOMY  age 13   [de-identified] BLADDER REPAIR      bladder tac    HX COLONOSCOPY  2004 and 2014    HX CORONARY STENT PLACEMENT  2009    HX HYSTERECTOMY  in her 46s    ovaries intact    HX ORTHOPAEDIC      right foot ORIF      Prior to Admission medications    Medication Sig Start Date End Date Taking? Authorizing Provider   predniSONE (DELTASONE) 10 mg tablet Take 10 mg by mouth daily. 10/16/21 10/21/21  Provider, Historical   promethazine-dextromethorphan (PROMETHAZINE-DM) 6.25-15 mg/5 mL syrup Take 5 mL by mouth. 10/16/21   Provider, Historical   Prolensa 0.07 % ophthalmic solution INSTILL 1 DROP IN EYE EVERY DAY TO THE OPERATED EYE BEGINNING 3 DAYS PRIOR TO SURGERY UNTIL GONE. 8/13/21   Provider, Historical   simvastatin (ZOCOR) 40 mg tablet TAKE ONE TABLET BY MOUTH EACH NIGHT AT BEDTIME 7/26/21   Winston Vega MD   omeprazole (PRILOSEC) 40 mg capsule Take 40 mg by mouth daily. Provider, Historical   naloxegoL (MOVANTIK) 25 mg tab tablet Take 1 Tablet by mouth Daily (before breakfast). To start after surgery  Indications: opiate pain medication causing severe constipation  Patient not taking: Reported on 7/9/2021 7/7/21   Trevor Meredith MD   senna-docusate (PERICOLACE) 8.6-50 mg per tablet Take 1 Tablet by mouth daily.  To start after surgery  Indications: constipation  Patient not taking: Reported on 7/9/2021 7/7/21   Trevor Meredith MD   ondansetron (ZOFRAN ODT) 4 mg disintegrating tablet 1 Tablet by SubLINGual route every six (6) hours as needed for Nausea or Nausea or Vomiting. To start after surgery  Indications: prevent nausea and vomiting after surgery  Patient not taking: Reported on 7/9/2021 7/7/21   Melvina Foster MD   alendronate (FOSAMAX) 70 mg tablet Take 1 Tablet by mouth every seven (7) days. Patient not taking: Reported on 7/9/2021 7/1/21   Liz Zamora MD   carvediloL (COREG) 12.5 mg tablet TAKE ONE TABLET BY MOUTH TWICE A DAY WITH MEALS  Patient not taking: Reported on 7/22/2021 2/13/20   Liz Zamora MD   calcium-cholecalciferol, D3, (CALTRATE 600+D) tablet Take 1 Tablet by mouth daily. 9/29/15   Provider, Historical   aspirin delayed-release 81 mg tablet Take 81 mg by mouth nightly. Provider, Historical     Allergies   Allergen Reactions    Norvasc [Amlodipine] Shortness of Breath and Cough    Promethazine Vertigo    Lisinopril Cough      Social History     Tobacco Use    Smoking status: Never Smoker    Smokeless tobacco: Never Used   Substance Use Topics    Alcohol use: No      Family History   Problem Relation Age of Onset    Diabetes Mother     Coronary Artery Disease Father     Thyroid Disease Maternal Grandmother     Thyroid Cancer Neg Hx           Review of Systems    As above     Objective:       Visit Vitals  BP (!) 159/98 (BP 1 Location: Left upper arm, BP Patient Position: Supine)   Pulse 90   Temp 97.5 °F (36.4 °C)   Resp 20   Ht 5' 2\" (1.575 m)   Wt 58.1 kg (128 lb)   SpO2 100%   BMI 23.41 kg/m²       10/22 0701 - 10/22 1900  In: 240 [P.O.:240]  Out: 200 [Urine:200]  10/20 1901 - 10/22 0700  In: -   Out: 7341 [Urine:3025]    PE:  Lethargic, but oriented to person and place.   Not in DARYL   Lung: clear, no C, no W  CV; RR, no rub , no JVD  Abd: soft, not tender  Ext: no edema   Data Review:     Recent Results (from the past 24 hour(s))   URINALYSIS W/ RFLX MICROSCOPIC    Collection Time: 10/21/21  3:18 PM   Result Value Ref Range    Color YELLOW      Appearance CLEAR      Specific gravity 1.013 1.001 - 1.023      pH (UA) 6.5 5.0 - 9.0      Protein TRACE (A) NEG mg/dL    Glucose Negative mg/dL    Ketone Negative NEG mg/dL    Bilirubin Negative NEG      Blood LARGE (A) NEG      Urobilinogen 1.0 0.2 - 1.0 EU/dL    Nitrites Negative NEG      Leukocyte Esterase Negative NEG      WBC 0-3 0 /hpf    RBC 10-20 0 /hpf    Epithelial cells 3-5 0 /hpf    Bacteria TRACE 0 /hpf    Other observations RESULTS VERIFIED MANUALLY     CULTURE, URINE    Collection Time: 10/21/21  3:18 PM    Specimen: Cath Urine    URINE   Result Value Ref Range    Special Requests: NO SPECIAL REQUESTS      Culture result:        NO GROWTH AFTER SHORT PERIOD OF INCUBATION. FURTHER RESULTS TO FOLLOW AFTER OVERNIGHT INCUBATION.    OSMOLALITY, UR    Collection Time: 10/21/21  3:18 PM   Result Value Ref Range    Osmolality,urine 488 50 - 1,400 MOSM/kg H2O   SODIUM, UR, RANDOM    Collection Time: 10/21/21  3:18 PM   Result Value Ref Range    Sodium,urine random 80 MMOL/L   METABOLIC PANEL, BASIC    Collection Time: 10/21/21  4:02 PM   Result Value Ref Range    Sodium 123 (L) 136 - 145 mmol/L    Potassium 3.8 3.5 - 5.1 mmol/L    Chloride 90 (L) 98 - 107 mmol/L    CO2 27 21 - 32 mmol/L    Anion gap 6 (L) 7 - 16 mmol/L    Glucose 134 (H) 65 - 100 mg/dL    BUN 13 8 - 23 MG/DL    Creatinine 0.59 (L) 0.6 - 1.0 MG/DL    GFR est AA >60 >60 ml/min/1.73m2    GFR est non-AA >60 >60 ml/min/1.73m2    Calcium 8.1 (L) 8.3 - 10.4 MG/DL   SODIUM    Collection Time: 10/21/21  7:43 PM   Result Value Ref Range    Sodium 123 (L) 136 - 145 mmol/L   SODIUM    Collection Time: 10/21/21 11:52 PM   Result Value Ref Range    Sodium 120 (L) 136 - 872 mmol/L   METABOLIC PANEL, BASIC    Collection Time: 10/22/21  6:01 AM   Result Value Ref Range    Sodium 120 (L) 136 - 145 mmol/L    Potassium 3.5 3.5 - 5.1 mmol/L    Chloride 87 (L) 98 - 107 mmol/L    CO2 26 21 - 32 mmol/L    Anion gap 7 7 - 16 mmol/L    Glucose 133 (H) 65 - 100 mg/dL    BUN 17 8 - 23 MG/DL    Creatinine 0.48 (L) 0.6 - 1.0 MG/DL    GFR est AA >60 >60 ml/min/1.73m2    GFR est non-AA >60 >60 ml/min/1.73m2    Calcium 8.2 (L) 8.3 - 10.4 MG/DL   CBC W/O DIFF    Collection Time: 10/22/21  6:01 AM   Result Value Ref Range    WBC 9.7 4.3 - 11.1 K/uL    RBC 2.81 (L) 4.05 - 5.2 M/uL    HGB 7.7 (L) 11.7 - 15.4 g/dL    HCT 23.9 (L) 35.8 - 46.3 %    MCV 85.1 79.6 - 97.8 FL    MCH 27.4 26.1 - 32.9 PG    MCHC 32.2 31.4 - 35.0 g/dL    RDW 13.7 11.9 - 14.6 %    PLATELET 999 412 - 213 K/uL    MPV 9.9 9.4 - 12.3 FL    ABSOLUTE NRBC 0.00 0.0 - 0.2 K/uL   SODIUM    Collection Time: 10/22/21  8:18 AM   Result Value Ref Range    Sodium 120 (L) 136 - 145 mmol/L   CORTISOL, AM    Collection Time: 10/22/21  8:18 AM   Result Value Ref Range    Cortisol, a.m. 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      LDL 85. Continue statin for heart protection      Dry cough (10/16/2021)      Visual hallucinations (10/20/2021)      Laryngitis (10/21/2021)      Normocytic anemia (10/21/2021)      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 (10/21/2021)        Plan:     Hyponatremia: check measured S. Osmolality to confirm true hypo-osmolar hyponatremia. Normal Bun/Creat, High urine osm, normal thyroid and adrenal function: all suggest SIADH. Cause not clear, could be promethazine? ?   Change of MS is most likely related to medication ( AMS started when Na is much higher in 128 or above) and less to hyponatremia. Check uric acid level, S. Osmo.   I agree with fluid restriction and Urea   F/u PROMISE Clements   Thanks   Dr Darlene Izquierdo   Check Uric acid  I agree with

## 2021-10-22 NOTE — PROGRESS NOTES
Patient in bed resting on 2L of NC for pursed lip breathing. Bed low and locked. Family at bedside. Prescribed meds given. Patient complained of pain and cough, prn meds given. No complaints at this time. Report given to night nurse. Will continue to monitor.

## 2021-10-22 NOTE — PROGRESS NOTES
Hospitalist Progress Note   Admit Date:  10/20/2021 10:44 AM   LOS: 0 days   Name:  Leigh Taveras   Age:  78 y.o. Sex:  female  :  1940   MRN:  [id number]     Chief Complaint: Hallucinations  Reason(s) for Admission: Metabolic encephalopathy [R02.25]  Acute hyponatremia [E87.1]     Hospital Course & Interval History:   78 y.o. female who presented to the ED for cc cough and sore throat with altered mental status. Hx of recent humeral fracture Saturday following ortho with conservative treatment, HLD, HTN, CAD. Daughter at the bedside who states ever since she was prescribed prednisone and promethazine- dextromethorphan she has been having confusion, unable to walk, and having visual hallucinations. She was found to have hyponatremia. Subjective (10/22/21):  Per daughter, the patient continues to have visual hallucinations. She was up most of the night. Dry cough and hoarseness slowly improving. Denies right shoulder pain. Denies CP/SOB. Denies F/C. Denies N/V/D. Review of systems negative except stated above.     Assessment & Plan:     Principal Problem:    Acute hyponatremia (10/21/2021)  - 10/20 Na 129  - 10/21 Na 127  - 10/22 Na 120  - Likely SIADH --> unsure etiology  - Stopped normal saline @ 150 ml/hr  - Start Urea-NA  - Fluid restrict  - Bladder scan showed retention - placed Vogt  - Repeat BMP  - Urine studies consistent with SIADH  - UA unremarkable  - Consult Nephrology for assistance    Active Problems:    Visual hallucinations (10/20/2021)  - I suspect this is due to Promethazine and possible sodium level  - UA unremarkable  - Promethazine stopped  - Continue to monitor      Laryngitis (10/21/2021)  - Due to rhinovirus on RVP  - Symptomatic care      Dry cough (10/16/2021)  - Due to rhinovirus on RSP  - Symptomatic care      Normocytic anemia (10/21/2021)  - Hgb stable at 7.7 today  - I suspect this is dilutional  - No s/s of active bleeding  - Check CBC daily      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 (10/21/2021)  - No acute inpatient issues  - Follows with ortho as outpatient      Essential hypertension (2/8/2017)  - Stable  - Continue to monitor      S/P reverse total shoulder arthroplasty, 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)    Diet:  ADULT DIET Easy to Chew; Low Fat/Low Chol/High Fiber/2 gm Na; 1200 ml  DVT PPx: SCDs  Code status: Full Code    Hospital Problems as of 10/22/2021 Date Reviewed: 10/19/2021        Codes Class Noted - Resolved POA    * (Principal) Acute hyponatremia ICD-10-CM: E87.1  ICD-9-CM: 276.1  10/21/2021 - Present Yes        Laryngitis ICD-10-CM: J04.0  ICD-9-CM: 464.00  10/21/2021 - Present Yes        Visual hallucinations ICD-10-CM: R44.1  ICD-9-CM: 368.16  10/20/2021 - Present Yes        Normocytic anemia ICD-10-CM: D64.9  ICD-9-CM: 285.9  10/21/2021 - Present Yes        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 Eddy Mcardle  ICD-9-CM: 996.44, V43.61  10/21/2021 - Present Yes        Dry cough ICD-10-CM: R05.8  ICD-9-CM: 786.2  10/16/2021 - Present Yes        Essential hypertension ICD-10-CM: I10  ICD-9-CM: 401.9  2/8/2017 - Present Yes        Multiple thyroid nodules ICD-10-CM: E04.2  ICD-9-CM: 241.1  Unknown - Present Yes        Coronary artery disease involving native coronary artery of native heart without angina pectoris ICD-10-CM: I25.10  ICD-9-CM: 414.01  2/8/2017 - Present Yes        S/P reverse total shoulder arthroplasty, right ICD-10-CM: Z96.611  ICD-9-CM: V43.61  7/11/2021 - Present Yes        Mixed hyperlipidemia ICD-10-CM: E78.2  ICD-9-CM: 272.2  2/8/2017 - Present Yes        Dyslipidemia ICD-10-CM: E78.5  ICD-9-CM: 272.4  8/16/2016 - Present Yes    Overview Signed 7/20/2021 11:21 AM by Vinnie Fernandes     Last Assessment & Plan:   Formatting of this note might be different from the original.  LDL 85.  Continue statin for heart protection                   Objective:     Patient Vitals for the past 24 hrs:   Temp Pulse Resp BP SpO2   10/22/21 0700 98 °F (36.7 °C) 79 18 (!) 157/83 96 %   10/22/21 0425 98.2 °F (36.8 °C) 82 21 (!) 155/83 96 %   10/21/21 2310 98.5 °F (36.9 °C) 81 19 (!) 170/78 97 %   10/21/21 1957 98.8 °F (37.1 °C) 73 16 133/72 94 %   10/21/21 1629 99.3 °F (37.4 °C) 98 18 (!) 171/79 96 %   10/21/21 1125 98.4 °F (36.9 °C) 74 17 139/68 95 %   10/21/21 1030    120/67      Oxygen Therapy  O2 Sat (%): 96 % (10/22/21 0700)  Pulse via Oximetry: 75 beats per minute (10/20/21 1602)  O2 Device: None (Room air) (10/22/21 0705)    Estimated body mass index is 23.41 kg/m² as calculated from the following:    Height as of this encounter: 5' 2\" (1.575 m). Weight as of this encounter: 58.1 kg (128 lb). Intake/Output Summary (Last 24 hours) at 10/22/2021 0845  Last data filed at 10/22/2021 7095  Gross per 24 hour   Intake    Output 3225 ml   Net -3225 ml         Physical Exam:   General:    Well nourished. No overt distress  Head:  Normocephalic, atraumatic  Eyes:  Sclerae appear normal.  Pupils equally round. ENT:  Nares appear normal, no drainage. Moist oral mucosa  Neck:  No restricted ROM. Trachea midline   CV:   RRR. No m/r/g. No jugular venous distension. Lungs:   CTAB. No wheezing, rhonchi, or rales. Respirations even, unlabored  Abdomen: Bowel sounds present. Soft, nontender, nondistended. Extremities: No cyanosis or clubbing. No edema  Skin:     No rashes and normal coloration. Warm and dry. Neuro: Cranial nerves II-XII grossly intact. Sensation intact  Psych: Normal mood and affect.   Alert and oriented x2    I have reviewed ordered lab tests and independently visualized imaging below:    Last 24hr Labs:  Recent Results (from the past 24 hour(s))   URINALYSIS W/ RFLX MICROSCOPIC    Collection Time: 10/21/21  3:18 PM   Result Value Ref Range    Color YELLOW      Appearance CLEAR      Specific gravity 1.013 1.001 - 1.023      pH (UA) 6.5 5.0 - 9.0      Protein TRACE (A) NEG mg/dL    Glucose Negative mg/dL    Ketone Negative NEG mg/dL    Bilirubin Negative NEG      Blood LARGE (A) NEG      Urobilinogen 1.0 0.2 - 1.0 EU/dL    Nitrites Negative NEG      Leukocyte Esterase Negative NEG      WBC 0-3 0 /hpf    RBC 10-20 0 /hpf    Epithelial cells 3-5 0 /hpf    Bacteria TRACE 0 /hpf    Other observations RESULTS VERIFIED MANUALLY     CULTURE, URINE    Collection Time: 10/21/21  3:18 PM    Specimen: Cath Urine    URINE   Result Value Ref Range    Special Requests: NO SPECIAL REQUESTS      Culture result:        NO GROWTH AFTER SHORT PERIOD OF INCUBATION. FURTHER RESULTS TO FOLLOW AFTER OVERNIGHT INCUBATION.    OSMOLALITY, UR    Collection Time: 10/21/21  3:18 PM   Result Value Ref Range    Osmolality,urine 488 50 - 1,400 MOSM/kg H2O   SODIUM, UR, RANDOM    Collection Time: 10/21/21  3:18 PM   Result Value Ref Range    Sodium,urine random 80 MMOL/L   METABOLIC PANEL, BASIC    Collection Time: 10/21/21  4:02 PM   Result Value Ref Range    Sodium 123 (L) 136 - 145 mmol/L    Potassium 3.8 3.5 - 5.1 mmol/L    Chloride 90 (L) 98 - 107 mmol/L    CO2 27 21 - 32 mmol/L    Anion gap 6 (L) 7 - 16 mmol/L    Glucose 134 (H) 65 - 100 mg/dL    BUN 13 8 - 23 MG/DL    Creatinine 0.59 (L) 0.6 - 1.0 MG/DL    GFR est AA >60 >60 ml/min/1.73m2    GFR est non-AA >60 >60 ml/min/1.73m2    Calcium 8.1 (L) 8.3 - 10.4 MG/DL   SODIUM    Collection Time: 10/21/21  7:43 PM   Result Value Ref Range    Sodium 123 (L) 136 - 145 mmol/L   SODIUM    Collection Time: 10/21/21 11:52 PM   Result Value Ref Range    Sodium 120 (L) 136 - 205 mmol/L   METABOLIC PANEL, BASIC    Collection Time: 10/22/21  6:01 AM   Result Value Ref Range    Sodium 120 (L) 136 - 145 mmol/L    Potassium 3.5 3.5 - 5.1 mmol/L    Chloride 87 (L) 98 - 107 mmol/L    CO2 26 21 - 32 mmol/L    Anion gap 7 7 - 16 mmol/L    Glucose 133 (H) 65 - 100 mg/dL    BUN 17 8 - 23 MG/DL    Creatinine 0.48 (L) 0.6 - 1.0 MG/DL    GFR est AA >60 >60 ml/min/1.73m2    GFR est non-AA >60 >60 ml/min/1.73m2    Calcium 8.2 (L) 8.3 - 10.4 MG/DL   CBC W/O DIFF    Collection Time: 10/22/21  6:01 AM   Result Value Ref Range    WBC 9.7 4.3 - 11.1 K/uL    RBC 2.81 (L) 4.05 - 5.2 M/uL    HGB 7.7 (L) 11.7 - 15.4 g/dL    HCT 23.9 (L) 35.8 - 46.3 %    MCV 85.1 79.6 - 97.8 FL    MCH 27.4 26.1 - 32.9 PG    MCHC 32.2 31.4 - 35.0 g/dL    RDW 13.7 11.9 - 14.6 %    PLATELET 022 765 - 123 K/uL    MPV 9.9 9.4 - 12.3 FL    ABSOLUTE NRBC 0.00 0.0 - 0.2 K/uL       All Micro Results     Procedure Component Value Units Date/Time    CULTURE, URINE [[id number]] Collected: 10/21/21 1518    Order Status: Completed Specimen: Cath Urine Updated: 10/22/21 5921     Special Requests: NO SPECIAL REQUESTS        Culture result:       NO GROWTH AFTER SHORT PERIOD OF INCUBATION. FURTHER RESULTS TO FOLLOW AFTER OVERNIGHT INCUBATION.           RESPIRATORY VIRUS PANEL W/COVID-19, PCR [[id number]]  (Abnormal) Collected: 10/20/21 1435    Order Status: Completed Specimen: Nasopharyngeal Updated: 10/20/21 1605     Adenovirus NOT DETECTED        Coronavirus 229E NOT DETECTED        Coronavirus HKU1 NOT DETECTED        Coronavirus CVNL63 NOT DETECTED        Coronavirus OC43 NOT DETECTED        SARS-CoV-2, PCR NOT DETECTED        Metapneumovirus NOT DETECTED        Rhinovirus and Enterovirus Detected        Influenza A NOT DETECTED        Influenza B NOT DETECTED        Parainfluenza 1 NOT DETECTED        Parainfluenza 2 NOT DETECTED        Parainfluenza 3 NOT DETECTED        Parainfluenza virus 4 NOT DETECTED        RSV by PCR NOT DETECTED        B. parapertussis, PCR NOT DETECTED        Bordetella pertussis - PCR NOT DETECTED        Chlamydophila pneumoniae DNA, QL, PCR NOT DETECTED        Mycoplasma pneumoniae DNA, QL, PCR NOT DETECTED       STREP, GROUP A, DIDIER [[id number]] Collected: 10/20/21 1404    Order Status: Completed Specimen: Throat Updated: 10/20/21 1429     Strep, Molecular Not detected        Comment: Negative for Strep A nucleic acid  Methodology: Isothermal Nucleic Acid Amplification               SARS-CoV-2 Lab Results  \"Novel Coronavirus\" Test: No results found for: COV2NT   \"Emergent Disease\" Test: No results found for: EDPR  \"SARS-COV-2\" Test: No results found for: XGCOVT  \"[facility] Labs\" Test: No results found for: RSLT  Rapid Test: No results found for: COVR       Imaging:  No results found. No results found for this visit on 10/20/21.     Current Meds:  Current Facility-Administered Medications   Medication Dose Route Frequency    guaiFENesin (ROBITUSSIN) 100 mg/5 mL oral liquid 100 mg  100 mg Oral Q4H PRN    urea (URE-NA) 15 gram packet 2 Packet  2 Packet Oral TID    acetaminophen (TYLENOL) tablet 650 mg  650 mg Oral Q6H PRN    Or    acetaminophen (TYLENOL) suppository 650 mg  650 mg Rectal Q6H PRN    polyethylene glycol (MIRALAX) packet 17 g  17 g Oral DAILY PRN    ondansetron (ZOFRAN ODT) tablet 4 mg  4 mg Oral Q8H PRN    Or    ondansetron (ZOFRAN) injection 4 mg  4 mg IntraVENous Q6H PRN    aspirin delayed-release tablet 81 mg  81 mg Oral QHS    calcium-vitamin D (OS-CHLOÉ +D3) 500 mg-200 unit per tablet 1 Tablet  1 Tablet Oral DAILY    HYDROcodone-acetaminophen (NORCO) 5-325 mg per tablet 0.5 Tablet  0.5 Tablet Oral Q6H PRN    pantoprazole (PROTONIX) tablet 40 mg  40 mg Oral DAILY    atorvastatin (LIPITOR) tablet 20 mg  20 mg Oral QHS    fluticasone propionate (FLONASE) 50 mcg/actuation nasal spray 2 Spray  2 Spray Both Nostrils DAILY    benzonatate (TESSALON) capsule 100 mg  100 mg Oral TID PRN       Discharge Plan:     Home in 2-3 days    Signed By: María Mcleod DO     October 22, 2021

## 2021-10-22 NOTE — PROGRESS NOTES
ACUTE OT GOALS:  (Developed with and agreed upon by patient and/or caregiver.)  1. Patient will complete lower body bathing and dressing with MIN A and adaptive equipment as needed. 2.Patient will complete upper body bathing and dressing with MIN A and adaptive equipment as needed. 3. Patient will complete toileting with MIN A.   4. Patient will tolerate 25 minutes of OT treatment with 1-2 rest breaks to increase activity tolerance for ADLs. 5. Patient will complete functional transfers with MIN A and adaptive equipment as needed. 6. Patient will complete functional activity with SUPERVISION and adaptive equipment as needed.     Timeframe: 7 visits    OCCUPATIONAL THERAPY: Daily Note OT Treatment Day # 2   NWB RUE, sling for comfort, lopez brace    Cordelia Garcia is a 78 y.o. female   PRIMARY DIAGNOSIS: Acute hyponatremia  Metabolic encephalopathy [F67.19]  Acute hyponatremia [E87.1]       [place] / [place] / Product Type: Medicare /   ASSESSMENT:     REHAB RECOMMENDATIONS: CURRENT LEVEL OF FUNCTION:  (Most Recently Demonstrated)   Recommendation to date pending progress:  Setting:   Short-term Rehab  Equipment:    To Be Determined Bathing:   Not tested  Dressing:   Maximal Assistance  Feeding/Grooming:   Contact Guard Assistance  Toileting:   Not tested  Functional Mobility:   Minimal Assistance x 2     ASSESSMENT:  Ms. Zhao Parra is progressing well towards OT goals. Today, pt was received supine in bed with daughters at bedside. Completed bed mobility Franki x2. MaxA for sling and RUE brace management. MaxA for LB dressing. CGA for grooming task sitting EOB. Sit>stand and ambulation in room Franki x2 with HHA. BP stable this session. Pt simply reported feeling \"weak\" but not \"lightheaded. \" Pt's family members reported pt is continuing to have hallucinations and confusion.  Ms. Zhao Parra continues to demonstrate overall deficits in strength, balance, activity tolerance, and ADL performance. Continue OT efforts and POC in order to address functional deficits and OT goals stated above. SUBJECTIVE:   Ms. Daniel Miller states, \"I'm weak. \"    SOCIAL HISTORY/LIVING ENVIRONMENT:   Support Systems: Child(dandy)    OBJECTIVE:     PAIN: VITAL SIGNS: LINES/DRAINS:   Pre Treatment: Pain Screen  Pain Scale 1: FLACC  Pain Intensity 1: 0  Post Treatment: no change   Vogt Catheter  O2 Device: None (Room air)     ACTIVITIES OF DAILY LIVING: I Mod I S SBA CGA Min Mod Max Total NT Comments   BASIC ADLs:              Bathing/ Showering [] [] [] [] [] [] [] [] [] [x]    Toileting [] [] [] [] [] [] [] [] [] [x]    Dressing [] [] [] [] [] [] [] [x] [] [] Socks    Feeding [] [] [] [] [] [] [] [] [] [x]    Grooming [] [] [] [] [x] [] [] [] [] [] Combed hair sitting EOB   Personal Device Care [] [] [] [] [] [] [] [] [] [x]    Functional Mobility [] [] [] [] [] [x] [] [] [] [] x2   I=Independent, Mod I=Modified Independent, S=Supervision, SBA=Standby Assistance, CGA=Contact Guard Assistance,   Min=Minimal Assistance, Mod=Moderate Assistance, Max=Maximal Assistance, Total=Total Assistance, NT=Not Tested    MOBILITY: I Mod I S SBA CGA Min Mod Max Total  NT x2 Comments:   Supine to sit [] [] [] [] [] [x] [] [] [] [] [x]    Sit to supine [] [] [] [] [] [x] [] [] [] [] [x]    Sit to stand [] [] [] [] [] [x] [] [] [] [] [x]    Bed to chair [] [] [] [] [] [] [] [] [] [x] []    I=Independent, Mod I=Modified Independent, S=Supervision, SBA=Standby Assistance, CGA=Contact Guard Assistance,   Min=Minimal Assistance, Mod=Moderate Assistance, Max=Maximal Assistance, Total=Total Assistance, NT=Not Tested    BALANCE: Good Fair+ Fair Fair- Poor NT Comments   Sitting Static [] [x] [] [] [] []    Sitting Dynamic [] [x] [] [] [] []              Standing Static [] [] [x] [] [] []    Standing Dynamic [] [] [x] [] [] []      PLAN:   FREQUENCY/DURATION: OT Plan of Care: 3 times/week for duration of hospital stay or until stated goals are met, whichever comes first.    TREATMENT:   TREATMENT:   ($$ Self Care/Home Management: 8-22 mins$$ Neuromuscular Re-Education: 8-22 mins   )  Co-Treatment PT/OT necessary due to patient's decreased overall endurance/tolerance levels, as well as need for high level skilled assistance to complete functional transfers/mobility and functional tasks  Self Care (10 Minutes): Self care including Upper Body Dressing, Lower Body Dressing and Grooming to increase independence and decrease level of assistance required. Neuromuscular Re-education (13 Minutes): Neuromuscular Re-education included Balance Training, Coordination training, Postural training, Sitting balance training and Standing balance training to improve Balance, Coordination, Functional Mobility and Postural Control.     TREATMENT GRID:  N/A    AFTER TREATMENT POSITION/PRECAUTIONS:  Alarm Activated, Bed, Needs within reach, RN notified and Visitors at bedside    INTERDISCIPLINARY COLLABORATION:  RN/PCT, PT/PTA and OT/DUMONT    TOTAL TREATMENT DURATION:  OT Patient Time In/Time Out  Time In: 1134  Time Out: [facility], OT

## 2021-10-22 NOTE — PROGRESS NOTES
Problem: Falls - Risk of  Goal: *Absence of Falls  Description: Document Reilly Catherine Fall Risk and appropriate interventions in the flowsheet.   Outcome: Progressing Towards Goal  Note: Fall Risk Interventions:  Mobility Interventions: Patient to call before getting OOB, Communicate number of staff needed for ambulation/transfer    Mentation Interventions: Adequate sleep, hydration, pain control, Bed/chair exit alarm, Door open when patient unattended, Family/sitter at bedside, Reorient patient    Medication Interventions: Bed/chair exit alarm, Patient to call before getting OOB, Teach patient to arise slowly    Elimination Interventions: Bed/chair exit alarm, Call light in reach, Patient to call for help with toileting needs    History of Falls Interventions: Bed/chair exit alarm, Room close to nurse's station, Door open when patient unattended         Problem: Patient Education: Go to Patient Education Activity  Goal: Patient/Family Education  Outcome: Progressing Towards Goal     Problem: Patient Education: Go to Patient Education Activity  Goal: Patient/Family Education  Outcome: Progressing Towards Goal     Problem: Patient Education: Go to Patient Education Activity  Goal: Patient/Family Education  Outcome: Progressing Towards Goal

## 2021-10-23 ENCOUNTER — APPOINTMENT (OUTPATIENT)
Dept: CT IMAGING | Age: 81
DRG: 643 | End: 2021-10-23
Attending: INTERNAL MEDICINE
Payer: MEDICARE

## 2021-10-23 ENCOUNTER — APPOINTMENT (OUTPATIENT)
Dept: CT IMAGING | Age: 81
DRG: 643 | End: 2021-10-23
Attending: EMERGENCY MEDICINE
Payer: MEDICARE

## 2021-10-23 ENCOUNTER — APPOINTMENT (OUTPATIENT)
Dept: GENERAL RADIOLOGY | Age: 81
DRG: 643 | End: 2021-10-23
Attending: EMERGENCY MEDICINE
Payer: MEDICARE

## 2021-10-23 PROBLEM — T79.6XXA TRAUMATIC RHABDOMYOLYSIS (HCC): Status: ACTIVE | Noted: 2021-10-23

## 2021-10-23 PROBLEM — D62 ACUTE BLOOD LOSS ANEMIA (ABLA): Status: ACTIVE | Noted: 2021-10-21

## 2021-10-23 PROBLEM — R57.9 SHOCK (HCC): Status: ACTIVE | Noted: 2021-10-23

## 2021-10-23 LAB
ANION GAP SERPL CALC-SCNC: 7 MMOL/L (ref 7–16)
BUN SERPL-MCNC: 17 MG/DL (ref 8–23)
CALCIUM SERPL-MCNC: 7.8 MG/DL (ref 8.3–10.4)
CHLORIDE SERPL-SCNC: 81 MMOL/L (ref 98–107)
CK SERPL-CCNC: 1094 U/L (ref 21–215)
CO2 SERPL-SCNC: 30 MMOL/L (ref 21–32)
CREAT SERPL-MCNC: 0.42 MG/DL (ref 0.6–1)
ERYTHROCYTE [DISTWIDTH] IN BLOOD BY AUTOMATED COUNT: 13.2 % (ref 11.9–14.6)
GLUCOSE SERPL-MCNC: 166 MG/DL (ref 65–100)
HCT VFR BLD AUTO: 20.6 % (ref 35.8–46.3)
HGB BLD-MCNC: 6.9 G/DL (ref 11.7–15.4)
HISTORY CHECKED?,CKHIST: NORMAL
MCH RBC QN AUTO: 27.6 PG (ref 26.1–32.9)
MCHC RBC AUTO-ENTMCNC: 33.5 G/DL (ref 31.4–35)
MCV RBC AUTO: 82.4 FL (ref 79.6–97.8)
NRBC # BLD: 0.02 K/UL (ref 0–0.2)
PLATELET # BLD AUTO: 248 K/UL (ref 150–450)
PMV BLD AUTO: 9.3 FL (ref 9.4–12.3)
POTASSIUM SERPL-SCNC: 3.3 MMOL/L (ref 3.5–5.1)
RBC # BLD AUTO: 2.5 M/UL (ref 4.05–5.2)
SODIUM SERPL-SCNC: 117 MMOL/L (ref 136–145)
SODIUM SERPL-SCNC: 118 MMOL/L (ref 136–145)
SODIUM SERPL-SCNC: 121 MMOL/L (ref 136–145)
SODIUM SERPL-SCNC: 122 MMOL/L (ref 136–145)
SODIUM SERPL-SCNC: 123 MMOL/L (ref 136–145)
SODIUM SERPL-SCNC: 128 MMOL/L (ref 136–145)
WBC # BLD AUTO: 9.2 K/UL (ref 4.3–11.1)

## 2021-10-23 PROCEDURE — 03H833Z INSERTION OF INFUSION DEVICE INTO LEFT BRACHIAL ARTERY, PERCUTANEOUS APPROACH: ICD-10-PCS | Performed by: EMERGENCY MEDICINE

## 2021-10-23 PROCEDURE — 74011250636 HC RX REV CODE- 250/636: Performed by: EMERGENCY MEDICINE

## 2021-10-23 PROCEDURE — 74011000258 HC RX REV CODE- 258: Performed by: INTERNAL MEDICINE

## 2021-10-23 PROCEDURE — 70450 CT HEAD/BRAIN W/O DYE: CPT

## 2021-10-23 PROCEDURE — 74011000250 HC RX REV CODE- 250: Performed by: EMERGENCY MEDICINE

## 2021-10-23 PROCEDURE — 84295 ASSAY OF SERUM SODIUM: CPT

## 2021-10-23 PROCEDURE — 80048 BASIC METABOLIC PNL TOTAL CA: CPT

## 2021-10-23 PROCEDURE — 74011250637 HC RX REV CODE- 250/637: Performed by: FAMILY MEDICINE

## 2021-10-23 PROCEDURE — 85027 COMPLETE CBC AUTOMATED: CPT

## 2021-10-23 PROCEDURE — 02HV33Z INSERTION OF INFUSION DEVICE INTO SUPERIOR VENA CAVA, PERCUTANEOUS APPROACH: ICD-10-PCS | Performed by: EMERGENCY MEDICINE

## 2021-10-23 PROCEDURE — 65610000001 HC ROOM ICU GENERAL

## 2021-10-23 PROCEDURE — 74011000636 HC RX REV CODE- 636: Performed by: INTERNAL MEDICINE

## 2021-10-23 PROCEDURE — 74011250637 HC RX REV CODE- 250/637: Performed by: INTERNAL MEDICINE

## 2021-10-23 PROCEDURE — 82550 ASSAY OF CK (CPK): CPT

## 2021-10-23 PROCEDURE — 87086 URINE CULTURE/COLONY COUNT: CPT

## 2021-10-23 PROCEDURE — 86901 BLOOD TYPING SEROLOGIC RH(D): CPT

## 2021-10-23 PROCEDURE — 71045 X-RAY EXAM CHEST 1 VIEW: CPT

## 2021-10-23 PROCEDURE — 74177 CT ABD & PELVIS W/CONTRAST: CPT

## 2021-10-23 PROCEDURE — 74011000258 HC RX REV CODE- 258: Performed by: EMERGENCY MEDICINE

## 2021-10-23 PROCEDURE — 2709999900 HC NON-CHARGEABLE SUPPLY

## 2021-10-23 PROCEDURE — 86923 COMPATIBILITY TEST ELECTRIC: CPT

## 2021-10-23 PROCEDURE — 36556 INSERT NON-TUNNEL CV CATH: CPT

## 2021-10-23 PROCEDURE — 30233N1 TRANSFUSION OF NONAUTOLOGOUS RED BLOOD CELLS INTO PERIPHERAL VEIN, PERCUTANEOUS APPROACH: ICD-10-PCS | Performed by: EMERGENCY MEDICINE

## 2021-10-23 PROCEDURE — 36430 TRANSFUSION BLD/BLD COMPNT: CPT

## 2021-10-23 PROCEDURE — P9016 RBC LEUKOCYTES REDUCED: HCPCS

## 2021-10-23 PROCEDURE — 36620 INSERTION CATHETER ARTERY: CPT

## 2021-10-23 RX ORDER — NOREPINEPHRINE BITARTRATE/D5W 4MG/250ML
PLASTIC BAG, INJECTION (ML) INTRAVENOUS
Status: ACTIVE
Start: 2021-10-23 | End: 2021-10-23

## 2021-10-23 RX ORDER — SODIUM CHLORIDE 9 MG/ML
250 INJECTION, SOLUTION INTRAVENOUS AS NEEDED
Status: DISCONTINUED | OUTPATIENT
Start: 2021-10-23 | End: 2021-10-27 | Stop reason: HOSPADM

## 2021-10-23 RX ORDER — NOREPINEPHRINE BITARTRATE/D5W 4MG/250ML
.5-3 PLASTIC BAG, INJECTION (ML) INTRAVENOUS
Status: DISCONTINUED | OUTPATIENT
Start: 2021-10-23 | End: 2021-10-24

## 2021-10-23 RX ORDER — SODIUM CHLORIDE 0.9 % (FLUSH) 0.9 %
10 SYRINGE (ML) INJECTION
Status: COMPLETED | OUTPATIENT
Start: 2021-10-23 | End: 2021-10-23

## 2021-10-23 RX ORDER — FENTANYL CITRATE 50 UG/ML
25 INJECTION, SOLUTION INTRAMUSCULAR; INTRAVENOUS ONCE
Status: COMPLETED | OUTPATIENT
Start: 2021-10-23 | End: 2021-10-23

## 2021-10-23 RX ADMIN — Medication 1 TABLET: at 08:29

## 2021-10-23 RX ADMIN — FENTANYL CITRATE 25 MCG: 50 INJECTION INTRAMUSCULAR; INTRAVENOUS at 00:48

## 2021-10-23 RX ADMIN — Medication 10 ML: at 15:51

## 2021-10-23 RX ADMIN — IOPAMIDOL 100 ML: 755 INJECTION, SOLUTION INTRAVENOUS at 15:50

## 2021-10-23 RX ADMIN — HYDRALAZINE HYDROCHLORIDE 20 MG: 20 INJECTION INTRAMUSCULAR; INTRAVENOUS at 23:38

## 2021-10-23 RX ADMIN — SODIUM CHLORIDE 2 MG/MIN: 9 INJECTION, SOLUTION INTRAVENOUS at 04:18

## 2021-10-23 RX ADMIN — SODIUM CHLORIDE 500 ML: 900 INJECTION, SOLUTION INTRAVENOUS at 06:23

## 2021-10-23 RX ADMIN — SODIUM CHLORIDE 50 ML/HR: 3 INJECTION, SOLUTION INTRAVENOUS at 01:02

## 2021-10-23 RX ADMIN — SODIUM CHLORIDE 100 ML: 900 INJECTION, SOLUTION INTRAVENOUS at 15:50

## 2021-10-23 RX ADMIN — Medication 10 ML: at 21:46

## 2021-10-23 RX ADMIN — FLUTICASONE PROPIONATE 2 SPRAY: 50 SPRAY, METERED NASAL at 09:00

## 2021-10-23 RX ADMIN — Medication 2 PACKET: at 09:00

## 2021-10-23 RX ADMIN — Medication 10 ML: at 13:23

## 2021-10-23 RX ADMIN — NOREPINEPHRINE BITARTRATE 4 MCG/MIN: 1 INJECTION, SOLUTION INTRAVENOUS at 06:17

## 2021-10-23 RX ADMIN — PANTOPRAZOLE SODIUM 40 MG: 40 TABLET, DELAYED RELEASE ORAL at 08:29

## 2021-10-23 NOTE — PROGRESS NOTES
Hospitalist Progress Note   Admit Date:  10/20/2021 10:44 AM   LOS: 1 day   Name:  Hiwot Gonsales   Age:  78 y.o. Sex:  female  :  1940   MRN:  [id number]     Chief Complaint: Hallucinations  Reason(s) for Admission: Metabolic encephalopathy [C01.23]  Acute hyponatremia [E87.1]     Hospital Course & Interval History:   78 y.o. female who presented to the ED for cc cough and sore throat with altered mental status. Hx of recent humeral fracture Saturday following ortho with conservative treatment, HLD, HTN, CAD. Daughter at the bedside who states ever since she was prescribed prednisone and promethazine- dextromethorphan she has been having confusion, unable to walk, and having visual hallucinations. She was found to have hyponatremia. Her sodium continued to drop on IVFs, consistent with SIADH. IVFs were stopped and she was started on Urea-Na. Nephrology was consulted, who started her on 3% saline. Her sodium continued to drop so she was moved to the ICU. She developed worsening anemia and pelvic bruising. Her CK elevated, consistent with rhabdomyolysis. She was noted to be hypotensive so Levophed was started. Subjective (10/23/21): She was up most of the night, restless and more confused. Dry cough and hoarseness slowly improving. Denies right shoulder pain. Denies CP/SOB. Denies F/C. Denies N/V/D. No family at bedside this AM.    Review of systems negative except stated above.     Assessment & Plan:     Principal Problem:    Acute hyponatremia (10/21/2021)  - 10/20 Na 129  - 10/21 Na 127  - 10/22 Na 120 --> 117 --> 113 --> 112 (moved to ICU)  - 10/23 Na 117  - Likely SIADH --> unsure etiology  - Stopped normal saline @ 150 ml/hr  - Continue 3% saline per Nephrology  - Continue Urea-NA  - Fluid restrict  - 10/21 Bladder scan showed retention - placed Vogt  - Repeat BMP  - Urine studies consistent with SIADH  - UA unremarkable  - Appreciate Nephrology's input and assistance    Active Problems: Shock ([address] (10/23/2021)  - Patient placed on Levophed overnight due to hypotension  - Unsure etiology - possibly hypovolemic with drop in Hgb  - Continue PRBC  - Continue 3% saline  - Continue Levophed for now  - Check CT A/P to rule out bleeding      Acute blood loss anemia (ABLA) (10/21/2021)  - 10/21 Hgb 7.7  - 10/22 Hgb stable at 7.7  - 10/23 Hgb 6.9 --> transfuse 1U PRBC  - Likely due to slow pelvic and/or right arm hematomas  - Check CT A/P to rule out massive bleeding and fractures  - Check CBC daily      Traumatic rhabdomyolysis (HCC)  - Likely due to right arm and pelvic trauma from fall  - CK 1,156 --> 1094  - Continue IVFs  - UA with blood/RBC  - Vogt placed  - Strict I/O      Visual hallucinations (10/20/2021)  - I suspect this is due to Promethazine and hyponatremia  - I also suspect we are developing ICU/hospital delirium  - UA unremarkable for UTI  - Promethazine stopped  - Start Seroquel QHS  - Continue to monitor      Laryngitis (10/21/2021)  - Due to rhinovirus on RVP  - Symptomatic care      Dry cough (10/16/2021)  - Due to rhinovirus on RSP  - Symptomatic care      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 (10/21/2021)  - No acute inpatient issues  - Large hematoma, but no current concern for compartment syndrome  - Follows with ortho as outpatient      Essential hypertension (2/8/2017)  - Stable  - Continue to monitor      S/P reverse total shoulder arthroplasty, 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)    Diet:  ADULT DIET Easy to Chew; Low Fat/Low Chol/High Fiber/CHAPO; 1200 ml; Please include salt packets  DVT PPx: SCDs  Code status: Full Code    Hospital Problems as of 10/23/2021 Date Reviewed: 10/19/2021        Codes Class Noted - Resolved POA    * (Principal) Acute hyponatremia ICD-10-CM: E87.1  ICD-9-CM: 276.1  10/21/2021 - Present Yes        Laryngitis ICD-10-CM: J04.0  ICD-9-CM: 464.00  10/21/2021 - Present Yes        Visual hallucinations ICD-10-CM: R44.1  ICD-9-CM: 368.16  10/20/2021 - Present Yes        Normocytic anemia ICD-10-CM: D64.9  ICD-9-CM: 285.9  10/21/2021 - Present Yes        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 [place]  ICD-9-CM: 996.44, V43.61  10/21/2021 - Present Yes        Dry cough ICD-10-CM: R05.8  ICD-9-CM: 786.2  10/16/2021 - Present Yes        Essential hypertension ICD-10-CM: I10  ICD-9-CM: 401.9  2/8/2017 - Present Yes        Multiple thyroid nodules ICD-10-CM: E04.2  ICD-9-CM: 241.1  Unknown - Present Yes        Coronary artery disease involving native coronary artery of native heart without angina pectoris ICD-10-CM: I25.10  ICD-9-CM: 414.01  2/8/2017 - Present Yes        S/P reverse total shoulder arthroplasty, right ICD-10-CM: Z96.611  ICD-9-CM: V43.61  7/11/2021 - Present Yes        Mixed hyperlipidemia ICD-10-CM: E78.2  ICD-9-CM: 272.2  2/8/2017 - Present Yes        Dyslipidemia ICD-10-CM: E78.5  ICD-9-CM: 272.4  8/16/2016 - Present Yes    Overview Signed 7/20/2021 11:21 AM by Danielle Denise     Last Assessment & Plan:   Formatting of this note might be different from the original.  LDL 85.  Continue statin for heart protection                   Objective:     Patient Vitals for the past 24 hrs:   Temp Pulse Resp BP SpO2   10/23/21 0817 98.2 °F (36.8 °C) 67 20 106/60 96 %   10/23/21 0803 98.3 °F (36.8 °C) 67 20 (!) 108/47 97 %   10/23/21 0755  74 (!) 33  97 %   10/23/21 0749 98.2 °F (36.8 °C) 75 16 (!) 110/45 96 %   10/23/21 0730  72   95 %   10/23/21 0700  70 24  97 %   10/23/21 0400 98 °F (36.7 °C)       10/23/21 0345  82 (!) 31  95 %   10/23/21 0330  83 (!) 44 (!) 136/101 96 %   10/23/21 0315  84 (!) 37  95 %   10/23/21 0300  92 27 (!) 146/70 95 %   10/23/21 0245  81 (!) 32  94 %   10/23/21 0230  80 (!) 35 (!) 160/71 95 %   10/23/21 0215  94 26  95 %   10/23/21 0200  92 29 (!) 158/82 97 %   10/23/21 0145  91 28  96 %   10/23/21 0130  88 (!) 37 (!) 196/89 90 %   10/23/21 0115  80 (!) 31 (!) 166/61 94 %   10/23/21 0100  89 (!) 33 (!) 182/84 96 %   10/23/21 0045  99 19  96 %   10/23/21 0030  94 (!) 63 (!) 189/79 95 %   10/23/21 0015  83 18  96 %   10/23/21 0000  84 24 (!) 169/79 97 %   10/22/21 2345  82 25 (!) 181/84    10/22/21 2330  79 25 (!) 141/75    10/22/21 2329 98.1 °F (36.7 °C)       10/22/21 2245 98.4 °F (36.9 °C) 82 24 (!) 154/83 96 %   10/22/21 1910 97.9 °F (36.6 °C) 88 25 (!) 152/105 95 %   10/22/21 1703    (!) 153/96    10/22/21 1546 97.8 °F (36.6 °C) 88 20 (!) 148/70 99 %   10/22/21 1033 97.5 °F (36.4 °C) 90 20 (!) 159/98 100 %     Oxygen Therapy  O2 Sat (%): 96 % (10/23/21 0817)  Pulse via Oximetry: 73 beats per minute (10/23/21 0755)  O2 Device: Nasal cannula (1L) (10/23/21 0115)  O2 Flow Rate (L/min): 1 l/min (10/23/21 0115)    Estimated body mass index is 26.25 kg/m² as calculated from the following:    Height as of this encounter: 5' 2\" (1.575 m). Weight as of this encounter: 65.1 kg (143 lb 8.3 oz). Intake/Output Summary (Last 24 hours) at 10/23/2021 0833  Last data filed at 10/23/2021 0420  Gross per 24 hour   Intake 634.4 ml   Output 1550 ml   Net -915.6 ml         Physical Exam:   General:    Well nourished. No overt distress but restless in bed  Head:  Normocephalic, atraumatic  Eyes:  Sclerae appear normal.  Pupils equally round. ENT:  Nares appear normal, no drainage. Moist oral mucosa  Neck:  No restricted ROM. Trachea midline   CV:   RRR. No m/r/g. No jugular venous distension. Lungs:   CTAB. No wheezing, rhonchi, or rales. Respirations even, unlabored  Abdomen: Bowel sounds present. Soft, nontender, nondistended. Extremities: Right arm with swelling and bruising  Skin:     Right arm with swelling and bruising, large pelvic hematoma. Neuro: Cranial nerves II-XII grossly intact. Sensation intact  Psych: Confused.   Alert and oriented x    I have reviewed ordered lab tests and independently visualized imaging below:    Last 24hr Labs:  Recent Results (from the past 24 hour(s))   SODIUM    Collection Time: 10/22/21 12:14 PM   Result Value Ref Range    Sodium 117 (LL) 136 - 145 mmol/L   OSMOLALITY, SERUM/PLASMA    Collection Time: 10/22/21  2:12 PM   Result Value Ref Range    Osmolality, serum/plasma 253 (L) 280 - 301 MOSM/kg H2O   URIC ACID    Collection Time: 10/22/21  2:12 PM   Result Value Ref Range    Uric acid 1.6 (L) 2.6 - 6.0 MG/DL   SODIUM    Collection Time: 10/22/21  2:12 PM   Result Value Ref Range    Sodium 117 (LL) 136 - 145 mmol/L   SODIUM    Collection Time: 10/22/21  7:38 PM   Result Value Ref Range    Sodium 113 (LL) 136 - 145 mmol/L   CK    Collection Time: 10/22/21  7:38 PM   Result Value Ref Range    CK 1,156 (H) 21 - 215 U/L   SODIUM    Collection Time: 10/22/21 10:28 PM   Result Value Ref Range    Sodium 112 (LL) 136 - 145 mmol/L   CULTURE, URINE    Collection Time: 10/23/21 12:22 AM    Specimen: Cath Urine    URINE   Result Value Ref Range    Special Requests: NO SPECIAL REQUESTS      Culture result:        NO GROWTH AFTER SHORT PERIOD OF INCUBATION. FURTHER RESULTS TO FOLLOW AFTER OVERNIGHT INCUBATION.    SODIUM    Collection Time: 10/23/21  2:09 AM   Result Value Ref Range    Sodium 117 (LL) 136 - 145 mmol/L   CBC W/O DIFF    Collection Time: 10/23/21  4:12 AM   Result Value Ref Range    WBC 9.2 4.3 - 11.1 K/uL    RBC 2.50 (L) 4.05 - 5.2 M/uL    HGB 6.9 (LL) 11.7 - 15.4 g/dL    HCT 20.6 (L) 35.8 - 46.3 %    MCV 82.4 79.6 - 97.8 FL    MCH 27.6 26.1 - 32.9 PG    MCHC 33.5 31.4 - 35.0 g/dL    RDW 13.2 11.9 - 14.6 %    PLATELET 469 024 - 348 K/uL    MPV 9.3 (L) 9.4 - 12.3 FL    ABSOLUTE NRBC 0.02 0.0 - 0.2 K/uL   METABOLIC PANEL, BASIC    Collection Time: 10/23/21  4:13 AM   Result Value Ref Range    Sodium 118 (LL) 136 - 145 mmol/L    Potassium 3.3 (L) 3.5 - 5.1 mmol/L    Chloride 81 (L) 98 - 107 mmol/L    CO2 30 21 - 32 mmol/L    Anion gap 7 7 - 16 mmol/L    Glucose 166 (H) 65 - 100 mg/dL    BUN 17 8 - 23 MG/DL Creatinine 0.42 (L) 0.6 - 1.0 MG/DL    GFR est AA >60 >60 ml/min/1.73m2    GFR est non-AA >60 >60 ml/min/1.73m2    Calcium 7.8 (L) 8.3 - 10.4 MG/DL   CK    Collection Time: 10/23/21  4:13 AM   Result Value Ref Range    CK 1,094 (H) 21 - 215 U/L   RBC, ALLOCATE    Collection Time: 10/23/21  5:30 AM   Result Value Ref Range    HISTORY CHECKED? Historical check performed    TYPE & SCREEN    Collection Time: 10/23/21  5:37 AM   Result Value Ref Range    Crossmatch Expiration 10/26/2021,2359     ABO/Rh(D) A POSITIVE     Antibody screen NEG     Unit number [id number]     Blood component type RC LR     Unit division 00     Status of unit ISSUED     Crossmatch result Compatible        All Micro Results     Procedure Component Value Units Date/Time    CULTURE, URINE [[id number]] Collected: 10/21/21 1518    Order Status: Completed Specimen: Cath Urine Updated: 10/23/21 0803     Special Requests: NO SPECIAL REQUESTS        Culture result: NO GROWTH 1 DAY       CULTURE, URINE [[id number]] Collected: 10/23/21 0022    Order Status: Completed Specimen: Cath Urine Updated: 10/23/21 0702     Special Requests: NO SPECIAL REQUESTS        Culture result:       NO GROWTH AFTER SHORT PERIOD OF INCUBATION. FURTHER RESULTS TO FOLLOW AFTER OVERNIGHT INCUBATION.           RESPIRATORY VIRUS PANEL W/COVID-19, PCR [[id number]]  (Abnormal) Collected: 10/20/21 1435    Order Status: Completed Specimen: Nasopharyngeal Updated: 10/20/21 1605     Adenovirus NOT DETECTED        Coronavirus 229E NOT DETECTED        Coronavirus HKU1 NOT DETECTED        Coronavirus CVNL63 NOT DETECTED        Coronavirus OC43 NOT DETECTED        SARS-CoV-2, PCR NOT DETECTED        Metapneumovirus NOT DETECTED        Rhinovirus and Enterovirus Detected        Influenza A NOT DETECTED        Influenza B NOT DETECTED        Parainfluenza 1 NOT DETECTED        Parainfluenza 2 NOT DETECTED        Parainfluenza 3 NOT DETECTED        Parainfluenza virus 4 NOT DETECTED        RSV by PCR NOT DETECTED        B. parapertussis, PCR NOT DETECTED        Bordetella pertussis - PCR NOT DETECTED        Chlamydophila pneumoniae DNA, QL, PCR NOT DETECTED        Mycoplasma pneumoniae DNA, QL, PCR NOT DETECTED       STREP, GROUP A, DIDIER [[id number]] Collected: 10/20/21 1404    Order Status: Completed Specimen: Throat Updated: 10/20/21 1429     Strep, Molecular Not detected        Comment: Negative for Strep A nucleic acid  Methodology: Isothermal Nucleic Acid Amplification               SARS-CoV-2 Lab Results  \"Novel Coronavirus\" Test: No results found for: COV2NT   \"Emergent Disease\" Test: No results found for: EDPR  \"SARS-COV-2\" Test: No results found for: XGCOVT  \"[facility]\" Test: No results found for: RSLT  Rapid Test: No results found for: COVR       Imaging:  XR CHEST SNGL V    Result Date: 10/23/2021   Portable view of the chest COMPARISON: October 20, 2021 CLINICAL HISTORY: Post CVC placement, right IJ. FINDINGS: There is a right-sided IJ line, with the tip in the area of SVC/right atrium junction. No evidence of pneumothorax. No focal pulmonary consolidation, pulmonary edema or large pleural effusion. Heart is enlarged. Mediastinal contour is within normal limits. Surrounding bones are stable. 1. Right IJ line tip is in the area of SVC/right atrium junction. Negative for pneumothorax. 2. No focal pulmonary consolidation or pulmonary edema. No results found for this visit on 10/20/21.     Current Meds:  Current Facility-Administered Medications   Medication Dose Route Frequency    0.9% sodium chloride infusion 250 mL  250 mL IntraVENous PRN    NOREPINephrine (LEVOPHED) 4 mg in 5% dextrose 250 mL infusion  0.5-30 mcg/min IntraVENous TITRATE    sodium chloride 0.9 % bolus infusion 100 mL  100 mL IntraVENous RAD ONCE    iopamidoL (ISOVUE-370) 76 % injection 100 mL  100 mL IntraVENous ONCE    saline peripheral flush soln 10 mL  10 mL InterCATHeter RAD ONCE    sodium chloride (NS) flush 5-40 mL  5-40 mL IntraVENous Q8H    sodium chloride (NS) flush 5-40 mL  5-40 mL IntraVENous PRN    hydrALAZINE (APRESOLINE) 20 mg/mL injection 20 mg  20 mg IntraVENous Q6H PRN    3% sodium chloride (*HIGH ALERT*CONCENTRATED IV*) infusion  40 mL/hr IntraVENous CONTINUOUS    guaiFENesin (ROBITUSSIN) 100 mg/5 mL oral liquid 100 mg  100 mg Oral Q4H PRN    urea (URE-NA) 15 gram packet 2 Packet  2 Packet Oral TID    acetaminophen (TYLENOL) tablet 650 mg  650 mg Oral Q6H PRN    Or    acetaminophen (TYLENOL) suppository 650 mg  650 mg Rectal Q6H PRN    polyethylene glycol (MIRALAX) packet 17 g  17 g Oral DAILY PRN    ondansetron (ZOFRAN ODT) tablet 4 mg  4 mg Oral Q8H PRN    Or    ondansetron (ZOFRAN) injection 4 mg  4 mg IntraVENous Q6H PRN    aspirin delayed-release tablet 81 mg  81 mg Oral QHS    calcium-vitamin D (OS-CHLOÉ +D3) 500 mg-200 unit per tablet 1 Tablet  1 Tablet Oral DAILY    HYDROcodone-acetaminophen (NORCO) 5-325 mg per tablet 0.5 Tablet  0.5 Tablet Oral Q6H PRN    pantoprazole (PROTONIX) tablet 40 mg  40 mg Oral DAILY    atorvastatin (LIPITOR) tablet 20 mg  20 mg Oral QHS    fluticasone propionate (FLONASE) 50 mcg/actuation nasal spray 2 Spray  2 Spray Both Nostrils DAILY    benzonatate (TESSALON) capsule 100 mg  100 mg Oral TID PRN       Discharge Plan:     TBD    Signed By: Joie Miramontes DO     October 23, 2021      There is a high probability of acute organ impairment or life-threatening deterioration in the patient's condition from: Acute hyponatremia and shock with encephalopathy    Critical care interventions: 3% saline, Levophed    Total critical care time spent: 35 minutes. Time is indicative of direct patient attendance at bedside and on the patient's floor nearby.   Includes time spent at bedside performing history and exam, performing chart review, discussing findings and treatment plan with patient and/or family, discussing patient with consultants and colleagues, ordering and reviewing pertinent laboratory and radiographic evaluations, and discussing patient with nursing staff. Time excludes procedures.

## 2021-10-23 NOTE — PROGRESS NOTES
Overnight events:    1. Worsening hyponatremia: I discussed this patient with Nephrology. Recommend hypertonic saline at 50 mL/h with goal of increasing sodium by 7-8 points in 6 to 8 hours and then keeping stable for the remaining 24 hours does not overshoot. Plan is to continue 50 mL/h of hypertonic saline until sodium is increased report from current level of 113 at the beginning of the night and then to either slow or hold hypertonic saline after that with close recheck of sodium so as not to overshoot but also not plummet again. Central line was placed by Dr. Avery Ospina. Patient was moved to the ICU. Hypertonic saline was started. On recheck, sodium had risen to 117. Hypertonic saline slowed down to 40 mL/h and an hour and a half later on recheck, sodium is now 118. Next recheck is currently pending with results expected around 7 AM.  I sincerely appreciate the involvement of Nephrology service in the ongoing care of this patient. 2.  Hypertension: Patient's blood pressure continued to rise until systolics are in the 031D range. Placed patient on labetalol drip with improvement. However, I was called to bedside because nursing staff felt that patient was uncomfortable and was tensing up every time her blood pressure was taken (left ankle). Therefore, arterial line was placed, and patient was noted to be hypotensive with good waveform with map in the mid 50s. Labetalol drip was stopped. 3.  Hypotension: Placed and was started on norepinephrine. Will monitor closely and adjust treatment as necessary. 4.  Pinkish tinged urine: UA 24 hours ago did not reveal significant findings suggestive of UTI. Concern for rhabdomyolysis, especially in setting of severe hyponatremia. CK was checked and returned in the 1300 range. Repeat CK this morning is 1094. Creatinine remains 0.42 and BUN of 17. We will continue to monitor and recheck CK as needed.     5.  Worsening anemia: Patient's hemoglobin is 6.9 this morning. The previous 2 days it was 7.7. In addition to significant bruising noted distal to right shoulder injury, there is no significant bruising of the pelvic region. CT abdomen pelvis is ordered. 1 unit of packed red blood cells is ordered to be transfused after being typed and crossed. Will monitor and recheck hemoglobin as needed. I will personally sign out this patient and overnight events to dayshift DrPatrick, Dr. Amy Douglas, in an attempt to ensure unbroken continuity of care with shift change.

## 2021-10-23 NOTE — PROGRESS NOTES
TRANSFER - OUT REPORT:    Verbal report given to TONY Chan(name) on Conner Jean  being transferred to ICU(unit) for routine progression of care       Report consisted of patients Situation, Background, Assessment and   Recommendations(SBAR). Information from the following report(s) SBAR was reviewed with the receiving nurse. Lines:   Peripheral IV 10/22/21 Left Forearm (Active)   Site Assessment Clean, dry, & intact 10/22/21 1518   Phlebitis Assessment 0 10/22/21 1518   Infiltration Assessment 0 10/22/21 1518   Dressing Status New 10/22/21 1518   Dressing Type Stabilization/securement device;Transparent 10/22/21 1518   Hub Color/Line Status Flushed 10/22/21 1518   Alcohol Cap Used No 10/22/21 1518        Opportunity for questions and clarification was provided.       Patient transported with:   O2 @ 2 liters  Registered Nurse  [facility]

## 2021-10-23 NOTE — PROCEDURES
Procedure:   US GUIDED CENTRAL LINE PLACEMENT    Indication:    Hyponatremia with need for 3% saline infusion  Summary:  Informed consent was obtained. A time-out was performed. Using the Sonosite ultrasound with the 5-10 MHz vascular probe transducer and sterile technique, the right internal jugular vein was identified and under direct real time visualization was cannulated. The CVC kit guide wire was then inserted and its position within the right internal jugular vein verified in short and long axis views on vascular probe US. Using Seldinger technique, a 3 lumen catheter was then placed in the right internal jugular vein, after dilation of entry port without difficulty. There was no significant bleeding during the procedure and good flush and drawback was noted. The CVC was then affixed with supplied 2.0 silk sutures via the proximal and distal limiters, with the insertion point affixed at 17 cm. A biostatic disc was applied to the entry port followed by a transparent dressing. A chest x-ray is ordered to confirm proper placement. There were no immediate complications.     Nando Bennett MD

## 2021-10-23 NOTE — PROCEDURES
[employer] [facility]  Arterial  Line Procedure Note    Indication:     Risks, benefits, alternatives explained and consent obtained. Line Bundle:   Full sterile barrier precautions used. 7-Step Sterility Protocol followed. (cap, mask sterile gown, sterile gloves, large sterile sheet, hand hygiene, 2% chlorhexidine for cutaneous antisepsis)  5 mL 1% Lidocaine placed at insertion site. Left brachial artery cannulated x 2 attempt(s) utilizing the modified Seldinger technique. Good blood return. Catheter secured & Biopatch applied. Sterile Tegaderm placed. This patient qualifies for critical care time as Ms. Jim Gagnon has a condition that impairs one or more vital organs and there is a high probability of imminent or life-threatening deterioration in the patient's condition. Total critical care time spend in evaluation and management of this patient, exclusive of procedures, is 100 minutes. This time was spent obtaining history from the patient or surrogate(s), evaluating the patient and their condition, physical exam of the patient, evaluation of vital signs and lab and imaging results, ordering treatments, evaluating response to treatments, discussing with the ED provider, and/or consulting other providers or specialists.         Dulce Brooke MD    7:02 AM

## 2021-10-23 NOTE — PROGRESS NOTES
Reviewed notes for concerns      Per notes:        Subjective (10/23/21): She was up most of the night, restless and more confused. Dry cough and hoarseness slowly improving. Denies right shoulder pain. Denies CP/SOB. Denies F/C. Denies N/V/D.  No family at bedside this AM.                     Will continue to assess how to best serve this family    FULL CODE    NO DIRECTIVES ON FILE    VISUAL HALLUCINATIONS    BRIDGET MANZO,  [address] [place] [address]

## 2021-10-23 NOTE — PROGRESS NOTES
Admit Date: 10/20/2021      Subjective:   78 y. o. female with Hx of recent humeral fracture  HLD, HTN, CAD. She presented with C/O cough and sore throat with altered mental status type confusion, unable to walk, and visual hallucinations.   She was recently prescribed prednisone and promethazine   Renal consult requested for hyponatremia. Review of Systems  Lethargic  On pressor  Getting rbc transfusion     Objective:     Patient Vitals for the past 8 hrs:   BP Temp Pulse Resp SpO2 Weight   10/23/21 0900   75  94 %    10/23/21 0833 110/61 98.3 °F (36.8 °C) 74 22 96 %    10/23/21 0830   76  97 %    10/23/21 0817 106/60 98.2 °F (36.8 °C) 67 20 96 %    10/23/21 0803 (!) 108/47 98.3 °F (36.8 °C) 67 20 97 %    10/23/21 0800   74  97 %    10/23/21 0755   74 (!) 33 97 %    10/23/21 0749 (!) 110/45 98.2 °F (36.8 °C) 75 16 96 %    10/23/21 0730   72  95 %    10/23/21 0700   70 24 97 %    10/23/21 0420      65.1 kg (143 lb 8.3 oz)   10/23/21 0400  98 °F (36.7 °C)       10/23/21 0345   82 (!) 31 95 %    10/23/21 0330 (!) 136/101  83 (!) 44 96 %    10/23/21 0315   84 (!) 37 95 %    10/23/21 0300 (!) 146/70  92 27 95 %    10/23/21 0245   81 (!) 32 94 %    10/23/21 0230 (!) 160/71  80 (!) 35 95 %    10/23/21 0215   94 26 95 %    10/23/21 0200 (!) 158/82  92 29 97 %      No intake/output data recorded. Physical Exam:   Lethargic  Lungs: decreased BS  CV: RR, no JVD  Abdomen: soft, not tender, no rebound.   Ext: no edema          Data Review   Recent Results (from the past 8 hour(s))   SODIUM    Collection Time: 10/23/21  2:09 AM   Result Value Ref Range    Sodium 117 (LL) 136 - 145 mmol/L   CBC W/O DIFF    Collection Time: 10/23/21  4:12 AM   Result Value Ref Range    WBC 9.2 4.3 - 11.1 K/uL    RBC 2.50 (L) 4.05 - 5.2 M/uL    HGB 6.9 (LL) 11.7 - 15.4 g/dL    HCT 20.6 (L) 35.8 - 46.3 %    MCV 82.4 79.6 - 97.8 FL    MCH 27.6 26.1 - 32.9 PG    MCHC 33.5 31.4 - 35.0 g/dL    RDW 13.2 11.9 - 14.6 %    PLATELET 446 125 - 505 K/uL    MPV 9.3 (L) 9.4 - 12.3 FL    ABSOLUTE NRBC 0.02 0.0 - 0.2 K/uL   METABOLIC PANEL, BASIC    Collection Time: 10/23/21  4:13 AM   Result Value Ref Range    Sodium 118 (LL) 136 - 145 mmol/L    Potassium 3.3 (L) 3.5 - 5.1 mmol/L    Chloride 81 (L) 98 - 107 mmol/L    CO2 30 21 - 32 mmol/L    Anion gap 7 7 - 16 mmol/L    Glucose 166 (H) 65 - 100 mg/dL    BUN 17 8 - 23 MG/DL    Creatinine 0.42 (L) 0.6 - 1.0 MG/DL    GFR est AA >60 >60 ml/min/1.73m2    GFR est non-AA >60 >60 ml/min/1.73m2    Calcium 7.8 (L) 8.3 - 10.4 MG/DL   CK    Collection Time: 10/23/21  4:13 AM   Result Value Ref Range    CK 1,094 (H) 21 - 215 U/L   RBC, ALLOCATE    Collection Time: 10/23/21  5:30 AM   Result Value Ref Range    HISTORY CHECKED? Historical check performed    TYPE & SCREEN    Collection Time: 10/23/21  5:37 AM   Result Value Ref Range    Crossmatch Expiration 10/26/2021,2359     ABO/Rh(D) A POSITIVE     Antibody screen NEG     Unit number [id number]     Blood component type RC LR     Unit division 00     Status of unit ISSUED     Crossmatch result Compatible    SODIUM    Collection Time: 10/23/21  8:49 AM   Result Value Ref Range    Sodium 122 (L) 136 - 145 mmol/L           Assessment:     Principal Problem:    Acute hyponatremia (10/21/2021)    Active Problems:    Coronary artery disease involving native coronary artery of native heart without angina pectoris (2/8/2017)      Essential hypertension (2/8/2017)      Mixed hyperlipidemia (2/8/2017)      Multiple thyroid nodules ()      S/P reverse total shoulder arthroplasty, 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      LDL 85.  Continue statin for heart protection      Dry cough (10/16/2021)      Visual hallucinations (10/20/2021)      Laryngitis (10/21/2021)      Acute blood loss anemia (ABLA) (10/21/2021) 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 (10/21/2021)      Traumatic rhabdomyolysis ([address] (10/23/2021)      Shock ([address] (10/23/2021)        Plan:     SIADH: S. Na is up to 123. Stop 3% and recheck S. Na in 2 hours to decide about Urea.   Shock  Anemia with drop of Hb: getting rbc transfusion  Mild elevated CK     Felecia Vidal MD

## 2021-10-24 LAB
ANION GAP SERPL CALC-SCNC: 4 MMOL/L (ref 7–16)
ATRIAL RATE: 86 BPM
BACTERIA SPEC CULT: NORMAL
BUN SERPL-MCNC: 13 MG/DL (ref 8–23)
CALCIUM SERPL-MCNC: 8 MG/DL (ref 8.3–10.4)
CALCULATED P AXIS, ECG09: 67 DEGREES
CALCULATED R AXIS, ECG10: -81 DEGREES
CALCULATED T AXIS, ECG11: 87 DEGREES
CHLORIDE SERPL-SCNC: 89 MMOL/L (ref 98–107)
CK SERPL-CCNC: 784 U/L (ref 21–215)
CO2 SERPL-SCNC: 29 MMOL/L (ref 21–32)
CREAT SERPL-MCNC: 0.55 MG/DL (ref 0.6–1)
DIAGNOSIS, 93000: NORMAL
ERYTHROCYTE [DISTWIDTH] IN BLOOD BY AUTOMATED COUNT: 14 % (ref 11.9–14.6)
GLUCOSE SERPL-MCNC: 139 MG/DL (ref 65–100)
HCT VFR BLD AUTO: 19.3 % (ref 35.8–46.3)
HCT VFR BLD AUTO: 25.2 % (ref 35.8–46.3)
HGB BLD-MCNC: 6.4 G/DL (ref 11.7–15.4)
HGB BLD-MCNC: 8.5 G/DL (ref 11.7–15.4)
HISTORY CHECKED?,CKHIST: NORMAL
MAGNESIUM SERPL-MCNC: 1.6 MG/DL (ref 1.8–2.4)
MCH RBC QN AUTO: 27.7 PG (ref 26.1–32.9)
MCHC RBC AUTO-ENTMCNC: 33.2 G/DL (ref 31.4–35)
MCV RBC AUTO: 83.5 FL (ref 79.6–97.8)
NRBC # BLD: 0 K/UL (ref 0–0.2)
P-R INTERVAL, ECG05: 160 MS
PLATELET # BLD AUTO: 236 K/UL (ref 150–450)
PMV BLD AUTO: 9.1 FL (ref 9.4–12.3)
POTASSIUM SERPL-SCNC: 3.4 MMOL/L (ref 3.5–5.1)
POTASSIUM SERPL-SCNC: 3.5 MMOL/L (ref 3.5–5.1)
Q-T INTERVAL, ECG07: 452 MS
QRS DURATION, ECG06: 150 MS
QTC CALCULATION (BEZET), ECG08: 540 MS
RBC # BLD AUTO: 2.31 M/UL (ref 4.05–5.2)
SERVICE CMNT-IMP: NORMAL
SODIUM SERPL-SCNC: 121 MMOL/L (ref 136–145)
SODIUM SERPL-SCNC: 122 MMOL/L (ref 136–145)
SODIUM SERPL-SCNC: 122 MMOL/L (ref 136–145)
SODIUM SERPL-SCNC: 124 MMOL/L (ref 136–145)
SODIUM SERPL-SCNC: 125 MMOL/L (ref 136–145)
SODIUM SERPL-SCNC: 125 MMOL/L (ref 136–145)
SODIUM SERPL-SCNC: 126 MMOL/L (ref 136–145)
VENTRICULAR RATE, ECG03: 86 BPM
WBC # BLD AUTO: 8.8 K/UL (ref 4.3–11.1)

## 2021-10-24 PROCEDURE — 77010033678 HC OXYGEN DAILY

## 2021-10-24 PROCEDURE — 74011250636 HC RX REV CODE- 250/636: Performed by: INTERNAL MEDICINE

## 2021-10-24 PROCEDURE — 82550 ASSAY OF CK (CPK): CPT

## 2021-10-24 PROCEDURE — P9016 RBC LEUKOCYTES REDUCED: HCPCS

## 2021-10-24 PROCEDURE — 74011250637 HC RX REV CODE- 250/637: Performed by: INTERNAL MEDICINE

## 2021-10-24 PROCEDURE — 84295 ASSAY OF SERUM SODIUM: CPT

## 2021-10-24 PROCEDURE — 80048 BASIC METABOLIC PNL TOTAL CA: CPT

## 2021-10-24 PROCEDURE — 74011250637 HC RX REV CODE- 250/637: Performed by: FAMILY MEDICINE

## 2021-10-24 PROCEDURE — 36430 TRANSFUSION BLD/BLD COMPNT: CPT

## 2021-10-24 PROCEDURE — 83735 ASSAY OF MAGNESIUM: CPT

## 2021-10-24 PROCEDURE — 85027 COMPLETE CBC AUTOMATED: CPT

## 2021-10-24 PROCEDURE — 74011250636 HC RX REV CODE- 250/636: Performed by: EMERGENCY MEDICINE

## 2021-10-24 PROCEDURE — 65610000001 HC ROOM ICU GENERAL

## 2021-10-24 PROCEDURE — 84132 ASSAY OF SERUM POTASSIUM: CPT

## 2021-10-24 PROCEDURE — 85018 HEMOGLOBIN: CPT

## 2021-10-24 RX ORDER — CARVEDILOL 12.5 MG/1
12.5 TABLET ORAL 2 TIMES DAILY WITH MEALS
Status: DISCONTINUED | OUTPATIENT
Start: 2021-10-24 | End: 2021-10-26

## 2021-10-24 RX ORDER — SODIUM CHLORIDE 9 MG/ML
250 INJECTION, SOLUTION INTRAVENOUS AS NEEDED
Status: DISCONTINUED | OUTPATIENT
Start: 2021-10-24 | End: 2021-10-27 | Stop reason: HOSPADM

## 2021-10-24 RX ORDER — CLONIDINE HYDROCHLORIDE 0.1 MG/1
0.1 TABLET ORAL
Status: DISCONTINUED | OUTPATIENT
Start: 2021-10-24 | End: 2021-10-27 | Stop reason: HOSPADM

## 2021-10-24 RX ORDER — DEXTROSE MONOHYDRATE 50 MG/ML
25 INJECTION, SOLUTION INTRAVENOUS CONTINUOUS
Status: DISCONTINUED | OUTPATIENT
Start: 2021-10-24 | End: 2021-10-24

## 2021-10-24 RX ADMIN — GUAIFENESIN 100 MG: 200 SOLUTION ORAL at 11:57

## 2021-10-24 RX ADMIN — DEXTROSE MONOHYDRATE 25 ML/HR: 5 INJECTION, SOLUTION INTRAVENOUS at 10:20

## 2021-10-24 RX ADMIN — CARVEDILOL 12.5 MG: 12.5 TABLET, FILM COATED ORAL at 16:56

## 2021-10-24 RX ADMIN — HYDRALAZINE HYDROCHLORIDE 20 MG: 20 INJECTION INTRAMUSCULAR; INTRAVENOUS at 07:58

## 2021-10-24 RX ADMIN — Medication 5 ML: at 22:00

## 2021-10-24 RX ADMIN — FLUTICASONE PROPIONATE 2 SPRAY: 50 SPRAY, METERED NASAL at 11:29

## 2021-10-24 RX ADMIN — DEXTROSE MONOHYDRATE 25 ML/HR: 5 INJECTION, SOLUTION INTRAVENOUS at 01:53

## 2021-10-24 RX ADMIN — ASPIRIN 81 MG: 81 TABLET ORAL at 21:06

## 2021-10-24 RX ADMIN — HYDRALAZINE HYDROCHLORIDE 20 MG: 20 INJECTION INTRAMUSCULAR; INTRAVENOUS at 13:52

## 2021-10-24 RX ADMIN — Medication 10 ML: at 05:36

## 2021-10-24 RX ADMIN — Medication 10 ML: at 13:12

## 2021-10-24 RX ADMIN — GUAIFENESIN 100 MG: 200 SOLUTION ORAL at 21:06

## 2021-10-24 RX ADMIN — ATORVASTATIN CALCIUM 20 MG: 20 TABLET, FILM COATED ORAL at 21:06

## 2021-10-24 NOTE — PROGRESS NOTES
Occupational Therapy Note:    Patient has transferred to the ICU due to decline in medical status. Will d/c OT orders at this time--please re-consult once medically appropriate.      Thank you,  Zbigniew Barcenas, OTR/L

## 2021-10-24 NOTE — PROGRESS NOTES
PT Discharge Note:  Patient is being discharged from PT services at this time due to a decline in medical status with subsequent transfer to the ICU. Please reorder PT in the future when patient would benefit from our services.   Thank you,  Charu Almazan, PTA

## 2021-10-24 NOTE — PROGRESS NOTES
Problem: Falls - Risk of  Goal: *Absence of Falls  Description: Document Melony Fortune Fall Risk and appropriate interventions in the flowsheet. Outcome: Progressing Towards Goal  Note: Fall Risk Interventions:  Mobility Interventions: Bed/chair exit alarm, Communicate number of staff needed for ambulation/transfer, Patient to call before getting OOB, Strengthening exercises (ROM-active/passive)    Mentation Interventions: Adequate sleep, hydration, pain control, Bed/chair exit alarm, Door open when patient unattended, Evaluate medications/consider consulting pharmacy, Increase mobility, More frequent rounding, Reorient patient, Room close to nurse's station, Toileting rounds, Update white board    Medication Interventions: Bed/chair exit alarm, Evaluate medications/consider consulting pharmacy, Patient to call before getting OOB    Elimination Interventions: Bed/chair exit alarm, Call light in reach, Patient to call for help with toileting needs    History of Falls Interventions: Bed/chair exit alarm, Door open when patient unattended, Evaluate medications/consider consulting pharmacy, Investigate reason for fall, Room close to nurse's station, Vital signs minimum Q4HRs X 24 hrs (comment for end date), Assess for delayed presentation/identification of injury for 48 hrs (comment for end date)         Problem: Patient Education: Go to Patient Education Activity  Goal: Patient/Family Education  Outcome: Progressing Towards Goal     Problem: Pressure Injury - Risk of  Goal: *Prevention of pressure injury  Description: Document Miguel Scale and appropriate interventions in the flowsheet.   Outcome: Progressing Towards Goal  Note: Pressure Injury Interventions:  Sensory Interventions: Assess changes in LOC, Assess need for specialty bed, Avoid rigorous massage over bony prominences, Check visual cues for pain, Float heels, Keep linens dry and wrinkle-free, Maintain/enhance activity level, Minimize linen layers, Monitor skin under medical devices, Pad between skin to skin, Pressure redistribution bed/mattress (bed type), Turn and reposition approx. every two hours (pillows and wedges if needed)    Moisture Interventions: Absorbent underpads, Assess need for specialty bed, Check for incontinence Q2 hours and as needed, Internal/External urinary devices, Limit adult briefs, Minimize layers    Activity Interventions: Assess need for specialty bed, Pressure redistribution bed/mattress(bed type)    Mobility Interventions: Assess need for specialty bed, Float heels, Pressure redistribution bed/mattress (bed type), Turn and reposition approx.  every two hours(pillow and wedges)    Nutrition Interventions: Document food/fluid/supplement intake, Discuss nutritional consult with provider    Friction and Shear Interventions: Feet elevated on foot rest, HOB 30 degrees or less, Lift sheet, Lift team/patient mobility team, Minimize layers                Problem: Non-Violent Restraints  Goal: Removal from restraints as soon as assessed to be safe  Outcome: Progressing Towards Goal  Goal: Patient's dignity will be maintained  Outcome: Progressing Towards Goal     Problem: Delirium Treatment  Goal: *Level of consciousness restored to baseline  Outcome: Progressing Towards Goal  Goal: *Level of environmental perceptions restored to baseline  Outcome: Progressing Towards Goal  Goal: *Sensory perception restored to baseline  Outcome: Progressing Towards Goal  Goal: *Absence of falls  Outcome: Progressing Towards Goal

## 2021-10-24 NOTE — PROGRESS NOTES
Bedside, Verbal and Written shift change report given to Jay, [facility] (oncoming nurse) by Kvng Clements RN (offgoing nurse). Report included the following information SBAR, Kardex, Intake/Output, MAR, Accordion, Recent Results and Cardiac Rhythm L bundle branch block w/ fq PVC. Pt sleeping, responds to voice, oriented x4. Lung sounds clear and diminished, dry cough, 02 Sat's in the upper 90's on 2L NC. Bowel sounds hyperactive, pt NPO. Pulses palpable +2. Dual skin assessment performed, no pressure injury noted. Bruising to both upper extremities and lisa area. D5 gtt running at 25.

## 2021-10-24 NOTE — PROGRESS NOTES
Admit Date: 10/20/2021      Subjective:   78 y. o. female with Hx of recent humeral fracture  HLD, HTN, CAD. She presented with C/O cough and sore throat with altered mental status type confusion, unable to walk, and visual hallucinations.   She was recently prescribed prednisone and promethazine   Renal consult requested for hyponatremia. Review of Systems  Lethargic  Pressor  S/p rbc transfusion     Objective:     Patient Vitals for the past 8 hrs:   BP Temp Pulse Resp SpO2 Weight   10/24/21 0848     98 %    10/24/21 0804 (!) 167/71  71 (!) 32 98 %    10/24/21 0801 (!) 172/67  67 (!) 42 97 %    10/24/21 0758 (!) 187/78  71      10/24/21 0752 (!) 187/78  71 26 98 %    10/24/21 0748 (!) 177/72  69 (!) 39 97 %    10/24/21 0746 (!) 167/68  71 21 97 %    10/24/21 0700  97.4 °F (36.3 °C)       10/24/21 0531 (!) 121/59  73 21 94 %    10/24/21 0501 109/79  75 (!) 61 94 %    10/24/21 0500      64.5 kg (142 lb 3.2 oz)   10/24/21 0431 (!) 118/53  71 22 94 %    10/24/21 0401 (!) 156/68  77 25 97 %    10/24/21 0346   69 (!) 44 94 %    10/24/21 0331 (!) 137/59  71 (!) 33 98 %    10/24/21 0316   70 28 97 %    10/24/21 0301 116/68  71 28 96 %    10/24/21 0300  99.2 °F (37.3 °C)       10/24/21 0246   71 (!) 41 98 %    10/24/21 0231 (!) 155/57  78 (!) 37 98 %    10/24/21 0216   72 30 95 %    10/24/21 0201 131/71  80 30 97 %      10/24 0701 - 10/24 1900  In: 400   Out: -       Physical Exam:   Lethargic  Lungs: decreased BS  CV: RR, no JVD  Abdomen: soft, not tender, no rebound.   Ext: no edema          Data Review   Recent Results (from the past 8 hour(s))   METABOLIC PANEL, BASIC    Collection Time: 10/24/21  2:56 AM   Result Value Ref Range    Sodium 122 (L) 136 - 145 mmol/L    Potassium 3.5 3.5 - 5.1 mmol/L    Chloride 89 (L) 98 - 107 mmol/L    CO2 29 21 - 32 mmol/L    Anion gap 4 (L) 7 - 16 mmol/L    Glucose 139 (H) 65 - 100 mg/dL    BUN 13 8 - 23 MG/DL Creatinine 0.55 (L) 0.6 - 1.0 MG/DL    GFR est AA >60 >60 ml/min/1.73m2    GFR est non-AA >60 >60 ml/min/1.73m2    Calcium 8.0 (L) 8.3 - 10.4 MG/DL   CBC W/O DIFF    Collection Time: 10/24/21  2:56 AM   Result Value Ref Range    WBC 8.8 4.3 - 11.1 K/uL    RBC 2.31 (L) 4.05 - 5.2 M/uL    HGB 6.4 (LL) 11.7 - 15.4 g/dL    HCT 19.3 (L) 35.8 - 46.3 %    MCV 83.5 79.6 - 97.8 FL    MCH 27.7 26.1 - 32.9 PG    MCHC 33.2 31.4 - 35.0 g/dL    RDW 14.0 11.9 - 14.6 %    PLATELET 416 404 - 732 K/uL    MPV 9.1 (L) 9.4 - 12.3 FL    ABSOLUTE NRBC 0.00 0.0 - 0.2 K/uL   RBC, ALLOCATE    Collection Time: 10/24/21  3:45 AM   Result Value Ref Range    HISTORY CHECKED? Historical check performed    SODIUM    Collection Time: 10/24/21  3:50 AM   Result Value Ref Range    Sodium 122 (L) 136 - 145 mmol/L   SODIUM    Collection Time: 10/24/21  5:44 AM   Result Value Ref Range    Sodium 125 (L) 136 - 145 mmol/L   CK    Collection Time: 10/24/21  5:44 AM   Result Value Ref Range     (H) 21 - 215 U/L   SODIUM    Collection Time: 10/24/21  9:06 AM   Result Value Ref Range    Sodium 126 (L) 136 - 145 mmol/L           Assessment:     Principal Problem:    Acute hyponatremia (10/21/2021)    Active Problems:    Coronary artery disease involving native coronary artery of native heart without angina pectoris (2/8/2017)      Essential hypertension (2/8/2017)      Mixed hyperlipidemia (2/8/2017)      Multiple thyroid nodules ()      S/P reverse total shoulder arthroplasty, 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      LDL 85.  Continue statin for heart protection      Dry cough (10/16/2021)      Visual hallucinations (10/20/2021)      Laryngitis (10/21/2021)      Acute blood loss anemia (ABLA) (10/21/2021)      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 (10/21/2021)      Traumatic rhabdomyolysis ([facility] [address] (10/23/2021)      Shock ([address] (10/23/2021)        Plan:     SIADH: S. Na is up to 126. ( rapid increase)   3% Off.  Restart D5 w       Anemia with drop of Hb: s/p  rbc transfusion    Mild rubyo  Simi Mckeon MD

## 2021-10-24 NOTE — PROGRESS NOTES
Good visit with the patient and her granddaughter   they were very thankful for the care of the staff

## 2021-10-24 NOTE — PROGRESS NOTES
Hospitalist Progress Note   Admit Date:  10/20/2021 10:44 AM   LOS: 2 days   Name:  Krystal Overall   Age:  78 y.o. Sex:  female  :  1940   MRN:  [id number]     Chief Complaint: Hallucinations  Reason(s) for Admission: Metabolic encephalopathy [S31.95]  Acute hyponatremia [E87.1]     Hospital Course & Interval History:   78 y.o. female who presented to the ED for cc cough and sore throat with altered mental status. Hx of recent humeral fracture Saturday following ortho with conservative treatment, HLD, HTN, CAD. Daughter at the bedside who states ever since she was prescribed prednisone and promethazine- dextromethorphan she has been having confusion, unable to walk, and having visual hallucinations. She was found to have hyponatremia. Her sodium continued to drop on IVFs, consistent with SIADH. IVFs were stopped and she was started on Urea-Na. Nephrology was consulted, who started her on 3% saline. Her sodium continued to drop so she was moved to the ICU. She developed worsening anemia and pelvic bruising. Her CK elevated, consistent with rhabdomyolysis. She was noted to be hypotensive so Levophed was started. Subjective (10/24/21): She slept well last evening. Less confused and more alert this evening. Dry cough and hoarseness resolved. Denies right shoulder pain. Denies CP/SOB. Denies F/C. Denies N/V/D. No family at bedside this AM.    Review of systems negative except stated above.     Assessment & Plan:     Principal Problem:    Acute hyponatremia (10/21/2021)  - 10/20 Na 129  - 10/21 Na 127  - 10/22 Na 120 --> 117 --> 113 --> 112 (moved to ICU) - on 3% saline  - 10/23 Na 117 --> 124 - 3% saline stopped  - 10/24 Na 125  - Likely SIADH --> unsure etiology  - Continue Urea-NA  - Fluid restrict  - 10/21 Bladder scan showed retention - placed Vogt  - Repeat BMP  - Urine studies consistent with SIADH  - UA unremarkable  - Appreciate Nephrology's input and assistance    Active Problems:    Shock ([address] (10/23/2021)  - 10/23 Patient placed on Levophed overnight due to hypotension  - 10/24 Resolved Off Levophed x 12 hours - BP stable  - Unsure etiology - possibly hypovolemic with drop in Hgb  - Continue PRBC  - Check CT A/P with no active source of bleeding      Acute blood loss anemia (ABLA) (10/21/2021)  - 10/21 Hgb 7.7  - 10/22 Hgb stable at 7.7  - 10/23 Hgb 6.9 --> transfuse 1U PRBC  - 10/24 Hgb 6.4 -- > transfuse 1U PRBC  - Likely due to slow pelvic and/or right arm hematomas  - Check CT A/P with no active source of bleeding  - Check CBC daily      Traumatic rhabdomyolysis (HCC)  - Likely due to right arm and pelvic trauma from fall  - CK 1,156 --> 1094 --> pending  - Continue IVFs  - UA with blood/RBC  - Vogt placed  - Strict I/O      Visual hallucinations (10/20/2021)  - I suspect this is due to Promethazine and hyponatremia  - 10/24 much improved  - I also suspect we are developing ICU/hospital delirium  - UA unremarkable for UTI  - Promethazine stopped  - Continue to monitor      Laryngitis (10/21/2021)  - Due to rhinovirus on RVP  - Symptomatic care      Dry cough (10/16/2021)  - Due to rhinovirus on RSP  - Symptomatic care      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 (10/21/2021)  - No acute inpatient issues  - Large hematoma, but no current concern for compartment syndrome  - Follows with ortho as outpatient      Essential hypertension (2/8/2017)  - Stable  - Continue to monitor      S/P reverse total shoulder arthroplasty, 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 6XXA  ICD-9-CM: 958.6  10/23/2021 - Present Yes        Shock ([address] ICD-10-CM: R57.9  ICD-9-CM: 785.50  10/23/2021 - Present Yes Laryngitis ICD-10-CM: J04.0  ICD-9-CM: 464.00  10/21/2021 - Present Yes        Visual hallucinations ICD-10-CM: R44.1  ICD-9-CM: 368.16  10/20/2021 - Present Yes        Acute blood loss anemia (ABLA) ICD-10-CM: D62  ICD-9-CM: 285.1  10/21/2021 - Present Yes        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 oMncho Ovalle  ICD-9-CM: 996.44, V43.61  10/21/2021 - Present Yes        Dry cough ICD-10-CM: R05.8  ICD-9-CM: 786.2  10/16/2021 - Present Yes        Essential hypertension ICD-10-CM: I10  ICD-9-CM: 401.9  2/8/2017 - Present Yes        Multiple thyroid nodules ICD-10-CM: E04.2  ICD-9-CM: 241.1  Unknown - Present Yes        Coronary artery disease involving native coronary artery of native heart without angina pectoris ICD-10-CM: I25.10  ICD-9-CM: 414.01  2/8/2017 - Present Yes        S/P reverse total shoulder arthroplasty, right ICD-10-CM: Z96.611  ICD-9-CM: V43.61  7/11/2021 - Present Yes        Mixed hyperlipidemia ICD-10-CM: E78.2  ICD-9-CM: 272.2  2/8/2017 - Present Yes        Dyslipidemia ICD-10-CM: E78.5  ICD-9-CM: 272.4  8/16/2016 - Present Yes    Overview Signed 7/20/2021 11:21 AM by Sophia Coombs     Last Assessment & Plan:   Formatting of this note might be different from the original.  LDL 85.  Continue statin for heart protection                   Objective:     Patient Vitals for the past 24 hrs:   Temp Pulse Resp BP SpO2   10/24/21 0700 97.4 °F (36.3 °C)       10/24/21 0531  73 21 (!) 121/59 94 %   10/24/21 0501  75 (!) 61 109/79 94 %   10/24/21 0431  71 22 (!) 118/53 94 %   10/24/21 0401  77 25 (!) 156/68 97 %   10/24/21 0346  69 (!) 44  94 %   10/24/21 0331  71 (!) 33 (!) 137/59 98 %   10/24/21 0316  70 28  97 %   10/24/21 0301  71 28 116/68 96 %   10/24/21 0300 99.2 °F (37.3 °C)       10/24/21 0246  71 (!) 41  98 %   10/24/21 0231  78 (!) 37 (!) 155/57 98 %   10/24/21 0216  72 30  95 %   10/24/21 0201  80 30 131/71 97 % 10/24/21 0146  76 (!) 56  96 %   10/24/21 0131  72 (!) 39 (!) 66/51 95 %   10/24/21 0116  71 (!) 37  95 %   10/24/21 0101  71 (!) 33 104/73 97 %   10/24/21 0046  70 (!) 39  97 %   10/24/21 0031  73 (!) 32 99/70 96 %   10/24/21 0016  74 28  97 %   10/24/21 0001  73 (!) 33 (!) 109/53 98 %   10/23/21 2346  74 (!) 33  97 %   10/23/21 2338  78  (!) 194/78    10/23/21 2331  73 (!) 42 (!) 171/85 97 %   10/23/21 2316  76 (!) 34  98 %   10/23/21 2301  71 (!) 35 (!) 155/84 100 %   10/23/21 2246  69 (!) 34  99 %   10/23/21 2231  72 (!) 38 (!) 143/65 96 %   10/23/21 2216  69 25  97 %   10/23/21 2201  74 21 (!) 158/60 98 %   10/23/21 2146  75 (!) 32  98 %   10/23/21 2131  73 26 (!) 161/67 97 %   10/23/21 2116  68 23  97 %   10/23/21 2101  77 24 (!) 160/72 99 %   10/23/21 2046  67 28  99 %   10/23/21 2031  72 (!) 35 124/76 98 %   10/23/21 2016  77 (!) 31  98 %   10/23/21 2001  74 21 134/82 99 %   10/23/21 1946  78 (!) 70  99 %   10/23/21 1931  76 21 (!) 155/69 95 %   10/23/21 1916  78 (!) 41  97 %   10/23/21 1901  71 27 (!) 174/113 98 %   10/23/21 1847     97 %   10/23/21 1832  69 (!) 39 (!) 157/97 94 %   10/23/21 1803  68 (!) 37 (!) 147/56 94 %   10/23/21 1733  69 23 (!) 151/63 98 %   10/23/21 1640 98.9 °F (37.2 °C)       10/23/21 1636  67 27 (!) 183/74 93 %   10/23/21 1403  73 (!) 43 (!) 116/59 98 %   10/23/21 1338  72 (!) 32 (!) 144/66 95 %   10/23/21 1302  70 25 139/77 98 %   10/23/21 1234  76 27 131/84 91 %   10/23/21 1202  71 23 (!) 167/72 98 %   10/23/21 1150  71 (!) 44 (!) 154/75 98 %   10/23/21 1103  77 28 (!) 140/57 97 %   10/23/21 1048  69 24 (!) 88/53 98 %   10/23/21 1022 98.3 °F (36.8 °C) 73  (!) 146/75    10/23/21 1000  69   93 %   10/23/21 0944  68  (!) 155/89 92 %   10/23/21 0900  75   94 %   10/23/21 0833 98.3 °F (36.8 °C) 74 22 110/61 96 %   10/23/21 0830  76   97 %   10/23/21 0817 98.2 °F (36.8 °C) 67 20 106/60 96 %   10/23/21 0803 98.3 °F (36.8 °C) 67 20 (!) 108/47 97 %   10/23/21 0800  74   97 %     Oxygen Therapy  O2 Sat (%): 94 % (10/24/21 0531)  Pulse via Oximetry: 83 beats per minute (10/24/21 0531)  O2 Device: Nasal cannula (10/23/21 1847)  Skin Assessment: Clean, dry, & intact (10/23/21 1847)  O2 Flow Rate (L/min): 1 l/min (10/23/21 1847)    Estimated body mass index is 26.01 kg/m² as calculated from the following:    Height as of this encounter: 5' 2\" (1.575 m). Weight as of this encounter: 64.5 kg (142 lb 3.2 oz). Intake/Output Summary (Last 24 hours) at 10/24/2021 0759  Last data filed at 10/24/2021 0752  Gross per 24 hour   Intake 683.7 ml   Output 950 ml   Net -266.3 ml         Physical Exam:   General:    Well nourished. No overt distress but restless in bed  Head:  Normocephalic, atraumatic  Eyes:  Sclerae appear normal.  Pupils equally round. ENT:  Nares appear normal, no drainage. Moist oral mucosa  Neck:  No restricted ROM. Trachea midline   CV:   RRR. No m/r/g. No jugular venous distension. Lungs:   CTAB. No wheezing, rhonchi, or rales. Respirations even, unlabored  Abdomen: Bowel sounds present. Soft, nontender, nondistended. Extremities: Right arm with swelling and bruising  Skin:     Right arm with swelling and bruising, large pelvic hematoma. Neuro: Cranial nerves II-XII grossly intact. Sensation intact  Psych: Confused but improved.   Alert and oriented x2-3    I have reviewed ordered lab tests and independently visualized imaging below:    Last 24hr Labs:  Recent Results (from the past 24 hour(s))   SODIUM    Collection Time: 10/23/21  8:49 AM   Result Value Ref Range    Sodium 122 (L) 136 - 145 mmol/L   SODIUM    Collection Time: 10/23/21  1:58 PM   Result Value Ref Range    Sodium 123 (L) 136 - 145 mmol/L   SODIUM    Collection Time: 10/23/21  3:35 PM   Result Value Ref Range    Sodium 122 (L) 136 - 145 mmol/L   SODIUM    Collection Time: 10/23/21  6:32 PM   Result Value Ref Range    Sodium 121 (L) 136 - 145 mmol/L   SODIUM    Collection Time: 10/23/21  8:29 PM   Result Value Ref Range    Sodium 122 (L) 136 - 145 mmol/L   SODIUM    Collection Time: 10/23/21 10:47 PM   Result Value Ref Range    Sodium 128 (L) 136 - 145 mmol/L   EKG, 12 LEAD, INITIAL    Collection Time: 10/24/21 12:12 AM   Result Value Ref Range    Ventricular Rate 86 BPM    Atrial Rate 86 BPM    P-R Interval 160 ms    QRS Duration 150 ms    Q-T Interval 452 ms    QTC Calculation (Bezet) 540 ms    Calculated P Axis 67 degrees    Calculated R Axis -81 degrees    Calculated T Axis 87 degrees    Diagnosis       Sinus rhythm with Premature atrial complexes  Left axis deviation  Left bundle branch block  Abnormal ECG  When compared with ECG of 20-OCT-2021 10:51,  QRS voltage has decreased  Nonspecific T wave abnormality, worse in Lateral leads  QT has lengthened  Confirmed by FELISHA OG ([id number]) on 10/24/2021 7:25:48 AM     SODIUM    Collection Time: 10/24/21  1:16 AM   Result Value Ref Range    Sodium 125 (L) 136 - 207 mmol/L   METABOLIC PANEL, BASIC    Collection Time: 10/24/21  2:56 AM   Result Value Ref Range    Sodium 122 (L) 136 - 145 mmol/L    Potassium 3.5 3.5 - 5.1 mmol/L    Chloride 89 (L) 98 - 107 mmol/L    CO2 29 21 - 32 mmol/L    Anion gap 4 (L) 7 - 16 mmol/L    Glucose 139 (H) 65 - 100 mg/dL    BUN 13 8 - 23 MG/DL    Creatinine 0.55 (L) 0.6 - 1.0 MG/DL    GFR est AA >60 >60 ml/min/1.73m2    GFR est non-AA >60 >60 ml/min/1.73m2    Calcium 8.0 (L) 8.3 - 10.4 MG/DL   CBC W/O DIFF    Collection Time: 10/24/21  2:56 AM   Result Value Ref Range    WBC 8.8 4.3 - 11.1 K/uL    RBC 2.31 (L) 4.05 - 5.2 M/uL    HGB 6.4 (LL) 11.7 - 15.4 g/dL    HCT 19.3 (L) 35.8 - 46.3 %    MCV 83.5 79.6 - 97.8 FL    MCH 27.7 26.1 - 32.9 PG    MCHC 33.2 31.4 - 35.0 g/dL    RDW 14.0 11.9 - 14.6 %    PLATELET 156 175 - 808 K/uL    MPV 9.1 (L) 9.4 - 12.3 FL    ABSOLUTE NRBC 0.00 0.0 - 0.2 K/uL   RBC, ALLOCATE    Collection Time: 10/24/21  3:45 AM   Result Value Ref Range    HISTORY CHECKED? Historical check performed    SODIUM    Collection Time: 10/24/21  3:50 AM   Result Value Ref Range    Sodium 122 (L) 136 - 145 mmol/L   SODIUM    Collection Time: 10/24/21  5:44 AM   Result Value Ref Range    Sodium 125 (L) 136 - 145 mmol/L       All Micro Results     Procedure Component Value Units Date/Time    CULTURE, URINE [[id number]] Collected: 10/21/21 1518    Order Status: Completed Specimen: Cath Urine Updated: 10/24/21 0750     Special Requests: NO SPECIAL REQUESTS        Culture result: NO GROWTH 2 DAYS       CULTURE, URINE [[id number]] Collected: 10/23/21 0022    Order Status: Completed Specimen: Cath Urine Updated: 10/23/21 0702     Special Requests: NO SPECIAL REQUESTS        Culture result:       NO GROWTH AFTER SHORT PERIOD OF INCUBATION. FURTHER RESULTS TO FOLLOW AFTER OVERNIGHT INCUBATION.           RESPIRATORY VIRUS PANEL W/COVID-19, PCR [[id number]]  (Abnormal) Collected: 10/20/21 1435    Order Status: Completed Specimen: Nasopharyngeal Updated: 10/20/21 1605     Adenovirus NOT DETECTED        Coronavirus 229E NOT DETECTED        Coronavirus HKU1 NOT DETECTED        Coronavirus CVNL63 NOT DETECTED        Coronavirus OC43 NOT DETECTED        SARS-CoV-2, PCR NOT DETECTED        Metapneumovirus NOT DETECTED        Rhinovirus and Enterovirus Detected        Influenza A NOT DETECTED        Influenza B NOT DETECTED        Parainfluenza 1 NOT DETECTED        Parainfluenza 2 NOT DETECTED        Parainfluenza 3 NOT DETECTED        Parainfluenza virus 4 NOT DETECTED        RSV by PCR NOT DETECTED        B. parapertussis, PCR NOT DETECTED        Bordetella pertussis - PCR NOT DETECTED        Chlamydophila pneumoniae DNA, QL, PCR NOT DETECTED        Mycoplasma pneumoniae DNA, QL, PCR NOT DETECTED       STREP, GROUP A, DIDIER [[id number]] Collected: 10/20/21 1404    Order Status: Completed Specimen: Throat Updated: 10/20/21 1429     Strep, Molecular Not detected        Comment: Negative for Strep A nucleic acid  Methodology: Isothermal Nucleic Acid Amplification               SARS-CoV-2 Lab Results  \"Novel Coronavirus\" Test: No results found for: COV2NT   \"Emergent Disease\" Test: No results found for: EDPR  \"SARS-COV-2\" Test: No results found for: XGCOVT  \"Precision Labs\" Test: No results found for: RSLT  Rapid Test: No results found for: COVR       Imaging:  CT HEAD WO CONT    Result Date: 10/23/2021  EXAMINATION: HEAD CT WITHOUT CONTRAST 10/23/2021 4:26 PM ACCESSION NUMBER: [id number] INDICATION: Confusion COMPARISON: None available TECHNIQUE: Multiple-row detector helical CT examination of the head without intravenous contrast. Radiation dose reduction techniques were used for this study:  Our CT scanners use one or all of the following: Automated exposure control, adjustment of the mA and/or kVp according to patient's size, iterative reconstruction. FINDINGS: Examination is limited by motion artifact as well as recently administered intravenous contrast from today's CT abdomen and pelvis. No evidence of intracranial mass, hemorrhage, or large territorial infarct. Generalized parenchymal volume loss with commensurate enlargement of the ventricles and sulci. The ventricles remain midline and symmetric. There are scattered deep and periventricular white matter hypodensities which are nonspecific but unchanged and most compatible with chronic microvascular ischemic changes. The basal cisterns are patent. No extra-axial fluid collection or mass effect. The orbital contents are within normal limits. The paranasal sinuses are clear. The mastoid air cells and middle ears are clear. No significant osseous or extracranial soft tissue lesions. 1. Limited examination due to motion artifact as well as recently administered intravenous contrast from today CT abdomen and pelvis. 2. Stable chronic changes without definite acute intracranial abnormality.      CT ABD PELV W CONT    Result Date: 10/23/2021  CT ABDOMEN AND PELVIS WITH INTRAVENOUS CONTRAST DATED 10/23/2021. History: Pelvic bruising and anemia. Comparison: None. Technique:   Multiple contiguous helical CT images reconstructed at 5 mm intervals were obtained from above the diaphragms through the ischial tuberosities following oral and 100 cc Isovue-370 without acute complication. All CT scans performed at this facility use one or all of the following: Automated exposure control, adjustment of the mA and/or kVp according to patient's size, iterative reconstruction. Findings: CT ABDOMEN:  Limited evaluation of the lung bases and base of the mediastinum demonstrates small to moderate bilateral dependent pleural effusions and mild cardiomegaly. These can suggest heart failure. A small to moderate size hiatal hernia is seen. Assessment of upper abdominal structures is limited by patient breathing motion as well as attenuation artifact from the patient's upper extremities being at her side. These could obscure a subtle abnormality. The Liver demonstrates small scattered cystic appearing lesions measuring up to 5 mm in size. The spleen is homogeneous in attenuation. No contour deforming or enhancing mass lesions are seen of the pancreas or adrenal glands. The gallbladder has an unremarkable CT appearance without radiopaque stones or pericholecystic fluid/inflammatory changes. The kidneys enhance symmetrically and no evidence of hydronephrosis is seen. The visualized loops of small bowel and colon are normal in caliber. No free air is seen. The mesentery is mildly diffusely edematous. Small scattered ascites is seen most evident adjacent to the inferior tip of the liver. No adenopathy is seen. The abdominal aorta demonstrates moderate atherosclerotic calcification and mild distal ectasia measuring up to 2 cm in diameter. No evidence for retroperitoneal hematoma is seen.  However, there is abnormal density and enlargement seen in the inferior rectus sheaths, left greater than right, best appreciated on axial image 57 suggesting bilateral rectus sheath hematomas. No acute osseous abnormality is seen. Moderate anasarca is seen. CT PELVIS: Small pelvic ascites is seen best appreciated on axial image 60. However, no abnormal hyperdensity is seen to suggest hemoperitoneum with this measuring 5 Hounsfield units in attenuation. No pelvic adenopathy is seen. The urinary bladder is collapsed about a Vogt catheter balloon and therefore not well assessed. No acute osseous abnormality is seen. Mild anasarca is seen. 1.  The only potential source of acute blood loss are bilateral rectus sheath hematomas, left greater than right. No evidence for retroperitoneal hematoma or significant hemoperitoneum is otherwise seen. It should be noted that multiple findings are seen suggesting fluid overload to include bilateral pleural effusions, cardiomegaly, anasarca, and ascites. It is uncertain whether there may be some dilutional effect contributing to the patient's clinical anemia. This report was made using voice transcription. Despite my best efforts to avoid any, transcription errors may persist. If there is any question about the accuracy of the report or need for clarification, then please call [phone], or text me through [employer] for clarification or correction. No results found for this visit on 10/20/21.     Current Meds:  Current Facility-Administered Medications   Medication Dose Route Frequency    dextrose 5% infusion  25 mL/hr IntraVENous CONTINUOUS    0.9% sodium chloride infusion 250 mL  250 mL IntraVENous PRN    0.9% sodium chloride infusion 250 mL  250 mL IntraVENous PRN    NOREPINephrine (LEVOPHED) 4 mg in 5% dextrose 250 mL infusion  0.5-30 mcg/min IntraVENous TITRATE    sodium chloride (NS) flush 5-40 mL  5-40 mL IntraVENous Q8H    sodium chloride (NS) flush 5-40 mL  5-40 mL IntraVENous PRN    hydrALAZINE (APRESOLINE) 20 mg/mL injection 20 mg  20 mg IntraVENous Q6H PRN    guaiFENesin (ROBITUSSIN) 100 mg/5 mL oral liquid 100 mg  100 mg Oral Q4H PRN    urea (URE-NA) 15 gram packet 2 Packet  2 Packet Oral TID    acetaminophen (TYLENOL) tablet 650 mg  650 mg Oral Q6H PRN    Or    acetaminophen (TYLENOL) suppository 650 mg  650 mg Rectal Q6H PRN    polyethylene glycol (MIRALAX) packet 17 g  17 g Oral DAILY PRN    ondansetron (ZOFRAN ODT) tablet 4 mg  4 mg Oral Q8H PRN    Or    ondansetron (ZOFRAN) injection 4 mg  4 mg IntraVENous Q6H PRN    aspirin delayed-release tablet 81 mg  81 mg Oral QHS    calcium-vitamin D (OS-CHLOÉ +D3) 500 mg-200 unit per tablet 1 Tablet  1 Tablet Oral DAILY    HYDROcodone-acetaminophen (NORCO) 5-325 mg per tablet 0.5 Tablet  0.5 Tablet Oral Q6H PRN    pantoprazole (PROTONIX) tablet 40 mg  40 mg Oral DAILY    atorvastatin (LIPITOR) tablet 20 mg  20 mg Oral QHS    fluticasone propionate (FLONASE) 50 mcg/actuation nasal spray 2 Spray  2 Spray Both Nostrils DAILY    benzonatate (TESSALON) capsule 100 mg  100 mg Oral TID PRN       Discharge Plan:     TBD    Signed By: Ramez Vasquez DO     October 24, 2021

## 2021-10-25 LAB
ABO + RH BLD: NORMAL
ANION GAP SERPL CALC-SCNC: 8 MMOL/L (ref 7–16)
BACTERIA SPEC CULT: NORMAL
BLD PROD TYP BPU: NORMAL
BLD PROD TYP BPU: NORMAL
BLOOD GROUP ANTIBODIES SERPL: NORMAL
BPU ID: NORMAL
BPU ID: NORMAL
BUN SERPL-MCNC: 11 MG/DL (ref 8–23)
CALCIUM SERPL-MCNC: 7.9 MG/DL (ref 8.3–10.4)
CHLORIDE SERPL-SCNC: 88 MMOL/L (ref 98–107)
CO2 SERPL-SCNC: 27 MMOL/L (ref 21–32)
CREAT SERPL-MCNC: 0.45 MG/DL (ref 0.6–1)
CROSSMATCH RESULT,%XM: NORMAL
CROSSMATCH RESULT,%XM: NORMAL
ERYTHROCYTE [DISTWIDTH] IN BLOOD BY AUTOMATED COUNT: 14.4 % (ref 11.9–14.6)
GLUCOSE SERPL-MCNC: 95 MG/DL (ref 65–100)
HCT VFR BLD AUTO: 23.3 % (ref 35.8–46.3)
HGB BLD-MCNC: 7.8 G/DL (ref 11.7–15.4)
MAGNESIUM SERPL-MCNC: 1.8 MG/DL (ref 1.8–2.4)
MCH RBC QN AUTO: 28.2 PG (ref 26.1–32.9)
MCHC RBC AUTO-ENTMCNC: 33.5 G/DL (ref 31.4–35)
MCV RBC AUTO: 84.1 FL (ref 79.6–97.8)
NRBC # BLD: 0.03 K/UL (ref 0–0.2)
PLATELET # BLD AUTO: 247 K/UL (ref 150–450)
PMV BLD AUTO: 8.7 FL (ref 9.4–12.3)
POTASSIUM SERPL-SCNC: 3.6 MMOL/L (ref 3.5–5.1)
RBC # BLD AUTO: 2.77 M/UL (ref 4.05–5.2)
SERVICE CMNT-IMP: NORMAL
SODIUM SERPL-SCNC: 123 MMOL/L (ref 136–145)
SODIUM SERPL-SCNC: 124 MMOL/L (ref 136–145)
SODIUM SERPL-SCNC: 125 MMOL/L (ref 136–145)
SODIUM SERPL-SCNC: 126 MMOL/L (ref 136–145)
SODIUM SERPL-SCNC: 127 MMOL/L (ref 136–145)
SPECIMEN EXP DATE BLD: NORMAL
STATUS OF UNIT,%ST: NORMAL
STATUS OF UNIT,%ST: NORMAL
UNIT DIVISION, %UDIV: 0
UNIT DIVISION, %UDIV: 0
WBC # BLD AUTO: 8.5 K/UL (ref 4.3–11.1)

## 2021-10-25 PROCEDURE — 97168 OT RE-EVAL EST PLAN CARE: CPT

## 2021-10-25 PROCEDURE — 74011250637 HC RX REV CODE- 250/637: Performed by: FAMILY MEDICINE

## 2021-10-25 PROCEDURE — 74011250636 HC RX REV CODE- 250/636: Performed by: STUDENT IN AN ORGANIZED HEALTH CARE EDUCATION/TRAINING PROGRAM

## 2021-10-25 PROCEDURE — 83735 ASSAY OF MAGNESIUM: CPT

## 2021-10-25 PROCEDURE — 84295 ASSAY OF SERUM SODIUM: CPT

## 2021-10-25 PROCEDURE — 74011250637 HC RX REV CODE- 250/637: Performed by: INTERNAL MEDICINE

## 2021-10-25 PROCEDURE — 85027 COMPLETE CBC AUTOMATED: CPT

## 2021-10-25 PROCEDURE — 80048 BASIC METABOLIC PNL TOTAL CA: CPT

## 2021-10-25 PROCEDURE — 97535 SELF CARE MNGMENT TRAINING: CPT

## 2021-10-25 PROCEDURE — 97112 NEUROMUSCULAR REEDUCATION: CPT

## 2021-10-25 PROCEDURE — 92610 EVALUATE SWALLOWING FUNCTION: CPT

## 2021-10-25 PROCEDURE — 74011250637 HC RX REV CODE- 250/637: Performed by: STUDENT IN AN ORGANIZED HEALTH CARE EDUCATION/TRAINING PROGRAM

## 2021-10-25 PROCEDURE — 65270000029 HC RM PRIVATE

## 2021-10-25 PROCEDURE — 36415 COLL VENOUS BLD VENIPUNCTURE: CPT

## 2021-10-25 RX ORDER — POTASSIUM CHLORIDE 14.9 MG/ML
20 INJECTION INTRAVENOUS
Status: COMPLETED | OUTPATIENT
Start: 2021-10-25 | End: 2021-10-25

## 2021-10-25 RX ADMIN — CARVEDILOL 12.5 MG: 12.5 TABLET, FILM COATED ORAL at 09:07

## 2021-10-25 RX ADMIN — Medication 5 ML: at 06:00

## 2021-10-25 RX ADMIN — BENZONATATE 100 MG: 100 CAPSULE ORAL at 04:19

## 2021-10-25 RX ADMIN — PANTOPRAZOLE SODIUM 40 MG: 40 TABLET, DELAYED RELEASE ORAL at 09:07

## 2021-10-25 RX ADMIN — POTASSIUM CHLORIDE 20 MEQ: 14.9 INJECTION, SOLUTION INTRAVENOUS at 09:07

## 2021-10-25 RX ADMIN — POTASSIUM CHLORIDE 20 MEQ: 14.9 INJECTION, SOLUTION INTRAVENOUS at 10:03

## 2021-10-25 RX ADMIN — Medication 10 ML: at 21:36

## 2021-10-25 RX ADMIN — Medication 1 PACKET: at 17:54

## 2021-10-25 RX ADMIN — GUAIFENESIN 100 MG: 200 SOLUTION ORAL at 19:53

## 2021-10-25 RX ADMIN — ATORVASTATIN CALCIUM 20 MG: 20 TABLET, FILM COATED ORAL at 21:33

## 2021-10-25 RX ADMIN — Medication 1 PACKET: at 22:00

## 2021-10-25 RX ADMIN — Medication 1 TABLET: at 09:07

## 2021-10-25 RX ADMIN — Medication 2 PACKET: at 09:07

## 2021-10-25 RX ADMIN — FLUTICASONE PROPIONATE 2 SPRAY: 50 SPRAY, METERED NASAL at 09:06

## 2021-10-25 RX ADMIN — ASPIRIN 81 MG: 81 TABLET ORAL at 21:33

## 2021-10-25 RX ADMIN — Medication 40 ML: at 14:00

## 2021-10-25 NOTE — PROGRESS NOTES
[facility] PROGRESS NOTE    Follow up for: Hyponatremia    Subjective:   Patient remains alert. No distress. She is resting in bed without complaints.     ROS:  No vomiting  No headache  No SOB    Objective:   Exam:  Vitals:    10/25/21 0731 10/25/21 0746 10/25/21 0751 10/25/21 0801   BP: (!) 154/71 (!) 166/63     Pulse: 70 67  70   Resp: 18 28  (!) 34   Temp:   98.7 °F (37.1 °C)    SpO2: 95% 96%  93%   Weight:       Height:             Intake/Output Summary (Last 24 hours) at 10/25/2021 1015  Last data filed at 10/25/2021 0421  Gross per 24 hour   Intake 114.17 ml   Output 500 ml   Net -385.83 ml       Current Facility-Administered Medications   Medication Dose Route Frequency    urea (URE-NA) 15 gram packet 2 Packet  2 Packet Oral TID    potassium chloride 20 mEq in 100 ml IVPB  20 mEq IntraVENous Q2H    0.9% sodium chloride infusion 250 mL  250 mL IntraVENous PRN    lip protectant (BLISTEX) ointment 1 Each  1 Each Topical PRN    carvediloL (COREG) tablet 12.5 mg  12.5 mg Oral BID WITH MEALS    cloNIDine HCL (CATAPRES) tablet 0.1 mg  0.1 mg Oral Q6H PRN    0.9% sodium chloride infusion 250 mL  250 mL IntraVENous PRN    sodium chloride (NS) flush 5-40 mL  5-40 mL IntraVENous Q8H    sodium chloride (NS) flush 5-40 mL  5-40 mL IntraVENous PRN    hydrALAZINE (APRESOLINE) 20 mg/mL injection 20 mg  20 mg IntraVENous Q6H PRN    guaiFENesin (ROBITUSSIN) 100 mg/5 mL oral liquid 100 mg  100 mg Oral Q4H PRN    acetaminophen (TYLENOL) tablet 650 mg  650 mg Oral Q6H PRN    Or    acetaminophen (TYLENOL) suppository 650 mg  650 mg Rectal Q6H PRN    polyethylene glycol (MIRALAX) packet 17 g  17 g Oral DAILY PRN    ondansetron (ZOFRAN ODT) tablet 4 mg  4 mg Oral Q8H PRN    Or    ondansetron (ZOFRAN) injection 4 mg  4 mg IntraVENous Q6H PRN    aspirin delayed-release tablet 81 mg  81 mg Oral QHS    calcium-vitamin D (OS-CHLOÉ +D3) 500 mg-200 unit per tablet 1 Tablet  1 Tablet Oral DAILY    HYDROcodone-acetaminophen (NORCO) 5-325 mg per tablet 0.5 Tablet  0.5 Tablet Oral Q6H PRN    pantoprazole (PROTONIX) tablet 40 mg  40 mg Oral DAILY    atorvastatin (LIPITOR) tablet 20 mg  20 mg Oral QHS    fluticasone propionate (FLONASE) 50 mcg/actuation nasal spray 2 Spray  2 Spray Both Nostrils DAILY    benzonatate (TESSALON) capsule 100 mg  100 mg Oral TID PRN       EXAM  GEN - Alert, oriented, in no distress  HEENT - NC/AT, non icteric sclera  CV - no audible murmur, regular rate  Lung - equal chest rise, no audible wheezing  Abd - non distended  Ext - no edema, no cyanosis  Access: n/a    Recent Labs     10/25/21  0306 10/24/21  1352 10/24/21  0256 10/23/21  0412 10/23/21  0412   WBC 8.5  --  8.8  --  9.2   HGB 7.8* 8.5* 6.4*   < > 6.9*   HCT 23.3* 25.2* 19.3*   < > 20.6*     --  236  --  248    < > = values in this interval not displayed. Recent Labs     10/25/21  0306 10/25/21  0108 10/24/21  2147 10/24/21  1506 10/24/21  1506 10/24/21  0350 10/24/21  0256 10/23/21  0849 10/23/21  0413   * 124* 121*   < > 124*   < > 122*   < > 118*   K 3.6  --   --   --  3.4*  --  3.5  --  3.3*   CL 88*  --   --   --   --   --  89*  --  81*   CO2 27  --   --   --   --   --  29  --  30   BUN 11  --   --   --   --   --  13  --  17   CREA 0.45*  --   --   --   --   --  0.55*  --  0.42*   CA 7.9*  --   --   --   --   --  8.0*  --  7.8*   GLU 95  --   --   --   --   --  139*  --  166*   MG  --   --   --   --  1.6*  --   --   --   --     < > = values in this interval not displayed. Assessment and Plan:   SIADH  - improved over 24 hours. Continue Ure-Na  - follow strict I&Os  - free water intake should be <1 L, though NPO currently.     Gene Johnson MD  [facility]

## 2021-10-25 NOTE — PROGRESS NOTES
Review notes for the new spiritual concerns during the night   had a good visit with the patient and granddaughter yesterday   will continue to follow closely

## 2021-10-25 NOTE — PROGRESS NOTES
10/25/21 1652   Dual Skin Pressure Injury Assessment   Dual Skin Pressure Injury Assessment X   Second Care Provider (Based on [address] [place] [address]) Cruz Montez RN   Skin Integumentary   Skin Integumentary (WDL) X   Skin Color Ecchymosis (comment)   Skin Condition/Temp Fragile   Skin Integrity Intact

## 2021-10-25 NOTE — PROGRESS NOTES
Problem: Falls - Risk of  Goal: *Absence of Falls  Description: Document Dany Lobe Fall Risk and appropriate interventions in the flowsheet. Outcome: Progressing Towards Goal  Note: Fall Risk Interventions:  Mobility Interventions: Bed/chair exit alarm, Communicate number of staff needed for ambulation/transfer, OT consult for ADLs, PT Consult for mobility concerns, Strengthening exercises (ROM-active/passive), Utilize walker, cane, or other assistive device    Mentation Interventions: Adequate sleep, hydration, pain control, Bed/chair exit alarm, Door open when patient unattended, Increase mobility, Update white board    Medication Interventions: Bed/chair exit alarm    Elimination Interventions: Bed/chair exit alarm, Call light in reach, Patient to call for help with toileting needs    History of Falls Interventions: Bed/chair exit alarm, Consult care management for discharge planning, Door open when patient unattended, Room close to nurse's station         Problem: Patient Education: Go to Patient Education Activity  Goal: Patient/Family Education  Outcome: Progressing Towards Goal     Problem: Patient Education: Go to Patient Education Activity  Goal: Patient/Family Education  Outcome: Progressing Towards Goal     Problem: Patient Education: Go to Patient Education Activity  Goal: Patient/Family Education  Outcome: Progressing Towards Goal     Problem: Pressure Injury - Risk of  Goal: *Prevention of pressure injury  Description: Document Miguel Scale and appropriate interventions in the flowsheet. Outcome: Progressing Towards Goal  Note: Pressure Injury Interventions:  Sensory Interventions: Turn and reposition approx.  every two hours (pillows and wedges if needed), Pressure redistribution bed/mattress (bed type), Minimize linen layers, Keep linens dry and wrinkle-free, Monitor skin under medical devices    Moisture Interventions: Minimize layers, Limit adult briefs, Internal/External urinary devices, Check for incontinence Q2 hours and as needed    Activity Interventions: Pressure redistribution bed/mattress(bed type)    Mobility Interventions: Turn and reposition approx.  every two hours(pillow and wedges), Pressure redistribution bed/mattress (bed type), Float heels    Nutrition Interventions: Document food/fluid/supplement intake    Friction and Shear Interventions: Minimize layers, Foam dressings/transparent film/skin sealants                Problem: Patient Education: Go to Patient Education Activity  Goal: Patient/Family Education  Outcome: Progressing Towards Goal     Problem: Non-Violent Restraints  Goal: Removal from restraints as soon as assessed to be safe  Outcome: Progressing Towards Goal  Goal: No harm/injury to patient while restraints in use  Outcome: Progressing Towards Goal  Goal: Patient's dignity will be maintained  Outcome: Progressing Towards Goal  Goal: Patient Interventions  Outcome: Progressing Towards Goal     Problem: Delirium Treatment  Goal: *Level of consciousness restored to baseline  Outcome: Progressing Towards Goal  Goal: *Level of environmental perceptions restored to baseline  Outcome: Progressing Towards Goal  Goal: *Sensory perception restored to baseline  Outcome: Progressing Towards Goal  Goal: *Emotional stability restored to baseline  Outcome: Progressing Towards Goal  Goal: *Functional assessment restored to baseline  Outcome: Progressing Towards Goal  Goal: *Absence of falls  Outcome: Progressing Towards Goal  Goal: *Will remain free of delirium, CAM Score negative  Outcome: Progressing Towards Goal  Goal: *Cognitive status will be restored to baseline  Outcome: Progressing Towards Goal  Goal: Interventions  Outcome: Progressing Towards Goal     Problem: Patient Education: Go to Patient Education Activity  Goal: Patient/Family Education  Outcome: Progressing Towards Goal

## 2021-10-25 NOTE — PROGRESS NOTES
Chart reviewed s/p tx from 6th floor to ICU for levo gtt. Pt now on O2 at 1L per RT. No gtts. Confusion better per MD notes. CM following for d/c needs/POC when stable. PT/OT to eval per MD. Continue following. LOS 3 days.

## 2021-10-25 NOTE — PROGRESS NOTES
SPEECH LANGUAGE PATHOLOGY: DYSPHAGIA- Initial Assessment and Discharge    NAME/AGE/GENDER: Josiah Bennett is a 78 y.o. female  DATE: 10/25/2021  PRIMARY DIAGNOSIS: Metabolic encephalopathy [T71.84]  Acute hyponatremia [E87.1]      ICD-10: Treatment Diagnosis: R13.12 Dysphagia, Oropharyngeal Phase    RECOMMENDATIONS   DIET:    Regular Consistency   Thin Liquids    MEDICATIONS: With liquid     PRECAUTIONS, MODIFICATIONS, AND STRATEGIES  · Slow rate of intake  · Small bites/sips  · Upright at 90 degrees during meal       RECOMMENDATIONS FOR CONTINUED SPEECH THERAPY:   No further speech therapy indicated at this time. ASSESSMENT   Patient presents with oropharyngeal swallow function that is within normal limits. No s/sx of dysphagia identified. Recommend regular diet/thin liquids. Medications with liquid wash. Dysphagia treatment is not warranted at this time. Please consider re-consult should new concerns arise. EDUCATION:  · Recommendations discussed with Patient, Family and RN    REHABILITATION POTENTIAL FOR STATED GOALS: Excellent    PLAN    FREQUENCY/DURATION:   No further speech therapy indicated at this time as oropharyngeal swallow function is within normal limits. CONTINUATION OF SKILLED SERVICES/MEDICAL NECESSITY:   No additional speech services warranted. SUBJECTIVE   Upright in bed with daughter at bedside. She denies history of dysphagia aside from mild trouble related to thyroid nodule. Regular diet at baseline    Oxygen Device: nasal cannual  Pain: Pain Scale 1: Numeric (0 - 10)  Pain Intensity 1: 0    History of Present Injury/Illness: Ms. Jadyn Jean Baptiste  has a past medical history of Adverse effect of anesthesia, Coronary artery disease, COVID-19 vaccine series completed (02/26/2021), GERD (gastroesophageal reflux disease), Hyperlipidemia, Hypertension, Insomnia, LBBB (left bundle branch block), Multiple thyroid nodules, Multiple thyroid nodules, Osteopenia, and Prediabetes. . She also  has a past surgical history that includes hx hysterectomy (in her 46s); hx appendectomy (age 13); hx bladder repair; hx coronary stent placement (2009); hx colonoscopy (2004 and 2014); and hx orthopaedic. PRECAUTIONS/ALLERGIES: Norvasc [amlodipine], Promethazine, and Lisinopril     Problem List:  (Impairments causing functional limitations):  1. Oropharyngeal dysphagia- No symptoms identified  2. Previous Dysphagia: NONE REPORTED  Diet Prior to Evaluation: NPO- on Easy to chew diet prior to transfer to ICU    Orientation:  Person  Place  Time  Situation    OBJECTIVE   Oral Motor:   · Labial: No impairment  · Dentition: Intact  · Oral Hygiene: Adequate  · Lingual: No impairment    Dysphagia evaluation:   Patient presented with thin liquid via cup and straw, puree, mixed, and solid consistencies. Appropriate oral prep with all textures. Timely swallow initiation, and single swallows upon palpation. Adequate oral clearing. No overt signs or symptoms of airway compromise observed with liquid or solid textures. Tool Used: Dysphagia Outcome and Severity Scale (GENARO)    Score Comments   Normal Diet  [] 7 With no strategies or extra time needed   Functional Swallow  [] 6 May have mild oral or pharyngeal delay   Mild Dysphagia  [] 5 Which may require one diet consistency restricted    Mild-Moderate Dysphagia  [] 4 With 1-2 diet consistencies restricted   Moderate Dysphagia  [] 3 With 2 or more diet consistencies restricted   Moderate-Severe Dysphagia  [] 2 With partial PO strategies (trials with ST only)   Severe Dysphagia  [] 1 With inability to tolerate any PO safely      Score:  Initial: 7 Most Recent: x (Date 10/25/21 )   Interpretation of Tool: The Dysphagia Outcome and Severity Scale (GENARO) is a simple, easy-to-use, 7-point scale developed to systematically rate the functional severity of dysphagia based on objective assessment and make recommendations for diet level, independence level, and type of nutrition. Current Medications:   No current facility-administered medications on file prior to encounter. Current Outpatient Medications on File Prior to Encounter   Medication Sig Dispense Refill    promethazine-dextromethorphan (PROMETHAZINE-DM) 6.25-15 mg/5 mL syrup Take 5 mL by mouth.  Prolensa 0.07 % ophthalmic solution INSTILL 1 DROP IN EYE EVERY DAY TO THE OPERATED EYE BEGINNING 3 DAYS PRIOR TO SURGERY UNTIL GONE.  simvastatin (ZOCOR) 40 mg tablet TAKE ONE TABLET BY MOUTH EACH NIGHT AT BEDTIME 90 Tablet 1    omeprazole (PRILOSEC) 40 mg capsule Take 40 mg by mouth daily.  naloxegoL (MOVANTIK) 25 mg tab tablet Take 1 Tablet by mouth Daily (before breakfast). To start after surgery  Indications: opiate pain medication causing severe constipation (Patient not taking: Reported on 7/9/2021) 7 Tablet 1    senna-docusate (PERICOLACE) 8.6-50 mg per tablet Take 1 Tablet by mouth daily. To start after surgery  Indications: constipation (Patient not taking: Reported on 7/9/2021) 21 Tablet 0    ondansetron (ZOFRAN ODT) 4 mg disintegrating tablet 1 Tablet by SubLINGual route every six (6) hours as needed for Nausea or Nausea or Vomiting. To start after surgery  Indications: prevent nausea and vomiting after surgery (Patient not taking: Reported on 7/9/2021) 20 Tablet 0    alendronate (FOSAMAX) 70 mg tablet Take 1 Tablet by mouth every seven (7) days. (Patient not taking: Reported on 7/9/2021) 12 Tablet 3    carvediloL (COREG) 12.5 mg tablet TAKE ONE TABLET BY MOUTH TWICE A DAY WITH MEALS (Patient not taking: Reported on 7/22/2021) 180 Tab 2    calcium-cholecalciferol, D3, (CALTRATE 600+D) tablet Take 1 Tablet by mouth daily.  aspirin delayed-release 81 mg tablet Take 81 mg by mouth nightly.           INTERDISCIPLINARY COLLABORATION: RN    After treatment position/precautions:  · Upright in bed  · Daughter  at bedside  · RN notified    Total Treatment Duration:   Time In: 1743  Time Out: [address] [facility], [facility], CCC-SLP  Speech Language Pathologist  Acute Rehabilitation Services  Contact: Jovanni

## 2021-10-25 NOTE — PROGRESS NOTES
Hospitalist Progress Note   Admit Date:  10/20/2021 10:44 AM   LOS: 3 days   Name:  Alethea Reilly   Age:  78 y.o. Sex:  female  :  1940   MRN:  [id number]     Chief Complaint: Hallucinations  Reason(s) for Admission: Metabolic encephalopathy [Q13.60]  Acute hyponatremia [E87.1]     Hospital Course & Interval History:   78 y.o. female who presented to the ED for cc cough and sore throat with altered mental status. Hx of recent humeral fracture Saturday following ortho with conservative treatment, HLD, HTN, CAD. Daughter at the bedside who states ever since she was prescribed prednisone and promethazine- dextromethorphan she has been having confusion, unable to walk, and having visual hallucinations. She was found to have hyponatremia. Her sodium continued to drop on IVFs, consistent with SIADH. IVFs were stopped and she was started on Urea-Na. Nephrology was consulted, who started her on 3% saline. Her sodium continued to drop so she was moved to the ICU. She developed worsening anemia and pelvic bruising. Her CK elevated, consistent with rhabdomyolysis. She was noted to be hypotensive so Levophed was started. Subjective (10/25/21):  Seen and examined at bedside in the ICU this morning. Overnight, sodium dropped to 121 and D5 W subsequently stopped. Sodium up to 123 this morning. This is first day I have seen the patient although patient seemingly less confused than previous days. She is alert and oriented x3. She denies any headache, blurry or double vision, nausea, vomiting, diarrhea, fevers, chills, chest pain or pressure, palpitations, lightheadedness or dizziness. She does endorse recent dry cough, but this has seemingly improved since hospitalization. Discussed with patient and daughter at bedside this morning. Review of systems negative except stated above.     Assessment & Plan:     Principal Problem:    Acute hyponatremia (10/21/2021)  - 10/20 Na 129  - 10/21 Na 127  - 10/22 Na 120 --> 117 --> 113 --> 112 (moved to ICU) - on 3% saline  - 10/23 Na 117 --> 124 - 3% saline stopped   - 10/24 Na 125 D5w started by nephro to prevent overcorrection--> overnight Na down to 121 and D5W stopped  - 10/25 give extra packet of urea this Am --> Na back up to 125  - Likely SIADH --> unsure etiology  - Continue Urea-NA 1 packet TID  - Fluid restrict  - 10/21 Bladder scan showed retention - placed Vogt  - Repeat BMP  - Urine studies consistent with SIADH  - UA unremarkable  - Appreciate Nephrology's input and assistance    Active Problems:    Shock ([facility] [address] (10/23/2021)  - 10/23 Patient placed on Levophed overnight due to hypotension  - 10/24 Resolved Off Levophed x 12 hours - BP stable  - Unsure etiology - possibly hypovolemic with drop in Hgb  - Continue PRBC  - CT A/P with no active source of bleeding other than BL rectus sheath hematomas, no RP hemorrhage or hemoperitoneum      Acute blood loss anemia (ABLA) (10/21/2021)  - 10/21 Hgb 7.7  - 10/22 Hgb stable at 7.7  - 10/23 Hgb 6.9 --> transfuse 1U PRBC  - 10/24 Hgb 6.4 -- > transfuse 1U PRBC  -10/25 Hgb 7.8 from 8.5 after transfusion yesterday   - Likely due to slow pelvic and/or right arm hematomas  - CT A/P as above with no RP hemorrhage or hemoperitoneum  - Check CBC daily      Traumatic rhabdomyolysis ([facility] [address]  - Likely due to right arm and pelvic trauma from fall  - CK 1,156 --> 1094 --> 784  - s/p IVFs  - UA with blood/RBC  - Vogt placed  - Strict I/O      Visual hallucinations (10/20/2021)  - I suspect this is due to Promethazine and hyponatremia  - 10/25 much improved  - I also suspect we are developing ICU/hospital delirium  - UA unremarkable for UTI  - Promethazine stopped  - Continue to monitor      Laryngitis (10/21/2021)  - Due to rhinovirus on RVP  - Symptomatic care      Dry cough (10/16/2021)  - Due to rhinovirus on RSP  - Symptomatic care      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 (10/21/2021)  - No acute inpatient issues  - Large hematoma, but no current concern for compartment syndrome  - Follows with ortho as outpatient, consult orthopedic surgery inhouse today       Essential hypertension (2/8/2017)  - Stable  - Continue to monitor      S/P reverse total shoulder arthroplasty, 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 6XXA  ICD-9-CM: 958.6  10/23/2021 - Present Yes        Shock ([facility] [address] ICD-10-CM: R57.9  ICD-9-CM: 785.50  10/23/2021 - Present Yes        Laryngitis ICD-10-CM: J04.0  ICD-9-CM: 464.00  10/21/2021 - Present Yes        * (Principal) Acute hyponatremia ICD-10-CM: E87.1  ICD-9-CM: 276.1  10/21/2021 - Present Yes        Acute blood loss anemia (ABLA) ICD-10-CM: D62  ICD-9-CM: 285.1  10/21/2021 - Present Yes        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 Roylene Ala  ICD-9-CM: 996.44, V43.61  10/21/2021 - Present Yes        Visual hallucinations ICD-10-CM: R44.1  ICD-9-CM: 368.16  10/20/2021 - Present Yes        Dry cough ICD-10-CM: R05.8  ICD-9-CM: 786.2  10/16/2021 - Present Yes        S/P reverse total shoulder arthroplasty, right ICD-10-CM: E28.565  ICD-9-CM: V43.61  7/11/2021 - Present Yes        Multiple thyroid nodules ICD-10-CM: E04.2  ICD-9-CM: 241.1  Unknown - Present Yes        Coronary artery disease involving native coronary artery of native heart without angina pectoris ICD-10-CM: I25.10  ICD-9-CM: 414.01  2/8/2017 - Present Yes        Essential hypertension ICD-10-CM: I10  ICD-9-CM: 401.9  2/8/2017 - Present Yes        Mixed hyperlipidemia ICD-10-CM: E78.2  ICD-9-CM: 272.2  2/8/2017 - Present Yes        Dyslipidemia ICD-10-CM: E78.5  ICD-9-CM: 272.4  8/16/2016 - Present Yes    Overview Signed 7/20/2021 11:21 AM by Danielle Denise     Last Assessment & Plan:   Formatting of this note might be different from the original.  LDL 85.  Continue statin for heart protection                   Objective:     Patient Vitals for the past 24 hrs:   Temp Pulse Resp BP SpO2   10/25/21 0801  70 (!) 34  93 %   10/25/21 0751 98.7 °F (37.1 °C)       10/25/21 0746  67 28 (!) 166/63 96 %   10/25/21 0731  70 18 (!) 154/71 95 %   10/25/21 0716  66 22 (!) 167/72 94 %   10/25/21 0701  66 20 (!) 155/65 92 %   10/25/21 0646  72 (!) 66 (!) 179/77 94 %   10/25/21 0631  71 22 (!) 157/60 93 %   10/25/21 0616  66 (!) 52 (!) 149/65 95 %   10/25/21 0601  65 23 (!) 144/78 94 %   10/25/21 0546  65 26 (!) 145/68 95 %   10/25/21 0531  64 25 (!) 142/65 95 %   10/25/21 0516  66 25 135/80 95 %   10/25/21 0501  67 (!) 42 (!) 144/70 95 %   10/25/21 0446  66 19 (!) 150/70 95 %   10/25/21 0431  68 15 (!) 152/68 95 %   10/25/21 0416  73 20 (!) 163/70 93 %   10/25/21 0401  70 29 (!) 160/70 95 %   10/25/21 0346  66 25 (!) 148/63 95 %   10/25/21 0331  60 25 (!) 153/65 95 %   10/25/21 0316  66 23 (!) 153/67 96 %   10/25/21 0301  64 15 (!) 158/107 95 %   10/25/21 0300 98.6 °F (37 °C)       10/25/21 0246  63 26 (!) 144/96 95 %   10/25/21 0231  64 25 (!) 145/62 95 %   10/25/21 0216  68 25 (!) 128/58 95 %   10/25/21 0201  68 23 (!) 158/70 94 %   10/25/21 0146  63 20 139/63 95 %   10/25/21 0131  63 26 (!) 117/56 95 %   10/25/21 0116  63 (!) 32 (!) 160/63 95 %   10/25/21 0101  (!) 58 18 138/61 94 %   10/25/21 0046  65 25 (!) 154/68 96 %   10/25/21 0031  68 (!) 34 (!) 151/67 95 %   10/25/21 0016  65 (!) 34 126/60 95 %   10/25/21 0001  65 23 (!) 143/72 97 %   10/24/21 2346  74 25 (!) 142/64 95 %   10/24/21 2331  68 27 (!) 134/59 97 %   10/24/21 2316  64 23 129/60 96 %   10/24/21 2301  63 23 119/68 95 %   10/24/21 2300 98.8 °F (37.1 °C)       10/24/21 2246  78 23 134/61 97 %   10/24/21 2231  67 27 (!) 142/65 96 %   10/24/21 2216  63 29 (!) 137/58 96 %   10/24/21 2201  64 23 (!) 152/68 97 %   10/24/21 2146  72 26 (!) 131/59 96 %   10/24/21 2131  64 22 132/61 95 %   10/24/21 2116  70 25 138/66 96 %   10/24/21 2101  65 (!) 33 135/65 96 %   10/24/21 2046  68 26 (!) 142/60 95 %   10/24/21 2031  66 26 124/65 96 %   10/24/21 2016  75 14 (!) 154/67 95 %   10/24/21 2001  63 23 (!) 123/57 96 %   10/24/21 1946  66 24 (!) 138/58 96 %   10/24/21 1931  76 26 (!) 153/67 96 %   10/24/21 1916  85 30 (!) 182/77 93 %   10/24/21 1901 98.7 °F (37.1 °C) 65 19 (!) 127/59 95 %   10/24/21 1802  68 19 (!) 146/61 94 %   10/24/21 1747  70 (!) 33 (!) 142/63 95 %   10/24/21 1732  74 14 (!) 142/62 95 %   10/24/21 1717  74 21 137/62 96 %   10/24/21 1702  74 17 (!) 148/65 95 %   10/24/21 1656  82  (!) 150/71    10/24/21 1647  74 21 (!) 150/71 95 %   10/24/21 1632  71 26 (!) 148/66 94 %   10/24/21 1617  76 24 138/62 95 %   10/24/21 1602  73 26 (!) 144/65 96 %   10/24/21 1547  81 23 (!) 156/84 95 %   10/24/21 1532  73 (!) 34 135/63 96 %   10/24/21 1517  80 17 138/63 95 %   10/24/21 1502 98.1 °F (36.7 °C) 75 24 (!) 121/56 95 %   10/24/21 1447  72 19 (!) 122/57 96 %   10/24/21 1432  74 29 110/60 96 %   10/24/21 1417  72 (!) 97 (!) 132/57 95 %   10/24/21 1402  72 19 (!) 144/64 97 %   10/24/21 1400  70 (!) 32 (!) 158/68 96 %   10/24/21 1352  71  (!) 186/75    10/24/21 1349  71 16 (!) 186/75 96 %   10/24/21 1347  73 (!) 32 (!) 187/79 97 %   10/24/21 1332  66 29 (!) 149/64 94 %   10/24/21 1317  (!) 59 26 (!) 154/69 96 %     Oxygen Therapy  O2 Sat (%): 93 % (10/25/21 0801)  Pulse via Oximetry: 67 beats per minute (10/25/21 0801)  O2 Device: Nasal cannula (10/25/21 0737)  Skin Assessment: Clean, dry, & intact (10/25/21 0737)  Skin Protection for O2 Device: N/A (10/25/21 0737)  O2 Flow Rate (L/min): 1 l/min (10/25/21 0737)  FIO2 (%): 24 % (10/24/21 0848)    Estimated body mass index is 27.06 kg/m² as calculated from the following:    Height as of this encounter: 5' 2\" (1.575 m). Weight as of this encounter: 67.1 kg (147 lb 14.9 oz). Intake/Output Summary (Last 24 hours) at 10/25/2021 1311  Last data filed at 10/25/2021 1040  Gross per 24 hour   Intake 114.17 ml   Output 401 ml   Net -286.83 ml         Physical Exam:   General:    Well nourished. No overt distress appears comfortable  Head:  Normocephalic, atraumatic  Eyes:  Sclerae appear normal.  Pupils equally round. ENT:  Nares appear normal, no drainage. Moist oral mucosa  Neck:  No restricted ROM. Trachea midline   CV:   RRR. No m/r/g. No jugular venous distension. Lungs:   CTAB. No wheezing, rhonchi, or rales. Respirations even, unlabored  Abdomen: Bowel sounds present. Soft, nontender, nondistended. Extremities: Right arm with swelling and bruising  Skin:     Right arm with swelling and bruising, large pelvic hematoma. Neuro: Cranial nerves II-XII grossly intact. Sensation intact  Psych: Confused but improved.   Alert and oriented x2-3    I have reviewed ordered lab tests and independently visualized imaging below:    Last 24hr Labs:  Recent Results (from the past 24 hour(s))   HGB & HCT    Collection Time: 10/24/21  1:52 PM   Result Value Ref Range    HGB 8.5 (L) 11.7 - 15.4 g/dL    HCT 25.2 (L) 35.8 - 46.3 %   SODIUM    Collection Time: 10/24/21  3:06 PM   Result Value Ref Range    Sodium 124 (L) 136 - 145 mmol/L   POTASSIUM    Collection Time: 10/24/21  3:06 PM   Result Value Ref Range    Potassium 3.4 (L) 3.5 - 5.1 mmol/L   MAGNESIUM    Collection Time: 10/24/21  3:06 PM   Result Value Ref Range    Magnesium 1.6 (L) 1.8 - 2.4 mg/dL   SODIUM    Collection Time: 10/24/21  9:47 PM   Result Value Ref Range    Sodium 121 (L) 136 - 145 mmol/L   SODIUM    Collection Time: 10/25/21  1:08 AM   Result Value Ref Range    Sodium 124 (L) 136 - 912 mmol/L   METABOLIC PANEL, BASIC    Collection Time: 10/25/21  3:06 AM   Result Value Ref Range    Sodium 123 (L) 136 - 145 mmol/L    Potassium 3.6 3.5 - 5.1 mmol/L    Chloride 88 (L) 98 - 107 mmol/L    CO2 27 21 - 32 mmol/L    Anion gap 8 7 - 16 mmol/L    Glucose 95 65 - 100 mg/dL    BUN 11 8 - 23 MG/DL    Creatinine 0.45 (L) 0.6 - 1.0 MG/DL    GFR est AA >60 >60 ml/min/1.73m2    GFR est non-AA >60 >60 ml/min/1.73m2    Calcium 7.9 (L) 8.3 - 10.4 MG/DL   CBC W/O DIFF    Collection Time: 10/25/21  3:06 AM   Result Value Ref Range    WBC 8.5 4.3 - 11.1 K/uL    RBC 2.77 (L) 4.05 - 5.2 M/uL    HGB 7.8 (L) 11.7 - 15.4 g/dL    HCT 23.3 (L) 35.8 - 46.3 %    MCV 84.1 79.6 - 97.8 FL    MCH 28.2 26.1 - 32.9 PG    MCHC 33.5 31.4 - 35.0 g/dL    RDW 14.4 11.9 - 14.6 %    PLATELET 081 373 - 289 K/uL    MPV 8.7 (L) 9.4 - 12.3 FL    ABSOLUTE NRBC 0.03 0.0 - 0.2 K/uL   SODIUM    Collection Time: 10/25/21 10:07 AM   Result Value Ref Range    Sodium 125 (L) 136 - 145 mmol/L   MAGNESIUM    Collection Time: 10/25/21 10:07 AM   Result Value Ref Range    Magnesium 1.8 1.8 - 2.4 mg/dL       All Micro Results     Procedure Component Value Units Date/Time    CULTURE, URINE [[id number]] Collected: 10/23/21 0022    Order Status: Completed Specimen: Cath Urine Updated: 10/25/21 0808     Special Requests: NO SPECIAL REQUESTS        Culture result:       10,000 to 50,000 COLONIES/mL MIXED SKIN RENATE ISOLATED          CULTURE, URINE [[id number]] Collected: 10/21/21 1518    Order Status: Completed Specimen: Cath Urine Updated: 10/24/21 0750     Special Requests: NO SPECIAL REQUESTS        Culture result: NO GROWTH 2 DAYS       RESPIRATORY VIRUS PANEL W/COVID-19, PCR [[id number]]  (Abnormal) Collected: 10/20/21 1435    Order Status: Completed Specimen: Nasopharyngeal Updated: 10/20/21 1605     Adenovirus NOT DETECTED        Coronavirus 229E NOT DETECTED        Coronavirus HKU1 NOT DETECTED        Coronavirus CVNL63 NOT DETECTED        Coronavirus OC43 NOT DETECTED        SARS-CoV-2, PCR NOT DETECTED        Metapneumovirus NOT DETECTED        Rhinovirus and Enterovirus Detected        Influenza A NOT DETECTED        Influenza B NOT DETECTED        Parainfluenza 1 NOT DETECTED        Parainfluenza 2 NOT DETECTED        Parainfluenza 3 NOT DETECTED        Parainfluenza virus 4 NOT DETECTED        RSV by PCR NOT DETECTED        B. parapertussis, PCR NOT DETECTED        Bordetella pertussis - PCR NOT DETECTED        Chlamydophila pneumoniae DNA, QL, PCR NOT DETECTED        Mycoplasma pneumoniae DNA, QL, PCR NOT DETECTED       STREP, GROUP A, DIDIER [[id number]] Collected: 10/20/21 1404    Order Status: Completed Specimen: Throat Updated: 10/20/21 1429     Strep, Molecular Not detected        Comment: Negative for Strep A nucleic acid  Methodology: Isothermal Nucleic Acid Amplification               SARS-CoV-2 Lab Results  \"Novel Coronavirus\" Test: No results found for: COV2NT   \"Emergent Disease\" Test: No results found for: EDPR  \"SARS-COV-2\" Test: No results found for: XGCOVT  \"Precision Labs\" Test: No results found for: RSLT  Rapid Test: No results found for: COVR       Imaging:  No results found. No results found for this visit on 10/20/21.     Current Meds:  Current Facility-Administered Medications   Medication Dose Route Frequency    urea (URE-NA) 15 gram packet 2 Packet  2 Packet Oral TID    0.9% sodium chloride infusion 250 mL  250 mL IntraVENous PRN    lip protectant (BLISTEX) ointment 1 Each  1 Each Topical PRN    carvediloL (COREG) tablet 12.5 mg  12.5 mg Oral BID WITH MEALS    cloNIDine HCL (CATAPRES) tablet 0.1 mg  0.1 mg Oral Q6H PRN    0.9% sodium chloride infusion 250 mL  250 mL IntraVENous PRN    sodium chloride (NS) flush 5-40 mL  5-40 mL IntraVENous Q8H    sodium chloride (NS) flush 5-40 mL  5-40 mL IntraVENous PRN    hydrALAZINE (APRESOLINE) 20 mg/mL injection 20 mg  20 mg IntraVENous Q6H PRN    guaiFENesin (ROBITUSSIN) 100 mg/5 mL oral liquid 100 mg  100 mg Oral Q4H PRN    acetaminophen (TYLENOL) tablet 650 mg  650 mg Oral Q6H PRN    Or    acetaminophen (TYLENOL) suppository 650 mg  650 mg Rectal Q6H PRN    polyethylene glycol (MIRALAX) packet 17 g  17 g Oral DAILY PRN    ondansetron (ZOFRAN ODT) tablet 4 mg  4 mg Oral Q8H PRN    Or    ondansetron (ZOFRAN) injection 4 mg  4 mg IntraVENous Q6H PRN    aspirin delayed-release tablet 81 mg  81 mg Oral QHS    calcium-vitamin D (OS-CHLOÉ +D3) 500 mg-200 unit per tablet 1 Tablet  1 Tablet Oral DAILY    HYDROcodone-acetaminophen (NORCO) 5-325 mg per tablet 0.5 Tablet  0.5 Tablet Oral Q6H PRN    pantoprazole (PROTONIX) tablet 40 mg  40 mg Oral DAILY    atorvastatin (LIPITOR) tablet 20 mg  20 mg Oral QHS    fluticasone propionate (FLONASE) 50 mcg/actuation nasal spray 2 Spray  2 Spray Both Nostrils DAILY    benzonatate (TESSALON) capsule 100 mg  100 mg Oral TID PRN       Discharge Plan:     TBD    Signed By: Jessi Dias MD     October 25, 2021

## 2021-10-26 ENCOUNTER — APPOINTMENT (OUTPATIENT)
Dept: PHYSICAL THERAPY | Age: 81
End: 2021-10-26
Attending: ORTHOPAEDIC SURGERY
Payer: MEDICARE

## 2021-10-26 ENCOUNTER — APPOINTMENT (OUTPATIENT)
Dept: GENERAL RADIOLOGY | Age: 81
DRG: 643 | End: 2021-10-26
Attending: PHYSICIAN ASSISTANT
Payer: MEDICARE

## 2021-10-26 LAB
ANION GAP SERPL CALC-SCNC: 6 MMOL/L (ref 7–16)
BASOPHILS # BLD: 0 K/UL (ref 0–0.2)
BASOPHILS NFR BLD: 0 % (ref 0–2)
BUN SERPL-MCNC: 30 MG/DL (ref 8–23)
CALCIUM SERPL-MCNC: 8.3 MG/DL (ref 8.3–10.4)
CHLORIDE SERPL-SCNC: 94 MMOL/L (ref 98–107)
CO2 SERPL-SCNC: 28 MMOL/L (ref 21–32)
CREAT SERPL-MCNC: 0.58 MG/DL (ref 0.6–1)
DIFFERENTIAL METHOD BLD: ABNORMAL
EOSINOPHIL # BLD: 0.1 K/UL (ref 0–0.8)
EOSINOPHIL NFR BLD: 1 % (ref 0.5–7.8)
ERYTHROCYTE [DISTWIDTH] IN BLOOD BY AUTOMATED COUNT: 15.3 % (ref 11.9–14.6)
GLUCOSE SERPL-MCNC: 114 MG/DL (ref 65–100)
HCT VFR BLD AUTO: 27.1 % (ref 35.8–46.3)
HGB BLD-MCNC: 8.8 G/DL (ref 11.7–15.4)
IMM GRANULOCYTES # BLD AUTO: 0.2 K/UL (ref 0–0.5)
IMM GRANULOCYTES NFR BLD AUTO: 2 % (ref 0–5)
LYMPHOCYTES # BLD: 1 K/UL (ref 0.5–4.6)
LYMPHOCYTES NFR BLD: 11 % (ref 13–44)
MCH RBC QN AUTO: 28.1 PG (ref 26.1–32.9)
MCHC RBC AUTO-ENTMCNC: 32.5 G/DL (ref 31.4–35)
MCV RBC AUTO: 86.6 FL (ref 79.6–97.8)
MONOCYTES # BLD: 1.1 K/UL (ref 0.1–1.3)
MONOCYTES NFR BLD: 12 % (ref 4–12)
NEUTS SEG # BLD: 7 K/UL (ref 1.7–8.2)
NEUTS SEG NFR BLD: 74 % (ref 43–78)
NRBC # BLD: 0.06 K/UL (ref 0–0.2)
PLATELET # BLD AUTO: 328 K/UL (ref 150–450)
PMV BLD AUTO: 8.9 FL (ref 9.4–12.3)
POTASSIUM SERPL-SCNC: 3.5 MMOL/L (ref 3.5–5.1)
RBC # BLD AUTO: 3.13 M/UL (ref 4.05–5.2)
SODIUM SERPL-SCNC: 128 MMOL/L (ref 136–145)
SODIUM SERPL-SCNC: 131 MMOL/L (ref 136–145)
SODIUM SERPL-SCNC: 132 MMOL/L (ref 136–145)
WBC # BLD AUTO: 9.4 K/UL (ref 4.3–11.1)

## 2021-10-26 PROCEDURE — 84295 ASSAY OF SERUM SODIUM: CPT

## 2021-10-26 PROCEDURE — 73060 X-RAY EXAM OF HUMERUS: CPT

## 2021-10-26 PROCEDURE — 85025 COMPLETE CBC W/AUTO DIFF WBC: CPT

## 2021-10-26 PROCEDURE — 74011250637 HC RX REV CODE- 250/637: Performed by: STUDENT IN AN ORGANIZED HEALTH CARE EDUCATION/TRAINING PROGRAM

## 2021-10-26 PROCEDURE — 36415 COLL VENOUS BLD VENIPUNCTURE: CPT

## 2021-10-26 PROCEDURE — 65270000029 HC RM PRIVATE

## 2021-10-26 PROCEDURE — 97164 PT RE-EVAL EST PLAN CARE: CPT

## 2021-10-26 PROCEDURE — 74011250637 HC RX REV CODE- 250/637: Performed by: FAMILY MEDICINE

## 2021-10-26 PROCEDURE — 97530 THERAPEUTIC ACTIVITIES: CPT

## 2021-10-26 PROCEDURE — 80048 BASIC METABOLIC PNL TOTAL CA: CPT

## 2021-10-26 RX ORDER — CARVEDILOL 6.25 MG/1
6.25 TABLET ORAL 2 TIMES DAILY WITH MEALS
Status: DISCONTINUED | OUTPATIENT
Start: 2021-10-26 | End: 2021-10-27 | Stop reason: HOSPADM

## 2021-10-26 RX ORDER — TRAMADOL HYDROCHLORIDE 50 MG/1
50 TABLET ORAL
Status: DISCONTINUED | OUTPATIENT
Start: 2021-10-26 | End: 2021-10-27 | Stop reason: HOSPADM

## 2021-10-26 RX ADMIN — Medication 1 TABLET: at 09:18

## 2021-10-26 RX ADMIN — CARVEDILOL 6.25 MG: 6.25 TABLET, FILM COATED ORAL at 09:18

## 2021-10-26 RX ADMIN — BENZONATATE 100 MG: 100 CAPSULE ORAL at 03:59

## 2021-10-26 RX ADMIN — Medication 1 PACKET: at 09:23

## 2021-10-26 RX ADMIN — Medication 10 ML: at 06:00

## 2021-10-26 RX ADMIN — GUAIFENESIN 100 MG: 200 SOLUTION ORAL at 03:18

## 2021-10-26 RX ADMIN — PANTOPRAZOLE SODIUM 40 MG: 40 TABLET, DELAYED RELEASE ORAL at 09:18

## 2021-10-26 RX ADMIN — Medication 10 ML: at 21:24

## 2021-10-26 RX ADMIN — ATORVASTATIN CALCIUM 20 MG: 20 TABLET, FILM COATED ORAL at 21:22

## 2021-10-26 RX ADMIN — ASPIRIN 81 MG: 81 TABLET ORAL at 21:22

## 2021-10-26 RX ADMIN — Medication 10 ML: at 17:40

## 2021-10-26 NOTE — PROGRESS NOTES
Orthopedic Update:       Right humerus periprosthetic fracture. This patient has seen Dr. Dai Wyatt in the office for this. She was admitted on 10/20 with cough and AMS after being prescribed prednisone and promethazine- dextromethorphan w/ confusion, unable to walk, and having visual hallucinations. Will recheck radiographs on the right humerus. Dr. Dai Waytt is planning on seeing patient tomorrow AM per his medical assistant. I examined the patient this morning. She has her lopez brace in place. She had taken off her sling as this was irritating her elbow. She has moderate swelling in her right forearm and hand. Expected pain on movement of her elbow.

## 2021-10-26 NOTE — PROGRESS NOTES
Hospitalist Progress Note   Admit Date:  10/20/2021 10:44 AM   LOS: 4 days   Name:  Lorrayne Closs   Age:  78 y.o. Sex:  female  :  1940   MRN:  [id number]     Chief Complaint: Hallucinations  Reason(s) for Admission: Metabolic encephalopathy [D02.78]  Acute hyponatremia [E87.1]     Hospital Course & Interval History:   78 y.o. female who presented to the ED for cc cough and sore throat with altered mental status. Hx of recent humeral fracture Saturday following ortho with conservative treatment, HLD, HTN, CAD. Daughter at the bedside who states ever since she was prescribed prednisone and promethazine- dextromethorphan she has been having confusion, unable to walk, and having visual hallucinations. She was found to have hyponatremia. Her sodium continued to drop on IVFs, consistent with SIADH. IVFs were stopped and she was started on Urea-Na. Nephrology was consulted, who started her on 3% saline. Her sodium continued to drop so she was moved to the ICU. She developed worsening anemia and pelvic bruising. Her CK elevated, consistent with rhabdomyolysis. She was noted to be hypotensive so Levophed was started. Subjective (10/26/21):  Seen and examined at bedside this morning. Patient transferred out of ICU yesterday and has been doing well on the floor. She has been off Levophed for over 48 hours and has no longer suffered from any hypotension. Patient's main complaint this morning is pain in her right arm. I discussed pain management with patient and family at bedside and to possibly try tramadol she does not like opioids. She states Tylenol gives her minimal relief and in her RUE. Patient states that she has had multiple bowel movements over the last 24 hours and I discussed decreasing her bowel regimen as well.  PT/OT have recommended short-term rehab but patient and family hesitant to go to rehab due to not being able to have visitors and her worried about getting Covid I provided reassurance that she will likely not have Covid there but patient would prefer to be around her family and does not think she will be motivated if she were to go to rehab alone. She denies any headache, blurry or double vision, nausea, vomiting, diarrhea, fevers, chills, chest pain or pressure, palpitations, lightheadedness or dizziness. Review of systems negative except stated above.     Assessment & Plan:     Principal Problem:    Acute hyponatremia (10/21/2021)  - 10/20 Na 129  - 10/21 Na 127  - 10/22 Na 120 --> 117 --> 113 --> 112 (moved to ICU) - on 3% saline  - 10/23 Na 117 --> 124 - 3% saline stopped   - 10/24 Na 125 D5w started by nephro to prevent overcorrection--> overnight Na down to 121 and D5W stopped  - 10/25 give extra packet of urea this Am --> Na back up to 125  - 10/26 Na up to 131 this AM, decrease URE-NA to daily  - Likely SIADH --> unsure etiology  - Continue Urea-NA 1 packet daily  - Cont Fluid restriction  - Urine studies consistent with SIADH  - UA unremarkable  - Appreciate Nephrology's input and assistance  - 10/21 Bladder scan showed retention - placed Canales but has never had problems with retention before,  D/c canales today and check bladder scans    Active Problems:    Shock ([address] (10/23/2021)  - 10/23 Patient placed on Levophed overnight due to hypotension  - 10/26 Resolved Off Levophed x 48 hours - BP stable  - Unsure etiology - possibly hypovolemic with drop in Hgb  - Continue PRBC transfusion for Hgb<7  - CT A/P with no active source of bleeding other than BL rectus sheath hematomas, no RP hemorrhage or hemoperitoneum      Acute blood loss anemia (ABLA) (10/21/2021)  - 10/21 Hgb 7.7  - 10/22 Hgb stable at 7.7  - 10/23 Hgb 6.9 --> transfuse 1U PRBC  - 10/24 Hgb 6.4 -- > transfuse 1U PRBC  -10/26 Hgb stable 8.8   - Likely due to slow pelvic and/or right arm hematomas  - CT A/P as above with no RP hemorrhage or hemoperitoneum  - Check CBC daily      Traumatic rhabdomyolysis ([facility] [address]  - Likely due to right arm and pelvic trauma from fall  - CK 1,156 --> 1094 --> 784  - s/p IVFs  - UA with blood/RBC  - Vogt placed, D/c today   - Strict I/O      Visual hallucinations (10/20/2021)  - I suspect this is due to Promethazine and hyponatremia  - 10/25 much improved  - I also suspect we are developing ICU/hospital delirium  - UA unremarkable for UTI  - Promethazine stopped  - Continue to monitor      Laryngitis (10/21/2021)  - Due to rhinovirus on RVP  - Symptomatic care      Dry cough (10/16/2021)  - Due to rhinovirus on RSP  - Symptomatic care      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 (10/21/2021)  - No acute inpatient issues  - Large hematoma, but no current concern for compartment syndrome  - Follows with ortho as outpatient  - consulted orthopedic surgery and primary Orthopedic Surgeon will see in AM   - XR humerus with R humeral fracture top at stem of prosthesis      Essential hypertension (2/8/2017)  - Stable  - Continue to monitor      S/P reverse total shoulder arthroplasty, 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 6XXA  ICD-9-CM: 958.6  10/23/2021 - Present Yes        Shock ([facility] [address] ICD-10-CM: R57.9  ICD-9-CM: 785.50  10/23/2021 - Present Yes        Laryngitis ICD-10-CM: J04.0  ICD-9-CM: 464.00  10/21/2021 - Present Yes        * (Principal) Acute hyponatremia ICD-10-CM: E87.1  ICD-9-CM: 276.1  10/21/2021 - Present Yes        Acute blood loss anemia (ABLA) ICD-10-CM: D62  ICD-9-CM: 285.1  10/21/2021 - Present Yes        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 ICD-10-CM: M97Patrick Mazariegos  ICD-9-CM: 996.44, V43.61  10/21/2021 - Present Yes        Visual hallucinations ICD-10-CM: R44.1  ICD-9-CM: 368.16  10/20/2021 - Present Yes        Dry cough ICD-10-CM: R05.8  ICD-9-CM: 786.2  10/16/2021 - Present Yes        S/P reverse total shoulder arthroplasty, right ICD-10-CM: P73.539  ICD-9-CM: V43.61  7/11/2021 - Present Yes        Multiple thyroid nodules ICD-10-CM: E04.2  ICD-9-CM: 241.1  Unknown - Present Yes        Coronary artery disease involving native coronary artery of native heart without angina pectoris ICD-10-CM: I25.10  ICD-9-CM: 414.01  2/8/2017 - Present Yes        Essential hypertension ICD-10-CM: I10  ICD-9-CM: 401.9  2/8/2017 - Present Yes        Mixed hyperlipidemia ICD-10-CM: E78.2  ICD-9-CM: 272.2  2/8/2017 - Present Yes        Dyslipidemia ICD-10-CM: E78.5  ICD-9-CM: 272.4  8/16/2016 - Present Yes    Overview Signed 7/20/2021 11:21 AM by Leigh Lew     Last Assessment & Plan:   Formatting of this note might be different from the original.  LDL 85. Continue statin for heart protection                   Objective:     Patient Vitals for the past 24 hrs:   Temp Pulse Resp BP SpO2   10/26/21 1525 97.6 °F (36.4 °C) 65 18 (!) 112/54 94 %   10/26/21 1119 99 °F (37.2 °C) 65 16 (!) 116/57 94 %   10/26/21 0808 98.8 °F (37.1 °C) 71 16 (!) 131/57 93 %   10/26/21 0403 98.4 °F (36.9 °C) 66 18 (!) 103/52 92 %   10/26/21 0019 99.1 °F (37.3 °C) (!) 58 18 (!) 116/42 94 %   10/25/21 1918 99.2 °F (37.3 °C) 69 18 (!) 103/44 92 %     Oxygen Therapy  O2 Sat (%): 94 % (10/26/21 1525)  Pulse via Oximetry: 66 beats per minute (10/25/21 1515)  O2 Device: None (Room air) (10/25/21 1945)  Skin Assessment: Clean, dry, & intact (10/25/21 7261)  Skin Protection for O2 Device: N/A (10/25/21 0737)  O2 Flow Rate (L/min): 1 l/min (10/25/21 0737)  FIO2 (%): 24 % (10/24/21 0848)    Estimated body mass index is 26.89 kg/m² as calculated from the following:    Height as of this encounter: 5' 2\" (1.575 m). Weight as of this encounter: 66.7 kg (147 lb).     Intake/Output Summary (Last 24 hours) at 10/26/2021 1617  Last data filed at 10/26/2021 1319  Gross per 24 hour   Intake 442 ml   Output 2000 ml   Net -1558 ml         Physical Exam:   General:    Well nourished. No overt distress appears comfortable  Head:  Normocephalic, atraumatic  Eyes:  Sclerae appear normal.  Pupils equally round. ENT:  Nares appear normal, no drainage. Moist oral mucosa  Neck:  No restricted ROM. Trachea midline   CV:   RRR. No m/r/g. No jugular venous distension. Lungs:   CTAB. No wheezing, rhonchi, or rales. Respirations even, unlabored  Abdomen: Bowel sounds present. Soft, nontender, nondistended. Extremities: Right arm with swelling and bruising  Skin:     Right arm with swelling and bruising, large pelvic hematoma. Neuro: Cranial nerves II-XII grossly intact. Sensation intact  Psych: Confused but improved.   Alert and oriented x2-3    I have reviewed ordered lab tests and independently visualized imaging below:    Last 24hr Labs:  Recent Results (from the past 24 hour(s))   SODIUM    Collection Time: 10/25/21 10:36 PM   Result Value Ref Range    Sodium 126 (L) 136 - 186 mmol/L   METABOLIC PANEL, BASIC    Collection Time: 10/26/21  5:18 AM   Result Value Ref Range    Sodium 128 (L) 136 - 145 mmol/L    Potassium 3.5 3.5 - 5.1 mmol/L    Chloride 94 (L) 98 - 107 mmol/L    CO2 28 21 - 32 mmol/L    Anion gap 6 (L) 7 - 16 mmol/L    Glucose 114 (H) 65 - 100 mg/dL    BUN 30 (H) 8 - 23 MG/DL    Creatinine 0.58 (L) 0.6 - 1.0 MG/DL    GFR est AA >60 >60 ml/min/1.73m2    GFR est non-AA >60 >60 ml/min/1.73m2    Calcium 8.3 8.3 - 10.4 MG/DL   CBC WITH AUTOMATED DIFF    Collection Time: 10/26/21  5:18 AM   Result Value Ref Range    WBC 9.4 4.3 - 11.1 K/uL    RBC 3.13 (L) 4.05 - 5.2 M/uL    HGB 8.8 (L) 11.7 - 15.4 g/dL    HCT 27.1 (L) 35.8 - 46.3 %    MCV 86.6 79.6 - 97.8 FL    MCH 28.1 26.1 - 32.9 PG    MCHC 32.5 31.4 - 35.0 g/dL    RDW 15.3 (H) 11.9 - 14.6 %    PLATELET 629 094 - 782 K/uL    MPV 8.9 (L) 9.4 - 12.3 FL    ABSOLUTE NRBC 0.06 0.0 - 0.2 K/uL    DF AUTOMATED      NEUTROPHILS 74 43 - 78 %    LYMPHOCYTES 11 (L) 13 - 44 %    MONOCYTES 12 4.0 - 12.0 %    EOSINOPHILS 1 0.5 - 7.8 %    BASOPHILS 0 0.0 - 2.0 %    IMMATURE GRANULOCYTES 2 0.0 - 5.0 %    ABS. NEUTROPHILS 7.0 1.7 - 8.2 K/UL    ABS. LYMPHOCYTES 1.0 0.5 - 4.6 K/UL    ABS. MONOCYTES 1.1 0.1 - 1.3 K/UL    ABS. EOSINOPHILS 0.1 0.0 - 0.8 K/UL    ABS. BASOPHILS 0.0 0.0 - 0.2 K/UL    ABS. IMM.  GRANS. 0.2 0.0 - 0.5 K/UL   SODIUM    Collection Time: 10/26/21 10:36 AM   Result Value Ref Range    Sodium 131 (L) 136 - 145 mmol/L       All Micro Results     Procedure Component Value Units Date/Time    CULTURE, URINE [[id number]] Collected: 10/23/21 0022    Order Status: Completed Specimen: Cath Urine Updated: 10/25/21 0808     Special Requests: NO SPECIAL REQUESTS        Culture result:       10,000 to 50,000 COLONIES/mL MIXED SKIN RENATE ISOLATED          CULTURE, URINE [[id number]] Collected: 10/21/21 1518    Order Status: Completed Specimen: Cath Urine Updated: 10/24/21 0750     Special Requests: NO SPECIAL REQUESTS        Culture result: NO GROWTH 2 DAYS       RESPIRATORY VIRUS PANEL W/COVID-19, PCR [[id number]]  (Abnormal) Collected: 10/20/21 1435    Order Status: Completed Specimen: Nasopharyngeal Updated: 10/20/21 1605     Adenovirus NOT DETECTED        Coronavirus 229E NOT DETECTED        Coronavirus HKU1 NOT DETECTED        Coronavirus CVNL63 NOT DETECTED        Coronavirus OC43 NOT DETECTED        SARS-CoV-2, PCR NOT DETECTED        Metapneumovirus NOT DETECTED        Rhinovirus and Enterovirus Detected        Influenza A NOT DETECTED        Influenza B NOT DETECTED        Parainfluenza 1 NOT DETECTED        Parainfluenza 2 NOT DETECTED        Parainfluenza 3 NOT DETECTED        Parainfluenza virus 4 NOT DETECTED        RSV by PCR NOT DETECTED        B. parapertussis, PCR NOT DETECTED        Bordetella pertussis - PCR NOT DETECTED        Chlamydophila pneumoniae DNA, QL, PCR NOT DETECTED Mycoplasma pneumoniae DNA, QL, PCR NOT DETECTED       STREP, GROUP A, DIDIER [[id number]] Collected: 10/20/21 1404    Order Status: Completed Specimen: Throat Updated: 10/20/21 1429     Strep, Molecular Not detected        Comment: Negative for Strep A nucleic acid  Methodology: Isothermal Nucleic Acid Amplification               SARS-CoV-2 Lab Results  \"Novel Coronavirus\" Test: No results found for: COV2NT   \"Emergent Disease\" Test: No results found for: EDPR  \"SARS-COV-2\" Test: No results found for: XGCOVT  \"Precision Labs\" Test: No results found for: RSLT  Rapid Test: No results found for: COVR       Imaging:  XR HUMERUS RT    Result Date: 10/26/2021  Right humerus INDICATION: Periprosthetic fracture. COMPARISON: Right shoulder x-ray 10/19/2021. AP and lateral views of the right humerus were obtained. FINDINGS: Right shoulder joint replacement hardware is noted. Transversely oriented fracture through the mid humerus at the tip of the stem of the prosthesis is again noted with single butterfly fragment. Alignment is near-anatomic. Right humeral fracture at tip of the stem of the prosthesis    No results found for this visit on 10/20/21.     Current Meds:  Current Facility-Administered Medications   Medication Dose Route Frequency    carvediloL (COREG) tablet 6.25 mg  6.25 mg Oral BID WITH MEALS    [START ON 10/27/2021] urea (URE-NA) 15 gram packet 1 Packet  1 Packet Oral DAILY    0.9% sodium chloride infusion 250 mL  250 mL IntraVENous PRN    lip protectant (BLISTEX) ointment 1 Each  1 Each Topical PRN    cloNIDine HCL (CATAPRES) tablet 0.1 mg  0.1 mg Oral Q6H PRN    0.9% sodium chloride infusion 250 mL  250 mL IntraVENous PRN    sodium chloride (NS) flush 5-40 mL  5-40 mL IntraVENous Q8H    sodium chloride (NS) flush 5-40 mL  5-40 mL IntraVENous PRN    hydrALAZINE (APRESOLINE) 20 mg/mL injection 20 mg  20 mg IntraVENous Q6H PRN    guaiFENesin (ROBITUSSIN) 100 mg/5 mL oral liquid 100 mg  100 mg Oral Q4H PRN    acetaminophen (TYLENOL) tablet 650 mg  650 mg Oral Q6H PRN    Or    acetaminophen (TYLENOL) suppository 650 mg  650 mg Rectal Q6H PRN    polyethylene glycol (MIRALAX) packet 17 g  17 g Oral DAILY PRN    ondansetron (ZOFRAN ODT) tablet 4 mg  4 mg Oral Q8H PRN    Or    ondansetron (ZOFRAN) injection 4 mg  4 mg IntraVENous Q6H PRN    aspirin delayed-release tablet 81 mg  81 mg Oral QHS    calcium-vitamin D (OS-CHLOÉ +D3) 500 mg-200 unit per tablet 1 Tablet  1 Tablet Oral DAILY    HYDROcodone-acetaminophen (NORCO) 5-325 mg per tablet 0.5 Tablet  0.5 Tablet Oral Q6H PRN    pantoprazole (PROTONIX) tablet 40 mg  40 mg Oral DAILY    atorvastatin (LIPITOR) tablet 20 mg  20 mg Oral QHS    fluticasone propionate (FLONASE) 50 mcg/actuation nasal spray 2 Spray  2 Spray Both Nostrils DAILY    benzonatate (TESSALON) capsule 100 mg  100 mg Oral TID PRN       Discharge Plan:     PT/OT recommending STR, but patient and family do not want to go to STR and have elected to go home home with [facility], possibly tomorrow.      Signed By: Jessi Dias MD     October 26, 2021

## 2021-10-26 NOTE — PROGRESS NOTES
ACUTE PHYSICAL THERAPY GOALS:  (Developed with and agreed upon by patient and/or caregiver.)  Updated 10/26  (1.) Cira Harada will move from supine to sit and sit to supine , scoot up and down and roll side to side with CONTACT GUARD ASSIST within 7 treatment day(s). (2.) Cira Harada will transfer from bed to chair and chair to bed with CONTACT GUARD ASSIST using the least restrictive device within 7 treatment day(s). (3.) Cira Harada will ambulate with CONTACT GUARD ASSIST for 100+ feet with the least restrictive device within 7 treatment day(s). (4.) Cira Harada will perform standing static and dynamic balance activities x 15 minutes with CONTACT GUARD ASSIST to improve safety within 7 treatment day(s). (5.) Cira Harada will perform therapeutic exercises x 15 min for HEP with SUPERVISION to improve strength, endurance, and functional mobility within 7 treatment day(s).      PHYSICAL THERAPY ASSESSMENT: Daily Note and Re-evaluation PT Treatment Day # 1    Cira Harada is a 78 y.o. female   PRIMARY DIAGNOSIS: Acute hyponatremia  Metabolic encephalopathy [Y00.02]  Acute hyponatremia [E87.1]     Reason for Referral:   ICD-10: Treatment Diagnosis: Generalized Muscle Weakness (M62.81)  Other lack of cordination (R27.8)  Difficulty in walking, Not elsewhere classified (R26.2)  Other abnormalities of gait and mobility (R26.89)  History of falling (Z91.81)  Dizziness and Giddiness (R42)  [place]PATIENT: [place] / [place] / Product Type: Medicare /     ASSESSMENT:     REHAB RECOMMENDATIONS:   Recommendation to date pending progress:  Setting:   Short-term Rehab vs HHPT  Equipment:    To Be Determined     PRIOR LEVEL OF FUNCTION:  (Prior to Hospitalization) INITIAL/CURRENT LEVEL OF FUNCTION:  (Most Recently Demonstrated)   Bed Mobility:   Independent  Sit to Stand:   Independent  Transfers:   Independent  Gait/Mobility:   Independent Bed Mobility:   Minimal Assistance  Sit to Stand:   Minimal Assistance  Transfers:   Minimal Assistance -moderate assistance  Gait/Mobility:   Unable to perform     ASSESSMENT:  Ms. Ilir Lindsey is an 78year old F who presents to hospital with acute hyponatremia, metabolic encephalopathy. Pt with a recent RUE shoulder replacement (July), she fell after cough medication made her feel dizzy & confused, found to have RUE humeral fx. Non-operative conservative management pursued, pt NWB RUE, to wear sling for comfort and placed in Poe brace. 10/26: pt performed bed mobility with min A and support of the RUE. She was able to demonstrate good standing balance with min A for stability and min-mod A to transfer to chair. PT discussed DC options with pt and daughter and educated on what each entails. Pt family would like for her to return home with 24/7 care and HHPT at this time. Pt would benefit from use of device, potentially SPC/quad cane or oswald-walker for increased support and to promote increased independence with mobility. She demonstrates improvements in mobility since initial assessment. PT will continue to follow to address mobility deficits. SUBJECTIVE:   Ms. Ilir Lindsey states, \"It's just this arm giving me problems. \"    SOCIAL HISTORY/LIVING ENVIRONMENT: Lives alone, daughters live nearby and are very supportive. Typically no use of DME. Independent.  Daughter is able to stay with pt at [facility]: Child(dandy), Other Family Member(s)  OBJECTIVE:     PAIN: VITAL SIGNS: LINES/DRAINS:   Pre Treatment: Pain Screen  Pain Scale 1: Numeric (0 - 10)  Pain Intensity 1: 5  Pain Onset 1: acute  Pain Location 1: Arm  Pain Orientation 1: Right  Pain Description 1: Aching  Post Treatment: 5   Vogt Catheter  O2 Device: None (Room air)     GROSS EVALUATION:  BLE Within Functional Limits Abnormal/ Functional Abnormal/ Non-Functional (see comments) Not Tested Comments:   AROM [x] [] [] []    PROM [x] [] [] []    Strength [] [x] [] []  generally weak, MMT grossly 4/5 BLE   Balance [] [x] [] []  fair + sitting; poor standing   Posture [] [x] [] []  forward head, rounded shoulders   Sensation [x] [] [] []    Coordination [x] [] [] []    Tone [] [] [] [x]    Edema [] [] [] [x]    Activity Tolerance [] [] [x] []      [] [] [] []      COGNITION/  PERCEPTION: Intact Impaired   (see comments) Comments:   Orientation [x] []    Vision [x] []    Hearing [x] []    Command Following [x] []    Safety Awareness [x] []     [] []      MOBILITY: I Mod I S SBA CGA Min Mod Max Total  NT x2 Comments:   Bed Mobility    Rolling [] [] [] [] [] [] [] [] [] [x] []    Supine to Sit [] [] [] [] [] [x] [] [] [] [] []    Scooting [] [] [] [] [] [] [] [] [] [x] []    Sit to Supine [] [] [] [] [] [] [] [] [] [x] []    Transfers    Sit to Stand [] [] [] [] [] [x] [] [] [] [] []    Bed to Chair [] [] [] [] [] [x] [x] [] [] [] []    Stand to Sit [] [] [] [] [] [x] [] [] [] [] []    I=Independent, Mod I=Modified Independent, S=Supervision, SBA=Standby Assistance, CGA=Contact Guard Assistance,   Min=Minimal Assistance, Mod=Moderate Assistance, Max=Maximal Assistance, Total=Total Assistance, NT=Not Tested  GAIT: I Mod I S SBA CGA Min Mod Max Total  NT x2 Comments:   Level of Assistance [] [] [] [] [] [] [] [] [] [x] []    Distance N/A    DME N/A    Gait Quality N/A    Weightbearing Status N/A     I=Independent, Mod I=Modified Independent, S=Supervision, SBA=Standby Assistance, CGA=Contact Guard Assistance,   Min=Minimal Assistance, Mod=Moderate Assistance, Max=Maximal Assistance, Total=Total Assistance, NT=Not Tested    [facility] AM-PAC 6 Clicks   Basic Mobility Inpatient Short Form       How much difficulty does the patient currently have. .. Unable A Lot A Little None   1. Turning over in bed (including adjusting bedclothes, sheets and blankets)? [] 1   [] 2   [x] 3   [] 4   2.   Sitting down on and standing up from a chair with arms ( e.g., wheelchair, bedside commode, etc.)   [] 1   [] 2 [x] 3   [] 4   3. Moving from lying on back to sitting on the side of the bed? [] 1   [] 2   [x] 3   [] 4   How much help from another person does the patient currently need. .. Total A Lot A Little None   4. Moving to and from a bed to a chair (including a wheelchair)? [] 1   [x] 2   [] 3   [] 4   5. Need to walk in hospital room? [] 1   [x] 2   [] 3   [] 4   6. Climbing 3-5 steps with a railing? [] 1   [x] 2   [] 3   [] 4   © 2007, Trustees of [facility] [employer], under license to [employer]. All rights reserved     Score:  Initial: 13 Most Recent: 15 (Date: 10/26 )    Interpretation of Tool:  Represents activities that are increasingly more difficult (i.e. Bed mobility, Transfers, Gait). PLAN:   FREQUENCY/DURATION: PT Plan of Care: 3 times/week for duration of hospital stay or until stated goals are met, whichever comes first.    PROBLEM LIST:   (Skilled intervention is medically necessary to address:)  1. Decreased ADL/Functional Activities  2. Decreased Activity Tolerance  3. Decreased Balance  4. Decreased Coordination  5. Decreased Gait Ability  6. Decreased Strength  7. Decreased Transfer Abilities   INTERVENTIONS PLANNED:   (Benefits and precautions of physical therapy have been discussed with the patient.)  1. Therapeutic Activity  2. Therapeutic Exercise/HEP  3. Neuromuscular Re-education  4. Gait Training  5. Education     TREATMENT:     EVALUATION: Re-Evaluation : (Untimed Charge)    TREATMENT:   ($$ Therapeutic Activity: 8-22 mins    )  Therapeutic Activity (10 Minutes): Therapeutic activity included Supine to Sit, Transfer Training, Sitting balance  and Standing balance to improve functional Mobility, Strength and Activity tolerance.     TREATMENT GRID:  N/A    AFTER TREATMENT POSITION/PRECAUTIONS:  Chair, Needs within reach and Visitors at bedside    INTERDISCIPLINARY COLLABORATION:  RN/PCT and PT/PTA    TOTAL TREATMENT DURATION:  PT Patient Time In/Time Out  Time In: 1058  Time Out: [address]

## 2021-10-26 NOTE — PROGRESS NOTES
[facility] PROGRESS NOTE    Follow up for: Hyponatremia    Subjective:   Patient remains alert. No distress. Afebrile.  Sodium continues to improve    ROS:  No vomiting  No headache  No SOB    Objective:   Exam:  Vitals:    10/26/21 0019 10/26/21 0403 10/26/21 0459 10/26/21 0808   BP: (!) 116/42 (!) 103/52  (!) 131/57   Pulse: (!) 58 66  71   Resp: 18 18  16   Temp: 99.1 °F (37.3 °C) 98.4 °F (36.9 °C)  98.8 °F (37.1 °C)   SpO2: 94% 92%  93%   Weight:   66.7 kg (147 lb)    Height:             Intake/Output Summary (Last 24 hours) at 10/26/2021 1050  Last data filed at 10/26/2021 1015  Gross per 24 hour   Intake 642 ml   Output 2775 ml   Net -2133 ml       Current Facility-Administered Medications   Medication Dose Route Frequency    carvediloL (COREG) tablet 6.25 mg  6.25 mg Oral BID WITH MEALS    urea (URE-NA) 15 gram packet 1 Packet  1 Packet Oral TID    0.9% sodium chloride infusion 250 mL  250 mL IntraVENous PRN    lip protectant (BLISTEX) ointment 1 Each  1 Each Topical PRN    cloNIDine HCL (CATAPRES) tablet 0.1 mg  0.1 mg Oral Q6H PRN    0.9% sodium chloride infusion 250 mL  250 mL IntraVENous PRN    sodium chloride (NS) flush 5-40 mL  5-40 mL IntraVENous Q8H    sodium chloride (NS) flush 5-40 mL  5-40 mL IntraVENous PRN    hydrALAZINE (APRESOLINE) 20 mg/mL injection 20 mg  20 mg IntraVENous Q6H PRN    guaiFENesin (ROBITUSSIN) 100 mg/5 mL oral liquid 100 mg  100 mg Oral Q4H PRN    acetaminophen (TYLENOL) tablet 650 mg  650 mg Oral Q6H PRN    Or    acetaminophen (TYLENOL) suppository 650 mg  650 mg Rectal Q6H PRN    polyethylene glycol (MIRALAX) packet 17 g  17 g Oral DAILY PRN    ondansetron (ZOFRAN ODT) tablet 4 mg  4 mg Oral Q8H PRN    Or    ondansetron (ZOFRAN) injection 4 mg  4 mg IntraVENous Q6H PRN    aspirin delayed-release tablet 81 mg  81 mg Oral QHS    calcium-vitamin D (OS-CHLOÉ +D3) 500 mg-200 unit per tablet 1 Tablet  1 Tablet Oral DAILY    HYDROcodone-acetaminophen (NORCO) 5-325 mg per tablet 0.5 Tablet  0.5 Tablet Oral Q6H PRN    pantoprazole (PROTONIX) tablet 40 mg  40 mg Oral DAILY    atorvastatin (LIPITOR) tablet 20 mg  20 mg Oral QHS    fluticasone propionate (FLONASE) 50 mcg/actuation nasal spray 2 Spray  2 Spray Both Nostrils DAILY    benzonatate (TESSALON) capsule 100 mg  100 mg Oral TID PRN       EXAM  GEN - Alert, oriented, in no distress  HEENT - NC/AT, non icteric sclera  CV - no audible murmur, regular rate  Lung - equal chest rise, no audible wheezing  Abd - non distended  Ext - no edema, no cyanosis  Access: n/a    Recent Labs     10/26/21  0518 10/25/21  0306 10/24/21  1352 10/24/21  0256 10/24/21  0256   WBC 9.4 8.5  --   --  8.8   HGB 8.8* 7.8* 8.5*   < > 6.4*   HCT 27.1* 23.3* 25.2*   < > 19.3*    247  --   --  236    < > = values in this interval not displayed. Recent Labs     10/26/21  0518 10/25/21  2236 10/25/21  1604 10/25/21  1007 10/25/21  1007 10/25/21  0306 10/25/21  0306 10/24/21  2147 10/24/21  1506 10/24/21  0350 10/24/21  0256   * 126* 127*   < > 125*   < > 123*   < > 124*   < > 122*   K 3.5  --   --   --   --   --  3.6  --  3.4*   < > 3.5   CL 94*  --   --   --   --   --  88*  --   --   --  89*   CO2 28  --   --   --   --   --  27  --   --   --  29   BUN 30*  --   --   --   --   --  11  --   --   --  13   CREA 0.58*  --   --   --   --   --  0.45*  --   --   --  0.55*   CA 8.3  --   --   --   --   --  7.9*  --   --   --  8.0*   *  --   --   --   --   --  95  --   --   --  139*   MG  --   --   --   --  1.8  --   --   --  1.6*  --   --     < > = values in this interval not displayed. Assessment and Plan:   SIADH  - improved over 48 hours. Continue Ure-Na - decrease to once daily. - follow strict I&Os  - free water intake should be <1 L, though NPO currently. - sodium check q12 hours    Hypomagnesemia - controlled for now. Would check intermittently.    HTN - controlled    Jennifer Manuel MD  [facility]

## 2021-10-26 NOTE — PROGRESS NOTES
Pt was discussed during IDR and chart screened by CM for d/c planning. Pt was transferred on 10-25-21 from ICU to room 505 for progression of care. CM met with pt and her daughter Gabreil May [phone] at the bedside to discuss d/c planning. [place] PPE and social distancing were observed. A/O x3. Pt resides alone in a one-story house without any steps to enter. Her daughter Tiburcio Boyer resides behind her. Pt has the following home DME: wheelchair, RTS, and shower chair. Prior to hospitalization pt was independent with ADLs and was not receiving [facility] services. She has insurance with pharmacy benefits and prefers the [facility] pharmacy in [place]. PT and OT are recommending STR at d/c. Dr. Corby Ramos stated he spoke with pt about STR this morning and she was agreeable. When CM inquired about STR and which facilities pt preferred she stated \"I've been talking to my daughter (pointing to Tiburcio Boyer) and I don't want to do that. I am going home with home health. I don't want to go somewhere with all of the [place] and me having this cough. \"  CM asked both pt and Tiburcio Boyer if they felt pt would be adequately cared for in this setting and they both agreed. They requested [facility]. CM notified Dr. Corby Ramos via [email or web address] that pt has changed her mind and is now refusing STR. CM will continue to follow and remain available if any needs arise. Care Management Interventions  PCP Verified by CM: Yes Priscilla Ibarra MD)  Mode of Transport at Discharge:  Other (see comment) (Family)  Transition of Care Consult (CM Consult): Home Health, Discharge [address] [facility]: Yes  Discharge Durable Medical Equipment: No  Physical Therapy Consult: Yes  Occupational Therapy Consult: Yes  Speech Therapy Consult: Yes  Support Systems: Child(dandy), Other Family Member(s)  Confirm Follow Up Transport: Family  The Patient and/or Patient Representative was Provided with a Choice of Provider and Agrees with the Discharge Plan?: Yes  Name of the Patient Representative Who was Provided with a Choice of Provider and Agrees with the Discharge Plan: Shayy Eden and Tulio Mir Silvestre of Choice List was Provided with Basic Dialogue that Supports the Patient's Individualized Plan of Care/Goals, Treatment Preferences and Shares the Quality Data Associated with the Providers?: Yes   Resource Information Provided?: No  Discharge Location  Discharge Placement: Home with home health

## 2021-10-26 NOTE — PROGRESS NOTES
Problem: Falls - Risk of  Goal: *Absence of Falls  Description: Document Ainsley Gottlieb Fall Risk and appropriate interventions in the flowsheet. Outcome: Progressing Towards Goal  Note: Fall Risk Interventions:  Mobility Interventions: OT consult for ADLs, Patient to call before getting OOB, PT Consult for mobility concerns    Mentation Interventions: Adequate sleep, hydration, pain control, Door open when patient unattended, Family/sitter at bedside    Medication Interventions: Patient to call before getting OOB, Evaluate medications/consider consulting pharmacy    Elimination Interventions: Call light in reach, Patient to call for help with toileting needs    History of Falls Interventions: Door open when patient unattended         Problem: Patient Education: Go to Patient Education Activity  Goal: Patient/Family Education  Outcome: Progressing Towards Goal     Problem: Patient Education: Go to Patient Education Activity  Goal: Patient/Family Education  Outcome: Progressing Towards Goal     Problem: Patient Education: Go to Patient Education Activity  Goal: Patient/Family Education  Outcome: Progressing Towards Goal     Problem: Pressure Injury - Risk of  Goal: *Prevention of pressure injury  Description: Document Miguel Scale and appropriate interventions in the flowsheet.   Outcome: Progressing Towards Goal  Note: Pressure Injury Interventions:  Sensory Interventions: Assess changes in LOC    Moisture Interventions: Absorbent underpads, Apply protective barrier, creams and emollients    Activity Interventions: PT/OT evaluation, Pressure redistribution bed/mattress(bed type)    Mobility Interventions: Pressure redistribution bed/mattress (bed type), PT/OT evaluation    Nutrition Interventions: Offer support with meals,snacks and hydration    Friction and Shear Interventions: Apply protective barrier, creams and emollients                Problem: Patient Education: Go to Patient Education Activity  Goal: Patient/Family Education  Outcome: Progressing Towards Goal     Problem: Non-Violent Restraints  Goal: Removal from restraints as soon as assessed to be safe  Outcome: Progressing Towards Goal  Goal: No harm/injury to patient while restraints in use  Outcome: Progressing Towards Goal  Goal: Patient's dignity will be maintained  Outcome: Progressing Towards Goal  Goal: Patient Interventions  Outcome: Progressing Towards Goal     Problem: Delirium Treatment  Goal: *Level of consciousness restored to baseline  Outcome: Progressing Towards Goal  Goal: *Level of environmental perceptions restored to baseline  Outcome: Progressing Towards Goal  Goal: *Sensory perception restored to baseline  Outcome: Progressing Towards Goal  Goal: *Emotional stability restored to baseline  Outcome: Progressing Towards Goal  Goal: *Functional assessment restored to baseline  Outcome: Progressing Towards Goal  Goal: *Absence of falls  Outcome: Progressing Towards Goal  Goal: *Will remain free of delirium, CAM Score negative  Outcome: Progressing Towards Goal  Goal: *Cognitive status will be restored to baseline  Outcome: Progressing Towards Goal  Goal: Interventions  Outcome: Progressing Towards Goal     Problem: Patient Education: Go to Patient Education Activity  Goal: Patient/Family Education  Outcome: Progressing Towards Goal     Problem: Patient Education: Go to Patient Education Activity  Goal: Patient/Family Education  Outcome: Progressing Towards Goal

## 2021-10-26 NOTE — PROGRESS NOTES
Date of Outreach Update:  Mumtaz Reilly was seen and assessed. Previous Outreach assessment has been reviewed. There have been no significant clinical changes since the completion of the last dated Outreach assessment. Family at bedside, patient on Room Air, no complaints at this time. Will continue to follow up per outreach protocol.     Signed By:   Guillermo Gonsalez RN    October 25, 2021 11:27 PM

## 2021-10-26 NOTE — ACP (ADVANCE CARE PLANNING)
Advance Care Planning     General Advance Care Planning (ACP) Conversation      Date of Conversation: 10/20/2021  Conducted with: Patient with Decision Making Capacity    Healthcare Decision Maker:   No healthcare decision makers have been documented. Click here to complete [address] including selection of the Healthcare Decision Maker Relationship (ie \"Primary\")    Today we documented Decision Maker(s) consistent with Legal Next of Kin hierarchy.     Content/Action Overview:   Has NO ACP documents/care preferences - refer to ACP Clinical Specialist  Reviewed DNR/DNI and patient elects Full Code (Attempt Resuscitation)  Topics discussed: hospitalization preferences and resuscitation preferences  Additional Comments: N/A     Length of Voluntary ACP Conversation in minutes:  <16 minutes (Non-Billable)    Vani Fofana LMSW

## 2021-10-26 NOTE — PROGRESS NOTES
Date of Outreach Update:  Alessandro Akbar was seen and assessed. Previous Outreach assessment has been reviewed. There have been no significant clinical changes since the completion of the last dated Outreach assessment. Family member at bedside and just assisted pt back to bed after having a bm. Positioned for comfort with RUE elevated on pillow. No needs voiced. Call light in reach. Will continue to follow up per outreach protocol.     Signed By:   Alessio Mccabe RN    October 26, 2021 6:37 PM

## 2021-10-26 NOTE — PROGRESS NOTES
Critical Care Outreach Nurse Progress Report:    Subjective: In to assess pt secondary to f/u transfer from ICU  MEWS Score: 1 (10/26/21 1525)    Vitals:    10/26/21 0459 10/26/21 0808 10/26/21 1119 10/26/21 1525   BP:  (!) 131/57 (!) 116/57 (!) 112/54   Pulse:  71 65 65   Resp:  16 16 18   Temp:  98.8 °F (37.1 °C) 99 °F (37.2 °C) 97.6 °F (36.4 °C)   SpO2:  93% 94% 94%   Weight: 66.7 kg (147 lb)      Height:            Objective: Pt sitting up in chair, in NAD. Family member at bedside. Pain Intensity 1: 5 (10/26/21 1300)  Pain Location 1: Arm  Pain Intervention(s) 1: Medication (see MAR)  Patient Stated Pain Goal: 0    Assessment: Pt alert and oriented. On room air. Right arm in brace and elevated on pillow. Moderate swelling to right hand and up arm w/some bruising. Using ice pack to help with swelling/pain. VSS. No needs voiced at this time. Plan: will follow per outreach protocol.

## 2021-10-27 VITALS
DIASTOLIC BLOOD PRESSURE: 60 MMHG | RESPIRATION RATE: 16 BRPM | WEIGHT: 147 LBS | SYSTOLIC BLOOD PRESSURE: 122 MMHG | BODY MASS INDEX: 27.05 KG/M2 | HEIGHT: 62 IN | OXYGEN SATURATION: 95 % | HEART RATE: 58 BPM | TEMPERATURE: 98.2 F

## 2021-10-27 LAB
ANION GAP SERPL CALC-SCNC: 5 MMOL/L (ref 7–16)
BASOPHILS # BLD: 0.1 K/UL (ref 0–0.2)
BASOPHILS NFR BLD: 1 % (ref 0–2)
BUN SERPL-MCNC: 18 MG/DL (ref 8–23)
CALCIUM SERPL-MCNC: 8.2 MG/DL (ref 8.3–10.4)
CHLORIDE SERPL-SCNC: 99 MMOL/L (ref 98–107)
CO2 SERPL-SCNC: 30 MMOL/L (ref 21–32)
CREAT SERPL-MCNC: 0.53 MG/DL (ref 0.6–1)
DIFFERENTIAL METHOD BLD: ABNORMAL
EOSINOPHIL # BLD: 0.3 K/UL (ref 0–0.8)
EOSINOPHIL NFR BLD: 3 % (ref 0.5–7.8)
ERYTHROCYTE [DISTWIDTH] IN BLOOD BY AUTOMATED COUNT: 15.9 % (ref 11.9–14.6)
GLUCOSE SERPL-MCNC: 128 MG/DL (ref 65–100)
HCT VFR BLD AUTO: 28.2 % (ref 35.8–46.3)
HGB BLD-MCNC: 9.3 G/DL (ref 11.7–15.4)
IMM GRANULOCYTES # BLD AUTO: 0.2 K/UL (ref 0–0.5)
IMM GRANULOCYTES NFR BLD AUTO: 2 % (ref 0–5)
LYMPHOCYTES # BLD: 1.1 K/UL (ref 0.5–4.6)
LYMPHOCYTES NFR BLD: 11 % (ref 13–44)
MCH RBC QN AUTO: 29.5 PG (ref 26.1–32.9)
MCHC RBC AUTO-ENTMCNC: 33 G/DL (ref 31.4–35)
MCV RBC AUTO: 89.5 FL (ref 79.6–97.8)
MONOCYTES # BLD: 1.1 K/UL (ref 0.1–1.3)
MONOCYTES NFR BLD: 11 % (ref 4–12)
NEUTS SEG # BLD: 7 K/UL (ref 1.7–8.2)
NEUTS SEG NFR BLD: 72 % (ref 43–78)
NRBC # BLD: 0.03 K/UL (ref 0–0.2)
PLATELET # BLD AUTO: 338 K/UL (ref 150–450)
PMV BLD AUTO: 8.6 FL (ref 9.4–12.3)
POTASSIUM SERPL-SCNC: 3.5 MMOL/L (ref 3.5–5.1)
RBC # BLD AUTO: 3.15 M/UL (ref 4.05–5.2)
SODIUM SERPL-SCNC: 134 MMOL/L (ref 136–145)
WBC # BLD AUTO: 9.7 K/UL (ref 4.3–11.1)

## 2021-10-27 PROCEDURE — 80048 BASIC METABOLIC PNL TOTAL CA: CPT

## 2021-10-27 PROCEDURE — 97530 THERAPEUTIC ACTIVITIES: CPT

## 2021-10-27 PROCEDURE — 97112 NEUROMUSCULAR REEDUCATION: CPT

## 2021-10-27 PROCEDURE — 74011250637 HC RX REV CODE- 250/637: Performed by: FAMILY MEDICINE

## 2021-10-27 PROCEDURE — 97535 SELF CARE MNGMENT TRAINING: CPT

## 2021-10-27 PROCEDURE — 74011250637 HC RX REV CODE- 250/637: Performed by: INTERNAL MEDICINE

## 2021-10-27 PROCEDURE — 36415 COLL VENOUS BLD VENIPUNCTURE: CPT

## 2021-10-27 PROCEDURE — 74011250637 HC RX REV CODE- 250/637: Performed by: STUDENT IN AN ORGANIZED HEALTH CARE EDUCATION/TRAINING PROGRAM

## 2021-10-27 PROCEDURE — 85025 COMPLETE CBC W/AUTO DIFF WBC: CPT

## 2021-10-27 RX ORDER — TRAMADOL HYDROCHLORIDE 50 MG/1
50 TABLET ORAL
Qty: 12 TABLET | Refills: 0 | Status: SHIPPED | OUTPATIENT
Start: 2021-10-27 | End: 2021-10-30

## 2021-10-27 RX ADMIN — Medication 10 ML: at 14:00

## 2021-10-27 RX ADMIN — Medication 1 TABLET: at 08:11

## 2021-10-27 RX ADMIN — CLONIDINE HYDROCHLORIDE 0.1 MG: 0.1 TABLET ORAL at 01:06

## 2021-10-27 RX ADMIN — GUAIFENESIN 100 MG: 200 SOLUTION ORAL at 01:06

## 2021-10-27 RX ADMIN — Medication 1 PACKET: at 08:12

## 2021-10-27 RX ADMIN — PANTOPRAZOLE SODIUM 40 MG: 40 TABLET, DELAYED RELEASE ORAL at 08:11

## 2021-10-27 RX ADMIN — Medication 10 ML: at 05:56

## 2021-10-27 RX ADMIN — FLUTICASONE PROPIONATE 2 SPRAY: 50 SPRAY, METERED NASAL at 08:12

## 2021-10-27 RX ADMIN — CARVEDILOL 6.25 MG: 6.25 TABLET, FILM COATED ORAL at 08:11

## 2021-10-27 NOTE — DISCHARGE INSTRUCTIONS
Please continue to take salt tablets daily and adhere to your fluid restriction. You should not drink more than 1.8 L of total fluid in a day to prevent worsening sodium levels. You should have your sodium checked next week with your PCP and then follow-up with nephrology in a month as they recommended. Please follow-up with orthopedic surgeon as previously scheduled. I have sent you home with tramadol for pain control but you should take Tylenol first if you have severe acute pain in her shoulder.

## 2021-10-27 NOTE — PROGRESS NOTES
[facility] PROGRESS NOTE    Follow up for: Hyponatremia    Subjective:   Patient remains alert. Sodium improved. 700cc of urine output already produced today.     ROS:  No vomiting  No headache  No SOB    Objective:   Exam:  Vitals:    10/26/21 1933 10/26/21 2352 10/27/21 0423 10/27/21 0757   BP: 135/62 (!) 163/65 (!) 143/52 (!) 148/76   Pulse: 62 62 61 60   Resp: 16 16 16 16   Temp: 98.5 °F (36.9 °C) 98.2 °F (36.8 °C) 97.6 °F (36.4 °C) 98.6 °F (37 °C)   SpO2: 93% 92% 92% 94%   Weight:       Height:             Intake/Output Summary (Last 24 hours) at 10/27/2021 1012  Last data filed at 10/27/2021 0925  Gross per 24 hour   Intake 120 ml   Output 1475 ml   Net -1355 ml       Current Facility-Administered Medications   Medication Dose Route Frequency    carvediloL (COREG) tablet 6.25 mg  6.25 mg Oral BID WITH MEALS    urea (URE-NA) 15 gram packet 1 Packet  1 Packet Oral DAILY    traMADoL (ULTRAM) tablet 50 mg  50 mg Oral Q6H PRN    0.9% sodium chloride infusion 250 mL  250 mL IntraVENous PRN    lip protectant (BLISTEX) ointment 1 Each  1 Each Topical PRN    cloNIDine HCL (CATAPRES) tablet 0.1 mg  0.1 mg Oral Q6H PRN    0.9% sodium chloride infusion 250 mL  250 mL IntraVENous PRN    sodium chloride (NS) flush 5-40 mL  5-40 mL IntraVENous Q8H    sodium chloride (NS) flush 5-40 mL  5-40 mL IntraVENous PRN    hydrALAZINE (APRESOLINE) 20 mg/mL injection 20 mg  20 mg IntraVENous Q6H PRN    guaiFENesin (ROBITUSSIN) 100 mg/5 mL oral liquid 100 mg  100 mg Oral Q4H PRN    acetaminophen (TYLENOL) tablet 650 mg  650 mg Oral Q6H PRN    Or    acetaminophen (TYLENOL) suppository 650 mg  650 mg Rectal Q6H PRN    polyethylene glycol (MIRALAX) packet 17 g  17 g Oral DAILY PRN    ondansetron (ZOFRAN ODT) tablet 4 mg  4 mg Oral Q8H PRN    Or    ondansetron (ZOFRAN) injection 4 mg  4 mg IntraVENous Q6H PRN    aspirin delayed-release tablet 81 mg  81 mg Oral QHS    calcium-vitamin D (OS-CHLOÉ +D3) 500 mg-200 unit per tablet 1 Tablet 1 Tablet Oral DAILY    pantoprazole (PROTONIX) tablet 40 mg  40 mg Oral DAILY    atorvastatin (LIPITOR) tablet 20 mg  20 mg Oral QHS    fluticasone propionate (FLONASE) 50 mcg/actuation nasal spray 2 Spray  2 Spray Both Nostrils DAILY    benzonatate (TESSALON) capsule 100 mg  100 mg Oral TID PRN       EXAM  GEN - Alert, oriented, in no distress  HEENT - NC/AT, non icteric sclera  CV - no audible murmur, regular rate  Lung - equal chest rise, no audible wheezing  Abd - non distended  Ext - no edema, no cyanosis  Access: n/a    Recent Labs     10/27/21  0704 10/26/21  0518 10/25/21  0306   WBC 9.7 9.4 8.5   HGB 9.3* 8.8* 7.8*   HCT 28.2* 27.1* 23.3*    328 247        Recent Labs     10/27/21  0704 10/26/21  2150 10/26/21  1036 10/26/21  0518 10/26/21  0518 10/25/21  1604 10/25/21  1007 10/25/21  0306 10/25/21  0306 10/24/21  2147 10/24/21  1506   * 132* 131*   < > 128*   < > 125*   < > 123*   < > 124*   K 3.5  --   --   --  3.5  --   --   --  3.6  --  3.4*   CL 99  --   --   --  94*  --   --   --  88*  --   --    CO2 30  --   --   --  28  --   --   --  27  --   --    BUN 18  --   --   --  30*  --   --   --  11  --   --    CREA 0.53*  --   --   --  0.58*  --   --   --  0.45*  --   --    CA 8.2*  --   --   --  8.3  --   --   --  7.9*  --   --    *  --   --   --  114*  --   --   --  95  --   --    MG  --   --   --   --   --   --  1.8  --   --   --  1.6*    < > = values in this interval not displayed. Assessment and Plan:   SIADH  - Continue Ure-Na - once daily. - follow strict I&Os  - liberate fluid restriction to 1.8L  - sodium check q12 hours    Hypomagnesemia - controlled for now. Would check intermittently. HTN - continue coreg    She should have follow up with PCP within 1-2 weeks to check sodium. I will arrange follow up with our office in ~1month.     Michell Noe MD  [facility]

## 2021-10-27 NOTE — PROGRESS NOTES
[address] October 27, 2021       RE: Josiah Bennett and Jose Gonzalez      To Whom It May Concern,    This is to certify that Josiah Bennett was admitted into the hospital from 10/20/2021-10/27/21. Patient had significant medical issues and was in the ICU for several days. Patient also with significant orthopedic issues which has limited her overall mobility and ability to care for herself at home. It is in my opinion that patient needs 24-hour care for at least the next 1 week and possibly as long as 2 weeks to continue her recovery safely at home with her daughters full support. Please feel free to contact my office at the hospital ([phone]) if you have any questions or concerns. Thank you for your assistance in this matter.     Sincerely,  Maribel Paniagua MD

## 2021-10-27 NOTE — PROGRESS NOTES
Pt to d/c home today. She will return home alone. Her daughter Heather Hui resides behind her and states she will be staying with pt to take care of her. Family will provide transportation home. [facility]: RN, PT, OT ordered. PT and OT recommend STR at d/c but pt adamantly refuses. Pt has necessary DME at home. She is on RA. No other supportive care needs identified. Milestones met. Care Management Interventions  PCP Verified by CM: Yes Lizette Felix MD)  Mode of Transport at Discharge: Other (see comment) (Family)  Transition of Care Consult (CM Consult): Home Health, Discharge [address]: Yes  Discharge Durable Medical Equipment: No  Physical Therapy Consult: Yes  Occupational Therapy Consult: Yes  Speech Therapy Consult: Yes  Support Systems: Child(dandy), Other Family Member(s)  Confirm Follow Up Transport: Family  The Plan for Transition of Care is Related to the Following Treatment Goals : Pt requires home health to return to functional baseline in the community.   The Patient and/or Patient Representative was Provided with a Choice of Provider and Agrees with the Discharge Plan?: Yes  Name of the Patient Representative Who was Provided with a Choice of Provider and Agrees with the Discharge Plan: Vneice Guan and [address] of Choice List was Provided with Basic Dialogue that Supports the Patient's Individualized Plan of Care/Goals, Treatment Preferences and Shares the Quality Data Associated with the Providers?: Yes  [place] Resource Information Provided?: No  Discharge Location  Discharge Placement: Home with home health

## 2021-10-27 NOTE — PROGRESS NOTES
ACUTE PHYSICAL THERAPY GOALS:  (Developed with and agreed upon by patient and/or caregiver.)  (1.) Hiwot Gonsales will move from supine to sit and sit to supine , scoot up and down and roll side to side with CONTACT GUARD ASSIST within 7 treatment day(s). (2.) Hiwot Gonsales will transfer from bed to chair and chair to bed with CONTACT GUARD ASSIST using the least restrictive device within 7 treatment day(s). (3.) Hiwot Gonsales will ambulate with CONTACT GUARD ASSIST for 100+ feet with the least restrictive device within 7 treatment day(s). (4.) Hiwot Gonsales will perform standing static and dynamic balance activities x 15 minutes with CONTACT GUARD ASSIST to improve safety within 7 treatment day(s). (5.) Hiwot Gonsales will perform therapeutic exercises x 15 min for HEP with SUPERVISION to improve strength, endurance, and functional mobility within 7 treatment day(s). PHYSICAL THERAPY: Daily Note and AM Treatment Day # 2    Hiwot Gonsales is a 78 y.o. female   PRIMARY DIAGNOSIS: Acute hyponatremia  Metabolic encephalopathy [W82.56]  Acute hyponatremia [E87.1]         ASSESSMENT:     REHAB RECOMMENDATIONS: CURRENT LEVEL OF FUNCTION:  (Most Recently Demonstrated)   Recommendation to date pending progress:  Setting:  [facility] is recommended but family wants HHPT  Equipment:    To Be Determined Bed Mobility:   Not tested  Sit to Stand:   Minimal Assistance  Transfers:   CGA-min assist  Gait/Mobility:   CGA-min assist     ASSESSMENT:  Ms. Chris Garza is making slow, steady progress towards PT goals. Patient transfers to standing with min assist and ambulates 6' x 2 (to and from bathroom) with HHA. Patient demonstrated fair standing balance during standing ADL activity. Patient then ambulated 76' with CGA-min assist and cues for balance/sequencing. Patient returned to recliner chair where she was left sitting up in recliner chair. Will continue efforts.      SUBJECTIVE:   Ms. Chris Garza states, \"I haven't seen myself in the mirror. \"    SOCIAL HISTORY/ LIVING ENVIRONMENT: See initial evaluation  Support Systems: Child(dandy), Other Family Member(s)  OBJECTIVE:     PAIN: VITAL SIGNS: LINES/DRAINS:   Pre Treatment:   0  Post Treatment: 0   None  O2 Device: None (Room air)     MOBILITY: I Mod I S SBA CGA Min Mod Max Total  NT x2 Comments:   Bed Mobility    Rolling [] [] [] [] [] [] [] [] [] [] []    Supine to Sit [] [] [] [] [] [] [] [] [] [] []    Scooting [] [] [] [] [] [] [] [] [] [] []    Sit to Supine [] [] [] [] [] [] [] [] [] [] []    Transfers    Sit to Stand [] [] [] [] [] [x] [] [] [] [] []    Bed to Chair [] [] [] [] [x] [x] [] [] [] [] []    Stand to Sit [] [] [] [] [] [x] [] [] [] [] []    I=Independent, Mod I=Modified Independent, S=Supervision, SBA=Standby Assistance, CGA=Contact Guard Assistance,   Min=Minimal Assistance, Mod=Moderate Assistance, Max=Maximal Assistance, Total=Total Assistance, NT=Not Tested    BALANCE: Good Fair+ Fair Fair- Poor NT Comments   Sitting Static [] [x] [] [] [] []    Sitting Dynamic [] [] [x] [] [] []              Standing Static [] [] [x] [] [] []    Standing Dynamic [] [] [x] [x] [] []      GAIT: I Mod I S SBA CGA Min Mod Max Total  NT x2 Comments:   Level of Assistance [] [] [] [] [x] [x] [] [] [] [] []    Distance 6' x 2 then 76'    DME HHA    Gait Quality Slightly unsteady    Weightbearing  Status N/A     I=Independent, Mod I=Modified Independent, S=Supervision, SBA=Standby Assistance, CGA=Contact Guard Assistance,   Min=Minimal Assistance, Mod=Moderate Assistance, Max=Maximal Assistance, Total=Total Assistance, NT=Not Tested    PLAN:   FREQUENCY/DURATION: PT Plan of Care: 3 times/week for duration of hospital stay or until stated goals are met, whichever comes first.  TREATMENT:     TREATMENT:   ($$ Therapeutic Activity: 23-37 mins    )  Co-Treatment PT/OT necessary due to patient's decreased overall endurance/tolerance levels, as well as need for high level skilled assistance to complete functional transfers/mobility and functional tasks  Therapeutic Activity (25 Minutes): Therapeutic activity included Scooting, Transfer Training, Ambulation on level ground, Sitting balance  and Standing balance to improve functional Mobility, Strength and Activity tolerance.     TREATMENT GRID:  N/A    AFTER TREATMENT POSITION/PRECAUTIONS:  Chair, Needs within reach, RN notified and Visitors at bedside    INTERDISCIPLINARY COLLABORATION:  RN/PCT, PT/PTA and OT/DUMONT    TOTAL TREATMENT DURATION:  PT Patient Time In/Time Out  Time In: 1044  Time Out: [facility]

## 2021-10-27 NOTE — PROGRESS NOTES
ACUTE OT GOALS:  (Developed with and agreed upon by patient and/or caregiver.)  1. Patient will complete lower body bathing and dressing with mod A and adaptive equipment as needed. UPDATED 10/25/2021  2. Patient will complete upper body bathing and dressing with MIN A and adaptive equipment as needed. - CONTINUE TO ADDRESS (10/25/2021)  3. Patient will complete toileting with MIN A. CONTINUE TO ADDRESS (10/25/2021)  4. Patient will tolerate 25 minutes of OT treatment with 1-2 rest breaks to increase activity tolerance for ADLs. CONTINUE TO ADDRESS (10/25/2021)  5. Patient will complete functional transfers with CGA and adaptive equipment as needed. UPDATED 10/25/2021  6. Patient will complete functional activity with SUPERVISION while sitting EOB with good dynamic sitting balance and adaptive equipment as needed. UPDATED 10/25/2021   NEW GOALS (ADDED 10/25/2021)  1.  Patient will zeeshan/doff UE sling with supervision and no verbal cues from therapist.  2. Patient will complete functional mobility for household distances with SBA  and adaptive equipment as needed.      Timeframe: 7 visits    OCCUPATIONAL THERAPY: Daily Note OT Treatment Day # 1    Yissel Mata is a 78 y.o. female   PRIMARY DIAGNOSIS: Acute hyponatremia  Metabolic encephalopathy [U13.35]  Acute hyponatremia [E87.1]       [place] / [place] / Product Type: Medicare /   ASSESSMENT:     REHAB RECOMMENDATIONS: CURRENT LEVEL OF FUNCTION:  (Most Recently Demonstrated)   Recommendation to date pending progress:  [address] [place] [address]  Equipment:    To Be Determined Bathing:   Not tested  Dressing:   total A to zeeshan sling  Feeding/Grooming:   Contact Guard Assistance after set-up while standing EOS to wash face and brush teeth  Toileting:   Not tested  Functional Mobility:   Contact Guard Assistance x 2     ASSESSMENT:  Ms. Rhonda Delong continues to present with deficits in overall strength, activity tolerance, ADL performance, and functional mobility. Patient progressing with mobility/transfers today; min A for sit <> stand transfers; CGA x 2 for functional mobility of household distances. Performed self-grooming tasks, including washing face and brushing teeth, while standing EOS, with CGA after set-up. Overall progressing well towards therapy goals. Will continue OT efforts as indicated. SUBJECTIVE:   Ms. Daisha Ocampo states, \"I feel like my head is in a barrel. \"    SOCIAL HISTORY/LIVING ENVIRONMENT: see initial eval.  Support Systems: Child(dandy), Other Family Member(s)    OBJECTIVE:     PAIN: VITAL SIGNS: LINES/DRAINS:   Pre Treatment: Pain Screen  Pain Scale 1: Numeric (0 - 10)  Pain Intensity 1: 0  Post Treatment: 0   none  O2 Device: None (Room air)     ACTIVITIES OF DAILY LIVING: I Mod I S SBA CGA Min Mod Max Total NT Comments   BASIC ADLs:              Bathing/ Showering [] [] [] [] [] [] [] [] [] [x]    Toileting [] [] [] [] [] [] [] [] [] [x]    Dressing [] [] [] [] [] [] [] [] [x] [] Donning sling   Feeding [] [] [] [] [] [] [] [] [] [x]    Grooming [] [] [] [] [x] [] [] [] [] [] CGA after set-up to wash face and brush teeth while standing EOS   Personal Device Care [] [] [] [] [] [] [] [] [] [x]    Functional Mobility [] [] [] [] [x] [] [] [] [] [] X 2 x gait belt   I=Independent, Mod I=Modified Independent, S=Supervision, SBA=Standby Assistance, CGA=Contact Guard Assistance,   Min=Minimal Assistance, Mod=Moderate Assistance, Max=Maximal Assistance, Total=Total Assistance, NT=Not Tested    MOBILITY: I Mod I S SBA CGA Min Mod Max Total  NT x2 Comments:   Supine to sit [] [] [] [] [] [] [] [] [] [x] []    Sit to supine [] [] [] [] [] [] [] [] [] [x] []    Sit to stand [] [] [] [] [] [x] [] [] [] [] []    Bed to chair [] [] [] [] [x] [] [] [] [] [] [x]    I=Independent, Mod I=Modified Independent, S=Supervision, SBA=Standby Assistance, CGA=Contact Guard Assistance,   Min=Minimal Assistance, Mod=Moderate Assistance, Max=Maximal Assistance, Total=Total Assistance, NT=Not Tested    BALANCE: Good Fair+ Fair Fair- Poor NT Comments   Sitting Static [] [x] [] [] [] []    Sitting Dynamic [] [] [x] [] [] []              Standing Static [] [] [x] [] [] []    Standing Dynamic [] [] [x] [x] [] []      PLAN:   FREQUENCY/DURATION: OT Plan of Care: 3 times/week for duration of hospital stay or until stated goals are met, whichever comes first.    TREATMENT:   TREATMENT:   ($$ Self Care/Home Management: 8-22 mins$$ Neuromuscular Re-Education: 8-22 mins   )  Self Care (13 Minutes): Self care including Upper Body Dressing, Grooming and Energy Conservation Training to increase independence and decrease level of assistance required. Neuromuscular Re-education (10 Minutes): Neuromuscular Re-education included Balance Training, Coordination training, Postural training, Sitting balance training and Standing balance training to improve Balance, Coordination and Postural Control.     TREATMENT GRID:  N/A    AFTER TREATMENT POSITION/PRECAUTIONS:  Chair, Needs within reach and Visitors at bedside    INTERDISCIPLINARY COLLABORATION:  RN/PCT, PT/PTA and OT/DUMONT    TOTAL TREATMENT DURATION:  OT Patient Time In/Time Out  Time In: 1044  Time Out: [address], OT

## 2021-10-27 NOTE — DISCHARGE SUMMARY
Hospitalist Discharge Summary   Admit Date:  10/20/2021 10:44 AM   DC Note date: 10/27/2021  Name:  Alessandro Akbar   Age:  78 y.o. Sex:  female  :  1940   MRN:  [id number]   Room:  [facility]  PCP:  Bob Majano MD    Presenting Complaint: Cough    Initial Admission Diagnosis: Metabolic encephalopathy [D12.21]  Acute hyponatremia [E87.1]     Problem List for this Hospitalization:  Hospital Problems as of 10/27/2021 Date Reviewed: 10/19/2021        Codes Class Noted - Resolved POA    Traumatic rhabdomyolysis ([facility] [address] ICD-10-CM: T79. 6XXA  ICD-9-CM: 958.6  10/23/2021 - Present Yes        Shock ([facility] [address] ICD-10-CM: R57.9  ICD-9-CM: 785.50  10/23/2021 - Present Yes        Laryngitis ICD-10-CM: J04.0  ICD-9-CM: 464.00  10/21/2021 - Present Yes        * (Principal) Acute hyponatremia ICD-10-CM: E87.1  ICD-9-CM: 276.1  10/21/2021 - Present Yes        Acute blood loss anemia (ABLA) ICD-10-CM: D62  ICD-9-CM: 285.1  10/21/2021 - Present Yes        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 Zaira Styles  ICD-9-CM: 996.44, V43.61  10/21/2021 - Present Yes        Visual hallucinations ICD-10-CM: R44.1  ICD-9-CM: 368.16  10/20/2021 - Present Yes        Dry cough ICD-10-CM: R05.8  ICD-9-CM: 786.2  10/16/2021 - Present Yes        S/P reverse total shoulder arthroplasty, right ICD-10-CM: R71.922  ICD-9-CM: V43.61  2021 - Present Yes        Multiple thyroid nodules ICD-10-CM: E04.2  ICD-9-CM: 241.1  Unknown - Present Yes        Coronary artery disease involving native coronary artery of native heart without angina pectoris ICD-10-CM: I25.10  ICD-9-CM: 414.01  2017 - Present Yes        Essential hypertension ICD-10-CM: I10  ICD-9-CM: 401.9  2017 - Present Yes        Mixed hyperlipidemia ICD-10-CM: E78.2  ICD-9-CM: 272.2  2017 - Present Yes        Dyslipidemia ICD-10-CM: E78.5  ICD-9-CM: 272.4  2016 - Present Yes    Overview Signed 2021 11:21 AM by Alexei Choi Last Assessment & Plan:   Formatting of this note might be different from the original.  LDL 85. Continue statin for heart protection                 Did Patient have Sepsis (YES OR NO): no    Hospital Course:  78 y. o. female who presented to the ED for cc cough and sore throat with altered mental status. Hx of recent humeral fracture Saturday following ortho with conservative treatment, HLD, HTN, CAD. Daughter at the bedside who states ever since she was prescribed prednisone and promethazine- dextromethorphan she has been having confusion, unable to walk, and having visual hallucinations. She was found to have hyponatremia. Her sodium continued to drop on IVFs, consistent with SIADH. Urine studies also consistent with SIADH. IVFs were stopped and she was started on Urea-Na. Nephrology was consulted, who started her on 3% saline. Her sodium continued to drop so she was moved to the ICU. She developed worsening anemia and pelvic bruising. Her CK elevated, consistent with traumatic rhabdomyolysis. She was noted to be hypotensive so Levophed was started and patient was monitored in the ICU for a couple days. Patient was given a unit of PRBC for drop in Hgb. Patient rhabdo and hypotension resolved and patient was weaned off levophed without any issues. She had her sodium check regularly and she was continued on salt tabs on scheduled basis. She was decreased from thee times daily to daily salt tablets as sodium continued to rise. She was also maintained on a fluid restriction with gradual increase in total number of fluids allowed as well. Her sodium level on day of discharge was 134. Per nephrology recommendations she was discharged with daily salt tablets regimen and 1.8L total fluid restriction in a day. Patient was also seen by orthopedic surgery for her recent history of periprosthetic shoulder fracture. She was started on pain control with tylenol and tramadol and patient worked with PT/OT.  Therapies recommended short term rehab but patient and family very hesitant to go to [facility] for STR. After extensive discussions, patient and family chose to go home with home health therapies. She will need to follow up with PCP in 1 week for sodium check and then follow up with Nephrology in 1 month. Disposition: Home Health Care [facility]  Diet: ADULT DIET Regular; Low Fat/Low Chol/High Fiber/CHAPO; 1800 ml; Please include salt packets  Code Status: Full Code    Follow Up Orders: Follow-up Appointments   Procedures    FOLLOW UP VISIT Appointment in: 3 - 5 Days     Standing Status:   Standing     Number of Occurrences:   1     Order Specific Question:   Appointment in     Answer:   3 - 5 Days       Follow-up Information     Follow up With Specialties Details Why Contact Info    Shaun Nagel MD Internal Medicine In 1 week for Sodium check  [address] [facility]      Dea Walters MD Nephrology In 1 month  [address] [facility]  [phone]            Follow up labs/diagnostics (ultimately defer to outpatient provider): Sodium in 1 week    Time spent in patient discharge and coordination 38 minutes. Plan was discussed with patient, daughter, case management. All questions answered. Patient was stable at time of discharge. Instructions given to call a physician or return if any concerns. Discharge Info:   Current Discharge Medication List      START taking these medications    Details   urea (URE-NA) 15 gram packet Take 1 Packet by mouth daily for 30 days. Qty: 30 Packet, Refills: 0  Start date: 10/28/2021, End date: 11/27/2021      traMADoL (ULTRAM) 50 mg tablet Take 1 Tablet by mouth every six (6) hours as needed for Pain for up to 3 days. Max Daily Amount: 200 mg.   Qty: 12 Tablet, Refills: 0  Start date: 10/27/2021, End date: 10/30/2021    Associated Diagnoses: Periprosthetic fracture around internal prosthetic right shoulder joint, subsequent encounter         CONTINUE these medications which have NOT CHANGED    Details   Prolensa 0.07 % ophthalmic solution INSTILL 1 DROP IN EYE EVERY DAY TO THE OPERATED EYE BEGINNING 3 DAYS PRIOR TO SURGERY UNTIL GONE.      simvastatin (ZOCOR) 40 mg tablet TAKE ONE TABLET BY MOUTH EACH NIGHT AT BEDTIME  Qty: 90 Tablet, Refills: 1    Associated Diagnoses: Mixed hyperlipidemia; Coronary artery disease involving native coronary artery of native heart without angina pectoris      omeprazole (PRILOSEC) 40 mg capsule Take 40 mg by mouth daily. naloxegoL (MOVANTIK) 25 mg tab tablet Take 1 Tablet by mouth Daily (before breakfast). To start after surgery  Indications: opiate pain medication causing severe constipation  Qty: 7 Tablet, Refills: 1    Associated Diagnoses: Constipation due to pain medication      senna-docusate (PERICOLACE) 8.6-50 mg per tablet Take 1 Tablet by mouth daily. To start after surgery  Indications: constipation  Qty: 21 Tablet, Refills: 0    Associated Diagnoses: Constipation due to pain medication      ondansetron (ZOFRAN ODT) 4 mg disintegrating tablet 1 Tablet by SubLINGual route every six (6) hours as needed for Nausea or Nausea or Vomiting. To start after surgery  Indications: prevent nausea and vomiting after surgery  Qty: 20 Tablet, Refills: 0    Associated Diagnoses: Nausea after anesthesia, initial encounter      alendronate (FOSAMAX) 70 mg tablet Take 1 Tablet by mouth every seven (7) days. Qty: 12 Tablet, Refills: 3      carvediloL (COREG) 12.5 mg tablet TAKE ONE TABLET BY MOUTH TWICE A DAY WITH MEALS  Qty: 180 Tab, Refills: 2    Associated Diagnoses: Coronary artery disease involving native coronary artery of native heart without angina pectoris; Essential hypertension      calcium-cholecalciferol, D3, (CALTRATE 600+D) tablet Take 1 Tablet by mouth daily. aspirin delayed-release 81 mg tablet Take 81 mg by mouth nightly.     Associated Diagnoses: Essential hypertension, hypertension with unspecified goal STOP taking these medications       predniSONE (DELTASONE) 10 mg tablet Comments:   Reason for Stopping:         promethazine-dextromethorphan (PROMETHAZINE-DM) 6.25-15 mg/5 mL syrup Comments:   Reason for Stopping:         HYDROcodone-acetaminophen (NORCO) 5-325 mg per tablet Comments:   Reason for Stopping:               Procedures done this admission:  * No surgery found *    Consults this admission:  IP CONSULT TO NEPHROLOGY  IP CONSULT TO ORTHOPEDIC SURGERY    Echocardiogram/EKG results:  No results found for this or any previous visit.        EKG Results     Procedure 720 Value Units Date/Time    EKG, 12 LEAD, INITIAL [[id number]] Collected: 10/24/21 0012    Order Status: Completed Updated: 10/24/21 0725     Ventricular Rate 86 BPM      Atrial Rate 86 BPM      P-R Interval 160 ms      QRS Duration 150 ms      Q-T Interval 452 ms      QTC Calculation (Bezet) 540 ms      Calculated P Axis 67 degrees      Calculated R Axis -81 degrees      Calculated T Axis 87 degrees      Diagnosis --     Sinus rhythm with Premature atrial complexes  Left axis deviation  Left bundle branch block  Abnormal ECG  When compared with ECG of 20-OCT-2021 10:51,  QRS voltage has decreased  Nonspecific T wave abnormality, worse in Lateral leads  QT has lengthened  Confirmed by Mani Leos ([id number]) on 10/24/2021 7:25:48 AM      EKG 12 LEAD INITIAL [[id number]] Collected: 10/20/21 1051    Order Status: Completed Updated: 10/20/21 1140     Ventricular Rate 76 BPM      Atrial Rate 76 BPM      P-R Interval 176 ms      QRS Duration 148 ms      Q-T Interval 410 ms      QTC Calculation (Bezet) 461 ms      Calculated P Axis 54 degrees      Calculated R Axis -68 degrees      Calculated T Axis 91 degrees      Diagnosis --     Sinus rhythm with Premature atrial complexes  Left axis deviation  Left bundle branch block  Abnormal ECG  When compared with ECG of 26-MAR-2015 17:33,  Premature atrial complexes are now Present  Confirmed by RAFAEL Leisa Smith ([id number]) on 10/20/2021 11:39:56 AM            Diagnostic Imaging/Tests:   CT HEAD WO CONT    Result Date: 10/20/2021  History: unwitnessed fall and confusion Exam: CT head without contrast Technique: Thin section axial CT images were obtained from the skullbase through the vertex. Radiation dose reduction techniques were used for this study. Our CT scanners use one or all of the following: Automated exposure control, adjustment of the mA and/or kV according to patient size, use of iterative reconstruction. Findings: The ventricles are normal in size, shape, and position. There is generalized cerebral atrophy with chronic appearing white matter change in the corona radiata and centrum semiovale. No acute intracranial hemorrhage. No extra-axial fluid collection is present. There is no mass or mass-effect. The basilar cisterns are patent. The paranasal sinuses and mastoid air cells are clear. Chronic appearing white matter change without acute intracranial abnormality. XR CHEST PORT    Result Date: 10/20/2021  History: Chest pain, congestion, cough, 8 days duration Exam: portable chest Comparison: 10/16/2021 Findings: The lungs are clear. The mediastinal contour and osseous structures are normal. IMPRESSIONs: No acute findings.        All Micro Results     Procedure Component Value Units Date/Time    CULTURE, URINE [[id number]] Collected: 10/23/21 0022    Order Status: Completed Specimen: Cath Urine Updated: 10/25/21 0808     Special Requests: NO SPECIAL REQUESTS        Culture result:       10,000 to 50,000 COLONIES/mL MIXED SKIN RENATE ISOLATED          CULTURE, URINE [[id number]] Collected: 10/21/21 1518    Order Status: Completed Specimen: Cath Urine Updated: 10/24/21 0750     Special Requests: NO SPECIAL REQUESTS        Culture result: NO GROWTH 2 DAYS       RESPIRATORY VIRUS PANEL W/COVID-19, PCR [[id number]]  (Abnormal) Collected: 10/20/21 1435    Order Status: Completed Specimen: Nasopharyngeal Updated: 10/20/21 1605     Adenovirus NOT DETECTED        Coronavirus 229E NOT DETECTED        Coronavirus HKU1 NOT DETECTED        Coronavirus CVNL63 NOT DETECTED        Coronavirus OC43 NOT DETECTED        SARS-CoV-2, PCR NOT DETECTED        Metapneumovirus NOT DETECTED        Rhinovirus and Enterovirus Detected        Influenza A NOT DETECTED        Influenza B NOT DETECTED        Parainfluenza 1 NOT DETECTED        Parainfluenza 2 NOT DETECTED        Parainfluenza 3 NOT DETECTED        Parainfluenza virus 4 NOT DETECTED        RSV by PCR NOT DETECTED        B. parapertussis, PCR NOT DETECTED        Bordetella pertussis - PCR NOT DETECTED        Chlamydophila pneumoniae DNA, QL, PCR NOT DETECTED        Mycoplasma pneumoniae DNA, QL, PCR NOT DETECTED       STREP, GROUP A, DIDIER [[id number]] Collected: 10/20/21 1404    Order Status: Completed Specimen: Throat Updated: 10/20/21 1429     Strep, Molecular Not detected        Comment: Negative for Strep A nucleic acid  Methodology: Isothermal Nucleic Acid Amplification               Labs: Results:       BMP, Mg, Phos Recent Labs     10/27/21  0704 10/26/21  2150 10/26/21  1036 10/26/21  0518 10/26/21  0518 10/25/21  1604 10/25/21  1007 10/25/21  0306 10/25/21  0306 10/24/21  2147 10/24/21  1506   * 132* 131*   < > 128*   < > 125*   < > 123*   < > 124*   K 3.5  --   --   --  3.5  --   --   --  3.6  --  3.4*   CL 99  --   --   --  94*  --   --   --  88*  --   --    CO2 30  --   --   --  28  --   --   --  27  --   --    AGAP 5*  --   --   --  6*  --   --   --  8  --   --    BUN 18  --   --   --  30*  --   --   --  11  --   --    CREA 0.53*  --   --   --  0.58*  --   --   --  0.45*  --   --    CA 8.2*  --   --   --  8.3  --   --   --  7.9*  --   --    *  --   --   --  114*  --   --   --  95  --   --    MG  --   --   --   --   --   --  1.8  --   --   --  1.6*    < > = values in this interval not displayed.       CBC Recent Labs     10/27/21  0704 10/26/21  0581 10/25/21  0306   WBC 9.7 9.4 8.5   RBC 3.15* 3.13* 2.77*   HGB 9.3* 8.8* 7.8*   HCT 28.2* 27.1* 23.3*    328 247   GRANS 72 74  --    LYMPH 11* 11*  --    EOS 3 1  --    MONOS 11 12  --    BASOS 1 0  --    IG 2 2  --    ANEU 7.0 7.0  --    ABL 1.1 1.0  --    CORRINA 0.3 0.1  --    ABM 1.1 1.1  --    ABB 0.1 0.0  --    AIG 0.2 0.2  --       LFT No results for input(s): ALT, TBIL, AP, TP, ALB, GLOB, AGRAT in the last 72 hours.     No lab exists for component: SGOT, GPT   Cardiac Testing Lab Results   Component Value Date/Time    BNP 45 03/26/2015 05:35 PM     (H) 10/24/2021 05:44 AM    CK 1,094 (H) 10/23/2021 04:13 AM    CK 1,156 (H) 10/22/2021 07:38 PM      Coagulation Tests No results found for: PTP, INR, APTT, INREXT   A1c Lab Results   Component Value Date/Time    Hemoglobin A1c 6.1 (H) 06/02/2021 03:23 PM    Hemoglobin A1c 5.9 (H) 08/13/2020 10:33 AM    Hemoglobin A1c 6.3 (H) 02/13/2020 02:29 PM      Lipid Panel Lab Results   Component Value Date/Time    Cholesterol, total 181 06/02/2021 03:23 PM    HDL Cholesterol 73 06/02/2021 03:23 PM    LDL, calculated 85 06/02/2021 03:23 PM    LDL, calculated 67 02/13/2020 02:29 PM    VLDL, calculated 23 06/02/2021 03:23 PM    VLDL, calculated 14 02/13/2020 02:29 PM    Triglyceride 131 06/02/2021 03:23 PM    CHOL/HDL Ratio 3.9 08/05/2016 08:36 AM      Thyroid Panel Lab Results   Component Value Date/Time    TSH 2.670 10/20/2021 10:43 AM    TSH 2.440 06/02/2021 03:23 PM    T4, Free 1.2 10/20/2021 10:43 AM        Most Recent UA Lab Results   Component Value Date/Time    Color YELLOW 10/21/2021 03:18 PM    Appearance CLEAR 10/21/2021 03:18 PM    pH (UA) 6.5 10/21/2021 03:18 PM    Protein TRACE (A) 10/21/2021 03:18 PM    Glucose Negative 10/21/2021 03:18 PM    Ketone Negative 10/21/2021 03:18 PM    Bilirubin Negative 10/21/2021 03:18 PM    Blood LARGE (A) 10/21/2021 03:18 PM    Urobilinogen 1.0 10/21/2021 03:18 PM    Nitrites Negative 10/21/2021 03:18 PM    Leukocyte Esterase Negative 10/21/2021 03:18 PM    WBC 0-3 10/21/2021 03:18 PM    RBC 10-20 10/21/2021 03:18 PM    Epithelial cells 3-5 10/21/2021 03:18 PM    Bacteria TRACE 10/21/2021 03:18 PM    Mucus Present 02/13/2020 11:50 AM    Other observations RESULTS VERIFIED MANUALLY 10/21/2021 03:18 PM          All Labs from Last 24 Hrs:  Recent Results (from the past 24 hour(s))   SODIUM    Collection Time: 10/26/21  9:50 PM   Result Value Ref Range    Sodium 132 (L) 136 - 914 mmol/L   METABOLIC PANEL, BASIC    Collection Time: 10/27/21  7:04 AM   Result Value Ref Range    Sodium 134 (L) 136 - 145 mmol/L    Potassium 3.5 3.5 - 5.1 mmol/L    Chloride 99 98 - 107 mmol/L    CO2 30 21 - 32 mmol/L    Anion gap 5 (L) 7 - 16 mmol/L    Glucose 128 (H) 65 - 100 mg/dL    BUN 18 8 - 23 MG/DL    Creatinine 0.53 (L) 0.6 - 1.0 MG/DL    GFR est AA >60 >60 ml/min/1.73m2    GFR est non-AA >60 >60 ml/min/1.73m2    Calcium 8.2 (L) 8.3 - 10.4 MG/DL   CBC WITH AUTOMATED DIFF    Collection Time: 10/27/21  7:04 AM   Result Value Ref Range    WBC 9.7 4.3 - 11.1 K/uL    RBC 3.15 (L) 4.05 - 5.2 M/uL    HGB 9.3 (L) 11.7 - 15.4 g/dL    HCT 28.2 (L) 35.8 - 46.3 %    MCV 89.5 79.6 - 97.8 FL    MCH 29.5 26.1 - 32.9 PG    MCHC 33.0 31.4 - 35.0 g/dL    RDW 15.9 (H) 11.9 - 14.6 %    PLATELET 372 327 - 480 K/uL    MPV 8.6 (L) 9.4 - 12.3 FL    ABSOLUTE NRBC 0.03 0.0 - 0.2 K/uL    DF AUTOMATED      NEUTROPHILS 72 43 - 78 %    LYMPHOCYTES 11 (L) 13 - 44 %    MONOCYTES 11 4.0 - 12.0 %    EOSINOPHILS 3 0.5 - 7.8 %    BASOPHILS 1 0.0 - 2.0 %    IMMATURE GRANULOCYTES 2 0.0 - 5.0 %    ABS. NEUTROPHILS 7.0 1.7 - 8.2 K/UL    ABS. LYMPHOCYTES 1.1 0.5 - 4.6 K/UL    ABS. MONOCYTES 1.1 0.1 - 1.3 K/UL    ABS. EOSINOPHILS 0.3 0.0 - 0.8 K/UL    ABS. BASOPHILS 0.1 0.0 - 0.2 K/UL    ABS. IMM.  GRANS. 0.2 0.0 - 0.5 K/UL       Current Med List in Hospital:   Current Facility-Administered Medications   Medication Dose Route Frequency    carvediloL (COREG) tablet 6.25 mg  6.25 mg Oral BID WITH MEALS    urea (URE-NA) 15 gram packet 1 Packet  1 Packet Oral DAILY    traMADoL (ULTRAM) tablet 50 mg  50 mg Oral Q6H PRN    0.9% sodium chloride infusion 250 mL  250 mL IntraVENous PRN    lip protectant (BLISTEX) ointment 1 Each  1 Each Topical PRN    cloNIDine HCL (CATAPRES) tablet 0.1 mg  0.1 mg Oral Q6H PRN    0.9% sodium chloride infusion 250 mL  250 mL IntraVENous PRN    sodium chloride (NS) flush 5-40 mL  5-40 mL IntraVENous Q8H    sodium chloride (NS) flush 5-40 mL  5-40 mL IntraVENous PRN    hydrALAZINE (APRESOLINE) 20 mg/mL injection 20 mg  20 mg IntraVENous Q6H PRN    guaiFENesin (ROBITUSSIN) 100 mg/5 mL oral liquid 100 mg  100 mg Oral Q4H PRN    acetaminophen (TYLENOL) tablet 650 mg  650 mg Oral Q6H PRN    Or    acetaminophen (TYLENOL) suppository 650 mg  650 mg Rectal Q6H PRN    polyethylene glycol (MIRALAX) packet 17 g  17 g Oral DAILY PRN    ondansetron (ZOFRAN ODT) tablet 4 mg  4 mg Oral Q8H PRN    Or    ondansetron (ZOFRAN) injection 4 mg  4 mg IntraVENous Q6H PRN    aspirin delayed-release tablet 81 mg  81 mg Oral QHS    calcium-vitamin D (OS-CHLOÉ +D3) 500 mg-200 unit per tablet 1 Tablet  1 Tablet Oral DAILY    pantoprazole (PROTONIX) tablet 40 mg  40 mg Oral DAILY    atorvastatin (LIPITOR) tablet 20 mg  20 mg Oral QHS    fluticasone propionate (FLONASE) 50 mcg/actuation nasal spray 2 Spray  2 Spray Both Nostrils DAILY    benzonatate (TESSALON) capsule 100 mg  100 mg Oral TID PRN       Allergies   Allergen Reactions    Norvasc [Amlodipine] Shortness of Breath and Cough    Promethazine Vertigo    Lisinopril Cough     Immunization History   Administered Date(s) Administered    COVID-19, PFIZER, MRNA, RENA-S, PF, 30MCG/0.3ML DOSE 02/04/2021, 02/26/2021    Influenza High Dose Vaccine PF 10/24/2016, 09/16/2017, 10/01/2018, 10/01/2019    Influenza Vaccine 10/07/2013, 08/18/2014, 10/07/2020    Influenza Vaccine PF 10/21/2015    Influenza Vaccine Whole 09/01/2012    Pneumococcal Conjugate (PCV-13) 04/15/2015    Pneumococcal Polysaccharide (PPSV-23) 02/25/2013    Pneumococcal Vaccine (Pcv) 01/01/2000    TB Skin Test (PPD) Intradermal 10/20/2021    Tdap 01/08/2015    Zoster Vaccine, Live 01/01/2012       Recent Vital Data:  Patient Vitals for the past 24 hrs:   Temp Pulse Resp BP SpO2   10/27/21 1222 98.2 °F (36.8 °C) (!) 58 16 122/60 95 %   10/27/21 0757 98.6 °F (37 °C) 60 16 (!) 148/76 94 %   10/27/21 0423 97.6 °F (36.4 °C) 61 16 (!) 143/52 92 %   10/26/21 2352 98.2 °F (36.8 °C) 62 16 (!) 163/65 92 %   10/26/21 1933 98.5 °F (36.9 °C) 62 16 135/62 93 %   10/26/21 1525 97.6 °F (36.4 °C) 65 18 (!) 112/54 94 %     Oxygen Therapy  O2 Sat (%): 95 % (10/27/21 1222)  Pulse via Oximetry: 66 beats per minute (10/25/21 1515)  O2 Device: None (Room air) (10/27/21 0800)  Skin Assessment: Clean, dry, & intact (10/25/21 9672)  Skin Protection for O2 Device: N/A (10/25/21 0737)  O2 Flow Rate (L/min): 1 l/min (10/25/21 0737)  FIO2 (%): 24 % (10/24/21 0848)    Estimated body mass index is 26.89 kg/m² as calculated from the following:    Height as of this encounter: 5' 2\" (1.575 m). Weight as of this encounter: 66.7 kg (147 lb). Intake/Output Summary (Last 24 hours) at 10/27/2021 1433  Last data filed at 10/27/2021 0925  Gross per 24 hour   Intake    Output 1025 ml   Net -1025 ml         Physical Exam:    General:    Well nourished. No overt distress  Head:  Normocephalic, atraumatic  Eyes:  Sclerae appear normal.  Pupils equally round. HENT:  Nares appear normal, no drainage. Moist mucous membranes  Neck:  No restricted ROM. Trachea midline  CV:   RRR. No m/r/g. No JVD  Lungs:   CTAB. No wheezing, rhonchi, or rales. Even, unlabored  Abdomen:   Soft, nontender, nondistended. Extremities: Warm and dry. No cyanosis or clubbing. No edema. RUE extremity in brace  Skin:     No rashes. Normal coloration  Neuro:  CN II-XII grossly intact.  AAOx3  Psych:  Normal mood and affect. Signed:  Halie Kay MD    Part of this note may have been written by using a voice dictation software. The note has been proof read but may still contain some grammatical/other typographical errors. Vargas Quiroz

## 2021-10-28 ENCOUNTER — HOME HEALTH ADMISSION (OUTPATIENT)
Dept: HOME HEALTH SERVICES | Facility: HOME HEALTH | Age: 81
End: 2021-10-28
Payer: MEDICARE

## 2021-10-28 ENCOUNTER — APPOINTMENT (OUTPATIENT)
Dept: PHYSICAL THERAPY | Age: 81
End: 2021-10-28
Attending: ORTHOPAEDIC SURGERY
Payer: MEDICARE

## 2021-10-29 ENCOUNTER — HOME CARE VISIT (OUTPATIENT)
Dept: SCHEDULING | Facility: HOME HEALTH | Age: 81
End: 2021-10-29
Payer: MEDICARE

## 2021-10-29 VITALS
TEMPERATURE: 98.3 F | OXYGEN SATURATION: 96 % | HEART RATE: 67 BPM | DIASTOLIC BLOOD PRESSURE: 78 MMHG | RESPIRATION RATE: 18 BRPM | SYSTOLIC BLOOD PRESSURE: 140 MMHG

## 2021-10-29 PROCEDURE — 400013 HH SOC

## 2021-10-29 PROCEDURE — 400018 HH-NO PAY CLAIM PROCEDURE

## 2021-10-29 PROCEDURE — G0299 HHS/HOSPICE OF RN EA 15 MIN: HCPCS

## 2021-10-29 PROCEDURE — 3331090002 HH PPS REVENUE DEBIT

## 2021-10-29 PROCEDURE — 3331090001 HH PPS REVENUE CREDIT

## 2021-10-30 PROCEDURE — 3331090001 HH PPS REVENUE CREDIT

## 2021-10-30 PROCEDURE — 3331090002 HH PPS REVENUE DEBIT

## 2021-10-31 PROCEDURE — 3331090002 HH PPS REVENUE DEBIT

## 2021-10-31 PROCEDURE — 3331090001 HH PPS REVENUE CREDIT

## 2021-11-01 PROCEDURE — 3331090002 HH PPS REVENUE DEBIT

## 2021-11-01 PROCEDURE — 3331090001 HH PPS REVENUE CREDIT

## 2021-11-02 ENCOUNTER — HOME CARE VISIT (OUTPATIENT)
Dept: SCHEDULING | Facility: HOME HEALTH | Age: 81
End: 2021-11-02
Payer: MEDICARE

## 2021-11-02 VITALS
RESPIRATION RATE: 18 BRPM | OXYGEN SATURATION: 94 % | TEMPERATURE: 98.6 F | HEART RATE: 70 BPM | DIASTOLIC BLOOD PRESSURE: 62 MMHG | SYSTOLIC BLOOD PRESSURE: 115 MMHG

## 2021-11-02 VITALS
RESPIRATION RATE: 18 BRPM | HEART RATE: 76 BPM | TEMPERATURE: 98.9 F | SYSTOLIC BLOOD PRESSURE: 110 MMHG | DIASTOLIC BLOOD PRESSURE: 62 MMHG | OXYGEN SATURATION: 95 %

## 2021-11-02 PROCEDURE — G0152 HHCP-SERV OF OT,EA 15 MIN: HCPCS

## 2021-11-02 PROCEDURE — G0299 HHS/HOSPICE OF RN EA 15 MIN: HCPCS

## 2021-11-02 PROCEDURE — 3331090002 HH PPS REVENUE DEBIT

## 2021-11-02 PROCEDURE — 3331090001 HH PPS REVENUE CREDIT

## 2021-11-03 ENCOUNTER — HOME CARE VISIT (OUTPATIENT)
Dept: SCHEDULING | Facility: HOME HEALTH | Age: 81
End: 2021-11-03
Payer: MEDICARE

## 2021-11-03 VITALS
DIASTOLIC BLOOD PRESSURE: 68 MMHG | TEMPERATURE: 97.4 F | HEART RATE: 74 BPM | SYSTOLIC BLOOD PRESSURE: 130 MMHG | OXYGEN SATURATION: 98 % | RESPIRATION RATE: 181 BRPM

## 2021-11-03 PROBLEM — R44.1 VISUAL HALLUCINATIONS: Status: RESOLVED | Noted: 2021-10-20 | Resolved: 2021-11-03

## 2021-11-03 PROBLEM — R57.9 SHOCK (HCC): Status: RESOLVED | Noted: 2021-10-23 | Resolved: 2021-11-03

## 2021-11-03 PROBLEM — J04.0 LARYNGITIS: Status: RESOLVED | Noted: 2021-10-21 | Resolved: 2021-11-03

## 2021-11-03 PROBLEM — R05.8 DRY COUGH: Status: RESOLVED | Noted: 2021-10-16 | Resolved: 2021-11-03

## 2021-11-03 PROBLEM — T79.6XXA TRAUMATIC RHABDOMYOLYSIS (HCC): Status: RESOLVED | Noted: 2021-10-23 | Resolved: 2021-11-03

## 2021-11-03 PROCEDURE — 3331090002 HH PPS REVENUE DEBIT

## 2021-11-03 PROCEDURE — 3331090001 HH PPS REVENUE CREDIT

## 2021-11-03 PROCEDURE — G0151 HHCP-SERV OF PT,EA 15 MIN: HCPCS

## 2021-11-04 ENCOUNTER — HOME CARE VISIT (OUTPATIENT)
Dept: SCHEDULING | Facility: HOME HEALTH | Age: 81
End: 2021-11-04
Payer: MEDICARE

## 2021-11-04 VITALS
SYSTOLIC BLOOD PRESSURE: 140 MMHG | RESPIRATION RATE: 16 BRPM | DIASTOLIC BLOOD PRESSURE: 82 MMHG | TEMPERATURE: 98.4 F | OXYGEN SATURATION: 94 % | HEART RATE: 68 BPM

## 2021-11-04 PROCEDURE — 3331090001 HH PPS REVENUE CREDIT

## 2021-11-04 PROCEDURE — 3331090002 HH PPS REVENUE DEBIT

## 2021-11-04 PROCEDURE — G0299 HHS/HOSPICE OF RN EA 15 MIN: HCPCS

## 2021-11-04 NOTE — Clinical Note
Daughter and patient states patient is constipated and tried magcitrate and mirliax. Instructed to call for OK to use colace (stool softner) and directions for daily mirilax.  Instructed not to use Mag Citrate unless OK with MD.

## 2021-11-05 ENCOUNTER — HOME CARE VISIT (OUTPATIENT)
Dept: SCHEDULING | Facility: HOME HEALTH | Age: 81
End: 2021-11-05
Payer: MEDICARE

## 2021-11-05 VITALS
TEMPERATURE: 97.7 F | DIASTOLIC BLOOD PRESSURE: 64 MMHG | SYSTOLIC BLOOD PRESSURE: 110 MMHG | OXYGEN SATURATION: 96 % | HEART RATE: 84 BPM

## 2021-11-05 PROCEDURE — 3331090001 HH PPS REVENUE CREDIT

## 2021-11-05 PROCEDURE — 3331090002 HH PPS REVENUE DEBIT

## 2021-11-05 PROCEDURE — G0158 HHC OT ASSISTANT EA 15: HCPCS

## 2021-11-06 PROCEDURE — 3331090002 HH PPS REVENUE DEBIT

## 2021-11-06 PROCEDURE — 3331090001 HH PPS REVENUE CREDIT

## 2021-11-07 PROCEDURE — 3331090001 HH PPS REVENUE CREDIT

## 2021-11-07 PROCEDURE — 3331090002 HH PPS REVENUE DEBIT

## 2021-11-08 ENCOUNTER — HOME CARE VISIT (OUTPATIENT)
Dept: SCHEDULING | Facility: HOME HEALTH | Age: 81
End: 2021-11-08
Payer: MEDICARE

## 2021-11-08 VITALS
RESPIRATION RATE: 17 BRPM | DIASTOLIC BLOOD PRESSURE: 65 MMHG | OXYGEN SATURATION: 98 % | SYSTOLIC BLOOD PRESSURE: 108 MMHG | HEART RATE: 68 BPM | TEMPERATURE: 98 F

## 2021-11-08 PROCEDURE — 3331090001 HH PPS REVENUE CREDIT

## 2021-11-08 PROCEDURE — 3331090002 HH PPS REVENUE DEBIT

## 2021-11-08 PROCEDURE — G0157 HHC PT ASSISTANT EA 15: HCPCS

## 2021-11-09 ENCOUNTER — HOME CARE VISIT (OUTPATIENT)
Dept: SCHEDULING | Facility: HOME HEALTH | Age: 81
End: 2021-11-09
Payer: MEDICARE

## 2021-11-09 VITALS
DIASTOLIC BLOOD PRESSURE: 80 MMHG | TEMPERATURE: 97.7 F | RESPIRATION RATE: 16 BRPM | HEART RATE: 69 BPM | OXYGEN SATURATION: 96 % | SYSTOLIC BLOOD PRESSURE: 130 MMHG

## 2021-11-09 PROCEDURE — 3331090002 HH PPS REVENUE DEBIT

## 2021-11-09 PROCEDURE — G0299 HHS/HOSPICE OF RN EA 15 MIN: HCPCS

## 2021-11-09 PROCEDURE — G0158 HHC OT ASSISTANT EA 15: HCPCS

## 2021-11-09 PROCEDURE — 3331090001 HH PPS REVENUE CREDIT

## 2021-11-10 VITALS
SYSTOLIC BLOOD PRESSURE: 110 MMHG | RESPIRATION RATE: 18 BRPM | DIASTOLIC BLOOD PRESSURE: 60 MMHG | TEMPERATURE: 97.9 F | HEART RATE: 94 BPM | OXYGEN SATURATION: 95 %

## 2021-11-10 PROCEDURE — 3331090001 HH PPS REVENUE CREDIT

## 2021-11-10 PROCEDURE — 3331090002 HH PPS REVENUE DEBIT

## 2021-11-10 NOTE — HOME HEALTH
Patient and daughter concerned that patient has not had a BM since 11/2/2021. Patient has taken numerous laxatives. Patient has also used suppository's and enema's with no results. Patient states that she's eating 3 meals a day and consuming an adequate amount water daily. Patient's daughter stated that Dr. Romy Akbar is aware about patients constipation issue. . Patient's daughter stated that Dr. Romy Akbar is going to refer patient to a GI doctor.  SN called Dr. Pranav Villa office today to make them aware to please notify patient about when her scheduled appointment is with GI md.

## 2021-11-11 ENCOUNTER — HOME CARE VISIT (OUTPATIENT)
Dept: SCHEDULING | Facility: HOME HEALTH | Age: 81
End: 2021-11-11
Payer: MEDICARE

## 2021-11-11 ENCOUNTER — APPOINTMENT (OUTPATIENT)
Dept: GENERAL RADIOLOGY | Age: 81
End: 2021-11-11
Attending: STUDENT IN AN ORGANIZED HEALTH CARE EDUCATION/TRAINING PROGRAM
Payer: MEDICARE

## 2021-11-11 ENCOUNTER — HOSPITAL ENCOUNTER (EMERGENCY)
Age: 81
Discharge: HOME | End: 2021-11-12
Attending: STUDENT IN AN ORGANIZED HEALTH CARE EDUCATION/TRAINING PROGRAM
Payer: MEDICARE

## 2021-11-11 VITALS
RESPIRATION RATE: 16 BRPM | TEMPERATURE: 98.5 F | HEART RATE: 69 BPM | OXYGEN SATURATION: 94 % | SYSTOLIC BLOOD PRESSURE: 110 MMHG | DIASTOLIC BLOOD PRESSURE: 60 MMHG

## 2021-11-11 VITALS
RESPIRATION RATE: 16 BRPM | HEART RATE: 61 BPM | SYSTOLIC BLOOD PRESSURE: 103 MMHG | TEMPERATURE: 97.7 F | OXYGEN SATURATION: 98 % | DIASTOLIC BLOOD PRESSURE: 65 MMHG

## 2021-11-11 DIAGNOSIS — K59.00 CONSTIPATION, UNSPECIFIED CONSTIPATION TYPE: Primary | ICD-10-CM

## 2021-11-11 LAB
ALBUMIN SERPL-MCNC: 2.8 G/DL (ref 3.2–4.6)
ALBUMIN/GLOB SERPL: 0.6 {RATIO} (ref 1.2–3.5)
ALP SERPL-CCNC: 118 U/L (ref 50–136)
ALT SERPL-CCNC: 34 U/L (ref 12–65)
ANION GAP SERPL CALC-SCNC: 7 MMOL/L (ref 7–16)
AST SERPL-CCNC: 50 U/L (ref 15–37)
BACTERIA URNS QL MICRO: 0 /HPF
BASOPHILS # BLD: 0.1 K/UL (ref 0–0.2)
BASOPHILS NFR BLD: 1 % (ref 0–2)
BILIRUB SERPL-MCNC: 0.6 MG/DL (ref 0.2–1.1)
BUN SERPL-MCNC: 12 MG/DL (ref 8–23)
CALCIUM SERPL-MCNC: 9.3 MG/DL (ref 8.3–10.4)
CASTS URNS QL MICRO: NORMAL /LPF
CHLORIDE SERPL-SCNC: 99 MMOL/L (ref 98–107)
CO2 SERPL-SCNC: 25 MMOL/L (ref 21–32)
CREAT SERPL-MCNC: 0.76 MG/DL (ref 0.6–1)
DIFFERENTIAL METHOD BLD: ABNORMAL
EOSINOPHIL # BLD: 0.1 K/UL (ref 0–0.8)
EOSINOPHIL NFR BLD: 2 % (ref 0.5–7.8)
EPI CELLS #/AREA URNS HPF: NORMAL /HPF
ERYTHROCYTE [DISTWIDTH] IN BLOOD BY AUTOMATED COUNT: 17.6 % (ref 11.9–14.6)
GLOBULIN SER CALC-MCNC: 4.6 G/DL (ref 2.3–3.5)
GLUCOSE SERPL-MCNC: 126 MG/DL (ref 65–100)
HCT VFR BLD AUTO: 38.1 % (ref 35.8–46.3)
HGB BLD-MCNC: 11.9 G/DL (ref 11.7–15.4)
IMM GRANULOCYTES # BLD AUTO: 0 K/UL (ref 0–0.5)
IMM GRANULOCYTES NFR BLD AUTO: 0 % (ref 0–5)
LIPASE SERPL-CCNC: 128 U/L (ref 73–393)
LYMPHOCYTES # BLD: 1.1 K/UL (ref 0.5–4.6)
LYMPHOCYTES NFR BLD: 16 % (ref 13–44)
MCH RBC QN AUTO: 29.1 PG (ref 26.1–32.9)
MCHC RBC AUTO-ENTMCNC: 31.2 G/DL (ref 31.4–35)
MCV RBC AUTO: 93.2 FL (ref 79.6–97.8)
MONOCYTES # BLD: 1 K/UL (ref 0.1–1.3)
MONOCYTES NFR BLD: 14 % (ref 4–12)
NEUTS SEG # BLD: 4.9 K/UL (ref 1.7–8.2)
NEUTS SEG NFR BLD: 67 % (ref 43–78)
NRBC # BLD: 0 K/UL (ref 0–0.2)
PLATELET # BLD AUTO: 350 K/UL (ref 150–450)
PMV BLD AUTO: 9.3 FL (ref 9.4–12.3)
POTASSIUM SERPL-SCNC: 4.5 MMOL/L (ref 3.5–5.1)
PROT SERPL-MCNC: 7.4 G/DL (ref 6.3–8.2)
RBC # BLD AUTO: 4.09 M/UL (ref 4.05–5.2)
RBC #/AREA URNS HPF: NORMAL /HPF
SODIUM SERPL-SCNC: 131 MMOL/L (ref 136–145)
WBC # BLD AUTO: 7.2 K/UL (ref 4.3–11.1)
WBC URNS QL MICRO: NORMAL /HPF

## 2021-11-11 PROCEDURE — 83690 ASSAY OF LIPASE: CPT

## 2021-11-11 PROCEDURE — 99284 EMERGENCY DEPT VISIT MOD MDM: CPT

## 2021-11-11 PROCEDURE — 81015 MICROSCOPIC EXAM OF URINE: CPT

## 2021-11-11 PROCEDURE — 81003 URINALYSIS AUTO W/O SCOPE: CPT

## 2021-11-11 PROCEDURE — 80053 COMPREHEN METABOLIC PANEL: CPT

## 2021-11-11 PROCEDURE — G0299 HHS/HOSPICE OF RN EA 15 MIN: HCPCS

## 2021-11-11 PROCEDURE — 85025 COMPLETE CBC W/AUTO DIFF WBC: CPT

## 2021-11-11 PROCEDURE — 96361 HYDRATE IV INFUSION ADD-ON: CPT

## 2021-11-11 PROCEDURE — 96360 HYDRATION IV INFUSION INIT: CPT

## 2021-11-11 PROCEDURE — G0158 HHC OT ASSISTANT EA 15: HCPCS

## 2021-11-11 PROCEDURE — 3331090001 HH PPS REVENUE CREDIT

## 2021-11-11 PROCEDURE — 74018 RADEX ABDOMEN 1 VIEW: CPT

## 2021-11-11 PROCEDURE — 3331090002 HH PPS REVENUE DEBIT

## 2021-11-11 PROCEDURE — 74011250636 HC RX REV CODE- 250/636: Performed by: STUDENT IN AN ORGANIZED HEALTH CARE EDUCATION/TRAINING PROGRAM

## 2021-11-11 RX ORDER — ADHESIVE BANDAGE
30 BANDAGE TOPICAL DAILY
Qty: 180 ML | Refills: 0 | Status: SHIPPED | OUTPATIENT
Start: 2021-11-11 | End: 2022-03-30

## 2021-11-11 RX ORDER — HYOSCYAMINE SULFATE 0.125 MG
125 TABLET ORAL
Qty: 30 TABLET | Refills: 1 | Status: SHIPPED | OUTPATIENT
Start: 2021-11-11 | End: 2022-03-30

## 2021-11-11 RX ORDER — MAGNESIUM CITRATE
296 SOLUTION, ORAL ORAL
Qty: 1 EACH | Refills: 1 | Status: SHIPPED | OUTPATIENT
Start: 2021-11-12 | End: 2021-11-12

## 2021-11-11 RX ORDER — SODIUM CHLORIDE 0.9 % (FLUSH) 0.9 %
5-10 SYRINGE (ML) INJECTION AS NEEDED
Status: DISCONTINUED | OUTPATIENT
Start: 2021-11-11 | End: 2021-11-12 | Stop reason: HOSPADM

## 2021-11-11 RX ORDER — SODIUM CHLORIDE 0.9 % (FLUSH) 0.9 %
5-10 SYRINGE (ML) INJECTION EVERY 8 HOURS
Status: DISCONTINUED | OUTPATIENT
Start: 2021-11-11 | End: 2021-11-12 | Stop reason: HOSPADM

## 2021-11-11 RX ORDER — DOCUSATE SODIUM 100 MG/1
100 CAPSULE, LIQUID FILLED ORAL
Qty: 20 CAPSULE | Refills: 1 | Status: SHIPPED | OUTPATIENT
Start: 2021-11-11 | End: 2021-11-21

## 2021-11-11 RX ADMIN — SODIUM CHLORIDE 1000 ML: 900 INJECTION, SOLUTION INTRAVENOUS at 21:11

## 2021-11-11 NOTE — ED TRIAGE NOTES
Pt arrives via pov with daughter c/o constipation over a week. Pt reports intermittent abd cramping. Denies dysuria. Arrives with a cast to the left arm but not taking pain medications. Pt denies n/v or fevers/chills. No relief with OTC meds.

## 2021-11-12 ENCOUNTER — HOME CARE VISIT (OUTPATIENT)
Dept: HOME HEALTH SERVICES | Facility: HOME HEALTH | Age: 81
End: 2021-11-12
Payer: MEDICARE

## 2021-11-12 ENCOUNTER — HOME CARE VISIT (OUTPATIENT)
Dept: SCHEDULING | Facility: HOME HEALTH | Age: 81
End: 2021-11-12
Payer: MEDICARE

## 2021-11-12 VITALS
DIASTOLIC BLOOD PRESSURE: 65 MMHG | HEIGHT: 62 IN | TEMPERATURE: 99 F | OXYGEN SATURATION: 98 % | SYSTOLIC BLOOD PRESSURE: 125 MMHG | RESPIRATION RATE: 18 BRPM | HEART RATE: 85 BPM | WEIGHT: 120 LBS | BODY MASS INDEX: 22.08 KG/M2

## 2021-11-12 VITALS
OXYGEN SATURATION: 98 % | HEART RATE: 67 BPM | RESPIRATION RATE: 16 BRPM | TEMPERATURE: 98.2 F | SYSTOLIC BLOOD PRESSURE: 110 MMHG | DIASTOLIC BLOOD PRESSURE: 65 MMHG

## 2021-11-12 PROCEDURE — G0157 HHC PT ASSISTANT EA 15: HCPCS

## 2021-11-12 PROCEDURE — 3331090001 HH PPS REVENUE CREDIT

## 2021-11-12 PROCEDURE — 3331090002 HH PPS REVENUE DEBIT

## 2021-11-12 NOTE — ED PROVIDER NOTES
57-year-old female patient presents with reports of constipation. Patient states she has had little productivity with attempted stooling over the past week. She states she has tried multiple medications at home including magnesium citrate, MiraLAX, Dulcolax and has had little relief for effect. She attempted a fleets enema without effect. Patient describes intermittent episodes of cramping abdominal discomfort, reduced appetite. She denies fever, chills, vomiting. She does report some mild nausea. Patient reports recent hospital admission for fall and subsequent arm fracture. She denies use of any narcotic pain medications or medications for nausea.            Past Medical History:   Diagnosis Date    Adverse effect of anesthesia     Difficulty awakening     Coronary artery disease     1 stent in 2009, Dr. Hattie Medina follows     COVID-19 vaccine series completed 02/26/2021    Pfizer vaccine     GERD (gastroesophageal reflux disease)     Managed with as needed meds     Hyperlipidemia     Hypertension     managed with meds     Insomnia     LBBB (left bundle branch block)     Multiple thyroid nodules     per endocrinology office note 8/19/19- nodules have resolved, pt has a small thyroid cyst    Multiple thyroid nodules     Osteopenia     Prediabetes     Managed with diet        Past Surgical History:   Procedure Laterality Date    HX APPENDECTOMY  age 13   Marin Leyden BLADDER REPAIR      bladder tac    HX COLONOSCOPY  2004 and 2014    HX CORONARY STENT PLACEMENT  2009    HX HYSTERECTOMY  in her 46s    ovaries intact    HX ORTHOPAEDIC      right foot ORIF         Family History:   Problem Relation Age of Onset    Diabetes Mother     Coronary Art Dis Father     Thyroid Disease Maternal Grandmother     Thyroid Cancer Neg Hx        Social History     Socioeconomic History    Marital status:      Spouse name: Not on file    Number of children: Not on file    Years of education: Not on file   [place] Highest education level: Not on file   Occupational History    Not on file   Tobacco Use    Smoking status: Never Smoker    Smokeless tobacco: Never Used   Vaping Use    Vaping Use: Never used   Substance and Sexual Activity    Alcohol use: No    Drug use: No    Sexual activity: Not on file   Other Topics Concern    Not on file   Social History Narrative    Not on file     Social Determinants of Health     Financial Resource Strain:     Difficulty of Paying Living Expenses: Not on file   Food Insecurity:     Worried About Running Out of Food in the Last Year: Not on file    Lorraine of Food in the Last Year: Not on file   Transportation Needs:     Lack of Transportation (Medical): Not on file    Lack of Transportation (Non-Medical): Not on file   Physical Activity:     Days of Exercise per Week: Not on file    Minutes of Exercise per Session: Not on file   Stress:     Feeling of Stress : Not on file   Social Connections:     Frequency of Communication with Friends and Family: Not on file    Frequency of Social Gatherings with Friends and Family: Not on file    Attends [employer] Services: Not on file    Active Member of [address] [place] [address] or Organizations: Not on file    Attends Club or Organization Meetings: Not on file    Marital Status: Not on file   Intimate Partner Violence:     Fear of Current or Ex-Partner: Not on file    Emotionally Abused: Not on file    Physically Abused: Not on file    Sexually Abused: Not on file   Housing Stability:     Unable to Pay for Housing in the Last Year: Not on file    Number of [place] in the Last Year: Not on file    Unstable Housing in the Last Year: Not on file         ALLERGIES: Norvasc [amlodipine], Promethazine, and Lisinopril    Review of Systems   Constitutional: Negative for chills, diaphoresis and fever. HENT: Negative for congestion, sneezing and sore throat. Eyes: Negative for visual disturbance.    Respiratory: Negative for cough, chest tightness, shortness of breath and wheezing. Cardiovascular: Negative for chest pain and leg swelling. Gastrointestinal: Positive for abdominal pain and constipation. Negative for blood in stool, diarrhea, nausea and vomiting. Endocrine: Negative for polyuria. Genitourinary: Negative for difficulty urinating, dysuria, flank pain, hematuria and urgency. Musculoskeletal: Negative for back pain, myalgias, neck pain and neck stiffness. Skin: Negative for color change and rash. Neurological: Negative for dizziness, syncope, speech difficulty, weakness, light-headedness, numbness and headaches. Psychiatric/Behavioral: Negative for behavioral problems. All other systems reviewed and are negative. Vitals:    11/11/21 1817   BP: (!) 171/85   Pulse: 83   Resp: 18   Temp: 99 °F (37.2 °C)   SpO2: 97%   Weight: 54.4 kg (120 lb)   Height: 5' 2\" (1.575 m)            Physical Exam  Vitals and nursing note reviewed. Constitutional:       General: She is not in acute distress. Appearance: She is well-developed. She is not diaphoretic. Comments: Alert and oriented to person place and time. No acute distress, speaks in clear, fluid sentences. HENT:      Head: Normocephalic and atraumatic. Right Ear: External ear normal.      Left Ear: External ear normal.      Nose: Nose normal.   Eyes:      Pupils: Pupils are equal, round, and reactive to light. Cardiovascular:      Rate and Rhythm: Normal rate and regular rhythm. Heart sounds: Normal heart sounds. No murmur heard. No friction rub. No gallop. Pulmonary:      Effort: Pulmonary effort is normal. No respiratory distress. Breath sounds: Normal breath sounds. No stridor. No decreased breath sounds, wheezing, rhonchi or rales. Chest:      Chest wall: No tenderness. Abdominal:      General: Bowel sounds are normal. There is no distension. Palpations: Abdomen is soft. There is no mass. Tenderness:  There is no abdominal tenderness. There is no guarding or rebound. Hernia: No hernia is present. Comments: Bowel sounds present throughout. No distention rebound or guarding. Musculoskeletal:         General: No tenderness or deformity. Normal range of motion. Cervical back: Normal range of motion. Skin:     General: Skin is warm and dry. Neurological:      Mental Status: She is alert and oriented to person, place, and time. Cranial Nerves: No cranial nerve deficit. MDM  Number of Diagnoses or Management Options  Constipation, unspecified constipation type: new and requires workup  Diagnosis management comments: X-ray imaging shows no evidence of obstruction. There is constipation visualized on this image. Orders placed for milk of molasses enema. Little productivity from this enema. We will adjust patient's medications and encourage increased fluid intake, regular meals and high-fiber diet. Patient voices understanding agreement with plan. Voice dictation software was used during the making of this note. This software is not perfect and grammatical and other typographical errors may be present. This note has been proofread, but may still contain errors. [address] Cleary [facility]; 11/12/2021 @12:23 AM   ===================================================================         Amount and/or Complexity of Data Reviewed  Tests in the radiology section of CPT®: ordered and reviewed  Tests in the medicine section of CPT®: ordered and reviewed  Independent visualization of images, tracings, or specimens: yes    Risk of Complications, Morbidity, and/or Mortality  Presenting problems: moderate  Diagnostic procedures: low  Management options: moderate    Patient Progress  Patient progress: stable    ED Course as of 11/12/21 0021   Thu Nov 11, 2021   2200 X-ray shows significant constipation without obstruction. Labs are stable. We will treat with milk of molasses enema.   Will encourage continued use of meds for constipation at home with the addition of milk of magnesia and Colace daily.  [BR]      ED Course User Index  [BR] Raheem Gaspar DO       Procedures

## 2021-11-12 NOTE — DISCHARGE INSTRUCTIONS
Discontinue Dulcolax in favor of Colace, begin taking milk of magnesia daily as directed. Use Levsin as needed for abdominal cramping and pain. You may continue MiraLAX daily as well but should discontinue MiraLAX, milk of magnesia if you develop loose stools. Continue taking a stool softener until symptoms normalize. Increase fluid intake and fiber intake in your diet. Arrange follow-up with your primary care provider return to this department for worsening symptoms, concerns or questions.

## 2021-11-13 PROCEDURE — 3331090001 HH PPS REVENUE CREDIT

## 2021-11-13 PROCEDURE — 3331090002 HH PPS REVENUE DEBIT

## 2021-11-14 PROCEDURE — 3331090002 HH PPS REVENUE DEBIT

## 2021-11-14 PROCEDURE — 3331090001 HH PPS REVENUE CREDIT

## 2021-11-15 ENCOUNTER — HOME CARE VISIT (OUTPATIENT)
Dept: SCHEDULING | Facility: HOME HEALTH | Age: 81
End: 2021-11-15
Payer: MEDICARE

## 2021-11-15 VITALS
TEMPERATURE: 97.8 F | HEART RATE: 70 BPM | DIASTOLIC BLOOD PRESSURE: 90 MMHG | OXYGEN SATURATION: 99 % | SYSTOLIC BLOOD PRESSURE: 140 MMHG | RESPIRATION RATE: 16 BRPM

## 2021-11-15 PROCEDURE — 3331090001 HH PPS REVENUE CREDIT

## 2021-11-15 PROCEDURE — 3331090002 HH PPS REVENUE DEBIT

## 2021-11-15 PROCEDURE — G0158 HHC OT ASSISTANT EA 15: HCPCS

## 2021-11-16 ENCOUNTER — HOME CARE VISIT (OUTPATIENT)
Dept: SCHEDULING | Facility: HOME HEALTH | Age: 81
End: 2021-11-16
Payer: MEDICARE

## 2021-11-16 VITALS
SYSTOLIC BLOOD PRESSURE: 120 MMHG | OXYGEN SATURATION: 98 % | HEART RATE: 82 BPM | DIASTOLIC BLOOD PRESSURE: 65 MMHG | TEMPERATURE: 98.1 F | RESPIRATION RATE: 17 BRPM

## 2021-11-16 PROCEDURE — 3331090002 HH PPS REVENUE DEBIT

## 2021-11-16 PROCEDURE — G0157 HHC PT ASSISTANT EA 15: HCPCS

## 2021-11-16 PROCEDURE — 3331090001 HH PPS REVENUE CREDIT

## 2021-11-17 ENCOUNTER — HOSPITAL ENCOUNTER (OUTPATIENT)
Dept: GENERAL RADIOLOGY | Age: 81
Discharge: HOME | End: 2021-11-17
Payer: MEDICARE

## 2021-11-17 DIAGNOSIS — K59.00 CONSTIPATION: ICD-10-CM

## 2021-11-17 PROCEDURE — 3331090002 HH PPS REVENUE DEBIT

## 2021-11-17 PROCEDURE — 3331090001 HH PPS REVENUE CREDIT

## 2021-11-17 PROCEDURE — 74022 RADEX COMPL AQT ABD SERIES: CPT

## 2021-11-18 ENCOUNTER — HOME CARE VISIT (OUTPATIENT)
Dept: SCHEDULING | Facility: HOME HEALTH | Age: 81
End: 2021-11-18
Payer: MEDICARE

## 2021-11-18 VITALS
RESPIRATION RATE: 16 BRPM | SYSTOLIC BLOOD PRESSURE: 130 MMHG | DIASTOLIC BLOOD PRESSURE: 74 MMHG | TEMPERATURE: 97.7 F | OXYGEN SATURATION: 98 % | HEART RATE: 67 BPM

## 2021-11-18 VITALS
DIASTOLIC BLOOD PRESSURE: 70 MMHG | RESPIRATION RATE: 17 BRPM | SYSTOLIC BLOOD PRESSURE: 112 MMHG | OXYGEN SATURATION: 98 % | HEART RATE: 67 BPM | TEMPERATURE: 98.2 F

## 2021-11-18 PROCEDURE — 3331090001 HH PPS REVENUE CREDIT

## 2021-11-18 PROCEDURE — G0158 HHC OT ASSISTANT EA 15: HCPCS

## 2021-11-18 PROCEDURE — 3331090002 HH PPS REVENUE DEBIT

## 2021-11-18 PROCEDURE — G0157 HHC PT ASSISTANT EA 15: HCPCS

## 2021-11-18 PROCEDURE — G0299 HHS/HOSPICE OF RN EA 15 MIN: HCPCS

## 2021-11-19 PROCEDURE — 3331090002 HH PPS REVENUE DEBIT

## 2021-11-19 PROCEDURE — 3331090001 HH PPS REVENUE CREDIT

## 2021-11-20 PROCEDURE — 3331090002 HH PPS REVENUE DEBIT

## 2021-11-20 PROCEDURE — 3331090001 HH PPS REVENUE CREDIT

## 2021-11-21 PROCEDURE — 3331090001 HH PPS REVENUE CREDIT

## 2021-11-21 PROCEDURE — 3331090002 HH PPS REVENUE DEBIT

## 2021-11-22 ENCOUNTER — HOME CARE VISIT (OUTPATIENT)
Dept: HOME HEALTH SERVICES | Facility: HOME HEALTH | Age: 81
End: 2021-11-22
Payer: MEDICARE

## 2021-11-22 ENCOUNTER — HOME CARE VISIT (OUTPATIENT)
Dept: SCHEDULING | Facility: HOME HEALTH | Age: 81
End: 2021-11-22
Payer: MEDICARE

## 2021-11-22 VITALS
SYSTOLIC BLOOD PRESSURE: 140 MMHG | OXYGEN SATURATION: 98 % | HEART RATE: 70 BPM | RESPIRATION RATE: 17 BRPM | DIASTOLIC BLOOD PRESSURE: 70 MMHG | TEMPERATURE: 97.3 F

## 2021-11-22 PROCEDURE — G0157 HHC PT ASSISTANT EA 15: HCPCS

## 2021-11-22 PROCEDURE — 3331090001 HH PPS REVENUE CREDIT

## 2021-11-22 PROCEDURE — 3331090002 HH PPS REVENUE DEBIT

## 2021-11-23 ENCOUNTER — HOME CARE VISIT (OUTPATIENT)
Dept: SCHEDULING | Facility: HOME HEALTH | Age: 81
End: 2021-11-23
Payer: MEDICARE

## 2021-11-23 VITALS
HEART RATE: 72 BPM | SYSTOLIC BLOOD PRESSURE: 130 MMHG | OXYGEN SATURATION: 97 % | DIASTOLIC BLOOD PRESSURE: 74 MMHG | RESPIRATION RATE: 18 BRPM | TEMPERATURE: 97.4 F

## 2021-11-23 PROCEDURE — 3331090002 HH PPS REVENUE DEBIT

## 2021-11-23 PROCEDURE — G0151 HHCP-SERV OF PT,EA 15 MIN: HCPCS

## 2021-11-23 PROCEDURE — 3331090001 HH PPS REVENUE CREDIT

## 2021-11-24 PROCEDURE — 3331090002 HH PPS REVENUE DEBIT

## 2021-11-24 PROCEDURE — 3331090001 HH PPS REVENUE CREDIT

## 2021-11-25 PROCEDURE — 3331090002 HH PPS REVENUE DEBIT

## 2021-11-25 PROCEDURE — 3331090001 HH PPS REVENUE CREDIT

## 2021-11-26 ENCOUNTER — HOME CARE VISIT (OUTPATIENT)
Dept: HOME HEALTH SERVICES | Facility: HOME HEALTH | Age: 81
End: 2021-11-26
Payer: MEDICARE

## 2021-11-26 PROCEDURE — 3331090001 HH PPS REVENUE CREDIT

## 2021-11-26 PROCEDURE — 3331090002 HH PPS REVENUE DEBIT

## 2021-11-27 PROCEDURE — 3331090002 HH PPS REVENUE DEBIT

## 2021-11-27 PROCEDURE — 3331090001 HH PPS REVENUE CREDIT

## 2021-11-28 PROCEDURE — 3331090001 HH PPS REVENUE CREDIT

## 2021-11-28 PROCEDURE — 400018 HH-NO PAY CLAIM PROCEDURE

## 2021-11-28 PROCEDURE — 3331090002 HH PPS REVENUE DEBIT

## 2021-11-29 ENCOUNTER — HOME CARE VISIT (OUTPATIENT)
Dept: SCHEDULING | Facility: HOME HEALTH | Age: 81
End: 2021-11-29
Payer: MEDICARE

## 2021-11-29 VITALS
DIASTOLIC BLOOD PRESSURE: 70 MMHG | HEART RATE: 67 BPM | OXYGEN SATURATION: 96 % | RESPIRATION RATE: 18 BRPM | SYSTOLIC BLOOD PRESSURE: 120 MMHG | TEMPERATURE: 97.7 F

## 2021-11-29 PROCEDURE — G0152 HHCP-SERV OF OT,EA 15 MIN: HCPCS

## 2021-11-29 PROCEDURE — 3331090003 HH PPS REVENUE ADJ

## 2021-11-29 PROCEDURE — 400013 HH SOC

## 2021-11-29 PROCEDURE — 3331090002 HH PPS REVENUE DEBIT

## 2021-11-29 PROCEDURE — 3331090001 HH PPS REVENUE CREDIT

## 2021-12-01 NOTE — THERAPY DISCHARGE
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 Frequency/Duration: 2 times a week for 90 Day(s) MEDICAL/REFERRING DIAGNOSIS:  Primary osteoarthritis, right shoulder [M19.011]  Presence of right artificial shoulder joint [Z96.611]   DATE OF ONSET: Patient underwent R reverse total shoulder on 2021  REFERRING PHYSICIAN: Adrianna Campoverde MD MD Orders: Evaluate and Treat   Reverse Total Shoulder Replacement- Dr. Juanita Bravo    1-3 weeks (2021- 2021): sling 24 hours unless sitting still- wean out of sling at 4 weeks as tolerated; pendulums; AROM elbow, wrist, and hand; PROM shoulder; supine external rotation 20-30 degrees, supine forward elevation 120-130 degrees; may progress to AAROM;  No internal rotation   4-6 weeks (2021 - 2021): sling when out of home/ traveling/ sleeping; pendulums; AAROM with passive stretching to above limits; supine external rotation gradually increase to full; supine forward elevation as tolerated; internal rotation progress to full; progress full AROM as tolerated; begin isometric deltoid contraction, postural work, upper trapezius relaxation, active scapular retraction and depression  6-8 weeks (2021 - 9/3/2021): pendulums; scapular mobilizations; internal rotation progress to full; begin resistance strengthening, maintain ROM. Return MD Appointment: 9/28/2021     INITIAL ASSESSMENT:  Ms. Gricelda Tillman is a 78 y.o. female presenting to physical therapy with complaints of R shoulder pain and stiffness s/p R reverse total shoulder arthroplasty on 7/9/2021. She has minimal to no complaints of R shoulder pain, but reports R flank pain from wearing the abduction pillow. Patient compliant with sling and MD removed abduction pillow at follow up appointment. Patient lives by herself and is eager to return to her prior level of activities including caring for herself, cleaning her house, and gardening. Patient presents with increased pain, decreased strength, decreased ROM, decreased flexibility, impaired posture, impaired overhead reach, impaired transfer ability, decreased activity tolerance, and overall impaired functional mobility. Patient is a good candidate for skilled physical therapy interventions to include manual therapy, therapeutic exercise, transfer training, postural re-education, body mechanics training, and pain modalities as needed. DISCHARGE 12/1/2021: Patient was seen for 22sessions of physical therapy from 7/30/2021 to 10/12/2021. She reported feeling at least 70% better since starting therapy with improving motion, being able to use her R UE, raising her arm better, and having more independence. She continued to  work on reaching overhead and behind her back and strength. Patient was improving with ROM and functional use of her R UE and gradually returning to prior activities. She met some of her goals and was doing well. Patient's family called to cancel her last appointment on 10/19 due to patient having a fall and fracturing her surgical shoulder. Patient discharged from plan of care. PROBLEM LIST (Impacting functional limitations):  1. Decreased Strength  2. Decreased ADL/Functional Activities  3. Increased Pain  4. Decreased Activity Tolerance  5. Decreased Pacing Skills  6.  Decreased Work Simplification/Energy Conservation Techniques  7. Decreased Flexibility/Joint Mobility  8. Edema/Girth INTERVENTIONS PLANNED: (Treatment may consist of any combination of the following)  1. Bed Mobility  2. Cold  3. Cryotherapy  4. Electrical Stimulation  5. Family Education  6. Heat  7. Home Exercise Program (HEP)  8. Manual Therapy  9. Neuromuscular Re-education/Strengthening  10. Range of Motion (ROM)  11. Therapeutic Activites  12. Therapeutic Exercise/Strengthening  13. Transfer Training     GOALS: (Goals have been discussed and agreed upon with patient.)  Short-Term Functional Goals: Time Frame: 7/30/2021 to 9/10/2021  1. Patient demonstrates independence with home exercise program without verbal cueing provided by therapist. -GOAL MET  2. Patient will report no more than 1/10 R shoulder or flank pain at rest in order to demonstrate improved self pain control and tolerance. -GOAL MET  3. Patient will be educated in and demonstrate improved upright posture including decreased forward head and shoulders to improve clearance for overhead activities. -GOAL MET  4. Patient will improve R shoulder passive external rotation to at least 45 degrees in order to demonstrate progress towards discharge goals. -NOT MET  Discharge Goals: Time Frame: 7/30/2021 to 10/28/2021  1. Patient will improve R shoulder forward elevation to 145 degrees in order to improve ability to reach overhead/ into cabinets at home. - -NOT MET  2. Patient will improve R shoulder functional internal and external rotation to L5 and C7, respectively, in order to improve ability to wash hair and don pants. -NOT MET  3. Patient will report minimal to no R shoulder pain with daily activities and self care in order to demonstrate improved activity tolerance. -GOAL MET  4. Patient will be able to return to light housework and gardening with minimal to no complaints in order to return to prior hobbies. -NOT MET  5.  Patient will improve Disability of the Arm, Shoulder, and Hand score to 30/55 from 45/55. -GOAL MET    Outcome Measure: Tool Used: Disabilities of the Arm, Shoulder and Hand (DASH) Questionnaire - Quick Version  Score:  Initial: 45/55  Most Recent: 33/55 (Date: 8/23/2021 )                       24/55 (Date: 9/16/2021)   Interpretation of Score: The DASH is designed to measure the activities of daily living in person's with upper extremity dysfunction or pain. Each section is scored on a 1-5 scale, 5 representing the greatest disability. The scores of each section are added together for a total score of 55. UPDATED OBJECTIVE FINDINGS:  (from 9/16/2021)  Palpation:          Minimal (from moderate) tenderness to palpation of gross R shoulder and incision site. ROM:        NT = noted tested  AROM/ PROM Left (degrees) Right (degrees)   Shoulder Flexion 150 145 (from 115)   Shoulder Abduction 140 120 (from 70)   Shoulder Internal Rotation  70 70 (from to belly)   Functional Internal Rotation T10 To R SI joint   (from NT)   Shoulder External Rotation 55 40 (from 0)   Functional External Rotation C7 with limited shoulder flexion To back of head, limited shoulder flexion (from NT)     Strength: Motion Tested Left   (*/5) Right  (*/5)   Shoulder Flexion 4+ 4  (from 3+)   [place] 4+ 4 (from 3+)   Shoulder Abduction 4+ 4+ (from 4-)   Shoulder Internal Rotation  4+ 4- (from 3+)   Shoulder External Rotation 4+ 3- (from 3+)   Elbow Flexion 4+ 5 (from 4+)   Elbow Extension 4+ 5 (from 4+)     Functional Mobility:         Patient able to dress/ bathe on her own with slight modifications and increased time required. Unable to reach overhead. Patient eager to return to prior activity level including caring for herself, her house, and gardening. Balance:          Sitting and standing balance grossly intact. Medical Necessity:   · Patient discharged.     Rehabilitation Potential For Stated Goals: Good  Regarding Marion Meyer's therapy, I certify that the treatment plan above will be carried out by a therapist or under their direction. Thank you for this referral,  Hanna Naqvi PT     Referring Physician Signature: Bryant Quinones MD No Signature is Required for this note.

## 2021-12-17 PROBLEM — E22.2 SIADH (SYNDROME OF INAPPROPRIATE ADH PRODUCTION) (HCC): Status: ACTIVE | Noted: 2021-11-23

## 2021-12-20 PROBLEM — M97.31XA PERIPROSTHETIC FRACTURE AROUND INTERNAL PROSTHETIC RIGHT SHOULDER JOINT: Status: ACTIVE | Noted: 2021-12-20

## 2022-03-07 PROBLEM — E22.2 SIADH (SYNDROME OF INAPPROPRIATE ADH PRODUCTION) (HCC): Status: RESOLVED | Noted: 2021-11-23 | Resolved: 2022-03-07

## 2022-03-18 PROBLEM — E87.1 ACUTE HYPONATREMIA: Status: ACTIVE | Noted: 2021-10-21

## 2022-03-18 PROBLEM — M19.011 OSTEOARTHRITIS OF RIGHT SHOULDER: Status: ACTIVE | Noted: 2021-06-23

## 2022-03-18 PROBLEM — I25.10 CORONARY ARTERY DISEASE INVOLVING NATIVE CORONARY ARTERY OF NATIVE HEART WITHOUT ANGINA PECTORIS: Status: ACTIVE | Noted: 2017-02-08

## 2022-03-18 PROBLEM — I10 ESSENTIAL HYPERTENSION: Status: ACTIVE | Noted: 2017-02-08

## 2022-03-19 PROBLEM — D62 ACUTE BLOOD LOSS ANEMIA (ABLA): Status: ACTIVE | Noted: 2021-10-21

## 2022-03-19 PROBLEM — M85.89 OSTEOPENIA OF MULTIPLE SITES: Status: ACTIVE | Noted: 2017-02-08

## 2022-03-19 PROBLEM — M19.019 SHOULDER ARTHRITIS: Status: ACTIVE | Noted: 2021-07-09

## 2022-03-19 PROBLEM — E78.2 MIXED HYPERLIPIDEMIA: Status: ACTIVE | Noted: 2017-02-08

## 2022-03-19 PROBLEM — M75.101 RIGHT ROTATOR CUFF TEAR ARTHROPATHY: Status: ACTIVE | Noted: 2021-05-18

## 2022-03-19 PROBLEM — M97.31XA PERIPROSTHETIC FRACTURE AROUND INTERNAL PROSTHETIC RIGHT SHOULDER JOINT: Status: ACTIVE | Noted: 2021-12-20

## 2022-03-19 PROBLEM — M12.811 RIGHT ROTATOR CUFF TEAR ARTHROPATHY: Status: ACTIVE | Noted: 2021-05-18

## 2022-03-20 PROBLEM — M97.8XXA: Status: ACTIVE | Noted: 2021-10-21

## 2022-03-20 PROBLEM — Z96.611 S/P REVERSE TOTAL SHOULDER ARTHROPLASTY, RIGHT: Status: ACTIVE | Noted: 2021-07-11

## 2022-03-20 PROBLEM — Z96.619: Status: ACTIVE | Noted: 2021-10-21

## 2022-03-20 PROBLEM — R73.03 PREDIABETES: Status: ACTIVE | Noted: 2017-02-08

## 2022-03-28 ENCOUNTER — HOSPITAL ENCOUNTER (OUTPATIENT)
Dept: GENERAL RADIOLOGY | Age: 82
Discharge: HOME | End: 2022-03-28
Attending: FAMILY MEDICINE
Payer: MEDICARE

## 2022-03-28 DIAGNOSIS — R13.14 PHARYNGOESOPHAGEAL DYSPHAGIA: ICD-10-CM

## 2022-03-28 PROCEDURE — 74246 X-RAY XM UPR GI TRC 2CNTRST: CPT

## 2022-03-28 PROCEDURE — 74011000250 HC RX REV CODE- 250: Performed by: FAMILY MEDICINE

## 2022-03-28 RX ADMIN — BARIUM SULFATE 135 ML: 980 POWDER, FOR SUSPENSION ORAL at 11:00

## 2022-03-28 RX ADMIN — ANTACID/ANTIFLATULENT 4 G: 380; 550; 10; 10 GRANULE, EFFERVESCENT ORAL at 11:00

## 2022-03-28 RX ADMIN — BARIUM SULFATE 355 ML: 0.6 SUSPENSION ORAL at 11:05

## 2022-06-22 ENCOUNTER — OFFICE VISIT (OUTPATIENT)
Dept: ENDOCRINOLOGY | Age: 82
End: 2022-06-22
Payer: MEDICARE

## 2022-06-22 VITALS
SYSTOLIC BLOOD PRESSURE: 118 MMHG | OXYGEN SATURATION: 96 % | DIASTOLIC BLOOD PRESSURE: 86 MMHG | WEIGHT: 130 LBS | HEART RATE: 58 BPM | BODY MASS INDEX: 23.78 KG/M2

## 2022-06-22 DIAGNOSIS — E04.2 MULTIPLE THYROID NODULES: Primary | ICD-10-CM

## 2022-06-22 PROCEDURE — 99203 OFFICE O/P NEW LOW 30 MIN: CPT | Performed by: INTERNAL MEDICINE

## 2022-06-22 PROCEDURE — G8399 PT W/DXA RESULTS DOCUMENT: HCPCS | Performed by: INTERNAL MEDICINE

## 2022-06-22 PROCEDURE — G8428 CUR MEDS NOT DOCUMENT: HCPCS | Performed by: INTERNAL MEDICINE

## 2022-06-22 PROCEDURE — 76536 US EXAM OF HEAD AND NECK: CPT | Performed by: INTERNAL MEDICINE

## 2022-06-22 PROCEDURE — G8420 CALC BMI NORM PARAMETERS: HCPCS | Performed by: INTERNAL MEDICINE

## 2022-06-22 PROCEDURE — 1090F PRES/ABSN URINE INCON ASSESS: CPT | Performed by: INTERNAL MEDICINE

## 2022-06-22 PROCEDURE — 4004F PT TOBACCO SCREEN RCVD TLK: CPT | Performed by: INTERNAL MEDICINE

## 2022-06-22 PROCEDURE — 1123F ACP DISCUSS/DSCN MKR DOCD: CPT | Performed by: INTERNAL MEDICINE

## 2022-06-22 RX ORDER — FOLIC ACID/MULTIVIT,IRON,MINER .4-18-35
1 TABLET,CHEWABLE ORAL DAILY
COMMUNITY

## 2022-06-22 ASSESSMENT — ENCOUNTER SYMPTOMS
VOICE CHANGE: 1
TROUBLE SWALLOWING: 0

## 2022-06-28 ENCOUNTER — OFFICE VISIT (OUTPATIENT)
Dept: ENT CLINIC | Age: 82
End: 2022-06-28
Payer: MEDICARE

## 2022-06-28 VITALS
WEIGHT: 132 LBS | DIASTOLIC BLOOD PRESSURE: 88 MMHG | BODY MASS INDEX: 24.29 KG/M2 | SYSTOLIC BLOOD PRESSURE: 130 MMHG | HEIGHT: 62 IN

## 2022-06-28 DIAGNOSIS — R49.0 HOARSENESS: Primary | ICD-10-CM

## 2022-06-28 DIAGNOSIS — K21.9 LARYNGOPHARYNGEAL REFLUX (LPR): ICD-10-CM

## 2022-06-28 DIAGNOSIS — R49.0 MUSCLE TENSION DYSPHONIA: ICD-10-CM

## 2022-06-28 PROCEDURE — 99213 OFFICE O/P EST LOW 20 MIN: CPT | Performed by: PHYSICIAN ASSISTANT

## 2022-06-28 PROCEDURE — 1036F TOBACCO NON-USER: CPT | Performed by: PHYSICIAN ASSISTANT

## 2022-06-28 PROCEDURE — 1123F ACP DISCUSS/DSCN MKR DOCD: CPT | Performed by: PHYSICIAN ASSISTANT

## 2022-06-28 PROCEDURE — G8428 CUR MEDS NOT DOCUMENT: HCPCS | Performed by: PHYSICIAN ASSISTANT

## 2022-06-28 PROCEDURE — G8399 PT W/DXA RESULTS DOCUMENT: HCPCS | Performed by: PHYSICIAN ASSISTANT

## 2022-06-28 PROCEDURE — 1090F PRES/ABSN URINE INCON ASSESS: CPT | Performed by: PHYSICIAN ASSISTANT

## 2022-06-28 PROCEDURE — 31575 DIAGNOSTIC LARYNGOSCOPY: CPT | Performed by: PHYSICIAN ASSISTANT

## 2022-06-28 PROCEDURE — G8420 CALC BMI NORM PARAMETERS: HCPCS | Performed by: PHYSICIAN ASSISTANT

## 2022-06-28 RX ORDER — CEFUROXIME AXETIL 250 MG/1
250 TABLET ORAL 2 TIMES DAILY
Qty: 20 TABLET | Refills: 0 | Status: SHIPPED | OUTPATIENT
Start: 2022-06-28 | End: 2022-07-08

## 2022-06-28 ASSESSMENT — ENCOUNTER SYMPTOMS
COUGH: 0
EYE DISCHARGE: 0
ABDOMINAL PAIN: 0

## 2022-06-28 NOTE — PROGRESS NOTES
Chief Complaint   Patient presents with    Other     Patient here here for hoarsness. She states she had a fall last year and has been like this since then. HPI:  Davin Snow is a 78 y.o. female seen for evaluation of hoarseness. She reports that her voice is worse than last year. She denies trouble swallowing, odynophagia or chronic cough. She denies PND or sinus infections. Patient reports that she decreased her omeprazole to 20 mg daily because she was having trouble with urination. Past Medical History, Past Surgical History, Family history, Social History, and Medications were all reviewed with the patient today and updated as necessary.      Allergies   Allergen Reactions    Amlodipine Other (See Comments) and Shortness Of Breath    Promethazine Other (See Comments)    Lisinopril Other (See Comments)     Patient Active Problem List   Diagnosis    Coronary artery disease involving native coronary artery of native heart without angina pectoris    Osteoarthritis of right shoulder    Acute hyponatremia    Essential hypertension    Multiple thyroid nodules    Right rotator cuff tear arthropathy    Osteopenia of multiple sites    Periprosthetic fracture around internal prosthetic right shoulder joint    Acute blood loss anemia (ABLA)    Mixed hyperlipidemia    Shoulder arthritis    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    S/P reverse total shoulder arthroplasty, right    Prediabetes     Current Outpatient Medications   Medication Sig    cefUROXime (CEFTIN) 250 MG tablet Take 1 tablet by mouth 2 times daily for 10 days    multivitamin-iron-minerals-folic acid (CENTRUM) chewable tablet Take 1 tablet by mouth daily    aspirin 81 MG EC tablet Take 81 mg by mouth    calcium carbonate-vitamin D (CALTRATE) 600-400 MG-UNIT TABS per tab Take 1 tablet by mouth daily    carvedilol (COREG) 25 MG tablet Take 6.25 mg by mouth 2 times daily (with meals)     omeprazole (PRILOSEC) 40 MG delayed release capsule 20 mg TAKE 1 CAPSULE BY MOUTH EVERY DAY    simvastatin (ZOCOR) 40 MG tablet Take 40 mg by mouth    calcium carbonate 1500 (600 Ca) MG TABS tablet Take 1,200 mg by mouth three times a week    ergocalciferol (ERGOCALCIFEROL) 1.25 MG (40129 UT) capsule Take 50,000 Units by mouth every 7 days    ibandronate (BONIVA) 150 MG tablet Take 150 mg by mouth every 30 days     No current facility-administered medications for this visit. Past Medical History:   Diagnosis Date    Adverse effect of anesthesia     Difficulty awakening     Coronary artery disease     1 stent in 2009, Dr. Alie Wade follows     COVID-19 vaccine series completed 02/26/2021    Pfizer vaccine     GERD (gastroesophageal reflux disease)     Managed with as needed meds     Hyperlipidemia     Hypertension     managed with meds     Insomnia     LBBB (left bundle branch block)     Multiple thyroid nodules     per endocrinology office note 8/19/19- nodules have resolved, pt has a small thyroid cyst    Multiple thyroid nodules     Osteopenia     Prediabetes     Managed with diet      Social History     Tobacco Use    Smoking status: Never Smoker    Smokeless tobacco: Never Used   Substance Use Topics    Alcohol use: No     Past Surgical History:   Procedure Laterality Date    APPENDECTOMY  age 14    BLADDER REPAIR      COLONOSCOPY  2004 and 2014    CORONARY ANGIOPLASTY WITH STENT PLACEMENT  2009    FRACTURE SURGERY Right     fractured right humerus    HYSTERECTOMY (CERVIX STATUS UNKNOWN)  10/16/2021    ORTHOPEDIC SURGERY      right foot ORIF    SHOULDER ARTHROPLASTY Right 07/09/2021    Dr. Aroldo Bingham  07/2021     Family History   Problem Relation Age of Onset    Diabetes Mother     Coronary Art Dis Father     Thyroid Disease Maternal Grandmother     Thyroid Cancer Neg Hx         ROS:    Review of Systems   Constitutional: Negative for fever.    HENT: Negative for ear discharge and ear pain. Eyes: Negative for discharge. Respiratory: Negative for cough. Cardiovascular: Negative for chest pain. Gastrointestinal: Negative for abdominal pain. Genitourinary: Negative for difficulty urinating. Musculoskeletal: Negative for neck pain. Skin: Negative for rash. Allergic/Immunologic: Negative for environmental allergies. Neurological: Negative for dizziness. Hematological: Negative for adenopathy. Psychiatric/Behavioral: Negative for agitation. PHYSICAL EXAM:    /88 (Site: Left Upper Arm, Position: Sitting)   Ht 5' 2\" (1.575 m)   Wt 132 lb (59.9 kg)   BMI 24.14 kg/m²     Head  Head and Face - The head and face are atraumatic, normocephalic. The salivary glands are intact and the facial appearance is symmetric. Head shape - No scars, lesions, or masses    Ear  Ear - Tympanic membranes are clear, the external auditory canal is without discharge and the tympanic membranes are mobile. There is no tympanic membrane erythema and no middle ear opacity is visualized. Pinna: bilateral - No hematomas or lacerations    Eye  Eyeball - bilateral - extraocular motions intact, equal in size and movement    Nose and Sinuses  Nose - mucosa is pink and the septum is deviated. There are no nasal lesions and there was mild turbinate hypertrophy. Mouth and Throat  Lips - upper lip - normal: no dryness, cracking, pallor, cyanosis, or vesicular eruption. Lower lip: normal: no dryness, cracking, pallor, cyanosis, or vesicular eruption. Teeth and Gums - No bleeding, no inflammation or ulceration. Lips - Pink and symmetrical  Oral Cavity - Oral mucosa pink, soft and hard palates contiguous and tongue moist without ulcers. The mucosa is without ulcerations. No oral cavity masses present. Parotid Gland - Bilateral - Non tender, not swollen.   Oropharynx - No discharge or Erythema  Nasopharynx - Non obstructed, mucosa pink and moist.    Hypopharynx - No erythema  Submandibular Gland - Non tender, not swollen. Tonsils - Normal    Neck   Neck - Full range of motion and Supple. 1cm lymph node at level III on the right with tenderness to palpation. Trachea - Midline. Thyroid - Gland - Symmetric. Non Tender. Nodules - No nodules. Neurologic - II - XII Grossly intact bilaterally    Cardiac  Inspection - Jugular Vein:  Bilateral - non distended, no prominent pulsations    Chest and Lung  Inspection - Movements:  Chest symmetrical with bilateral expansion, respirations even and non labored    Laryngoscopy Procedure Note        Indications   A laryngoscopy will be performed due to hoarseness. Procedure Details     Laryngoscopy was performed today under Rhinocaine was applied topically. Findings  The findings include: septum deviated, nasal cavity and nasopharynx without masses or obstructions, base of tongue without mass or asymmetry, true vocal cords mobile bilaterally, extraesophageal reflux, erythema of the laryngeal surface of the epiglottis, erythema of arytenoids, no true vocal cord lesion or mass, no pyriform sinus mass, no supraglottic laryngeal mass and thinned left vocal cord, muscle tension dysphonia. Patient Response   The patient tolerated the procedure well. .            Procedure Impression  Extraesophageal reflux (LPR). Mild  thinned left vocal cord, muscle tension dysphonia    ASSESSMENT and PLAN      ICD-10-CM    1. Hoarseness  R49.0 LARYNGOSCOPY,FLEX FIBER,DIAGNOSTIC   2. Laryngopharyngeal reflux (LPR)  K21.9 LARYNGOSCOPY,FLEX FIBER,DIAGNOSTIC   3. Muscle tension dysphonia  R49.0 LARYNGOSCOPY,FLEX FIBER,DIAGNOSTIC       Patient will continue her reflux medication. I believe that she will benefit from speech therapy to help with the voice. She states that she was in speech therapy years ago and has her videos and material that she can use which she would rather try vs going in the office setting.  She will monitor her neck and I will send in a round of antibiotics. She reports that she recently had an ultrasound of her thyroid with Dr Aldo Bird. Patient will return to the clinic in 6 weeks for reevaluation with Dr Kyle Jeronimo to consider if intervention is needed for the thinned vocal cord.      Abbi Day PA-C  6/28/2022

## 2022-08-10 ENCOUNTER — OFFICE VISIT (OUTPATIENT)
Dept: ENT CLINIC | Age: 82
End: 2022-08-10
Payer: MEDICARE

## 2022-08-10 VITALS
SYSTOLIC BLOOD PRESSURE: 120 MMHG | DIASTOLIC BLOOD PRESSURE: 70 MMHG | HEIGHT: 62 IN | WEIGHT: 131.6 LBS | BODY MASS INDEX: 24.22 KG/M2

## 2022-08-10 DIAGNOSIS — K21.9 LARYNGOPHARYNGEAL REFLUX (LPR): ICD-10-CM

## 2022-08-10 DIAGNOSIS — J38.7 PRESBYLARYNX: ICD-10-CM

## 2022-08-10 DIAGNOSIS — R49.0 HOARSENESS: Primary | ICD-10-CM

## 2022-08-10 PROCEDURE — 1090F PRES/ABSN URINE INCON ASSESS: CPT | Performed by: STUDENT IN AN ORGANIZED HEALTH CARE EDUCATION/TRAINING PROGRAM

## 2022-08-10 PROCEDURE — 31575 DIAGNOSTIC LARYNGOSCOPY: CPT | Performed by: STUDENT IN AN ORGANIZED HEALTH CARE EDUCATION/TRAINING PROGRAM

## 2022-08-10 PROCEDURE — 1123F ACP DISCUSS/DSCN MKR DOCD: CPT | Performed by: STUDENT IN AN ORGANIZED HEALTH CARE EDUCATION/TRAINING PROGRAM

## 2022-08-10 PROCEDURE — 99214 OFFICE O/P EST MOD 30 MIN: CPT | Performed by: STUDENT IN AN ORGANIZED HEALTH CARE EDUCATION/TRAINING PROGRAM

## 2022-08-10 PROCEDURE — G8427 DOCREV CUR MEDS BY ELIG CLIN: HCPCS | Performed by: STUDENT IN AN ORGANIZED HEALTH CARE EDUCATION/TRAINING PROGRAM

## 2022-08-10 PROCEDURE — G8399 PT W/DXA RESULTS DOCUMENT: HCPCS | Performed by: STUDENT IN AN ORGANIZED HEALTH CARE EDUCATION/TRAINING PROGRAM

## 2022-08-10 PROCEDURE — G8420 CALC BMI NORM PARAMETERS: HCPCS | Performed by: STUDENT IN AN ORGANIZED HEALTH CARE EDUCATION/TRAINING PROGRAM

## 2022-08-10 PROCEDURE — 1036F TOBACCO NON-USER: CPT | Performed by: STUDENT IN AN ORGANIZED HEALTH CARE EDUCATION/TRAINING PROGRAM

## 2022-08-10 ASSESSMENT — ENCOUNTER SYMPTOMS
COUGH: 0
ABDOMINAL PAIN: 0
EYE DISCHARGE: 0

## 2022-08-10 NOTE — PROGRESS NOTES
[address] ENT Office Note    Patient: Melvin Velarde  MRN: [id number]  : 1940  Gender:  female  Vital Signs: /70 (Site: Left Upper Arm, Position: Sitting)   Ht 5' 2\" (1.575 m)   Wt 131 lb 9.6 oz (59.7 kg)   BMI 24.07 kg/m²   Date: 8/10/2022    CC:   Chief Complaint   Patient presents with    Follow-up     Patient here for her throat issues. HPI:  Melvin Velarde is a 78 y.o. female with a history of LPR and muscle tension dysphonia. She takes Prilosec. She has hoarseness that interferes with her ability to communicate. She denies any dysphagia or pain. She has worked with speech therapy in the past.    Past Medical History, Past Surgical History, Family history, Social History, and Medications were all reviewed with the patient today and updated as necessary.      Allergies   Allergen Reactions    Amlodipine Other (See Comments) and Shortness Of Breath    Promethazine Other (See Comments)    Lisinopril Other (See Comments)     Patient Active Problem List   Diagnosis    Coronary artery disease involving native coronary artery of native heart without angina pectoris    Osteoarthritis of right shoulder    Acute hyponatremia    Essential hypertension    Multiple thyroid nodules    Right rotator cuff tear arthropathy    Osteopenia of multiple sites    Periprosthetic fracture around internal prosthetic right shoulder joint    Acute blood loss anemia (ABLA)    Mixed hyperlipidemia    Shoulder arthritis    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    S/P reverse total shoulder arthroplasty, right    Prediabetes     Current Outpatient Medications   Medication Sig    multivitamin-iron-minerals-folic acid (CENTRUM) chewable tablet Take 1 tablet by mouth daily    aspirin 81 MG EC tablet Take 81 mg by mouth    calcium carbonate-vitamin D (CALTRATE) 600-400 MG-UNIT TABS per tab Take 1 tablet by mouth daily    carvedilol (COREG) 25 MG tablet Take 6.25 mg by mouth 2 times daily (with meals)     ibandronate (BONIVA) 150 MG tablet Take 150 mg by mouth every 30 days    omeprazole (PRILOSEC) 40 MG delayed release capsule 20 mg TAKE 1 CAPSULE BY MOUTH EVERY DAY    simvastatin (ZOCOR) 40 MG tablet Take 40 mg by mouth    calcium carbonate 1500 (600 Ca) MG TABS tablet Take 1,200 mg by mouth three times a week    ergocalciferol (ERGOCALCIFEROL) 1.25 MG (86389 UT) capsule Take 50,000 Units by mouth every 7 days     No current facility-administered medications for this visit. Past Medical History:   Diagnosis Date    Adverse effect of anesthesia     Difficulty awakening     Coronary artery disease     1 stent in 2009, Dr. Ron Anderson follows     COVID-19 vaccine series completed 02/26/2021    Pfizer vaccine     GERD (gastroesophageal reflux disease)     Managed with as needed meds     Hyperlipidemia     Hypertension     managed with meds     Insomnia     LBBB (left bundle branch block)     Multiple thyroid nodules     per endocrinology office note 8/19/19- nodules have resolved, pt has a small thyroid cyst    Multiple thyroid nodules     Osteopenia     Prediabetes     Managed with diet      Social History     Tobacco Use    Smoking status: Never    Smokeless tobacco: Never   Substance Use Topics    Alcohol use: No     Past Surgical History:   Procedure Laterality Date    APPENDECTOMY  age 13    BLADDER REPAIR      COLONOSCOPY  2004 and 2014    CORONARY ANGIOPLASTY WITH STENT PLACEMENT  2009    FRACTURE SURGERY Right     fractured right humerus    HYSTERECTOMY (CERVIX STATUS UNKNOWN)  10/16/2021    ORTHOPEDIC SURGERY      right foot ORIF    SHOULDER ARTHROPLASTY Right 07/09/2021    Dr. Nadiya Muñiz  07/2021     Family History   Problem Relation Age of Onset    Diabetes Mother     Coronary Art Dis Father     Thyroid Disease Maternal Grandmother     Thyroid Cancer Neg Hx         ROS:    Review of Systems   Constitutional:  Negative for fever. HENT:  Negative for ear discharge and ear pain. Eyes:  Negative for discharge. Respiratory:  Negative for cough. Cardiovascular:  Negative for chest pain. Gastrointestinal:  Negative for abdominal pain. Genitourinary:  Negative for difficulty urinating. Musculoskeletal:  Negative for neck pain. Skin:  Negative for rash. Allergic/Immunologic: Negative for environmental allergies. Neurological:  Negative for dizziness. Hematological:  Negative for adenopathy. Psychiatric/Behavioral:  Negative for agitation. PHYSICAL EXAM:    /70 (Site: Left Upper Arm, Position: Sitting)   Ht 5' 2\" (1.575 m)   Wt 131 lb 9.6 oz (59.7 kg)   BMI 24.07 kg/m²     General: NAD, well-appearing  Neuro: No gross neuro deficits. CN's II-XII intact. No facial weakness. Eyes: EOMI. Pupils reactive. No periorbital edema/ecchymosis. Skin: No facial erythema, rashes or concerning lesions. Nose: No external deviations or saddling. Intranasally, septum is midline without perforations, nasal mucosa appears healthy with no erythema, mucopurulence, or polyps. Mouth: Moist mucus membranes, normal tongue/palate mobility, no concerning mucosal lesions. Oropharynx: clear with no erythema/exudate, no tonsillar hypertrophy. Ears: Normal appearing auricles, no hematomas. EACs clear with no cerumen impaction, healthy canal skin, TM's intact with no perforations or retraction pockets. No middle ear effusions. Neck: Soft, supple, no palpable lateral neck masses. No parotid or submandibular masses. No thyromegaly or palpable thyroid nodules. No surgical scars. Lymphatics: No palpable cervical LAD. Resp/Lungs: No audible stridor or wheezing, CTAB  Heart: RRR  Extremities: No clubbing or cyanosis. PROCEDURE: Flexible laryngoscopy  INDICATIONS: Hoarseness  DESCRIPTION: After verbal consent was obtained and a timeout was performed, the flexible scope was advanced down one of the patient's nares into the nasopharynx. The nasal cavity and nasopharynx were clear.  The scope was then turned distally down towards the oropharynx. The BOT and vallecula were clear and the epiglottis was normal in appearance. Both aryepiglottic folds were clear and there was a normal appearing glottis. No nodules or polyps and the cords were fully mobile. 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 she has false vocal fold squeeze consistent with muscle tension dysphonia. No concerning lesions seen along post-cricoid region or within either piriform sinus. The posterior pharyngeal was clear as well. The scope was then carefully removed. The patient tolerated the procedure well and there were no complications. Exam: XR UPPER GI W KUB AIR CONT on 3/28/2022 4:08 PM       Clinical History: The Female patient is 78years old presenting for hoarseness   with occasional dysphasia and history of reflux. Comparison:  None abdomen pelvis CT 10/23/2021       Findings:         Fluoroscopic and film studies demonstrate dilatation of the distal esophagus   with a large paraesophageal hiatal hernia. There is associated gastroesophageal   reflux during the exam       The stomach is otherwise grossly unremarkable with unobstructed gastric emptying   into the proximal small bowel. No mucosal irregularity is demonstrated. The duodenal bulb is unremarkable as are visualized portions of the proximal   small bowel. Total images: 19   Total fluoroscopy time: 1.1 minutes           Impression       1. Large parasagittal hiatal hernia with dilatation of distal esophagus and   reflux.          Lab Results   Component Value Date    WBC 7.2 11/11/2021    HGB 11.9 11/11/2021    HCT 38.1 11/11/2021    MCV 93.2 11/11/2021     11/11/2021     Lab Results   Component Value Date/Time     11/11/2021 06:25 PM    K 4.5 11/11/2021 06:25 PM    CL 99 11/11/2021 06:25 PM    CO2 25 11/11/2021 06:25 PM    BUN 12 11/11/2021 06:25 PM    CREATININE 0.76 11/11/2021 06:25 PM    GLUCOSE 126

## 2022-09-06 ENCOUNTER — OFFICE VISIT (OUTPATIENT)
Dept: PRIMARY CARE CLINIC | Facility: CLINIC | Age: 82
End: 2022-09-06
Payer: MEDICARE

## 2022-09-06 VITALS
BODY MASS INDEX: 24.37 KG/M2 | DIASTOLIC BLOOD PRESSURE: 64 MMHG | HEIGHT: 62 IN | SYSTOLIC BLOOD PRESSURE: 122 MMHG | HEART RATE: 70 BPM | WEIGHT: 132.4 LBS | OXYGEN SATURATION: 93 % | TEMPERATURE: 97.4 F

## 2022-09-06 DIAGNOSIS — E78.2 MIXED HYPERLIPIDEMIA: ICD-10-CM

## 2022-09-06 DIAGNOSIS — R73.9 HYPERGLYCEMIA: ICD-10-CM

## 2022-09-06 DIAGNOSIS — F51.01 PRIMARY INSOMNIA: ICD-10-CM

## 2022-09-06 DIAGNOSIS — Z79.899 ENCOUNTER FOR LONG-TERM (CURRENT) USE OF MEDICATIONS: ICD-10-CM

## 2022-09-06 DIAGNOSIS — I10 ESSENTIAL HYPERTENSION: Primary | ICD-10-CM

## 2022-09-06 DIAGNOSIS — I10 ESSENTIAL HYPERTENSION: ICD-10-CM

## 2022-09-06 LAB
ALBUMIN SERPL-MCNC: 4.3 G/DL (ref 3.2–4.6)
ALBUMIN/GLOB SERPL: 1.3 {RATIO} (ref 1.2–3.5)
ALP SERPL-CCNC: 67 U/L (ref 50–136)
ALT SERPL-CCNC: 20 U/L (ref 12–65)
ANION GAP SERPL CALC-SCNC: 6 MMOL/L (ref 4–13)
AST SERPL-CCNC: 28 U/L (ref 15–37)
BASOPHILS # BLD: 0.1 K/UL (ref 0–0.2)
BASOPHILS NFR BLD: 2 % (ref 0–2)
BILIRUB SERPL-MCNC: 0.6 MG/DL (ref 0.2–1.1)
BUN SERPL-MCNC: 23 MG/DL (ref 8–23)
CALCIUM SERPL-MCNC: 9.3 MG/DL (ref 8.3–10.4)
CHLORIDE SERPL-SCNC: 107 MMOL/L (ref 101–110)
CHOLEST SERPL-MCNC: 181 MG/DL
CO2 SERPL-SCNC: 26 MMOL/L (ref 21–32)
CREAT SERPL-MCNC: 1 MG/DL (ref 0.6–1)
DIFFERENTIAL METHOD BLD: NORMAL
EOSINOPHIL # BLD: 0.2 K/UL (ref 0–0.8)
EOSINOPHIL NFR BLD: 4 % (ref 0.5–7.8)
ERYTHROCYTE [DISTWIDTH] IN BLOOD BY AUTOMATED COUNT: 14 % (ref 11.9–14.6)
EST. AVERAGE GLUCOSE BLD GHB EST-MCNC: 134 MG/DL
GLOBULIN SER CALC-MCNC: 3.4 G/DL (ref 2.3–3.5)
GLUCOSE SERPL-MCNC: 85 MG/DL (ref 65–100)
HBA1C MFR BLD: 6.3 % (ref 4.8–5.6)
HCT VFR BLD AUTO: 44.1 % (ref 35.8–46.3)
HDLC SERPL-MCNC: 69 MG/DL (ref 40–60)
HDLC SERPL: 2.6 {RATIO}
HGB BLD-MCNC: 14 G/DL (ref 11.7–15.4)
IMM GRANULOCYTES # BLD AUTO: 0 K/UL (ref 0–0.5)
IMM GRANULOCYTES NFR BLD AUTO: 0 % (ref 0–5)
LDLC SERPL CALC-MCNC: 88.6 MG/DL
LYMPHOCYTES # BLD: 1.6 K/UL (ref 0.5–4.6)
LYMPHOCYTES NFR BLD: 32 % (ref 13–44)
MCH RBC QN AUTO: 30 PG (ref 26.1–32.9)
MCHC RBC AUTO-ENTMCNC: 31.7 G/DL (ref 31.4–35)
MCV RBC AUTO: 94.6 FL (ref 79.6–97.8)
MONOCYTES # BLD: 0.6 K/UL (ref 0.1–1.3)
MONOCYTES NFR BLD: 12 % (ref 4–12)
NEUTS SEG # BLD: 2.5 K/UL (ref 1.7–8.2)
NEUTS SEG NFR BLD: 50 % (ref 43–78)
NRBC # BLD: 0 K/UL (ref 0–0.2)
PLATELET # BLD AUTO: 231 K/UL (ref 150–450)
PMV BLD AUTO: 11.1 FL (ref 9.4–12.3)
POTASSIUM SERPL-SCNC: 4.6 MMOL/L (ref 3.5–5.1)
PROT SERPL-MCNC: 7.7 G/DL (ref 6.3–8.2)
RBC # BLD AUTO: 4.66 M/UL (ref 4.05–5.2)
SODIUM SERPL-SCNC: 139 MMOL/L (ref 136–145)
TRIGL SERPL-MCNC: 117 MG/DL (ref 35–150)
VLDLC SERPL CALC-MCNC: 23.4 MG/DL (ref 6–23)
WBC # BLD AUTO: 4.9 K/UL (ref 4.3–11.1)

## 2022-09-06 PROCEDURE — 99213 OFFICE O/P EST LOW 20 MIN: CPT | Performed by: FAMILY MEDICINE

## 2022-09-06 PROCEDURE — 1036F TOBACCO NON-USER: CPT | Performed by: FAMILY MEDICINE

## 2022-09-06 PROCEDURE — G8399 PT W/DXA RESULTS DOCUMENT: HCPCS | Performed by: FAMILY MEDICINE

## 2022-09-06 PROCEDURE — G8420 CALC BMI NORM PARAMETERS: HCPCS | Performed by: FAMILY MEDICINE

## 2022-09-06 PROCEDURE — 1090F PRES/ABSN URINE INCON ASSESS: CPT | Performed by: FAMILY MEDICINE

## 2022-09-06 PROCEDURE — G8427 DOCREV CUR MEDS BY ELIG CLIN: HCPCS | Performed by: FAMILY MEDICINE

## 2022-09-06 PROCEDURE — 1123F ACP DISCUSS/DSCN MKR DOCD: CPT | Performed by: FAMILY MEDICINE

## 2022-09-06 RX ORDER — LORAZEPAM 0.5 MG/1
0.5 TABLET ORAL EVERY EVENING
Qty: 30 TABLET | Refills: 3 | Status: SHIPPED | OUTPATIENT
Start: 2022-09-06 | End: 2022-10-06

## 2022-09-06 ASSESSMENT — PATIENT HEALTH QUESTIONNAIRE - PHQ9
SUM OF ALL RESPONSES TO PHQ QUESTIONS 1-9: 2
1. LITTLE INTEREST OR PLEASURE IN DOING THINGS: 1
SUM OF ALL RESPONSES TO PHQ QUESTIONS 1-9: 2
SUM OF ALL RESPONSES TO PHQ QUESTIONS 1-9: 2
SUM OF ALL RESPONSES TO PHQ9 QUESTIONS 1 & 2: 2
SUM OF ALL RESPONSES TO PHQ QUESTIONS 1-9: 2
2. FEELING DOWN, DEPRESSED OR HOPELESS: 1

## 2022-09-06 NOTE — PROGRESS NOTES
Ale Salas M.D.  [address] [facility]  Phone:  [phone]  Fax:  [phone]    CHIEF COMPLAINT:  Chief Complaint   Patient presents with    Follow-up     4 month follow up and labs due. No med refills due at this time. Insomnia     Trouble falling and staying asleep           HISTORY OF PRESENT ILLNESS:  Ms. Megan Esteves is a 78 y.o. female  who presents for follow up. She has been having trouble sleeping. She has tried melatonin, Sleep Eez, Benadryl. Nothing is helping. No other complaints. Taking medications as prescribed. Medications reviewed and updated. HISTORY:  Allergies   Allergen Reactions    Amlodipine Other (See Comments) and Shortness Of Breath    Promethazine Other (See Comments)    Lisinopril Other (See Comments)    Tramadol Anxiety         REVIEW OF SYSTEMS:  Review of systems is as indicated in HPI otherwise negative. PHYSICAL EXAM:  Vital Signs -   Visit Vitals  /64 (Site: Left Upper Arm, Position: Sitting)   Pulse 70   Temp 97.4 °F (36.3 °C) (Temporal)   Ht 5' 2\" (1.575 m)   Wt 132 lb 6.4 oz (60.1 kg)   SpO2 93%   BMI 24.22 kg/m²        Physical Exam  Vitals and nursing note reviewed. Constitutional:       Appearance: Normal appearance. HENT:      Head: Normocephalic and atraumatic. Nose: Nose normal.      Mouth/Throat:      Mouth: Mucous membranes are moist.   Eyes:      Extraocular Movements: Extraocular movements intact. Pupils: Pupils are equal, round, and reactive to light. Cardiovascular:      Rate and Rhythm: Normal rate and regular rhythm. Pulses: Normal pulses. Pulmonary:      Effort: Pulmonary effort is normal.      Breath sounds: Normal breath sounds. Abdominal:      General: Abdomen is flat. Palpations: Abdomen is soft. Musculoskeletal:         General: Normal range of motion. Cervical back: Normal range of motion and neck supple. Skin:     General: Skin is warm and dry. Neurological:      Mental Status: She is alert. Psychiatric:         Mood and Affect: Mood normal.         Behavior: Behavior normal.         PHQ:  PHQ-9  9/6/2022   Little interest or pleasure in doing things 1   Little interest or pleasure in doing things -   Feeling down, depressed, or hopeless 1   PHQ-2 Score 2   Total Score PHQ 2 -   PHQ-9 Total Score 2       LABS    Office Visit on 11/03/2021   Component Date Value Ref Range Status    Glucose 11/03/2021 117 (A) 65 - 99 mg/dL Final    BUN 11/03/2021 10  8 - 27 mg/dL Final    Creatinine 11/03/2021 0.59  0.57 - 1.00 mg/dL Final    EGFR IF NonAfrican American 11/03/2021 85  >59 mL/min/1.73 Final    GFR  11/03/2021 98  >59 mL/min/1.73 Final    Comment: **In accordance with recommendations from the NKF-ASN Task force,**    [facility] is in the process of updating its eGFR calculation to the    2021 CKD-EPI creatinine equation that estimates kidney function    without a race variable.       Bun/Cre Ratio 11/03/2021 17  12 - 28 NA Final    Sodium 11/03/2021 132 (A) 134 - 144 mmol/L Final    Potassium 11/03/2021 4.7  3.5 - 5.2 mmol/L Final    Chloride 11/03/2021 97  96 - 106 mmol/L Final    CO2 11/03/2021 23  20 - 29 mmol/L Final    Calcium 11/03/2021 8.4 (A) 8.7 - 10.3 mg/dL Final    Total Protein 11/03/2021 6.0  6.0 - 8.5 g/dL Final    Albumin 11/03/2021 3.3 (A) 3.6 - 4.6 g/dL Final    Total Bilirubin 11/03/2021 0.7  0.0 - 1.2 mg/dL Final    Bilirubin, Direct 11/03/2021 0.23  0.00 - 0.40 mg/dL Final    Alkaline Phosphatase 11/03/2021 118  44 - 121 IU/L Final                  **Please note reference interval change**    AST 11/03/2021 25  0 - 40 IU/L Final    ALT 11/03/2021 19  0 - 32 IU/L Final    Total CK 11/03/2021 70  26 - 161 U/L Final    WBC 11/03/2021 7.1  3.4 - 10.8 x10E3/uL Final    RBC 11/03/2021 3.76 (A) 3.77 - 5.28 x10E6/uL Final    Hemoglobin 11/03/2021 11.0 (A) 11.1 - 15.9 g/dL Final    Hematocrit 11/03/2021 34.8  34.0 - 46.6 % Final MCV 11/03/2021 93  79 - 97 fL Final    MCH 11/03/2021 29.3  26.6 - 33.0 pg Final    MCHC 11/03/2021 31.6  31.5 - 35.7 g/dL Final    RDW 11/03/2021 16.5 (A) 11.7 - 15.4 % Final    Platelets 64/03/6230 452 (A) 150 - 450 x10E3/uL Final    Neutrophils % 11/03/2021 73  Not Estab. % Final    Lymphocytes % 11/03/2021 11  Not Estab. % Final    Monocytes % 11/03/2021 11  Not Estab. % Final    Eosinophils % 11/03/2021 4  Not Estab. % Final    Basophils % 11/03/2021 1  Not Estab. % Final    Neutrophils Absolute 11/03/2021 5.2  1.4 - 7.0 x10E3/uL Final    Lymphocytes Absolute 11/03/2021 0.8  0.7 - 3.1 x10E3/uL Final    Monocytes Absolute 11/03/2021 0.8  0.1 - 0.9 x10E3/uL Final    Eosinophils Absolute 11/03/2021 0.3  0.0 - 0.4 x10E3/uL Final    Basophils Absolute 11/03/2021 0.1  0.0 - 0.2 x10E3/uL Final    Immature Granulocytes 11/03/2021 0  Not Estab. % Final    GRANULOCYTE ABSOLUTE COUNT 11/03/2021 0.0  0.0 - 0.1 x10E3/uL Final       IMPRESSION/PLAN     Diagnosis Orders   1. Essential hypertension  CBC with Auto Differential    Comprehensive Metabolic Panel      2. Mixed hyperlipidemia  Lipid Panel      3. Primary insomnia  LORazepam (ATIVAN) 0.5 MG tablet      4. Hyperglycemia  Hemoglobin A1C      5. Encounter for long-term (current) use of medications  CBC with Auto Differential    Comprehensive Metabolic Panel          Follow up and Dispositions:  Return in about 4 months (around 1/6/2023). Patient will continue current medications. Will add the following medications: Ativan 0.5 mg at night for insomnia. Reviewed medications and side effects in detail. Will check the above labs. Reviewed most recent labs. Reviewed diet, exercise and weight control. Cardiovascular risks and recommendations reviewed. Patient encouraged to follow a low sodium diet. Ann Marie Rossi MD    [facility] PRESCRIPTION DRUG MONITORING SEARCH DONE. PATIENT IS IN COMPLIANCE. Dictated using voice recognition software.  Proofread, but unrecognized voice recognition errors may exist.

## 2022-10-09 NOTE — RESULT ENCOUNTER NOTE
1. HbA1c: 6.3 (was 6.1) - excellent. Continue to control with diet. Remainder of her labs are good. Patient notified via [address]

## 2022-11-02 ENCOUNTER — OFFICE VISIT (OUTPATIENT)
Dept: ORTHOPEDIC SURGERY | Age: 82
End: 2022-11-02
Payer: MEDICARE

## 2022-11-02 DIAGNOSIS — M97.31XS PERIPROSTHETIC FRACTURE AROUND INTERNAL PROSTHETIC RIGHT SHOULDER JOINT, SEQUELA: Primary | ICD-10-CM

## 2022-11-02 PROCEDURE — 1123F ACP DISCUSS/DSCN MKR DOCD: CPT | Performed by: ORTHOPAEDIC SURGERY

## 2022-11-02 PROCEDURE — G8484 FLU IMMUNIZE NO ADMIN: HCPCS | Performed by: ORTHOPAEDIC SURGERY

## 2022-11-02 PROCEDURE — G8399 PT W/DXA RESULTS DOCUMENT: HCPCS | Performed by: ORTHOPAEDIC SURGERY

## 2022-11-02 PROCEDURE — G8427 DOCREV CUR MEDS BY ELIG CLIN: HCPCS | Performed by: ORTHOPAEDIC SURGERY

## 2022-11-02 PROCEDURE — 1090F PRES/ABSN URINE INCON ASSESS: CPT | Performed by: ORTHOPAEDIC SURGERY

## 2022-11-02 PROCEDURE — 99212 OFFICE O/P EST SF 10 MIN: CPT | Performed by: ORTHOPAEDIC SURGERY

## 2022-11-02 PROCEDURE — G8420 CALC BMI NORM PARAMETERS: HCPCS | Performed by: ORTHOPAEDIC SURGERY

## 2022-11-02 PROCEDURE — 1036F TOBACCO NON-USER: CPT | Performed by: ORTHOPAEDIC SURGERY

## 2022-11-02 NOTE — PROGRESS NOTES
Name: Denver Engels  YOB: 1940  Gender: female  MRN: [id number]    CC:   Chief Complaint   Patient presents with    Follow-up     Recheck right humerus fx DOI 10-16-21        HPI: Patient presents for recheck of right humerus fracture date of injury 10-. She was last seen on 4-. At her last visit we discussed a fair amount of callus but could not rule out hypertrophic nonunion. We discussed obtaining a CT if she were interested in surgical intervention but she was content at her last visit. Discussed repeat radiographs at this visit and repeat x-rays yearly as the patient was not interested in revision surgery such as a longstem. She states she has no issues. Only if she really uses a lot will she have some soreness in her arm.     Allergies   Allergen Reactions    Amlodipine Other (See Comments) and Shortness Of Breath    Promethazine Other (See Comments)    Lisinopril Other (See Comments)    Tramadol Anxiety     Past Medical History:   Diagnosis Date    Adverse effect of anesthesia     Difficulty awakening     Coronary artery disease     1 stent in 2009, Dr. Ada Constantino follows     COVID-19 vaccine series completed 02/26/2021    Pfizer vaccine     GERD (gastroesophageal reflux disease)     Managed with as needed meds     Hyperlipidemia     Hypertension     managed with meds     Insomnia     LBBB (left bundle branch block)     Multiple thyroid nodules     per endocrinology office note 8/19/19- nodules have resolved, pt has a small thyroid cyst    Multiple thyroid nodules     Osteopenia     Prediabetes     Managed with diet      Past Surgical History:   Procedure Laterality Date    APPENDECTOMY  age 13    BLADDER REPAIR      COLONOSCOPY  2004 and 2014    CORONARY ANGIOPLASTY WITH STENT PLACEMENT  2009    FRACTURE SURGERY Right     fractured right humerus    HYSTERECTOMY (CERVIX STATUS UNKNOWN)  10/16/2021    ORTHOPEDIC SURGERY      right foot ORIF    SHOULDER ARTHROPLASTY Right 07/09/2021    Dr. Manoj Mendez  07/2021     Family History   Problem Relation Age of Onset    Diabetes Mother     Coronary Art Dis Father     Thyroid Disease Maternal Grandmother     Thyroid Cancer Neg Hx      Social History     Socioeconomic History    Marital status:      Spouse name: Not on file    Number of children: Not on file    Years of education: Not on file    Highest education level: Not on file   Occupational History    Not on file   Tobacco Use    Smoking status: Never    Smokeless tobacco: Never   Substance and Sexual Activity    Alcohol use: No    Drug use: No    Sexual activity: Not on file   Other Topics Concern    Not on file   Social History Narrative    Not on file     Social Determinants of Health     Financial Resource Strain: Not on file   Food Insecurity: Not on file   Transportation Needs: Not on file   Physical Activity: Not on file   Stress: Not on file   Social Connections: Not on file   Intimate Partner Violence: Not on file   Housing Stability: Not on file        No flowsheet data found. Review of Systems  Non-contributory    PE right shoulder/arm:    Moves fairly well. Has abduction to around 90. Flexion to around 105 200 and tempted external rotation at least 10 or 15. Feels stable at the callus site. No tenderness. Xray:  AP and lateral right humerus x-rays are obtained today and reviewed. Shows further interval callus formation and excellent alignment on the lateral view AP shows slight varus alignment. Otherwise  the shoulder joint appears normal. Further consolidation is noted in this area. A/Plan:     ICD-10-CM    1. Periprosthetic fracture around internal prosthetic right shoulder joint, sequela  M97. 31XS XR SHOULDER RIGHT (MIN 2 VIEWS)     CANCELED: XR HUMERUS RIGHT (MIN 2 VIEWS)           Discussed she is doing well enough. She is having no issues that I do not do make her come back in to reevaluate.   At this point I think she can come in as she needs if she has any injuries or problems. Return if symptoms worsen or fail to improve.         Wayne Scott MD  11/02/22

## 2023-01-17 ENCOUNTER — OFFICE VISIT (OUTPATIENT)
Dept: PRIMARY CARE CLINIC | Facility: CLINIC | Age: 83
End: 2023-01-17
Payer: MEDICARE

## 2023-01-17 VITALS
HEART RATE: 66 BPM | WEIGHT: 135.2 LBS | BODY MASS INDEX: 24.88 KG/M2 | SYSTOLIC BLOOD PRESSURE: 120 MMHG | OXYGEN SATURATION: 97 % | DIASTOLIC BLOOD PRESSURE: 89 MMHG | HEIGHT: 62 IN | TEMPERATURE: 97.2 F

## 2023-01-17 DIAGNOSIS — E78.2 MIXED HYPERLIPIDEMIA: ICD-10-CM

## 2023-01-17 DIAGNOSIS — Z79.899 ENCOUNTER FOR LONG-TERM (CURRENT) USE OF MEDICATIONS: ICD-10-CM

## 2023-01-17 DIAGNOSIS — I10 ESSENTIAL HYPERTENSION: Primary | ICD-10-CM

## 2023-01-17 PROCEDURE — G8420 CALC BMI NORM PARAMETERS: HCPCS | Performed by: FAMILY MEDICINE

## 2023-01-17 PROCEDURE — G8484 FLU IMMUNIZE NO ADMIN: HCPCS | Performed by: FAMILY MEDICINE

## 2023-01-17 PROCEDURE — 1123F ACP DISCUSS/DSCN MKR DOCD: CPT | Performed by: FAMILY MEDICINE

## 2023-01-17 PROCEDURE — 3078F DIAST BP <80 MM HG: CPT | Performed by: FAMILY MEDICINE

## 2023-01-17 PROCEDURE — 1090F PRES/ABSN URINE INCON ASSESS: CPT | Performed by: FAMILY MEDICINE

## 2023-01-17 PROCEDURE — 3074F SYST BP LT 130 MM HG: CPT | Performed by: FAMILY MEDICINE

## 2023-01-17 PROCEDURE — G8399 PT W/DXA RESULTS DOCUMENT: HCPCS | Performed by: FAMILY MEDICINE

## 2023-01-17 PROCEDURE — G8427 DOCREV CUR MEDS BY ELIG CLIN: HCPCS | Performed by: FAMILY MEDICINE

## 2023-01-17 PROCEDURE — 99213 OFFICE O/P EST LOW 20 MIN: CPT | Performed by: FAMILY MEDICINE

## 2023-01-17 PROCEDURE — 1036F TOBACCO NON-USER: CPT | Performed by: FAMILY MEDICINE

## 2023-01-17 RX ORDER — MULTIVITAMIN WITH FOLIC ACID 400 MCG
1 TABLET ORAL DAILY
COMMUNITY

## 2023-01-17 SDOH — ECONOMIC STABILITY: FOOD INSECURITY: WITHIN THE PAST 12 MONTHS, YOU WORRIED THAT YOUR FOOD WOULD RUN OUT BEFORE YOU GOT MONEY TO BUY MORE.: PATIENT DECLINED

## 2023-01-17 SDOH — ECONOMIC STABILITY: FOOD INSECURITY: WITHIN THE PAST 12 MONTHS, THE FOOD YOU BOUGHT JUST DIDN'T LAST AND YOU DIDN'T HAVE MONEY TO GET MORE.: PATIENT DECLINED

## 2023-01-17 ASSESSMENT — PATIENT HEALTH QUESTIONNAIRE - PHQ9
SUM OF ALL RESPONSES TO PHQ QUESTIONS 1-9: 0
SUM OF ALL RESPONSES TO PHQ9 QUESTIONS 1 & 2: 0
1. LITTLE INTEREST OR PLEASURE IN DOING THINGS: 0
SUM OF ALL RESPONSES TO PHQ QUESTIONS 1-9: 0
2. FEELING DOWN, DEPRESSED OR HOPELESS: 0

## 2023-01-17 ASSESSMENT — SOCIAL DETERMINANTS OF HEALTH (SDOH): HOW HARD IS IT FOR YOU TO PAY FOR THE VERY BASICS LIKE FOOD, HOUSING, MEDICAL CARE, AND HEATING?: PATIENT DECLINED

## 2023-01-17 NOTE — PROGRESS NOTES
Magalys Tate M.D.  [address] [facility]  [place]  Phone:  [phone]  Fax:  [phone]:  Chief Complaint   Patient presents with    Hypertension     Patient reports her blood pressure has been low and then spiking up. Yesterday it was 80/50 then it went up to over 200. She feels bad when it goes low. She is taking Carvedilol twice daily. She has not been able to see the Cardiologist in 5 months. Osteoporosis     Patient is taking Boniva and Calcium. No problems with treatment. Cholesterol Problem     Pt is compliant with medication use           HISTORY OF PRESENT ILLNESS:  Ms. Amber Love is a 78 y.o. female  who presents for follow up. HISTORY:  Allergies   Allergen Reactions    Amlodipine Other (See Comments) and Shortness Of Breath    Promethazine Other (See Comments)    Lisinopril Other (See Comments)    Tramadol Anxiety         REVIEW OF SYSTEMS:  Review of systems is as indicated in HPI otherwise negative. PHYSICAL EXAM:  Vital Signs -   Visit Vitals  /89   Pulse 66   Temp 97.2 °F (36.2 °C) (Temporal)   Ht 5' 2\" (1.575 m)   Wt 135 lb 3.2 oz (61.3 kg)   SpO2 97%   BMI 24.73 kg/m²        Physical Exam  Vitals and nursing note reviewed. Constitutional:       Appearance: Normal appearance. HENT:      Head: Normocephalic and atraumatic. Nose: Nose normal.      Mouth/Throat:      Mouth: Mucous membranes are moist.   Eyes:      Extraocular Movements: Extraocular movements intact. Pupils: Pupils are equal, round, and reactive to light. Cardiovascular:      Rate and Rhythm: Normal rate and regular rhythm. Pulses: Normal pulses. Pulmonary:      Effort: Pulmonary effort is normal.      Breath sounds: Normal breath sounds. Abdominal:      General: Abdomen is flat. Palpations: Abdomen is soft. Musculoskeletal:         General: Normal range of motion.       Cervical back: Normal range of motion and neck supple. Skin:     General: Skin is warm and dry. Neurological:      Mental Status: She is alert. Psychiatric:         Mood and Affect: Mood normal.         Behavior: Behavior normal.         PHQ:  PHQ-9  1/17/2023   Little interest or pleasure in doing things 0   Little interest or pleasure in doing things -   Feeling down, depressed, or hopeless 0   PHQ-2 Score 0   Total Score PHQ 2 -   PHQ-9 Total Score 0       LABS  No results found for this visit on 01/17/23.   Orders Only on 09/06/2022   Component Date Value Ref Range Status    Hemoglobin A1C 09/06/2022 6.3 (A)  4.8 - 5.6 % Final    eAG 09/06/2022 134  mg/dL Final    Comment: Reference Range  Normal: 4.8-5.6  Diabetic >=6.5%  Normal       <5.7%      Cholesterol, Total 09/06/2022 181  <200 MG/DL Final    Comment: Borderline High: 200-239 mg/dL  High: Greater than or equal to 240 mg/dL      Triglycerides 09/06/2022 117  35 - 150 MG/DL Final    Comment: Borderline High: 150-199 mg/dL, High: 200-499 mg/dL  Very High: Greater than or equal to 500 mg/dL      HDL 09/06/2022 69 (A)  40 - 60 MG/DL Final    LDL Calculated 09/06/2022 88.6  <100 MG/DL Final    Comment: Near Optimal: 100-129 mg/dL  Borderline High: 130-159, High: 160-189 mg/dL  Very High: Greater than or equal to 190 mg/dL      VLDL Cholesterol Calculated 09/06/2022 23.4 (A)  6.0 - 23.0 MG/DL Final    Chol/HDL Ratio 09/06/2022 2.6    Final    Sodium 09/06/2022 139  136 - 145 mmol/L Final    Potassium 09/06/2022 4.6  3.5 - 5.1 mmol/L Final    Chloride 09/06/2022 107  101 - 110 mmol/L Final    CO2 09/06/2022 26  21 - 32 mmol/L Final    Anion Gap 09/06/2022 6  4 - 13 mmol/L Final    Glucose 09/06/2022 85  65 - 100 mg/dL Final    BUN 09/06/2022 23  8 - 23 MG/DL Final    Creatinine 09/06/2022 1.00  0.6 - 1.0 MG/DL Final    GFR  09/06/2022 >60  >60 ml/min/1.73m2 Final    GFR Non- 09/06/2022 56 (A)  >60 ml/min/1.73m2 Final    Comment:   Estimated GFR is calculated using the Modification of Diet in Renal Disease (MDRD) Study equation, reported for both  Americans (GFRAA) and non- Americans (GFRNA), and normalized to 1.73m2 body surface area. The physician must decide which value applies to the patient. The MDRD study equation should only be used in individuals age 25 or older. It has not been validated for the following: pregnant women, patients with serious comorbid conditions,or on certain medications, or persons with extremes of body size, muscle mass, or nutritional status.       Calcium 09/06/2022 9.3  8.3 - 10.4 MG/DL Final    Total Bilirubin 09/06/2022 0.6  0.2 - 1.1 MG/DL Final    ALT 09/06/2022 20  12 - 65 U/L Final    AST 09/06/2022 28  15 - 37 U/L Final    Alk Phosphatase 09/06/2022 67  50 - 136 U/L Final    Total Protein 09/06/2022 7.7  6.3 - 8.2 g/dL Final    Albumin 09/06/2022 4.3  3.2 - 4.6 g/dL Final    Globulin 09/06/2022 3.4  2.3 - 3.5 g/dL Final    Albumin/Globulin Ratio 09/06/2022 1.3  1.2 - 3.5   Final    WBC 09/06/2022 4.9  4.3 - 11.1 K/uL Final    RBC 09/06/2022 4.66  4.05 - 5.2 M/uL Final    Hemoglobin 09/06/2022 14.0  11.7 - 15.4 g/dL Final    Hematocrit 09/06/2022 44.1  35.8 - 46.3 % Final    MCV 09/06/2022 94.6  79.6 - 97.8 FL Final    MCH 09/06/2022 30.0  26.1 - 32.9 PG Final    MCHC 09/06/2022 31.7  31.4 - 35.0 g/dL Final    RDW 09/06/2022 14.0  11.9 - 14.6 % Final    Platelets 40/44/1210 231  150 - 450 K/uL Final    MPV 09/06/2022 11.1  9.4 - 12.3 FL Final    nRBC 09/06/2022 0.00  0.0 - 0.2 K/uL Final    **Note: Absolute NRBC parameter is now reported with Hemogram**    Differential Type 09/06/2022 AUTOMATED    Final    Seg Neutrophils 09/06/2022 50  43 - 78 % Final    Lymphocytes 09/06/2022 32  13 - 44 % Final    Monocytes 09/06/2022 12  4.0 - 12.0 % Final    Eosinophils % 09/06/2022 4  0.5 - 7.8 % Final    Basophils 09/06/2022 2  0.0 - 2.0 % Final    Immature Granulocytes 09/06/2022 0  0.0 - 5.0 % Final    Segs Absolute 09/06/2022 2.5  1.7 - 8.2 K/UL Final    Absolute Lymph # 09/06/2022 1.6  0.5 - 4.6 K/UL Final    Absolute Mono # 09/06/2022 0.6  0.1 - 1.3 K/UL Final    Absolute Eos # 09/06/2022 0.2  0.0 - 0.8 K/UL Final    Basophils Absolute 09/06/2022 0.1  0.0 - 0.2 K/UL Final    Absolute Immature Granulocyte 09/06/2022 0.0  0.0 - 0.5 K/UL Final       IMPRESSION/PLAN     Diagnosis Orders   1. Essential hypertension        2. Mixed hyperlipidemia        3. Encounter for long-term (current) use of medications            Follow up and Dispositions:  Return in about 4 months (around 5/17/2023) for [place] [address]. Patient is stable on medications and will continue current medications. Refilled the above medications. Will add the following medications: Will change the following medications:  Reviewed medications and side effects in detail. Was given samples of   Will check the above labs. Reviewed most recent labs. Reviewed diet, exercise and weight control. Cardiovascular risks and recommendations reviewed. Patient encouraged to quit smoking. Patient encouraged to take medications as prescribed. Patient encouraged to follow a diabetic/low sodium diet. Use of aspirin to prevent MIs and TIAs discussed. Patient will check blood sugars once daily. Misti Thao MD    Dictated using voice recognition software.  Proofread, but unrecognized voice recognition errors may exist.

## 2023-05-18 ENCOUNTER — NURSE ONLY (OUTPATIENT)
Dept: PRIMARY CARE CLINIC | Facility: CLINIC | Age: 83
End: 2023-05-18

## 2023-05-18 DIAGNOSIS — D62 ACUTE BLOOD LOSS ANEMIA (ABLA): ICD-10-CM

## 2023-05-18 DIAGNOSIS — I10 ESSENTIAL HYPERTENSION: ICD-10-CM

## 2023-05-18 DIAGNOSIS — E87.1 ACUTE HYPONATREMIA: ICD-10-CM

## 2023-05-18 DIAGNOSIS — E78.2 MIXED HYPERLIPIDEMIA: ICD-10-CM

## 2023-05-18 DIAGNOSIS — I10 ESSENTIAL HYPERTENSION: Primary | ICD-10-CM

## 2023-05-18 DIAGNOSIS — E55.9 VITAMIN D DEFICIENCY: ICD-10-CM

## 2023-05-18 DIAGNOSIS — R73.03 PREDIABETES: ICD-10-CM

## 2023-05-18 LAB
25(OH)D3 SERPL-MCNC: 49.1 NG/ML (ref 30–100)
ALBUMIN SERPL-MCNC: 4.1 G/DL (ref 3.2–4.6)
ALBUMIN/GLOB SERPL: 1.5 (ref 0.4–1.6)
ALP SERPL-CCNC: 56 U/L (ref 50–136)
ALT SERPL-CCNC: 24 U/L (ref 12–65)
ANION GAP SERPL CALC-SCNC: 4 MMOL/L (ref 2–11)
AST SERPL-CCNC: 29 U/L (ref 15–37)
BASOPHILS # BLD: 0.1 K/UL (ref 0–0.2)
BASOPHILS NFR BLD: 1 % (ref 0–2)
BILIRUB SERPL-MCNC: 0.7 MG/DL (ref 0.2–1.1)
BUN SERPL-MCNC: 15 MG/DL (ref 8–23)
CALCIUM SERPL-MCNC: 9 MG/DL (ref 8.3–10.4)
CHLORIDE SERPL-SCNC: 102 MMOL/L (ref 101–110)
CHOLEST SERPL-MCNC: 179 MG/DL
CO2 SERPL-SCNC: 27 MMOL/L (ref 21–32)
CREAT SERPL-MCNC: 0.8 MG/DL (ref 0.6–1)
DIFFERENTIAL METHOD BLD: NORMAL
EOSINOPHIL # BLD: 0.1 K/UL (ref 0–0.8)
EOSINOPHIL NFR BLD: 3 % (ref 0.5–7.8)
ERYTHROCYTE [DISTWIDTH] IN BLOOD BY AUTOMATED COUNT: 13.9 % (ref 11.9–14.6)
EST. AVERAGE GLUCOSE BLD GHB EST-MCNC: 126 MG/DL
GLOBULIN SER CALC-MCNC: 2.8 G/DL (ref 2.8–4.5)
GLUCOSE SERPL-MCNC: 97 MG/DL (ref 65–100)
HBA1C MFR BLD: 6 % (ref 4.8–5.6)
HCT VFR BLD AUTO: 39.7 % (ref 35.8–46.3)
HDLC SERPL-MCNC: 82 MG/DL (ref 40–60)
HDLC SERPL: 2.2
HGB BLD-MCNC: 13 G/DL (ref 11.7–15.4)
IMM GRANULOCYTES # BLD AUTO: 0 K/UL (ref 0–0.5)
IMM GRANULOCYTES NFR BLD AUTO: 0 % (ref 0–5)
LDLC SERPL CALC-MCNC: 78.6 MG/DL
LYMPHOCYTES # BLD: 1.3 K/UL (ref 0.5–4.6)
LYMPHOCYTES NFR BLD: 29 % (ref 13–44)
MCH RBC QN AUTO: 30.7 PG (ref 26.1–32.9)
MCHC RBC AUTO-ENTMCNC: 32.7 G/DL (ref 31.4–35)
MCV RBC AUTO: 93.6 FL (ref 82–102)
MONOCYTES # BLD: 0.5 K/UL (ref 0.1–1.3)
MONOCYTES NFR BLD: 11 % (ref 4–12)
NEUTS SEG # BLD: 2.5 K/UL (ref 1.7–8.2)
NEUTS SEG NFR BLD: 56 % (ref 43–78)
NRBC # BLD: 0 K/UL (ref 0–0.2)
PLATELET # BLD AUTO: 226 K/UL (ref 150–450)
PMV BLD AUTO: 10.5 FL (ref 9.4–12.3)
POTASSIUM SERPL-SCNC: 4.4 MMOL/L (ref 3.5–5.1)
PROT SERPL-MCNC: 6.9 G/DL (ref 6.3–8.2)
RBC # BLD AUTO: 4.24 M/UL (ref 4.05–5.2)
SODIUM SERPL-SCNC: 133 MMOL/L (ref 133–143)
TRIGL SERPL-MCNC: 92 MG/DL (ref 35–150)
VLDLC SERPL CALC-MCNC: 18.4 MG/DL (ref 6–23)
WBC # BLD AUTO: 4.6 K/UL (ref 4.3–11.1)

## 2023-05-23 SDOH — ECONOMIC STABILITY: FOOD INSECURITY: WITHIN THE PAST 12 MONTHS, THE FOOD YOU BOUGHT JUST DIDN'T LAST AND YOU DIDN'T HAVE MONEY TO GET MORE.: NEVER TRUE

## 2023-05-23 SDOH — ECONOMIC STABILITY: TRANSPORTATION INSECURITY
IN THE PAST 12 MONTHS, HAS LACK OF TRANSPORTATION KEPT YOU FROM MEETINGS, WORK, OR FROM GETTING THINGS NEEDED FOR DAILY LIVING?: NO

## 2023-05-23 SDOH — ECONOMIC STABILITY: FOOD INSECURITY: WITHIN THE PAST 12 MONTHS, YOU WORRIED THAT YOUR FOOD WOULD RUN OUT BEFORE YOU GOT MONEY TO BUY MORE.: NEVER TRUE

## 2023-05-23 SDOH — ECONOMIC STABILITY: INCOME INSECURITY: HOW HARD IS IT FOR YOU TO PAY FOR THE VERY BASICS LIKE FOOD, HOUSING, MEDICAL CARE, AND HEATING?: NOT HARD AT ALL

## 2023-05-23 SDOH — ECONOMIC STABILITY: HOUSING INSECURITY
IN THE LAST 12 MONTHS, WAS THERE A TIME WHEN YOU DID NOT HAVE A STEADY PLACE TO SLEEP OR SLEPT IN A SHELTER (INCLUDING NOW)?: NO

## 2023-05-25 ENCOUNTER — OFFICE VISIT (OUTPATIENT)
Dept: PRIMARY CARE CLINIC | Facility: CLINIC | Age: 83
End: 2023-05-25
Payer: MEDICARE

## 2023-05-25 VITALS
DIASTOLIC BLOOD PRESSURE: 86 MMHG | WEIGHT: 134.4 LBS | OXYGEN SATURATION: 94 % | BODY MASS INDEX: 24.73 KG/M2 | HEART RATE: 67 BPM | TEMPERATURE: 97.2 F | SYSTOLIC BLOOD PRESSURE: 138 MMHG | HEIGHT: 62 IN

## 2023-05-25 DIAGNOSIS — Z91.81 AT HIGH RISK FOR FALLS: ICD-10-CM

## 2023-05-25 DIAGNOSIS — Z86.39 HISTORY OF SIADH: ICD-10-CM

## 2023-05-25 DIAGNOSIS — M85.89 OSTEOPENIA OF MULTIPLE SITES: ICD-10-CM

## 2023-05-25 DIAGNOSIS — Z79.899 ENCOUNTER FOR LONG-TERM (CURRENT) USE OF MEDICATIONS: ICD-10-CM

## 2023-05-25 DIAGNOSIS — I10 ESSENTIAL HYPERTENSION: Primary | ICD-10-CM

## 2023-05-25 PROBLEM — S62.324D DISPLACED FRACTURE OF SHAFT OF FOURTH METACARPAL BONE OF RIGHT HAND WITH ROUTINE HEALING: Status: ACTIVE | Noted: 2023-04-14

## 2023-05-25 PROCEDURE — G0439 PPPS, SUBSEQ VISIT: HCPCS | Performed by: FAMILY MEDICINE

## 2023-05-25 PROCEDURE — 1123F ACP DISCUSS/DSCN MKR DOCD: CPT | Performed by: FAMILY MEDICINE

## 2023-05-25 PROCEDURE — G8420 CALC BMI NORM PARAMETERS: HCPCS | Performed by: FAMILY MEDICINE

## 2023-05-25 PROCEDURE — 3074F SYST BP LT 130 MM HG: CPT | Performed by: FAMILY MEDICINE

## 2023-05-25 PROCEDURE — G8399 PT W/DXA RESULTS DOCUMENT: HCPCS | Performed by: FAMILY MEDICINE

## 2023-05-25 PROCEDURE — 3078F DIAST BP <80 MM HG: CPT | Performed by: FAMILY MEDICINE

## 2023-05-25 PROCEDURE — 99213 OFFICE O/P EST LOW 20 MIN: CPT | Performed by: FAMILY MEDICINE

## 2023-05-25 PROCEDURE — G8427 DOCREV CUR MEDS BY ELIG CLIN: HCPCS | Performed by: FAMILY MEDICINE

## 2023-05-25 PROCEDURE — 1090F PRES/ABSN URINE INCON ASSESS: CPT | Performed by: FAMILY MEDICINE

## 2023-05-25 PROCEDURE — 1036F TOBACCO NON-USER: CPT | Performed by: FAMILY MEDICINE

## 2023-05-25 RX ORDER — IBANDRONATE SODIUM 150 MG/1
150 TABLET, FILM COATED ORAL
Qty: 3 TABLET | Status: CANCELLED | OUTPATIENT
Start: 2023-05-25

## 2023-05-25 ASSESSMENT — PATIENT HEALTH QUESTIONNAIRE - PHQ9
SUM OF ALL RESPONSES TO PHQ9 QUESTIONS 1 & 2: 0
1. LITTLE INTEREST OR PLEASURE IN DOING THINGS: 0
2. FEELING DOWN, DEPRESSED OR HOPELESS: 0
SUM OF ALL RESPONSES TO PHQ QUESTIONS 1-9: 0

## 2023-05-25 ASSESSMENT — LIFESTYLE VARIABLES
HOW MANY STANDARD DRINKS CONTAINING ALCOHOL DO YOU HAVE ON A TYPICAL DAY: PATIENT DOES NOT DRINK
HOW OFTEN DO YOU HAVE A DRINK CONTAINING ALCOHOL: NEVER

## 2023-06-02 ENCOUNTER — E-VISIT (OUTPATIENT)
Dept: PRIMARY CARE CLINIC | Facility: CLINIC | Age: 83
End: 2023-06-02
Payer: MEDICARE

## 2023-06-02 DIAGNOSIS — J02.9 SORE THROAT: ICD-10-CM

## 2023-06-02 DIAGNOSIS — J01.00 ACUTE NON-RECURRENT MAXILLARY SINUSITIS: Primary | ICD-10-CM

## 2023-06-02 PROCEDURE — 99421 OL DIG E/M SVC 5-10 MIN: CPT | Performed by: FAMILY MEDICINE

## 2023-06-02 RX ORDER — AMOXICILLIN 500 MG/1
500 CAPSULE ORAL 2 TIMES DAILY
Qty: 20 CAPSULE | Refills: 0 | Status: SHIPPED | OUTPATIENT
Start: 2023-06-02 | End: 2023-06-12

## 2023-06-02 RX ORDER — CARVEDILOL 6.25 MG/1
TABLET ORAL
COMMUNITY
Start: 2023-05-30

## 2023-06-02 ASSESSMENT — LIFESTYLE VARIABLES: SMOKING_STATUS: NO, I'VE NEVER SMOKED

## 2023-06-02 NOTE — PROGRESS NOTES
Gavin Liz (1940) initiated an asynchronous digital communication through [employer]. HPI: per patient questionnaire  Exam: not applicable  Diagnoses and all orders for this visit:  Diagnoses and all orders for this visit:    Acute non-recurrent maxillary sinusitis  -     amoxicillin (AMOXIL) 500 MG capsule; Take 1 capsule by mouth 2 times daily for 10 days    Sore throat  -     amoxicillin (AMOXIL) 500 MG capsule; Take 1 capsule by mouth 2 times daily for 10 days          Time: EV1 - 5-10 minutes were spent on the digital evaluation and management of this patient.       Julissa Scherer MD

## 2023-06-16 NOTE — PROGRESS NOTES
Abiodun Louis MD, [address] [place] [address]            Reason for visit: Follow-up of a thyroid cyst      ASSESSMENT AND PLAN:    1. Multiple thyroid nodules  Ms. Liz was previously evaluated by me with ultrasound. Because of the development of hoarseness sometime last year, she was understandably concerned that her thyroid could be causing the hoarseness. Ultrasound was repeated today and demonstrates a stable (very small) thyroid cyst in the right lobe. This is not the source of hoarseness. I have asked her to seek reassessment from her otolaryngologist.  Follow-up with me is not necessary.  - US,HEAD/NECK TISSUES,REAL TIME          PROCEDURES:    HEAD AND NECK ULTRASOUND    Date of study:  2022    Performing/interpreting physician:  Abiodun Louis MD, FACE    Indication:  thyroid cyst    Technique:  Using a 12 MHz linear transducer, multiple real-time planar images were obtained of the thyroid and surrounding tissues. Findings:  Right lobe 1.34 x 1.19 x 3.46 cm, left lobe 0.91 x 1.08 x 2.50 cm. Homogeneous echotexture. Normal blood flow. 0.53 x 0.78 x 0.86 cm colloid cyst at the right upper pole. Impression: Small thyroid cyst as noted. Follow-up and Dispositions    · Return if symptoms worsen or fail to improve. History of Present Illness:    THYROID NODULES  Saleem Lyn is seen today in the [place] [address] for follow-up of thyroid nodules. Ultrasound was done because of abnormal thyroid stimulating hormone. She has never been treated for thyroid dysfunction. There is not a history of radiation to the head/neck. The patient does not have a family history of thyroid cancer.       Current symptoms: See review of systems below     Prior imagin2016: Ultrasound ([facility])- Right lobe 3.9 x 1.2 x 1.5 cm, isthmus 0.2 cm, left lobe 4.0 x 1.2 x 1.3 cm.  1.0 cm solid nodule in the mid to lower portion of the right lobe.  Subcentimeter nodule at the right edge of the isthmus. 1.1 cm cyst at the left lower pole.     2/10/2017: Ultrasound ([facility])- Right lobe 4.0 x 1.0 x 1.2 cm, isthmus 0.2 cm, left lobe 3.1 x 1.4 x 1.0 cm.  0.8 x 0.4 x 0.7 cm hypoechoic nodule at the right upper pole. 1.0 cm solid nodule in the midportion of the right lobe. 0.2 cm cyst at the right lower pole. 1.0 cm cyst at the left upper pole.     8/16/2017: Ultrasound ([facility])- Right lobe 1.31 x 1.41 x 3.72 cm, isthmus 0.15 cm, left lobe 1.36 x 1.19 x 2.88 cm. Homogeneous echotexture. 0.45 x 0.51 x 0.66 cm cyst at the right upper pole. 0.21 x 0.33 x 0.25 cm hypoechoic/anechoic nodule/cyst in the upper to midportion of the right lobe. 0.26 x 0.24 x 0.34 cm hypoechoic nodule at the right lower pole. 0.79 x 0.80 x 0.86 cm anechoic cyst at the left lower pole.     8/17/2018: Ultrasound ([facility])- Right lobe 1.65 x 1.39 x 3.97 cm, isthmus 0.12 cm, left lobe 1.23 x 1.42 x 3.40 cm. Homogeneous echotexture. 0.50 x 0.72 x 0.78 cm anechoic cyst at the right upper pole. 0.26 x 0.25 x 0.45 cm colloid cyst at the left lower pole.     8/19/2019: Ultrasound ([facility])- Right lobe 1.75 x 1.38 x 3.46 cm, isthmus 0.10 cm, left lobe 1.57 x 1.10 x 2.88 cm. Homogeneous echotexture. 0.50 x 0.65 x 0.78 cm anechoic cyst at the right upper pole. 6/22/2022: Ultrasound ([facility])- Right lobe 1.34 x 1.19 x 3.46 cm, left lobe 0.91 x 1.08 x 2.50 cm. Homogeneous echotexture. Normal blood flow. 0.53 x 0.78 x 0.86 cm colloid cyst at the right upper pole.     Prior laboratory evaluation:  4/9/2015: TSH 4.52.  8/5/2016: TSH 5.54.  2/1/2017: TSH 3.96.  8/8/2017:  TSH 3.390.  8/9/2018: TSH 2.990.  2/13/2019: TSH 4.270.  8/13/2019: TSH 3.070.     Prior biopsies:  none    Review of Systems   Constitutional: Positive for unexpected weight change (lost 30 pounds in 2021 grieving the death of her ; regained 10 pounds since then). Negative for fatigue.    HENT: Positive for voice change (hoarseness). Negative for trouble swallowing. /86 (Site: Left Upper Arm, Position: Sitting)   Pulse 58   Wt 130 lb (59 kg)   SpO2 96%   BMI 23.78 kg/m²   Wt Readings from Last 3 Encounters:   06/22/22 130 lb (59 kg)   05/02/22 130 lb (59 kg)   03/07/22 128 lb 1.6 oz (58.1 kg)       Physical Exam  HENT:      Head: Normocephalic. Neck:      Thyroid: No thyroid mass or thyromegaly. Cardiovascular:      Rate and Rhythm: Normal rate. Pulmonary:      Effort: No respiratory distress. Breath sounds: Normal breath sounds. Neurological:      Mental Status: She is alert. Psychiatric:         Mood and Affect: Mood normal.         Behavior: Behavior normal.         Orders Placed This Encounter   Procedures    US,HEAD/NECK TISSUES,REAL TIME       Current Outpatient Medications   Medication Sig Dispense Refill    multivitamin-iron-minerals-folic acid (CENTRUM) chewable tablet Take 1 tablet by mouth daily      aspirin 81 MG EC tablet Take 81 mg by mouth      calcium carbonate 1500 (600 Ca) MG TABS tablet Take 1,200 mg by mouth three times a week      calcium carbonate-vitamin D (CALTRATE) 600-400 MG-UNIT TABS per tab Take 1 tablet by mouth daily      carvedilol (COREG) 25 MG tablet Take 6.25 mg by mouth 2 times daily (with meals)       ergocalciferol (ERGOCALCIFEROL) 1.25 MG (76475 UT) capsule Take 50,000 Units by mouth every 7 days      ibandronate (BONIVA) 150 MG tablet Take 150 mg by mouth every 30 days      omeprazole (PRILOSEC) 40 MG delayed release capsule TAKE 1 CAPSULE BY MOUTH EVERY DAY      simvastatin (ZOCOR) 40 MG tablet Take 40 mg by mouth       No current facility-administered medications for this visit. Sherlyn Saul MD, FACE      Portions of this note were generated with the assistance of voice recognition software. As such, some errors in transcription may be present. Cimzia Counseling:  I discussed with the patient the risks of Cimzia including but not limited to immunosuppression, allergic reactions and infections.  The patient understands that monitoring is required including a PPD at baseline and must alert us or the primary physician if symptoms of infection or other concerning signs are noted.

## 2023-07-11 ENCOUNTER — HOSPITAL ENCOUNTER (OUTPATIENT)
Dept: MAMMOGRAPHY | Age: 83
Discharge: HOME | End: 2023-07-14
Attending: FAMILY MEDICINE
Payer: MEDICARE

## 2023-07-11 DIAGNOSIS — M85.89 OSTEOPENIA OF MULTIPLE SITES: ICD-10-CM

## 2023-07-11 PROCEDURE — 77080 DXA BONE DENSITY AXIAL: CPT

## 2023-10-04 ENCOUNTER — OFFICE VISIT (OUTPATIENT)
Dept: PRIMARY CARE CLINIC | Facility: CLINIC | Age: 83
End: 2023-10-04
Payer: MEDICARE

## 2023-10-04 VITALS
SYSTOLIC BLOOD PRESSURE: 137 MMHG | OXYGEN SATURATION: 95 % | HEART RATE: 61 BPM | DIASTOLIC BLOOD PRESSURE: 68 MMHG | BODY MASS INDEX: 24.8 KG/M2 | HEIGHT: 62 IN | TEMPERATURE: 97.8 F | WEIGHT: 134.8 LBS

## 2023-10-04 DIAGNOSIS — M62.838 NECK MUSCLE SPASM: ICD-10-CM

## 2023-10-04 DIAGNOSIS — Z79.899 ENCOUNTER FOR LONG-TERM (CURRENT) USE OF MEDICATIONS: ICD-10-CM

## 2023-10-04 DIAGNOSIS — Z23 NEED FOR INFLUENZA VACCINATION: ICD-10-CM

## 2023-10-04 DIAGNOSIS — M85.89 OSTEOPENIA OF MULTIPLE SITES: ICD-10-CM

## 2023-10-04 DIAGNOSIS — I10 ESSENTIAL HYPERTENSION: Primary | ICD-10-CM

## 2023-10-04 DIAGNOSIS — E78.2 MIXED HYPERLIPIDEMIA: ICD-10-CM

## 2023-10-04 PROBLEM — I44.7 LBBB (LEFT BUNDLE BRANCH BLOCK): Status: ACTIVE | Noted: 2017-09-21

## 2023-10-04 PROCEDURE — 1090F PRES/ABSN URINE INCON ASSESS: CPT | Performed by: FAMILY MEDICINE

## 2023-10-04 PROCEDURE — 90694 VACC AIIV4 NO PRSRV 0.5ML IM: CPT | Performed by: FAMILY MEDICINE

## 2023-10-04 PROCEDURE — G0008 ADMIN INFLUENZA VIRUS VAC: HCPCS | Performed by: FAMILY MEDICINE

## 2023-10-04 PROCEDURE — G8420 CALC BMI NORM PARAMETERS: HCPCS | Performed by: FAMILY MEDICINE

## 2023-10-04 PROCEDURE — 3078F DIAST BP <80 MM HG: CPT | Performed by: FAMILY MEDICINE

## 2023-10-04 PROCEDURE — 1123F ACP DISCUSS/DSCN MKR DOCD: CPT | Performed by: FAMILY MEDICINE

## 2023-10-04 PROCEDURE — 99213 OFFICE O/P EST LOW 20 MIN: CPT | Performed by: FAMILY MEDICINE

## 2023-10-04 PROCEDURE — G8399 PT W/DXA RESULTS DOCUMENT: HCPCS | Performed by: FAMILY MEDICINE

## 2023-10-04 PROCEDURE — 3075F SYST BP GE 130 - 139MM HG: CPT | Performed by: FAMILY MEDICINE

## 2023-10-04 PROCEDURE — G8427 DOCREV CUR MEDS BY ELIG CLIN: HCPCS | Performed by: FAMILY MEDICINE

## 2023-10-04 PROCEDURE — 1036F TOBACCO NON-USER: CPT | Performed by: FAMILY MEDICINE

## 2023-10-04 PROCEDURE — G8484 FLU IMMUNIZE NO ADMIN: HCPCS | Performed by: FAMILY MEDICINE

## 2023-10-04 RX ORDER — METOPROLOL SUCCINATE 25 MG/1
25 TABLET, EXTENDED RELEASE ORAL DAILY
COMMUNITY
Start: 2023-08-24

## 2023-10-04 RX ORDER — IBANDRONATE SODIUM 150 MG/1
150 TABLET, FILM COATED ORAL
Qty: 3 TABLET | Refills: 5 | Status: SHIPPED | OUTPATIENT
Start: 2023-10-04

## 2023-10-04 SDOH — ECONOMIC STABILITY: FOOD INSECURITY: WITHIN THE PAST 12 MONTHS, THE FOOD YOU BOUGHT JUST DIDN'T LAST AND YOU DIDN'T HAVE MONEY TO GET MORE.: NEVER TRUE

## 2023-10-04 SDOH — ECONOMIC STABILITY: INCOME INSECURITY: HOW HARD IS IT FOR YOU TO PAY FOR THE VERY BASICS LIKE FOOD, HOUSING, MEDICAL CARE, AND HEATING?: NOT VERY HARD

## 2023-10-04 SDOH — ECONOMIC STABILITY: FOOD INSECURITY: WITHIN THE PAST 12 MONTHS, YOU WORRIED THAT YOUR FOOD WOULD RUN OUT BEFORE YOU GOT MONEY TO BUY MORE.: NEVER TRUE

## 2023-10-04 ASSESSMENT — PATIENT HEALTH QUESTIONNAIRE - PHQ9
SUM OF ALL RESPONSES TO PHQ QUESTIONS 1-9: 0
1. LITTLE INTEREST OR PLEASURE IN DOING THINGS: 0
SUM OF ALL RESPONSES TO PHQ QUESTIONS 1-9: 0
SUM OF ALL RESPONSES TO PHQ9 QUESTIONS 1 & 2: 0
SUM OF ALL RESPONSES TO PHQ QUESTIONS 1-9: 0
SUM OF ALL RESPONSES TO PHQ9 QUESTIONS 1 & 2: 0
2. FEELING DOWN, DEPRESSED OR HOPELESS: NOT AT ALL
1. LITTLE INTEREST OR PLEASURE IN DOING THINGS: NOT AT ALL
2. FEELING DOWN, DEPRESSED OR HOPELESS: 0
SUM OF ALL RESPONSES TO PHQ QUESTIONS 1-9: 0

## 2024-02-05 ENCOUNTER — NURSE ONLY (OUTPATIENT)
Dept: PRIMARY CARE CLINIC | Facility: CLINIC | Age: 84
End: 2024-02-05

## 2024-02-05 DIAGNOSIS — F51.01 PRIMARY INSOMNIA: ICD-10-CM

## 2024-02-05 DIAGNOSIS — E78.2 MIXED HYPERLIPIDEMIA: ICD-10-CM

## 2024-02-05 DIAGNOSIS — E55.9 VITAMIN D DEFICIENCY: ICD-10-CM

## 2024-02-05 DIAGNOSIS — Z79.899 ENCOUNTER FOR LONG-TERM (CURRENT) USE OF MEDICATIONS: ICD-10-CM

## 2024-02-05 DIAGNOSIS — I25.10 CORONARY ARTERY DISEASE INVOLVING NATIVE CORONARY ARTERY OF NATIVE HEART WITHOUT ANGINA PECTORIS: ICD-10-CM

## 2024-02-05 DIAGNOSIS — I10 ESSENTIAL HYPERTENSION: ICD-10-CM

## 2024-02-05 DIAGNOSIS — R73.03 PREDIABETES: ICD-10-CM

## 2024-02-05 DIAGNOSIS — E04.2 MULTIPLE THYROID NODULES: ICD-10-CM

## 2024-02-05 DIAGNOSIS — E78.2 MIXED HYPERLIPIDEMIA: Primary | ICD-10-CM

## 2024-02-05 LAB
25(OH)D3 SERPL-MCNC: 49.2 NG/ML (ref 30–100)
ALBUMIN SERPL-MCNC: 3.8 G/DL (ref 3.2–4.6)
ALBUMIN/GLOB SERPL: 1 (ref 0.4–1.6)
ALP SERPL-CCNC: 57 U/L (ref 50–136)
ALT SERPL-CCNC: 18 U/L (ref 12–65)
ANION GAP SERPL CALC-SCNC: 3 MMOL/L (ref 2–11)
AST SERPL-CCNC: 27 U/L (ref 15–37)
BASOPHILS # BLD: 0.1 K/UL (ref 0–0.2)
BASOPHILS NFR BLD: 1 % (ref 0–2)
BILIRUB SERPL-MCNC: 0.3 MG/DL (ref 0.2–1.1)
BUN SERPL-MCNC: 16 MG/DL (ref 8–23)
CALCIUM SERPL-MCNC: 9.6 MG/DL (ref 8.3–10.4)
CHLORIDE SERPL-SCNC: 108 MMOL/L (ref 103–113)
CHOLEST SERPL-MCNC: 164 MG/DL
CO2 SERPL-SCNC: 28 MMOL/L (ref 21–32)
CREAT SERPL-MCNC: 1 MG/DL (ref 0.6–1)
DIFFERENTIAL METHOD BLD: NORMAL
EOSINOPHIL # BLD: 0.3 K/UL (ref 0–0.8)
EOSINOPHIL NFR BLD: 5 % (ref 0.5–7.8)
ERYTHROCYTE [DISTWIDTH] IN BLOOD BY AUTOMATED COUNT: 13.4 % (ref 11.9–14.6)
GLOBULIN SER CALC-MCNC: 3.7 G/DL (ref 2.8–4.5)
GLUCOSE SERPL-MCNC: 123 MG/DL (ref 65–100)
HCT VFR BLD AUTO: 41.2 % (ref 35.8–46.3)
HDLC SERPL-MCNC: 59 MG/DL (ref 40–60)
HDLC SERPL: 2.8
HGB BLD-MCNC: 13.2 G/DL (ref 11.7–15.4)
IMM GRANULOCYTES # BLD AUTO: 0 K/UL (ref 0–0.5)
IMM GRANULOCYTES NFR BLD AUTO: 0 % (ref 0–5)
LDLC SERPL CALC-MCNC: 75 MG/DL
LYMPHOCYTES # BLD: 1.5 K/UL (ref 0.5–4.6)
LYMPHOCYTES NFR BLD: 26 % (ref 13–44)
MCH RBC QN AUTO: 30.6 PG (ref 26.1–32.9)
MCHC RBC AUTO-ENTMCNC: 32 G/DL (ref 31.4–35)
MCV RBC AUTO: 95.6 FL (ref 82–102)
MONOCYTES # BLD: 0.7 K/UL (ref 0.1–1.3)
MONOCYTES NFR BLD: 11 % (ref 4–12)
NEUTS SEG # BLD: 3.2 K/UL (ref 1.7–8.2)
NEUTS SEG NFR BLD: 57 % (ref 43–78)
NRBC # BLD: 0 K/UL (ref 0–0.2)
PLATELET # BLD AUTO: 242 K/UL (ref 150–450)
PMV BLD AUTO: 10.8 FL (ref 9.4–12.3)
POTASSIUM SERPL-SCNC: 4.1 MMOL/L (ref 3.5–5.1)
PROT SERPL-MCNC: 7.5 G/DL (ref 6.3–8.2)
RBC # BLD AUTO: 4.31 M/UL (ref 4.05–5.2)
SODIUM SERPL-SCNC: 139 MMOL/L (ref 136–146)
TRIGL SERPL-MCNC: 150 MG/DL (ref 35–150)
VLDLC SERPL CALC-MCNC: 30 MG/DL (ref 6–23)
WBC # BLD AUTO: 5.7 K/UL (ref 4.3–11.1)

## 2024-02-06 LAB
EST. AVERAGE GLUCOSE BLD GHB EST-MCNC: 126 MG/DL
HBA1C MFR BLD: 6 % (ref 4.8–5.6)

## 2024-02-12 ENCOUNTER — OFFICE VISIT (OUTPATIENT)
Dept: PRIMARY CARE CLINIC | Facility: CLINIC | Age: 84
End: 2024-02-12
Payer: MEDICARE

## 2024-02-12 VITALS
WEIGHT: 135 LBS | HEART RATE: 70 BPM | HEIGHT: 62 IN | TEMPERATURE: 97 F | OXYGEN SATURATION: 96 % | BODY MASS INDEX: 24.84 KG/M2 | SYSTOLIC BLOOD PRESSURE: 123 MMHG | DIASTOLIC BLOOD PRESSURE: 66 MMHG

## 2024-02-12 DIAGNOSIS — Z79.899 ENCOUNTER FOR LONG-TERM (CURRENT) USE OF MEDICATIONS: ICD-10-CM

## 2024-02-12 DIAGNOSIS — I10 ESSENTIAL HYPERTENSION: Primary | ICD-10-CM

## 2024-02-12 DIAGNOSIS — M85.89 OSTEOPENIA OF MULTIPLE SITES: ICD-10-CM

## 2024-02-12 DIAGNOSIS — R73.03 PREDIABETES: ICD-10-CM

## 2024-02-12 DIAGNOSIS — E55.9 VITAMIN D DEFICIENCY: ICD-10-CM

## 2024-02-12 DIAGNOSIS — E78.2 MIXED HYPERLIPIDEMIA: ICD-10-CM

## 2024-02-12 PROBLEM — E87.1 ACUTE HYPONATREMIA: Status: RESOLVED | Noted: 2021-10-21 | Resolved: 2024-02-12

## 2024-02-12 PROBLEM — F51.01 PRIMARY INSOMNIA: Status: RESOLVED | Noted: 2022-09-06 | Resolved: 2024-02-12

## 2024-02-12 PROBLEM — D62 ACUTE BLOOD LOSS ANEMIA (ABLA): Status: RESOLVED | Noted: 2021-10-21 | Resolved: 2024-02-12

## 2024-02-12 PROBLEM — Z86.39 HISTORY OF SIADH: Status: RESOLVED | Noted: 2023-05-25 | Resolved: 2024-02-12

## 2024-02-12 PROCEDURE — 1036F TOBACCO NON-USER: CPT | Performed by: FAMILY MEDICINE

## 2024-02-12 PROCEDURE — 1090F PRES/ABSN URINE INCON ASSESS: CPT | Performed by: FAMILY MEDICINE

## 2024-02-12 PROCEDURE — 3074F SYST BP LT 130 MM HG: CPT | Performed by: FAMILY MEDICINE

## 2024-02-12 PROCEDURE — 3078F DIAST BP <80 MM HG: CPT | Performed by: FAMILY MEDICINE

## 2024-02-12 PROCEDURE — G8427 DOCREV CUR MEDS BY ELIG CLIN: HCPCS | Performed by: FAMILY MEDICINE

## 2024-02-12 PROCEDURE — G8420 CALC BMI NORM PARAMETERS: HCPCS | Performed by: FAMILY MEDICINE

## 2024-02-12 PROCEDURE — 1123F ACP DISCUSS/DSCN MKR DOCD: CPT | Performed by: FAMILY MEDICINE

## 2024-02-12 PROCEDURE — 99213 OFFICE O/P EST LOW 20 MIN: CPT | Performed by: FAMILY MEDICINE

## 2024-02-12 PROCEDURE — G8399 PT W/DXA RESULTS DOCUMENT: HCPCS | Performed by: FAMILY MEDICINE

## 2024-02-12 PROCEDURE — G8484 FLU IMMUNIZE NO ADMIN: HCPCS | Performed by: FAMILY MEDICINE

## 2024-02-12 RX ORDER — SIMVASTATIN 40 MG
40 TABLET ORAL NIGHTLY
Qty: 90 TABLET | Refills: 3 | Status: SHIPPED | OUTPATIENT
Start: 2024-02-12

## 2024-02-12 RX ORDER — METOPROLOL SUCCINATE 25 MG/1
25 TABLET, EXTENDED RELEASE ORAL DAILY
Qty: 90 TABLET | Refills: 3 | Status: SHIPPED | OUTPATIENT
Start: 2024-02-12

## 2024-02-12 NOTE — PROGRESS NOTES
[facility] PRIMARY CARE  Carolyn Fairchild M.D.  [address] [place] [address]  Phone:  [phone]  Fax:  [phone]    CHIEF COMPLAINT:  Chief Complaint   Patient presents with    Cholesterol Problem    Hypertension     Refills review labs        HISTORY OF PRESENT ILLNESS:  Ms. Liz is a 83 y.o. female  who presents for follow up.  Patient states she has been doing well.  Her blood pressure has been good.  Today it is 123/66.  She denies any chest pain, shortness of breath, headaches, or blurred vision.    Her blood sugars have been under control.  She is controlling her blood sugars with diet.  Her last A1c was 6.0.    No other complaints. Taking medications as prescribed. Medications reviewed and updated.     HISTORY:  Allergies   Allergen Reactions    Amlodipine Other (See Comments) and Shortness Of Breath    Promethazine Other (See Comments)    Lisinopril Other (See Comments)    Tramadol Anxiety       REVIEW OF SYSTEMS:  Review of systems is as indicated in HPI otherwise negative.    PHYSICAL EXAM:  Vital Signs -   Visit Vitals  /66   Pulse 70   Temp 97 °F (36.1 °C) (Skin)   Ht 1.575 m (5' 2\")   Wt 61.2 kg (135 lb)   SpO2 96%   BMI 24.69 kg/m²        Physical Exam  Vitals and nursing note reviewed.   Constitutional:       Appearance: Normal appearance.   HENT:      Head: Normocephalic and atraumatic.      Nose: Nose normal.      Mouth/Throat:      Mouth: Mucous membranes are moist.   Eyes:      Extraocular Movements: Extraocular movements intact.      Pupils: Pupils are equal, round, and reactive to light.   Cardiovascular:      Rate and Rhythm: Normal rate and regular rhythm.      Pulses: Normal pulses.   Pulmonary:      Effort: Pulmonary effort is normal.      Breath sounds: Normal breath sounds.   Abdominal:      General: Abdomen is flat.      Palpations: Abdomen is soft.   Musculoskeletal:         General: Normal range of motion.      Cervical back: Normal, normal range of

## 2024-03-21 ENCOUNTER — OFFICE VISIT (OUTPATIENT)
Dept: PRIMARY CARE CLINIC | Facility: CLINIC | Age: 84
End: 2024-03-21

## 2024-03-21 VITALS
SYSTOLIC BLOOD PRESSURE: 131 MMHG | HEIGHT: 62 IN | WEIGHT: 138.6 LBS | BODY MASS INDEX: 25.51 KG/M2 | DIASTOLIC BLOOD PRESSURE: 62 MMHG | TEMPERATURE: 97.3 F | OXYGEN SATURATION: 95 % | HEART RATE: 66 BPM

## 2024-03-21 DIAGNOSIS — R05.8 PRODUCTIVE COUGH: Primary | ICD-10-CM

## 2024-03-21 DIAGNOSIS — R53.81 MALAISE: ICD-10-CM

## 2024-03-21 DIAGNOSIS — J02.9 SORE THROAT: ICD-10-CM

## 2024-03-21 DIAGNOSIS — R53.82 CHRONIC FATIGUE: ICD-10-CM

## 2024-03-21 PROBLEM — R57.9 SHOCK, UNSPECIFIED (HCC): Status: ACTIVE | Noted: 2024-03-21

## 2024-03-21 PROBLEM — R57.9 SHOCK, UNSPECIFIED (HCC): Status: RESOLVED | Noted: 2024-03-21 | Resolved: 2024-03-21

## 2024-03-21 LAB
EXP DATE SOLUTION: NORMAL
EXP DATE SWAB: NORMAL
EXPIRATION DATE: NORMAL
INFLUENZA A ANTIGEN, POC: NEGATIVE
INFLUENZA B ANTIGEN, POC: NEGATIVE
LOT NUMBER POC: NORMAL
LOT NUMBER SOLUTION: NORMAL
LOT NUMBER SWAB: NORMAL
SARS-COV-2 RNA, POC: NEGATIVE
VALID INTERNAL CONTROL, POC: YES

## 2024-03-21 RX ORDER — CYANOCOBALAMIN 1000 UG/ML
1000 INJECTION, SOLUTION INTRAMUSCULAR; SUBCUTANEOUS ONCE
Status: COMPLETED | OUTPATIENT
Start: 2024-03-21 | End: 2024-03-21

## 2024-03-21 RX ORDER — BROMPHENIRAMINE MALEATE, PSEUDOEPHEDRINE HYDROCHLORIDE, AND DEXTROMETHORPHAN HYDROBROMIDE 2; 30; 10 MG/5ML; MG/5ML; MG/5ML
5 SYRUP ORAL 4 TIMES DAILY PRN
Qty: 120 ML | Refills: 1 | Status: SHIPPED | OUTPATIENT
Start: 2024-03-21

## 2024-03-21 RX ORDER — AZITHROMYCIN 250 MG/1
TABLET, FILM COATED ORAL
Qty: 6 TABLET | Refills: 0 | Status: SHIPPED | OUTPATIENT
Start: 2024-03-21 | End: 2024-03-31

## 2024-03-21 RX ORDER — METHYLPREDNISOLONE 4 MG/1
TABLET ORAL
Qty: 1 KIT | Refills: 0 | Status: SHIPPED | OUTPATIENT
Start: 2024-03-21

## 2024-03-21 RX ADMIN — CYANOCOBALAMIN 1000 MCG: 1000 INJECTION, SOLUTION INTRAMUSCULAR; SUBCUTANEOUS at 14:24

## 2024-03-21 SDOH — ECONOMIC STABILITY: INCOME INSECURITY: HOW HARD IS IT FOR YOU TO PAY FOR THE VERY BASICS LIKE FOOD, HOUSING, MEDICAL CARE, AND HEATING?: NOT VERY HARD

## 2024-03-21 SDOH — ECONOMIC STABILITY: FOOD INSECURITY: WITHIN THE PAST 12 MONTHS, THE FOOD YOU BOUGHT JUST DIDN'T LAST AND YOU DIDN'T HAVE MONEY TO GET MORE.: NEVER TRUE

## 2024-03-21 SDOH — ECONOMIC STABILITY: FOOD INSECURITY: WITHIN THE PAST 12 MONTHS, YOU WORRIED THAT YOUR FOOD WOULD RUN OUT BEFORE YOU GOT MONEY TO BUY MORE.: NEVER TRUE

## 2024-03-21 ASSESSMENT — PATIENT HEALTH QUESTIONNAIRE - PHQ9
SUM OF ALL RESPONSES TO PHQ QUESTIONS 1-9: 0
1. LITTLE INTEREST OR PLEASURE IN DOING THINGS: NOT AT ALL
SUM OF ALL RESPONSES TO PHQ QUESTIONS 1-9: 0
2. FEELING DOWN, DEPRESSED OR HOPELESS: NOT AT ALL
SUM OF ALL RESPONSES TO PHQ QUESTIONS 1-9: 0
SUM OF ALL RESPONSES TO PHQ QUESTIONS 1-9: 0
SUM OF ALL RESPONSES TO PHQ9 QUESTIONS 1 & 2: 0

## 2024-03-21 NOTE — PROGRESS NOTES
[facility] PRIMARY CARE  Carolyn Fairchild M.D.  [address] [place] [address]  Phone:  [phone]  Fax:  [phone]    CHIEF COMPLAINT:  Chief Complaint   Patient presents with    Cough     Patient c/o cough, fatigue, sore throat, malaise, weakness x 3 days. She has been taking mucinex and delsym. She started to have hallucinations last night.         HISTORY OF PRESENT ILLNESS:  Ms. Liz is a 83 y.o. female  who presents with complaints of a sore throat, malaise, weakness, a productive cough, and a low-grade fever for about 3 days.  Her grandchild was sick last week. She has tried OTC Delsym and Mucinex but they made her hallucinate last night.     HISTORY:  Allergies   Allergen Reactions    Amlodipine Other (See Comments) and Shortness Of Breath    Promethazine Other (See Comments)    Lisinopril Other (See Comments)    Tramadol Anxiety       REVIEW OF SYSTEMS:  Review of systems is as indicated in HPI otherwise negative.    PHYSICAL EXAM:  Visit Vitals  /62   Pulse 66   Temp 97.3 °F (36.3 °C) (Temporal)   Ht 1.575 m (5' 2\")   Wt 62.9 kg (138 lb 9.6 oz)   SpO2 95%   BMI 25.35 kg/m²        Physical Exam  Vitals and nursing note reviewed.   Constitutional:       Appearance: Normal appearance.   HENT:      Head: Normocephalic and atraumatic.      Right Ear: Decreased hearing noted. A middle ear effusion is present.      Left Ear: Decreased hearing noted. A middle ear effusion is present.      Nose: Nose normal.      Mouth/Throat:      Lips: Pink.      Mouth: Mucous membranes are moist.      Pharynx: Oropharynx is clear. Uvula midline.   Eyes:      Extraocular Movements: Extraocular movements intact.      Pupils: Pupils are equal, round, and reactive to light.   Cardiovascular:      Rate and Rhythm: Normal rate and regular rhythm.      Pulses: Normal pulses.   Pulmonary:      Effort: Pulmonary effort is normal.      Breath sounds: Normal breath sounds.   Abdominal:      General: Abdomen

## 2024-04-25 ENCOUNTER — HOSPITAL ENCOUNTER (OUTPATIENT)
Age: 84
Setting detail: OBSERVATION
Discharge: HOME | End: 2024-04-26
Attending: EMERGENCY MEDICINE | Admitting: HOSPITALIST
Payer: MEDICARE

## 2024-04-25 ENCOUNTER — APPOINTMENT (OUTPATIENT)
Dept: GENERAL RADIOLOGY | Age: 84
End: 2024-04-25
Payer: MEDICARE

## 2024-04-25 ENCOUNTER — APPOINTMENT (OUTPATIENT)
Dept: CT IMAGING | Age: 84
End: 2024-04-25
Payer: MEDICARE

## 2024-04-25 DIAGNOSIS — R55 SYNCOPE AND COLLAPSE: Primary | ICD-10-CM

## 2024-04-25 DIAGNOSIS — S09.90XA CLOSED HEAD INJURY, INITIAL ENCOUNTER: ICD-10-CM

## 2024-04-25 PROCEDURE — 93005 ELECTROCARDIOGRAM TRACING: CPT | Performed by: EMERGENCY MEDICINE

## 2024-04-25 PROCEDURE — 71045 X-RAY EXAM CHEST 1 VIEW: CPT

## 2024-04-25 PROCEDURE — 70450 CT HEAD/BRAIN W/O DYE: CPT

## 2024-04-25 PROCEDURE — 99285 EMERGENCY DEPT VISIT HI MDM: CPT

## 2024-04-25 PROCEDURE — 94760 N-INVAS EAR/PLS OXIMETRY 1: CPT

## 2024-04-25 ASSESSMENT — ENCOUNTER SYMPTOMS
COUGH: 0
SHORTNESS OF BREATH: 0
DIARRHEA: 0
VOMITING: 0
FACIAL SWELLING: 0
ABDOMINAL PAIN: 0

## 2024-04-25 ASSESSMENT — PAIN - FUNCTIONAL ASSESSMENT: PAIN_FUNCTIONAL_ASSESSMENT: 0-10

## 2024-04-25 ASSESSMENT — PAIN SCALES - GENERAL: PAINLEVEL_OUTOF10: 8

## 2024-04-25 ASSESSMENT — PAIN DESCRIPTION - LOCATION: LOCATION: HEAD

## 2024-04-25 ASSESSMENT — PAIN DESCRIPTION - DESCRIPTORS: DESCRIPTORS: ACHING

## 2024-04-26 ENCOUNTER — APPOINTMENT (OUTPATIENT)
Dept: NON INVASIVE DIAGNOSTICS | Age: 84
End: 2024-04-26
Payer: MEDICARE

## 2024-04-26 VITALS
SYSTOLIC BLOOD PRESSURE: 129 MMHG | TEMPERATURE: 98 F | DIASTOLIC BLOOD PRESSURE: 78 MMHG | HEART RATE: 66 BPM | BODY MASS INDEX: 23.92 KG/M2 | OXYGEN SATURATION: 95 % | WEIGHT: 130 LBS | RESPIRATION RATE: 18 BRPM | HEIGHT: 62 IN

## 2024-04-26 PROBLEM — R55 SYNCOPE AND COLLAPSE: Status: ACTIVE | Noted: 2024-04-26

## 2024-04-26 PROBLEM — T14.8XXA HEMATOMA: Status: ACTIVE | Noted: 2024-04-26

## 2024-04-26 LAB
ALBUMIN SERPL-MCNC: 4.2 G/DL (ref 3.2–4.6)
ALBUMIN/GLOB SERPL: 1.2 (ref 1–1.9)
ALP SERPL-CCNC: 73 U/L (ref 35–104)
ALT SERPL-CCNC: 20 U/L (ref 12–65)
ANION GAP SERPL CALC-SCNC: 10 MMOL/L (ref 9–18)
AST SERPL-CCNC: 37 U/L (ref 15–37)
BASOPHILS # BLD: 0 K/UL (ref 0–0.2)
BASOPHILS NFR BLD: 1 % (ref 0–2)
BILIRUB SERPL-MCNC: 0.4 MG/DL (ref 0–1.2)
BUN SERPL-MCNC: 20 MG/DL (ref 8–23)
CALCIUM SERPL-MCNC: 9.7 MG/DL (ref 8.8–10.2)
CHLORIDE SERPL-SCNC: 98 MMOL/L (ref 98–107)
CO2 SERPL-SCNC: 27 MMOL/L (ref 20–28)
CREAT SERPL-MCNC: 0.9 MG/DL (ref 0.6–1.1)
DIFFERENTIAL METHOD BLD: ABNORMAL
ECHO AO ASC DIAM: 3.3 CM
ECHO AO ASCENDING AORTA INDEX: 2.08 CM/M2
ECHO AO ROOT DIAM: 2.9 CM
ECHO AO ROOT INDEX: 1.82 CM/M2
ECHO AR MAX VEL PISA: 4.8 M/S
ECHO AV AREA PEAK VELOCITY: 1.7 CM2
ECHO AV AREA VTI: 1.8 CM2
ECHO AV AREA/BSA PEAK VELOCITY: 1.1 CM2/M2
ECHO AV AREA/BSA VTI: 1.1 CM2/M2
ECHO AV MEAN GRADIENT: 6 MMHG
ECHO AV MEAN VELOCITY: 1.2 M/S
ECHO AV PEAK GRADIENT: 13 MMHG
ECHO AV PEAK VELOCITY: 1.8 M/S
ECHO AV REGURGITANT PHT: 475 MS
ECHO AV VELOCITY RATIO: 0.67
ECHO AV VTI: 35.3 CM
ECHO BSA: 1.61 M2
ECHO EST RA PRESSURE: 3 MMHG
ECHO IVC PROX: 1.6 CM
ECHO LA AREA 2C: 18.6 CM2
ECHO LA AREA 4C: 17.1 CM2
ECHO LA DIAMETER INDEX: 1.26 CM/M2
ECHO LA DIAMETER: 2 CM
ECHO LA MAJOR AXIS: 5.9 CM
ECHO LA MINOR AXIS: 5.7 CM
ECHO LA TO AORTIC ROOT RATIO: 0.69
ECHO LA VOL BP: 44 ML (ref 22–52)
ECHO LA VOL MOD A2C: 49 ML (ref 22–52)
ECHO LA VOL MOD A4C: 39 ML (ref 22–52)
ECHO LA VOL/BSA BIPLANE: 28 ML/M2 (ref 16–34)
ECHO LA VOLUME INDEX MOD A2C: 31 ML/M2 (ref 16–34)
ECHO LA VOLUME INDEX MOD A4C: 25 ML/M2 (ref 16–34)
ECHO LV E' LATERAL VELOCITY: 7 CM/S
ECHO LV E' SEPTAL VELOCITY: 5 CM/S
ECHO LV EDV A2C: 55 ML
ECHO LV EDV A4C: 68 ML
ECHO LV EDV INDEX A4C: 43 ML/M2
ECHO LV EDV NDEX A2C: 35 ML/M2
ECHO LV EJECTION FRACTION A2C: 65 %
ECHO LV EJECTION FRACTION A4C: 63 %
ECHO LV EJECTION FRACTION BIPLANE: 62 % (ref 55–100)
ECHO LV ESV A2C: 19 ML
ECHO LV ESV A4C: 25 ML
ECHO LV ESV INDEX A2C: 12 ML/M2
ECHO LV ESV INDEX A4C: 16 ML/M2
ECHO LV FRACTIONAL SHORTENING: 32 % (ref 28–44)
ECHO LV INTERNAL DIMENSION DIASTOLE INDEX: 2.14 CM/M2
ECHO LV INTERNAL DIMENSION DIASTOLIC: 3.4 CM (ref 3.9–5.3)
ECHO LV INTERNAL DIMENSION SYSTOLIC INDEX: 1.45 CM/M2
ECHO LV INTERNAL DIMENSION SYSTOLIC: 2.3 CM
ECHO LV IVSD: 0.9 CM (ref 0.6–0.9)
ECHO LV MASS 2D: 84.9 G (ref 67–162)
ECHO LV MASS INDEX 2D: 53.4 G/M2 (ref 43–95)
ECHO LV POSTERIOR WALL DIASTOLIC: 0.9 CM (ref 0.6–0.9)
ECHO LV RELATIVE WALL THICKNESS RATIO: 0.53
ECHO LVOT AREA: 2.5 CM2
ECHO LVOT AV VTI INDEX: 0.7
ECHO LVOT DIAM: 1.8 CM
ECHO LVOT MEAN GRADIENT: 3 MMHG
ECHO LVOT PEAK GRADIENT: 5 MMHG
ECHO LVOT PEAK VELOCITY: 1.2 M/S
ECHO LVOT STROKE VOLUME INDEX: 39.7 ML/M2
ECHO LVOT SV: 63.1 ML
ECHO LVOT VTI: 24.8 CM
ECHO MV A VELOCITY: 1.08 M/S
ECHO MV E DECELERATION TIME (DT): 267 MS
ECHO MV E VELOCITY: 0.9 M/S
ECHO MV E/A RATIO: 0.83
ECHO MV E/E' LATERAL: 12.86
ECHO MV E/E' RATIO (AVERAGED): 15.43
ECHO PV ACCELERATION TIME (AT): 106 MS
ECHO PV MAX VELOCITY: 1 M/S
ECHO PV PEAK GRADIENT: 4 MMHG
ECHO RIGHT VENTRICULAR SYSTOLIC PRESSURE (RVSP): 37 MMHG
ECHO RV BASAL DIMENSION: 4.5 CM
ECHO RV FREE WALL PEAK S': 15 CM/S
ECHO RV INTERNAL DIMENSION: 3 CM
ECHO RV TAPSE: 2.5 CM (ref 1.7–?)
ECHO TV REGURGITANT MAX VELOCITY: 2.91 M/S
ECHO TV REGURGITANT PEAK GRADIENT: 34 MMHG
EKG ATRIAL RATE: 79 BPM
EKG DIAGNOSIS: NORMAL
EKG P AXIS: 52 DEGREES
EKG P-R INTERVAL: 216 MS
EKG Q-T INTERVAL: 416 MS
EKG QRS DURATION: 144 MS
EKG QTC CALCULATION (BAZETT): 477 MS
EKG R AXIS: -71 DEGREES
EKG T AXIS: 76 DEGREES
EKG VENTRICULAR RATE: 79 BPM
EOSINOPHIL # BLD: 0.2 K/UL (ref 0–0.8)
EOSINOPHIL NFR BLD: 3 % (ref 0.5–7.8)
ERYTHROCYTE [DISTWIDTH] IN BLOOD BY AUTOMATED COUNT: 14.3 % (ref 11.9–14.6)
GLOBULIN SER CALC-MCNC: 3.3 G/DL (ref 2.3–3.5)
GLUCOSE SERPL-MCNC: 129 MG/DL (ref 70–99)
HCT VFR BLD AUTO: 39.2 % (ref 35.8–46.3)
HGB BLD-MCNC: 12.8 G/DL (ref 11.7–15.4)
IMM GRANULOCYTES # BLD AUTO: 0 K/UL (ref 0–0.5)
IMM GRANULOCYTES NFR BLD AUTO: 0 % (ref 0–5)
LYMPHOCYTES # BLD: 2.3 K/UL (ref 0.5–4.6)
LYMPHOCYTES NFR BLD: 35 % (ref 13–44)
MAGNESIUM SERPL-MCNC: 2.1 MG/DL (ref 1.8–2.4)
MCH RBC QN AUTO: 29.9 PG (ref 26.1–32.9)
MCHC RBC AUTO-ENTMCNC: 32.7 G/DL (ref 31.4–35)
MCV RBC AUTO: 91.6 FL (ref 82–102)
MONOCYTES # BLD: 0.8 K/UL (ref 0.1–1.3)
MONOCYTES NFR BLD: 13 % (ref 4–12)
NEUTS SEG # BLD: 3.3 K/UL (ref 1.7–8.2)
NEUTS SEG NFR BLD: 48 % (ref 43–78)
NRBC # BLD: 0 K/UL (ref 0–0.2)
PLATELET # BLD AUTO: 238 K/UL (ref 150–450)
PMV BLD AUTO: 10.3 FL (ref 9.4–12.3)
POTASSIUM SERPL-SCNC: 4.2 MMOL/L (ref 3.5–5.1)
PROT SERPL-MCNC: 7.5 G/DL (ref 6.3–8.2)
RBC # BLD AUTO: 4.28 M/UL (ref 4.05–5.2)
SODIUM SERPL-SCNC: 135 MMOL/L (ref 136–145)
TROPONIN T SERPL HS-MCNC: 13 NG/L (ref 0–14)
TROPONIN T SERPL HS-MCNC: 13 NG/L (ref 0–14)
WBC # BLD AUTO: 6.7 K/UL (ref 4.3–11.1)

## 2024-04-26 PROCEDURE — 97161 PT EVAL LOW COMPLEX 20 MIN: CPT

## 2024-04-26 PROCEDURE — 83735 ASSAY OF MAGNESIUM: CPT

## 2024-04-26 PROCEDURE — 97535 SELF CARE MNGMENT TRAINING: CPT

## 2024-04-26 PROCEDURE — 80053 COMPREHEN METABOLIC PANEL: CPT

## 2024-04-26 PROCEDURE — 6370000000 HC RX 637 (ALT 250 FOR IP): Performed by: HOSPITALIST

## 2024-04-26 PROCEDURE — 93010 ELECTROCARDIOGRAM REPORT: CPT | Performed by: INTERNAL MEDICINE

## 2024-04-26 PROCEDURE — 97165 OT EVAL LOW COMPLEX 30 MIN: CPT

## 2024-04-26 PROCEDURE — 84484 ASSAY OF TROPONIN QUANT: CPT

## 2024-04-26 PROCEDURE — G0378 HOSPITAL OBSERVATION PER HR: HCPCS

## 2024-04-26 PROCEDURE — 93306 TTE W/DOPPLER COMPLETE: CPT | Performed by: INTERNAL MEDICINE

## 2024-04-26 PROCEDURE — 36415 COLL VENOUS BLD VENIPUNCTURE: CPT

## 2024-04-26 PROCEDURE — 93306 TTE W/DOPPLER COMPLETE: CPT

## 2024-04-26 PROCEDURE — 85025 COMPLETE CBC W/AUTO DIFF WBC: CPT

## 2024-04-26 PROCEDURE — 97530 THERAPEUTIC ACTIVITIES: CPT

## 2024-04-26 RX ORDER — POTASSIUM CHLORIDE 7.45 MG/ML
10 INJECTION INTRAVENOUS PRN
Status: DISCONTINUED | OUTPATIENT
Start: 2024-04-26 | End: 2024-04-26 | Stop reason: HOSPADM

## 2024-04-26 RX ORDER — ONDANSETRON 4 MG/1
4 TABLET, ORALLY DISINTEGRATING ORAL EVERY 8 HOURS PRN
Status: DISCONTINUED | OUTPATIENT
Start: 2024-04-26 | End: 2024-04-26 | Stop reason: HOSPADM

## 2024-04-26 RX ORDER — ACETAMINOPHEN 650 MG/1
650 SUPPOSITORY RECTAL EVERY 6 HOURS PRN
Status: DISCONTINUED | OUTPATIENT
Start: 2024-04-26 | End: 2024-04-26 | Stop reason: HOSPADM

## 2024-04-26 RX ORDER — ATORVASTATIN CALCIUM 10 MG/1
20 TABLET, FILM COATED ORAL DAILY
Status: DISCONTINUED | OUTPATIENT
Start: 2024-04-26 | End: 2024-04-26 | Stop reason: HOSPADM

## 2024-04-26 RX ORDER — SODIUM CHLORIDE 0.9 % (FLUSH) 0.9 %
5-40 SYRINGE (ML) INJECTION EVERY 12 HOURS SCHEDULED
Status: DISCONTINUED | OUTPATIENT
Start: 2024-04-26 | End: 2024-04-26 | Stop reason: HOSPADM

## 2024-04-26 RX ORDER — ASPIRIN 81 MG/1
81 TABLET ORAL DAILY
Status: DISCONTINUED | OUTPATIENT
Start: 2024-04-26 | End: 2024-04-26 | Stop reason: HOSPADM

## 2024-04-26 RX ORDER — MAGNESIUM HYDROXIDE/ALUMINUM HYDROXICE/SIMETHICONE 120; 1200; 1200 MG/30ML; MG/30ML; MG/30ML
30 SUSPENSION ORAL EVERY 6 HOURS PRN
Status: DISCONTINUED | OUTPATIENT
Start: 2024-04-26 | End: 2024-04-26 | Stop reason: HOSPADM

## 2024-04-26 RX ORDER — ONDANSETRON 2 MG/ML
4 INJECTION INTRAMUSCULAR; INTRAVENOUS EVERY 6 HOURS PRN
Status: DISCONTINUED | OUTPATIENT
Start: 2024-04-26 | End: 2024-04-26 | Stop reason: HOSPADM

## 2024-04-26 RX ORDER — ACETAMINOPHEN 325 MG/1
650 TABLET ORAL ONCE
Status: COMPLETED | OUTPATIENT
Start: 2024-04-26 | End: 2024-04-26

## 2024-04-26 RX ORDER — POLYETHYLENE GLYCOL 3350 17 G/17G
17 POWDER, FOR SOLUTION ORAL DAILY PRN
Status: DISCONTINUED | OUTPATIENT
Start: 2024-04-26 | End: 2024-04-26 | Stop reason: HOSPADM

## 2024-04-26 RX ORDER — POTASSIUM CHLORIDE 20 MEQ/1
40 TABLET, EXTENDED RELEASE ORAL PRN
Status: DISCONTINUED | OUTPATIENT
Start: 2024-04-26 | End: 2024-04-26 | Stop reason: HOSPADM

## 2024-04-26 RX ORDER — ACETAMINOPHEN 325 MG/1
650 TABLET ORAL EVERY 6 HOURS PRN
Status: DISCONTINUED | OUTPATIENT
Start: 2024-04-26 | End: 2024-04-26 | Stop reason: HOSPADM

## 2024-04-26 RX ORDER — SODIUM CHLORIDE 0.9 % (FLUSH) 0.9 %
5-40 SYRINGE (ML) INJECTION PRN
Status: DISCONTINUED | OUTPATIENT
Start: 2024-04-26 | End: 2024-04-26 | Stop reason: HOSPADM

## 2024-04-26 RX ORDER — PANTOPRAZOLE SODIUM 40 MG/1
40 TABLET, DELAYED RELEASE ORAL DAILY PRN
Status: DISCONTINUED | OUTPATIENT
Start: 2024-04-26 | End: 2024-04-26 | Stop reason: HOSPADM

## 2024-04-26 RX ORDER — SODIUM CHLORIDE 9 MG/ML
INJECTION, SOLUTION INTRAVENOUS PRN
Status: DISCONTINUED | OUTPATIENT
Start: 2024-04-26 | End: 2024-04-26 | Stop reason: HOSPADM

## 2024-04-26 RX ORDER — METOPROLOL SUCCINATE 25 MG/1
25 TABLET, EXTENDED RELEASE ORAL DAILY
Status: DISCONTINUED | OUTPATIENT
Start: 2024-04-26 | End: 2024-04-26 | Stop reason: HOSPADM

## 2024-04-26 RX ORDER — MAGNESIUM SULFATE IN WATER 40 MG/ML
2000 INJECTION, SOLUTION INTRAVENOUS PRN
Status: DISCONTINUED | OUTPATIENT
Start: 2024-04-26 | End: 2024-04-26

## 2024-04-26 RX ADMIN — ACETAMINOPHEN 650 MG: 325 TABLET ORAL at 02:36

## 2024-04-26 ASSESSMENT — PAIN DESCRIPTION - ORIENTATION: ORIENTATION: ANTERIOR

## 2024-04-26 ASSESSMENT — PAIN SCALES - GENERAL: PAINLEVEL_OUTOF10: 8

## 2024-04-26 ASSESSMENT — PAIN DESCRIPTION - DESCRIPTORS: DESCRIPTORS: ACHING

## 2024-04-26 ASSESSMENT — PAIN DESCRIPTION - LOCATION: LOCATION: HEAD

## 2024-04-26 NOTE — ED NOTES
TRANSFER - OUT REPORT:    Verbal report given to Lucinda on Juve Liz  being transferred to [facility] for routine progression of patient care       Report consisted of patient's Situation, Background, Assessment and   Recommendations(SBAR).     Information from the following report(s) ED SBAR was reviewed with the receiving nurse.    Mikey Fall Assessment:    Presents to emergency department  because of falls (Syncope, seizure, or loss of consciousness): Yes  Age > 70: Yes  Altered Mental Status, Intoxication with alcohol or substance confusion (Disorientation, impaired judgment, poor safety awaremess, or inability to follow instructions): No  Impaired Mobility: Ambulates or transfers with assistive devices or assistance; Unable to ambulate or transer.: Yes  Nursing Judgement: Yes          Lines:   Peripheral IV 04/26/24 Right Antecubital (Active)   Site Assessment Clean, dry & intact 04/26/24 0001   Line Status Blood return noted;Normal saline locked 04/26/24 0001   Phlebitis Assessment No symptoms 04/26/24 0001   Infiltration Assessment 0 04/26/24 0001        Opportunity for questions and clarification was provided.      Patient transported with:  Registered Nurse          Loli Wiseman RN  04/26/24 0229

## 2024-04-26 NOTE — PROGRESS NOTES
4 Eyes Skin Assessment     NAME:  Juve Liz  YOB: 1940  MEDICAL RECORD NUMBER:  [id number]    The patient is being assessed for  Admission    I agree that at least one RN has performed a thorough Head to Toe Skin Assessment on the patient. ALL assessment sites listed below have been assessed.      Areas assessed by both nurses:    Head, Face, Ears, Shoulders, Back, Chest, Arms, Elbows, Hands, Sacrum. Buttock, Coccyx, Ischium, and Legs. Feet and Heels        Does the Patient have a Wound? No noted wound(s)       Rogelio Prevention initiated by RN: Yes  Wound Care Orders initiated by RN: No    Pressure Injury (Stage 3,4, Unstageable, DTI, NWPT, and Complex wounds) if present, place Wound referral order by RN under : No    New Ostomies, if present place, Ostomy referral order under : No     Nurse 1 eSignature: Electronically signed by Lucinda Eller RN on 4/26/24 at 4:48 AM EDT    **SHARE this note so that the co-signing nurse can place an eSignature**    Nurse 2 eSignature: Electronically signed by Margarito Parker RN on 4/26/24 at 4:49 AM EDT

## 2024-04-26 NOTE — PROGRESS NOTES
ACUTE OCCUPATIONAL THERAPY GOALS:   (Developed with and agreed upon by patient and/or caregiver.)  Patient will demonstrate the ability to complete functional transfers and observed ADL with supervision. MET   Timeframe: 1 visit     OCCUPATIONAL THERAPY Initial Assessment, Daily Note, Discharge, and AM       OT Visit Days: 1  Acknowledge Orders  Time  OT Charge Capture  Rehab Caseload Tracker      Juve Liz is a 83 y.o. female   PRIMARY DIAGNOSIS: Syncope and collapse  Syncope and collapse [R55]  Closed head injury, initial encounter [S09.90XA]       Reason for Referral: Generalized Muscle Weakness (M62.81)  History of falling (Z91.81)  Observation: Payor: MEDICARE / Plan: MEDICARE PART A AND B / Product Type: *No Product type* /     ASSESSMENT:     REHAB RECOMMENDATIONS:   Recommendation to date pending progress:  Setting:  No further skilled occupational therapy after discharge from hospital    Equipment:    None     ASSESSMENT:  Ms. Liz presents to the hospital with syncope and collapse with closed head injury. Pt supine in the bed upon arrival with supportive daughters at bedside. Pt is alert and appropriate for her age. Pt has no complaints currently. Pt did well with functional transfers and did not require assistance for bed mobility. Pt stood edge of bed and was able to step into her slide on shoes without loss of balance. Pt denies any dizziness throughout session. Pt tolerated functional mobility well with supervision and no use of any adaptive equipment. Pt able to stand sink side to brush her teeth, wash her face, and comb her hair without assistance. Pt feels she is currently back to her baseline and family at bedside agrees. Pt has no further OT needs at this time.      [facility] AM-PAC™ “6 Clicks” Daily Activity Inpatient Short Form:    AM-PAC Daily Activity - Inpatient   How much help is needed for putting on and taking off regular lower body clothing?: None  How much help is needed  monitoring of the patient's response to treatment in order to develop a plan of care.     TREATMENT:   Self Care (8 minutes): Patient participated in upper body dressing and grooming ADLs in standing with minimal verbal cueing to increase independence and increase activity tolerance. Patient also participated in functional mobility and functional transfer training to increase independence and increase activity tolerance.     TREATMENT GRID:  N/A    AFTER TREATMENT PRECAUTIONS: Bed, Bed/Chair Locked, Call light within reach, Needs within reach, RN notified, and Visitors at bedside    INTERDISCIPLINARY COLLABORATION:  RN/ PCT, PT/ PTA, and OT/ [place]    EDUCATION:  Education Given To: Patient  Education Provided: Role of Therapy;Plan of Care  Education Method: Verbal  Education Outcome: Verbalized understanding    TOTAL TREATMENT DURATION AND TIME:  Time In: 0942  Time Out: 0956  Minutes: 14    Jeanne France OT

## 2024-04-26 NOTE — DISCHARGE SUMMARY
Hospitalist Discharge Summary   Admit Date:  2024 11:18 PM   DC Note date: 2024  Name:  Juve Liz   Age:  83 y.o.  Sex:  female  :  1940   MRN:  [id number]   Room:  [facility]  PCP:  Carolyn Fairchild MD    Presenting Complaint: Loss of Consciousness     Initial Admission Diagnosis: Syncope and collapse [R55]  Closed head injury, initial encounter [S09.90XA]     Problem List for this Hospitalization (present on admission):    Principal Problem:    Syncope and collapse  Active Problems:    Coronary artery disease involving native coronary artery of native heart without angina pectoris    Essential hypertension    Mixed hyperlipidemia    At high risk for falls    Hematoma  Resolved Problems:    * No resolved hospital problems. *      Hospital Course:  Juve Liz is a pleasant 83 y.o. CF w/ a PMH of GERD, pre-diabetes, HTN, HLD, CAD with 1 stent, left bundle branch block who presented after suffering a syncopal episode.  She was placed under observation status with the hospitalist service.    CXR non infectious.  CT head non acute.  Troponin checks negative.  CBC and CMP unremarkable.  No urinary complaints.  TTE showed normal systolic and diastolic function.  Negative for orthostatic hypotension.    Ms. Liz may have suffered a brief vasovagal episode at home.  She feels very well now and is at her baseline.  She said that she wants to go home and we will arrange for discharge today, .  She should follow-up with her PCP within 2 weeks for a checkup.    Disposition: Home  Diet: ADULT DIET; Regular  Code Status: Full Code    Follow Ups:  Follow-up Information       Follow up With Specialties Details Why Contact Info    Carolyn Fairchild MD Family Medicine Follow up in 2 week(s) post discharge check up [address] [place] [id number] [phone]              Time spent in patient discharge and coordination 40 minutes.      Plan was discussed with Patient.  All questions  Peak Gradient 5 mmHg    LVPWd 0.9 0.6 - 0.9 cm    LV E' Lateral Velocity 7 cm/s    LV E' Septal Velocity 5 cm/s    LV Ejection Fraction A2C 65 %    LV Ejection Fraction A4C 63 %    EF BP 62 55 - 100 %    LVOT Area 2.5 cm2    LVOT SV 63.1 ml    LA Minor Axis 5.7 cm    LA Major Axis 5.9 cm    LA Area 2C 18.6 cm2    LA Area 4C 17.1 cm2    LA Volume MOD A2C 49 22 - 52 mL    LA Volume MOD A4C 39 22 - 52 mL    LA Volume BP 44 22 - 52 mL    LA Diameter 2.0 cm    AR .0 ms    AR Max Velocity PISA 4.8 m/s    AV Peak Velocity 1.8 m/s    AV Peak Gradient 13 mmHg    AV Mean Velocity 1.2 m/s    AV Mean Gradient 6 mmHg    AV VTI 35.3 cm    AV Area by VTI 1.8 cm2    AV Area by Peak Velocity 1.7 cm2    Aortic Root 2.9 cm    Ascending Aorta 3.3 cm    IVC Proxmal 1.6 cm    MV E Wave Deceleration Time 267.0 ms    MV A Velocity 1.08 m/s    MV E Velocity 0.90 m/s    PV .0 ms    PV Max Velocity 1.0 m/s    PV Peak Gradient 4 mmHg    RVIDd 3.0 cm    RV Basal Dimension 4.5 cm    RV Free Wall Peak S' 15 cm/s    TAPSE 2.5 1.7 cm    TR Max Velocity 2.91 m/s    TR Peak Gradient 34 mmHg    Body Surface Area 1.61 m2    Fractional Shortening 2D 32 28 - 44 %    LV ESV Index A4C 16 mL/m2    LV EDV Index A4C 43 mL/m2    LV ESV Index A2C 12 mL/m2    LV EDV Index A2C 35 mL/m2    LVIDd Index 2.14 cm/m2    LVIDs Index 1.45 cm/m2    LV RWT Ratio 0.53     LV Mass 2D 84.9 67 - 162 g    LV Mass 2D Index 53.4 43 - 95 g/m2    MV E/A 0.83     E/E' Ratio (Averaged) 15.43     E/E' Lateral 12.86     LA Volume Index BP 28 16 - 34 ml/m2    LVOT Stroke Volume Index 39.7 mL/m2    LA Volume Index MOD A2C 31 16 - 34 ml/m2    LA Volume Index MOD A4C 25 16 - 34 ml/m2    LA Size Index 1.26 cm/m2    LA/AO Root Ratio 0.69     Ao Root Index 1.82 cm/m2    Ascending Aorta Index 2.08 cm/m2    AV Velocity Ratio 0.67     LVOT:AV VTI Index 0.70     BRITNEY/BSA VTI 1.1 cm2/m2    BRITNEY/BSA Peak Velocity 1.1 cm2/m2    Est. RA Pressure 3 mmHg    RVSP 37 mmHg       No results for

## 2024-04-26 NOTE — PROGRESS NOTES
of a flat bed without using bedrails?: A Little  How much help is needed moving to and from a bed to a chair?: None  How much help is needed standing up from a chair using your arms?: None  How much help is needed walking in hospital room?: None  How much help is needed climbing 3-5 steps with a railing?: None  AM-PAC Inpatient Mobility Raw Score : 22  AM-PAC Inpatient T-Scale Score : 53.28  Mobility Inpatient CMS 0-100% Score: 20.91  Mobility Inpatient CMS G-Code Modifier : CJ    SUBJECTIVE:   Ms. Liz states, \"I am ready to go home\"     Social/Functional Lives With: Alone  Type of Home: House  Home Layout: One level  Home Access: Stairs to enter without rails  Entrance Stairs - Number of Steps: 1  Bathroom Shower/Tub: Tub/Shower unit  Bathroom Equipment: Shower chair  ADL Assistance: Independent  Ambulation Assistance: Independent    OBJECTIVE:     PAIN: VITALS / O2: PRECAUTION / LINES / DRAINS:   Pre Treatment:    0/10      Post Treatment: 0/10 Vitals    WDL    Oxygen   RA   Telemetry     RESTRICTIONS/PRECAUTIONS:                    GROSS EVALUATION:  Intact Impaired (Comments):   AROM [x]     PROM [x]    Strength [x]     Balance []  Fair + to good   Posture [] Rounded Shoulders   Sensation [x]     Coordination [x]      Tone [x]     Edema [x]    Activity Tolerance []  General fatigue    []      COGNITION/  PERCEPTION: Intact Impaired (Comments):   Orientation [x]     Vision [x]     Hearing [x]     Cognition  [x]       MOBILITY: I Mod I S SBA CGA Min Mod Max Total  NT x2 Comments:   Bed Mobility    Rolling [x] [] [] [] [] [] [] [] [] [] []    Supine to Sit [x] [] [] [] [] [] [] [] [] [] []    Scooting [x] [] [] [] [] [] [] [] [] [] []    Sit to Supine [x] [] [] [] [] [] [] [] [] [] []    Transfers    Sit to Stand [x] [] [] [x] [] [] [] [] [] [] []    Bed to Chair [] [] [] [] [] [] [] [] [] [x] []    Stand to Sit [x] [] [] [x] [] [] [] [] [] [] []     [] [] [] [] [] [] [] [] [] [] []    I=Independent, Mod  I=Modified Independent, S=Supervision, SBA=Standby Assistance, CGA=Contact Guard Assistance,   Min=Minimal Assistance, Mod=Moderate Assistance, Max=Maximal Assistance, Total=Total Assistance, NT=Not Tested    GAIT: I Mod I S SBA CGA Min Mod Max Total  NT x2 Comments:   Level of Assistance [] [] [] [x] [] [] [] [] [] [] []    Distance 250  feet    DME None    Gait Quality Decreased john     Weightbearing Status      Stairs      I=Independent, Mod I=Modified Independent, S=Supervision, SBA=Standby Assistance, CGA=Contact Guard Assistance,   Min=Minimal Assistance, Mod=Moderate Assistance, Max=Maximal Assistance, Total=Total Assistance, NT=Not Tested    PLAN:   FREQUENCY AND DURATION:  (eval and d/c) for duration of hospital stay or until stated goals are met, whichever comes first.    THERAPY PROGNOSIS: Good    PROBLEM LIST:   (Skilled intervention is medically necessary to address:)  Decreased Activity Tolerance INTERVENTIONS PLANNED:   (Benefits and precautions of physical therapy have been discussed with the patient.)  Therapeutic Activity  Education       TREATMENT:   EVALUATION: LOW COMPLEXITY: (Untimed Charge)  The initial evaluation charge encompasses clinical chart review, objective assessment, interpretation of assessment, and skilled monitoring of the patient's response to treatment in order to develop a plan of care.     TREATMENT:   Co-Treatment PT/OT necessary due to patient's decreased overall endurance/tolerance levels, as well as need for high level skilled assistance to complete functional transfers/mobility and functional tasks  Therapeutic Activity (8 Minutes): Therapeutic activity included Supine to Sit, Sit to Supine, Scooting, Transfer Training, Ambulation on level ground, Sitting balance , and Standing balance to improve functional Activity tolerance, Balance, Coordination, and Mobility.    TREATMENT GRID:  N/A    AFTER TREATMENT PRECAUTIONS: Bed, Bed/Chair Locked, Call light within reach,

## 2024-04-26 NOTE — PROGRESS NOTES
TRANSFER - IN REPORT:    Verbal report received from MANAS Ennis on Juve Liz  being received from ED for routine progression of patient care      Report consisted of patient's Situation, Background, Assessment and   Recommendations(SBAR).     Information from the following report(s) Adult Overview was reviewed with the receiving nurse.    Opportunity for questions and clarification was provided.

## 2024-04-26 NOTE — H&P
compared with ECG of 24-OCT-2021 00:12,  Premature atrial complexes are no longer Present  WI interval has increased  QT has shortened     CBC with Auto Differential    Collection Time: 04/26/24 12:00 AM   Result Value Ref Range    WBC 6.7 4.3 - 11.1 K/uL    RBC 4.28 4.05 - 5.2 M/uL    Hemoglobin 12.8 11.7 - 15.4 g/dL    Hematocrit 39.2 35.8 - 46.3 %    MCV 91.6 82 - 102 FL    MCH 29.9 26.1 - 32.9 PG    MCHC 32.7 31.4 - 35.0 g/dL    RDW 14.3 11.9 - 14.6 %    Platelets 238 150 - 450 K/uL    MPV 10.3 9.4 - 12.3 FL    nRBC 0.00 0.0 - 0.2 K/uL    Differential Type AUTOMATED      Neutrophils % 48 43 - 78 %    Lymphocytes % 35 13 - 44 %    Monocytes % 13 (H) 4.0 - 12.0 %    Eosinophils % 3 0.5 - 7.8 %    Basophils % 1 0.0 - 2.0 %    Immature Granulocytes % 0 0.0 - 5.0 %    Neutrophils Absolute 3.3 1.7 - 8.2 K/UL    Lymphocytes Absolute 2.3 0.5 - 4.6 K/UL    Monocytes Absolute 0.8 0.1 - 1.3 K/UL    Eosinophils Absolute 0.2 0.0 - 0.8 K/UL    Basophils Absolute 0.0 0.0 - 0.2 K/UL    Immature Granulocytes Absolute 0.0 0.0 - 0.5 K/UL   Comprehensive Metabolic Panel    Collection Time: 04/26/24 12:00 AM   Result Value Ref Range    Sodium 135 (L) 136 - 145 mmol/L    Potassium 4.2 3.5 - 5.1 mmol/L    Chloride 98 98 - 107 mmol/L    CO2 27 20 - 28 mmol/L    Anion Gap 10 9 - 18 mmol/L    Glucose 129 (H) 70 - 99 mg/dL    BUN 20 8 - 23 MG/DL    Creatinine 0.90 0.60 - 1.10 MG/DL    Est, Glom Filt Rate 63 >60 ml/min/1.73m2    Calcium 9.7 8.8 - 10.2 MG/DL    Total Bilirubin 0.4 0.0 - 1.2 MG/DL    ALT 20 12 - 65 U/L    AST 37 15 - 37 U/L    Alk Phosphatase 73 35 - 104 U/L    Total Protein 7.5 6.3 - 8.2 g/dL    Albumin 4.2 3.2 - 4.6 g/dL    Globulin 3.3 2.3 - 3.5 g/dL    Albumin/Globulin Ratio 1.2 1.0 - 1.9     Magnesium    Collection Time: 04/26/24 12:00 AM   Result Value Ref Range    Magnesium 2.1 1.8 - 2.4 mg/dL   Troponin    Collection Time: 04/26/24 12:00 AM   Result Value Ref Range    Troponin T 13.0 0 - 14 ng/L       I have  personally reviewed imaging studies showing:  XR CHEST PORTABLE    Result Date: 4/26/2024  Clinical History/Indication for Exam: Syncope : Syncope RADIOGRAPH OF THE CHEST 1 VIEW INDICATION:  Syncope : Syncope COMPARISON:  Chest x-ray 11/17/2021. FINDINGS: Lungs:  Minor left basilar scarring.  No consolidation. Pleural space:  Unremarkable.  No pneumothorax. Heart:  Moderate cardiomegaly. Mediastinum:  Large hiatal hernia. Bones/joints:  Right shoulder arthroplasty.  No acute fracture.     1.  Cardiomegaly. 2.  Large hiatal hernia. 3.  Left basilar scarring. Report signed on 04/26/2024 (00:31 Eastern Time) Signed by: Edouard Fischer M.D. Reading Location: 226    CT Head W/O Contrast    Result Date: 4/26/2024  Clinical History/Indication for Exam: syncope, hit head : syncope, hit head CT HEAD WITHOUT INTRAVENOUS CONTRAST INDICATION:  syncope, hit head : syncope, hit head TECHNIQUE:  Axial computed tomography images of the head/brain without intravenous contrast.  Sagittal and coronal reformatted images were created and reviewed.  This CT exam was performed using one or more of the following dose reduction techniques:  automated exposure control, adjustment of the mA and/or kV according to patient size, and/or use of iterative reconstruction technique. COMPARISON:  No relevant prior studies available. FINDINGS: Brain:  Mild generalized volume loss.  Mild chronic ischemic change of the white matter.  No hemorrhage. Ventricles:  Unremarkable.  No ventriculomegaly. Bones/joints:  Unremarkable.  No acute fracture. Soft tissues:  Unremarkable. Vasculature:  There are atherosclerotic calcifications of the intracranial vasculature. Sinuses:  Unremarkable as visualized.  No acute sinusitis. Mastoid air cells:  Unremarkable as visualized.  No mastoid effusion.     No acute findings in the head/brain.  Mild generalized volume loss. Mild chronic ischemic change of the white matter. Automatic exposure control was used as a dose

## 2024-04-26 NOTE — CARE COORDINATION
CM met with Ms. Liz and her daughters Claudia Garza and Mehreen Garza in room 609.  Ms. Liz is observation status (MOON letter signed) for syncope and collapse.      Prior to admit, she was living alone, with daughter Clauida living behind her, and granddaughter living nearby and checking on her often.  Daughter Mehreen lives nearby, too.  Ms. Liz is fairly independent with ADLs, including driving and shopping for groceries.      At discharge, her plan is home, no additoinal needs voiced or identified.  Will discuss at IDT rounds (e.g., would she benefit from home health?).  CM to follow and assist with discharge planning.       04/26/24 0941   Service Assessment   Patient Orientation Alert and Oriented  ([place]; hearing aids are at home)   History Provided By Patient   Primary Caregiver Self   Accompanied By/Relationship daughters Claudia Garza and Mehreen Garza   Support Systems Children;Family Members   PCP Verified by CM Yes   Prior Functional Level Independent in ADLs/IADLs   Current Functional Level Other (see comment)  (TBD)   Can patient return to prior living arrangement Yes   Ability to make needs known: Good   Family able to assist with home care needs: Yes   Would you like for me to discuss the discharge plan with any other family members/significant others, and if so, who? Yes  (yes, with daughters in the room just now)   Condition of Participation: Discharge Planning   The Plan for Transition of Care is related to the following treatment goals: she would like to return home when stable

## 2024-04-26 NOTE — ED TRIAGE NOTES
Patient arrives via wheelchair to triage with family. Patient reports getting up to go to the kitchen and then woke up in the floor of her bedroom. Endorses LOC, endorses hitting head. Small skin tear noted on right upper arm. Denies taking blood thinners.

## 2024-04-26 NOTE — ED PROVIDER NOTES
as needed for Congestion or Cough    CALCIUM CARBONATE-VITAMIN D (CALTRATE) 600-400 MG-UNIT TABS PER TAB    Take 1 tablet by mouth daily    IBANDRONATE (BONIVA) 150 MG TABLET    Take 1 tablet by mouth every 30 days    METHYLPREDNISOLONE (MEDROL DOSEPACK) 4 MG TABLET    Taper as directed    METOPROLOL SUCCINATE (TOPROL XL) 25 MG EXTENDED RELEASE TABLET    Take 1 tablet by mouth daily    MULTIPLE VITAMIN (MULTIVITAMIN) TABLET    Take 1 tablet by mouth daily    OMEPRAZOLE (PRILOSEC) 40 MG DELAYED RELEASE CAPSULE    20 mg TAKE 1 CAPSULE BY MOUTH EVERY DAY    SIMVASTATIN (ZOCOR) 40 MG TABLET    Take 1 tablet by mouth nightly        Results for orders placed or performed during the hospital encounter of 04/25/24   XR CHEST PORTABLE    Narrative    Clinical History/Indication for Exam:  Syncope : Syncope    RADIOGRAPH OF THE CHEST 1 VIEW    INDICATION:  Syncope : Syncope    COMPARISON:  Chest x-ray 11/17/2021.    FINDINGS:    Lungs:  Minor left basilar scarring.  No consolidation.    Pleural space:  Unremarkable.  No pneumothorax.    Heart:  Moderate cardiomegaly.    Mediastinum:  Large hiatal hernia.    Bones/joints:  Right shoulder arthroplasty.  No acute fracture.      Impression    1.  Cardiomegaly.  2.  Large hiatal hernia.  3.  Left basilar scarring.        Report signed on 04/26/2024 (00:31 Eastern Time)  Signed by: Edouard Fischer M.D.  Reading Location: 226   CT Head W/O Contrast    Narrative    Clinical History/Indication for Exam:  syncope, hit head : syncope, hit head    CT HEAD WITHOUT INTRAVENOUS CONTRAST    INDICATION:  syncope, hit head : syncope, hit head    TECHNIQUE:  Axial computed tomography images of the head/brain without  intravenous contrast.  Sagittal and coronal reformatted images were  created and reviewed.  This CT exam was performed using one or more of  the following dose reduction techniques:  automated exposure control,  adjustment of the mA and/or kV according to patient size, and/or  use  of iterative reconstruction technique.    COMPARISON:  No relevant prior studies available.    FINDINGS:    Brain:  Mild generalized volume loss.  Mild chronic ischemic change of  the white matter.  No hemorrhage.    Ventricles:  Unremarkable.  No ventriculomegaly.    Bones/joints:  Unremarkable.  No acute fracture.    Soft tissues:  Unremarkable.    Vasculature:  There are atherosclerotic calcifications of the  intracranial vasculature.    Sinuses:  Unremarkable as visualized.  No acute sinusitis.    Mastoid air cells:  Unremarkable as visualized.  No mastoid effusion.      Impression    No acute findings in the head/brain.  Mild generalized volume loss.  Mild chronic ischemic change of the white matter.        Automatic exposure control was used as a dose lowering technique.    Radiation Dose: CTDI is 49.12 mGy. DLP is 1011.28 mGy-cm.    Report signed on 04/26/2024 (00:23 Eastern Time)  Signed by: Refugio Smith M.D.  Reading Location: 368   CBC with Auto Differential   Result Value Ref Range    WBC 6.7 4.3 - 11.1 K/uL    RBC 4.28 4.05 - 5.2 M/uL    Hemoglobin 12.8 11.7 - 15.4 g/dL    Hematocrit 39.2 35.8 - 46.3 %    MCV 91.6 82 - 102 FL    MCH 29.9 26.1 - 32.9 PG    MCHC 32.7 31.4 - 35.0 g/dL    RDW 14.3 11.9 - 14.6 %    Platelets 238 150 - 450 K/uL    MPV 10.3 9.4 - 12.3 FL    nRBC 0.00 0.0 - 0.2 K/uL    Differential Type AUTOMATED      Neutrophils % 48 43 - 78 %    Lymphocytes % 35 13 - 44 %    Monocytes % 13 (H) 4.0 - 12.0 %    Eosinophils % 3 0.5 - 7.8 %    Basophils % 1 0.0 - 2.0 %    Immature Granulocytes % 0 0.0 - 5.0 %    Neutrophils Absolute 3.3 1.7 - 8.2 K/UL    Lymphocytes Absolute 2.3 0.5 - 4.6 K/UL    Monocytes Absolute 0.8 0.1 - 1.3 K/UL    Eosinophils Absolute 0.2 0.0 - 0.8 K/UL    Basophils Absolute 0.0 0.0 - 0.2 K/UL    Immature Granulocytes Absolute 0.0 0.0 - 0.5 K/UL   Comprehensive Metabolic Panel   Result Value Ref Range    Sodium 135 (L) 136 - 145 mmol/L    Potassium 4.2 3.5 - 5.1 mmol/L

## 2024-04-26 NOTE — ED NOTES
Orthostatic blood pressures  0105 sitting /87 and pulse 78  0107 standing /89 and pulse 81     Ida Larson LPN  04/26/24 0109

## 2024-05-03 ENCOUNTER — OFFICE VISIT (OUTPATIENT)
Dept: PRIMARY CARE CLINIC | Facility: CLINIC | Age: 84
End: 2024-05-03

## 2024-05-03 VITALS
OXYGEN SATURATION: 96 % | HEIGHT: 62 IN | WEIGHT: 133.6 LBS | TEMPERATURE: 98.9 F | HEART RATE: 58 BPM | DIASTOLIC BLOOD PRESSURE: 69 MMHG | SYSTOLIC BLOOD PRESSURE: 137 MMHG | BODY MASS INDEX: 24.59 KG/M2

## 2024-05-03 DIAGNOSIS — M19.011 PRIMARY OSTEOARTHRITIS OF RIGHT SHOULDER: ICD-10-CM

## 2024-05-03 DIAGNOSIS — I10 ESSENTIAL HYPERTENSION: ICD-10-CM

## 2024-05-03 DIAGNOSIS — Z79.899 ENCOUNTER FOR LONG-TERM (CURRENT) USE OF MEDICATIONS: ICD-10-CM

## 2024-05-03 DIAGNOSIS — R30.0 DYSURIA: ICD-10-CM

## 2024-05-03 DIAGNOSIS — Z91.81 HISTORY OF RECENT FALL: ICD-10-CM

## 2024-05-03 DIAGNOSIS — R09.89 LUNG CRACKLES: ICD-10-CM

## 2024-05-03 DIAGNOSIS — Z09 HOSPITAL DISCHARGE FOLLOW-UP: Primary | ICD-10-CM

## 2024-05-03 DIAGNOSIS — Z91.81 AT HIGH RISK FOR FALLS: ICD-10-CM

## 2024-05-03 DIAGNOSIS — N30.01 ACUTE CYSTITIS WITH HEMATURIA: ICD-10-CM

## 2024-05-03 PROBLEM — H25.10 NUCLEAR SENILE CATARACT: Status: ACTIVE | Noted: 2019-11-11

## 2024-05-03 PROBLEM — H43.819 POSTERIOR VITREOUS DETACHMENT: Status: ACTIVE | Noted: 2017-10-26

## 2024-05-03 PROBLEM — H25.13 AGE-RELATED NUCLEAR CATARACT OF BOTH EYES: Status: ACTIVE | Noted: 2017-10-26

## 2024-05-03 LAB
BILIRUBIN, URINE, POC: NEGATIVE
BLOOD URINE, POC: ABNORMAL
GLUCOSE URINE, POC: NEGATIVE
KETONES, URINE, POC: NEGATIVE
LEUKOCYTE ESTERASE, URINE, POC: ABNORMAL
NITRITE, URINE, POC: NEGATIVE
PH, URINE, POC: 7 (ref 4.6–8)
PROTEIN,URINE, POC: 100
SPECIFIC GRAVITY, URINE, POC: 1.02 (ref 1–1.03)
URINALYSIS CLARITY, POC: ABNORMAL
URINALYSIS COLOR, POC: YELLOW
UROBILINOGEN, POC: ABNORMAL

## 2024-05-03 RX ORDER — CIPROFLOXACIN 500 MG/1
500 TABLET, FILM COATED ORAL 2 TIMES DAILY
Qty: 10 TABLET | Refills: 0 | Status: SHIPPED | OUTPATIENT
Start: 2024-05-03 | End: 2024-05-05 | Stop reason: ALTCHOICE

## 2024-05-03 SDOH — ECONOMIC STABILITY: FOOD INSECURITY: WITHIN THE PAST 12 MONTHS, YOU WORRIED THAT YOUR FOOD WOULD RUN OUT BEFORE YOU GOT MONEY TO BUY MORE.: NEVER TRUE

## 2024-05-03 SDOH — ECONOMIC STABILITY: FOOD INSECURITY: WITHIN THE PAST 12 MONTHS, THE FOOD YOU BOUGHT JUST DIDN'T LAST AND YOU DIDN'T HAVE MONEY TO GET MORE.: NEVER TRUE

## 2024-05-03 SDOH — ECONOMIC STABILITY: INCOME INSECURITY: HOW HARD IS IT FOR YOU TO PAY FOR THE VERY BASICS LIKE FOOD, HOUSING, MEDICAL CARE, AND HEATING?: NOT VERY HARD

## 2024-05-03 ASSESSMENT — PATIENT HEALTH QUESTIONNAIRE - PHQ9
SUM OF ALL RESPONSES TO PHQ QUESTIONS 1-9: 0
SUM OF ALL RESPONSES TO PHQ9 QUESTIONS 1 & 2: 0
SUM OF ALL RESPONSES TO PHQ QUESTIONS 1-9: 0
2. FEELING DOWN, DEPRESSED OR HOPELESS: NOT AT ALL
1. LITTLE INTEREST OR PLEASURE IN DOING THINGS: NOT AT ALL

## 2024-05-03 NOTE — PROGRESS NOTES
Cefazolin. Resistance to Clindamycin and Erythromycin can occur. Please contact laboratory within 7 days of collection if susceptibility testing is needed.     AMB POC URINALYSIS DIP STICK AUTO W/O MICRO   Result Value Ref Range    Color, Urine, POC Yellow     Clarity, Urine, POC Cloudy     Glucose, Urine, POC Negative     Bilirubin, Urine, POC Negative     Ketones, Urine, POC Negative     Specific Gravity, Urine, POC 1.020 1.001 - 1.035    Blood, Urine, POC Moderate     pH, Urine, POC 7.0 4.6 - 8.0    Protein, Urine,      Urobilinogen, POC 0.2 mg/dL     Nitrite, Urine, POC Negative     Leukocyte Esterase, Urine, POC Large      Admission on 04/25/2024, Discharged on 04/26/2024   Component Date Value Ref Range Status    WBC 04/26/2024 6.7  4.3 - 11.1 K/uL Final    RBC 04/26/2024 4.28  4.05 - 5.2 M/uL Final    Hemoglobin 04/26/2024 12.8  11.7 - 15.4 g/dL Final    Hematocrit 04/26/2024 39.2  35.8 - 46.3 % Final    MCV 04/26/2024 91.6  82 - 102 FL Final    MCH 04/26/2024 29.9  26.1 - 32.9 PG Final    MCHC 04/26/2024 32.7  31.4 - 35.0 g/dL Final    RDW 04/26/2024 14.3  11.9 - 14.6 % Final    Platelets 04/26/2024 238  150 - 450 K/uL Final    MPV 04/26/2024 10.3  9.4 - 12.3 FL Final    nRBC 04/26/2024 0.00  0.0 - 0.2 K/uL Final    **Note: Absolute NRBC parameter is now reported with Hemogram**    Differential Type 04/26/2024 AUTOMATED    Final    Neutrophils % 04/26/2024 48  43 - 78 % Final    Lymphocytes % 04/26/2024 35  13 - 44 % Final    Monocytes % 04/26/2024 13 (H)  4.0 - 12.0 % Final    Eosinophils % 04/26/2024 3  0.5 - 7.8 % Final    Basophils % 04/26/2024 1  0.0 - 2.0 % Final    Immature Granulocytes % 04/26/2024 0  0.0 - 5.0 % Final    Neutrophils Absolute 04/26/2024 3.3  1.7 - 8.2 K/UL Final    Lymphocytes Absolute 04/26/2024 2.3  0.5 - 4.6 K/UL Final    Monocytes Absolute 04/26/2024 0.8  0.1 - 1.3 K/UL Final    Eosinophils Absolute 04/26/2024 0.2  0.0 - 0.8 K/UL Final    Basophils Absolute 04/26/2024 0.0

## 2024-05-05 LAB
BACTERIA SPEC CULT: ABNORMAL
SERVICE CMNT-IMP: ABNORMAL

## 2024-05-05 RX ORDER — PENICILLIN V POTASSIUM 500 MG/1
500 TABLET ORAL 3 TIMES DAILY
Qty: 21 TABLET | Refills: 0 | Status: SHIPPED | OUTPATIENT
Start: 2024-05-05 | End: 2024-05-12

## 2024-05-06 LAB
BACTERIA SPEC CULT: ABNORMAL
SERVICE CMNT-IMP: ABNORMAL

## 2024-08-07 ENCOUNTER — LAB (OUTPATIENT)
Dept: PRIMARY CARE CLINIC | Facility: CLINIC | Age: 84
End: 2024-08-07

## 2024-08-07 DIAGNOSIS — E78.2 MIXED HYPERLIPIDEMIA: Primary | ICD-10-CM

## 2024-08-07 DIAGNOSIS — E55.9 VITAMIN D DEFICIENCY: ICD-10-CM

## 2024-08-07 DIAGNOSIS — Z79.899 ENCOUNTER FOR LONG-TERM (CURRENT) USE OF MEDICATIONS: ICD-10-CM

## 2024-08-07 DIAGNOSIS — R73.03 PREDIABETES: ICD-10-CM

## 2024-08-07 LAB
25(OH)D3 SERPL-MCNC: 59.4 NG/ML (ref 30–100)
ALBUMIN SERPL-MCNC: 4.2 G/DL (ref 3.2–4.6)
ALBUMIN/GLOB SERPL: 1.4 (ref 1–1.9)
ALP SERPL-CCNC: 61 U/L (ref 35–104)
ALT SERPL-CCNC: 16 U/L (ref 12–65)
ANION GAP SERPL CALC-SCNC: 13 MMOL/L (ref 9–18)
AST SERPL-CCNC: 32 U/L (ref 15–37)
BASOPHILS # BLD: 0.1 K/UL (ref 0–0.2)
BASOPHILS NFR BLD: 1 % (ref 0–2)
BILIRUB SERPL-MCNC: 0.7 MG/DL (ref 0–1.2)
BUN SERPL-MCNC: 13 MG/DL (ref 8–23)
CALCIUM SERPL-MCNC: 9.3 MG/DL (ref 8.8–10.2)
CHLORIDE SERPL-SCNC: 98 MMOL/L (ref 98–107)
CHOLEST SERPL-MCNC: 158 MG/DL (ref 0–200)
CO2 SERPL-SCNC: 24 MMOL/L (ref 20–28)
CREAT SERPL-MCNC: 0.94 MG/DL (ref 0.6–1.1)
DIFFERENTIAL METHOD BLD: NORMAL
EOSINOPHIL # BLD: 0.2 K/UL (ref 0–0.8)
EOSINOPHIL NFR BLD: 3 % (ref 0.5–7.8)
ERYTHROCYTE [DISTWIDTH] IN BLOOD BY AUTOMATED COUNT: 13.5 % (ref 11.9–14.6)
EST. AVERAGE GLUCOSE BLD GHB EST-MCNC: 140 MG/DL
GLOBULIN SER CALC-MCNC: 3 G/DL (ref 2.3–3.5)
GLUCOSE SERPL-MCNC: 118 MG/DL (ref 70–99)
HBA1C MFR BLD: 6.5 % (ref 0–5.6)
HCT VFR BLD AUTO: 42.9 % (ref 35.8–46.3)
HDLC SERPL-MCNC: 61 MG/DL (ref 40–60)
HDLC SERPL: 2.6 (ref 0–5)
HGB BLD-MCNC: 13.5 G/DL (ref 11.7–15.4)
IMM GRANULOCYTES # BLD AUTO: 0 K/UL (ref 0–0.5)
IMM GRANULOCYTES NFR BLD AUTO: 0 % (ref 0–5)
LDLC SERPL CALC-MCNC: 73 MG/DL (ref 0–100)
LYMPHOCYTES # BLD: 2.2 K/UL (ref 0.5–4.6)
LYMPHOCYTES NFR BLD: 40 % (ref 13–44)
MCH RBC QN AUTO: 30.4 PG (ref 26.1–32.9)
MCHC RBC AUTO-ENTMCNC: 31.5 G/DL (ref 31.4–35)
MCV RBC AUTO: 96.6 FL (ref 82–102)
MONOCYTES NFR BLD: 10 % (ref 4–12)
NEUTS SEG # BLD: 2.5 K/UL (ref 1.7–8.2)
NEUTS SEG NFR BLD: 46 % (ref 43–78)
NRBC # BLD: 0 K/UL (ref 0–0.2)
PLATELET # BLD AUTO: 227 K/UL (ref 150–450)
PMV BLD AUTO: 10.8 FL (ref 9.4–12.3)
POTASSIUM SERPL-SCNC: 4.5 MMOL/L (ref 3.5–5.1)
PROT SERPL-MCNC: 7.1 G/DL (ref 6.3–8.2)
RBC # BLD AUTO: 4.44 M/UL (ref 4.05–5.2)
SODIUM SERPL-SCNC: 136 MMOL/L (ref 136–145)
TRIGL SERPL-MCNC: 123 MG/DL (ref 0–150)
VLDLC SERPL CALC-MCNC: 25 MG/DL (ref 6–23)
WBC # BLD AUTO: 5.4 K/UL (ref 4.3–11.1)

## 2024-08-12 ENCOUNTER — OFFICE VISIT (OUTPATIENT)
Dept: PRIMARY CARE CLINIC | Facility: CLINIC | Age: 84
End: 2024-08-12

## 2024-08-12 VITALS
WEIGHT: 136.4 LBS | HEART RATE: 67 BPM | BODY MASS INDEX: 25.1 KG/M2 | TEMPERATURE: 97.2 F | HEIGHT: 62 IN | SYSTOLIC BLOOD PRESSURE: 119 MMHG | OXYGEN SATURATION: 97 % | DIASTOLIC BLOOD PRESSURE: 72 MMHG

## 2024-08-12 DIAGNOSIS — I10 ESSENTIAL HYPERTENSION: Primary | ICD-10-CM

## 2024-08-12 DIAGNOSIS — M76.62 LEFT ACHILLES BURSITIS: ICD-10-CM

## 2024-08-12 DIAGNOSIS — Z79.899 ENCOUNTER FOR LONG-TERM (CURRENT) USE OF MEDICATIONS: ICD-10-CM

## 2024-08-12 NOTE — PROGRESS NOTES
EXAM:  Visit Vitals  /72 (Site: Left Upper Arm, Position: Sitting)   Pulse 67   Temp 97.2 °F (36.2 °C) (Temporal)   Ht 1.575 m (5' 2\")   Wt 61.9 kg (136 lb 6.4 oz)   SpO2 97%   BMI 24.95 kg/m²        Physical Exam  Vitals and nursing note reviewed.   Constitutional:       Appearance: Normal appearance.   HENT:      Head: Normocephalic and atraumatic.      Nose: Nose normal.      Mouth/Throat:      Mouth: Mucous membranes are moist.   Eyes:      Extraocular Movements: Extraocular movements intact.      Pupils: Pupils are equal, round, and reactive to light.   Cardiovascular:      Rate and Rhythm: Normal rate and regular rhythm.      Pulses: Normal pulses.   Pulmonary:      Effort: Pulmonary effort is normal.      Breath sounds: Normal breath sounds.   Abdominal:      General: Abdomen is flat.      Palpations: Abdomen is soft.   Musculoskeletal:         General: Normal range of motion.      Cervical back: Normal, normal range of motion and neck supple.      Right foot: Normal.      Left foot: Tenderness present.      Comments: Mild kyphosis.  Tenderness at the Achilles tendon and the calcaneous.   Skin:     General: Skin is warm and dry.   Neurological:      Mental Status: She is alert.   Psychiatric:         Mood and Affect: Mood normal.         Behavior: Behavior normal.         PHQ:      5/3/2024     8:43 AM   PHQ-9    Little interest or pleasure in doing things 0   Feeling down, depressed, or hopeless 0   PHQ-2 Score 0   PHQ-9 Total Score 0       LABS  No results found for this visit on 08/12/24.  Lab on 08/07/2024   Component Date Value Ref Range Status    WBC 08/07/2024 5.4  4.3 - 11.1 K/uL Final    RBC 08/07/2024 4.44  4.05 - 5.2 M/uL Final    Hemoglobin 08/07/2024 13.5  11.7 - 15.4 g/dL Final    Hematocrit 08/07/2024 42.9  35.8 - 46.3 % Final    MCV 08/07/2024 96.6  82 - 102 FL Final    MCH 08/07/2024 30.4  26.1 - 32.9 PG Final    MCHC 08/07/2024 31.5  31.4 - 35.0 g/dL Final    RDW 08/07/2024 13.5  11.9

## 2024-08-23 ENCOUNTER — OFFICE VISIT (OUTPATIENT)
Dept: ORTHOPEDIC SURGERY | Age: 84
End: 2024-08-23

## 2024-08-23 DIAGNOSIS — M97.31XS PERIPROSTHETIC FRACTURE AROUND INTERNAL PROSTHETIC RIGHT SHOULDER JOINT, SEQUELA: ICD-10-CM

## 2024-08-23 DIAGNOSIS — M76.62 ACHILLES TENDINITIS OF LEFT LOWER EXTREMITY: Primary | ICD-10-CM

## 2024-08-23 NOTE — PROGRESS NOTES
Name: Juve Liz  YOB: 1940  Gender: female  MRN: [id number]    Summary:  insertion achilles tendonitis and plantar fasciitis  Pain is improved at this time.    Night splint  PF HEP    F/u PRN       CC: left Heel pain     HPI: Juve Liz is a 84 y.o. female who presents with increasing posterior ankle pain located at the Achilles tendon region.  They do not remember any acute event that started.  Patient states that this pain began approximately 1 week ago.  She states that she went to her PCP and they referred her to our office.  She states that her pain at this time is not as bad but she still notices it.  The pain as a burning tearing pain, tender to palpation and pressure with shoe wear, and it limits daily activities.  I also described some swelling in this region also.  She does have a history of ankle ORIF that was done in 2003.    ROS/Meds/PSH/PMH/FH/SH: below is tobacco and diabetes.  A full history is at the bottom of the note.   Patient Denies fever/chills, headache, visual changes, chest pain, shortness of breath, and nausea/vomiting/diarrhea     Physical Examination:  Gen: AAOx3 NAD    left Lower Extremity: FROM actively of toes, foot, ankle, knee and hip. No instability of foot or ankle with drawer and stress.  5/5 strength to TA/EHL/GSC/Peroneals/PTib. No skin lesions, Non TTP throughout.  2+ dp pulse w/ cap refill of 5 toes <2s.  Dorsiflexion of the ankle produces pain at the Achilles insertion.  Hind foot alignment neutral. There is some edema, mild erythema, and TTP at the Achilles at the same point of tenderness.    + silverskoid exam  Achilles has no defects.    Neuro:  normal SILT to s/s/sp/dp/t.  Reflexes normal: 1+ patella reflex bilaterally, 1+ achilles reflex bilaterally, negative babinski bilaterally. no signs of hyper reflexia or absent reflex    Vascular: BLE: 2+ DP pulse, toes wwp w/ BCR<2s    Imaging:   Interpretation of imaging,   X-Ray LEFT Foot 2 vw (AP/Oblique)

## 2024-08-23 NOTE — PROGRESS NOTES
Patient was prescribed a Night splint for the patient's left foot. The patient wears a size 7 shoe and I fitted the patient with a S size night splint. Additionally, I instructed the patient on proper use and care of device as well as ensuring patient could safely get device on and off. I explained to the patient that wearing the splint, the foot is held in a certain position, called “dorsiflexion“. This means that the fascia is stretched, not allowing it to contract and become tighter overnight. The great thing about a night splint is that the remedy is quite gentle and the fascia will be returned to its proper length over a period of time. Once stretched out, the plantar fascia will become less tense and therefore will cause less pain.Patient read and signed documenting they understand and agree to [facility]'s current DME return policy.     The above DME items were also checked for same/similar on the Medicare portal. An ABN was issued if necessary.

## 2024-08-23 NOTE — PROGRESS NOTES
Name: Juve Liz  YOB: 1940  Gender: female  MRN: [id number]    Summary:  insertion achilles tendonitis and plantar fasciitis  Pain is improved at this time.    Night splint  PF HEP    F/u PRN       CC: left Heel pain     HPI: Juve Liz is a 84 y.o. female who presents with increasing posterior ankle pain located at the Achilles tendon region.  They do not remember any acute event that started.  Patient states that this pain began approximately 1 week ago.  She states that she went to her PCP and they referred her to our office.  She states that her pain at this time is not as bad but she still notices it.  The pain as a burning tearing pain, tender to palpation and pressure with shoe wear, and it limits daily activities.  I also described some swelling in this region also.  She does have a history of ankle ORIF that was done in 2003.    ROS/Meds/PSH/PMH/FH/SH: below is tobacco and diabetes.  A full history is at the bottom of the note.   Patient Denies fever/chills, headache, visual changes, chest pain, shortness of breath, and nausea/vomiting/diarrhea     Physical Examination:  Gen: AAOx3 NAD    left Lower Extremity: FROM actively of toes, foot, ankle, knee and hip. No instability of foot or ankle with drawer and stress.  5/5 strength to TA/EHL/GSC/Peroneals/PTib. No skin lesions, Non TTP throughout.  2+ dp pulse w/ cap refill of 5 toes <2s.  Dorsiflexion of the ankle produces pain at the Achilles insertion.  Hind foot alignment neutral. There is some edema, mild erythema, and TTP at the Achilles at the same point of tenderness.    + silverskoid exam  Achilles has no defects.    Neuro:  normal SILT to s/s/sp/dp/t.  Reflexes normal: 1+ patella reflex bilaterally, 1+ achilles reflex bilaterally, negative babinski bilaterally. no signs of hyper reflexia or absent reflex    Vascular: BLE: 2+ DP pulse, toes wwp w/ BCR<2s    Imaging:   Interpretation of imaging,   X-Ray LEFT Foot 2 vw (AP/Oblique)

## 2024-11-12 DIAGNOSIS — E78.2 MIXED HYPERLIPIDEMIA: ICD-10-CM

## 2024-11-13 RX ORDER — SIMVASTATIN 40 MG
40 TABLET ORAL NIGHTLY
Qty: 90 TABLET | Refills: 3 | OUTPATIENT
Start: 2024-11-13

## 2024-12-12 ENCOUNTER — APPOINTMENT (OUTPATIENT)
Dept: GENERAL RADIOLOGY | Age: 84
DRG: 872 | End: 2024-12-12
Payer: MEDICARE

## 2024-12-12 ENCOUNTER — HOSPITAL ENCOUNTER (INPATIENT)
Age: 84
LOS: 1 days | Discharge: HOME | DRG: 872 | End: 2024-12-15
Attending: STUDENT IN AN ORGANIZED HEALTH CARE EDUCATION/TRAINING PROGRAM
Payer: MEDICARE

## 2024-12-12 DIAGNOSIS — N30.01 ACUTE CYSTITIS WITH HEMATURIA: Primary | ICD-10-CM

## 2024-12-12 LAB
ALBUMIN SERPL-MCNC: 3.1 G/DL (ref 3.2–4.6)
ALBUMIN/GLOB SERPL: 0.8 (ref 1–1.9)
ALP SERPL-CCNC: 90 U/L (ref 35–104)
ALT SERPL-CCNC: 11 U/L (ref 8–45)
ANION GAP SERPL CALC-SCNC: 13 MMOL/L (ref 7–16)
AST SERPL-CCNC: 24 U/L (ref 15–37)
BASOPHILS # BLD: 0 K/UL (ref 0–0.2)
BASOPHILS NFR BLD: 0 % (ref 0–2)
BILIRUB SERPL-MCNC: 1.1 MG/DL (ref 0–1.2)
BUN SERPL-MCNC: 22 MG/DL (ref 8–23)
CALCIUM SERPL-MCNC: 8.5 MG/DL (ref 8.8–10.2)
CHLORIDE SERPL-SCNC: 98 MMOL/L (ref 98–107)
CO2 SERPL-SCNC: 21 MMOL/L (ref 20–29)
CREAT SERPL-MCNC: 1.46 MG/DL (ref 0.6–1.1)
DIFFERENTIAL METHOD BLD: ABNORMAL
EOSINOPHIL # BLD: 0.1 K/UL (ref 0–0.8)
EOSINOPHIL NFR BLD: 1 % (ref 0.5–7.8)
ERYTHROCYTE [DISTWIDTH] IN BLOOD BY AUTOMATED COUNT: 13.3 % (ref 11.9–14.6)
GLOBULIN SER CALC-MCNC: 3.8 G/DL (ref 2.3–3.5)
GLUCOSE SERPL-MCNC: 152 MG/DL (ref 70–99)
HCT VFR BLD AUTO: 35.9 % (ref 35.8–46.3)
HGB BLD-MCNC: 12.3 G/DL (ref 11.7–15.4)
IMM GRANULOCYTES # BLD AUTO: 0 K/UL (ref 0–0.5)
IMM GRANULOCYTES NFR BLD AUTO: 0 % (ref 0–5)
LACTATE SERPL-SCNC: 1.1 MMOL/L (ref 0.5–2)
LYMPHOCYTES # BLD: 0.8 K/UL (ref 0.5–4.6)
LYMPHOCYTES NFR BLD: 6 % (ref 13–44)
MCH RBC QN AUTO: 30.7 PG (ref 26.1–32.9)
MCHC RBC AUTO-ENTMCNC: 34.3 G/DL (ref 31.4–35)
MCV RBC AUTO: 89.5 FL (ref 82–102)
MONOCYTES # BLD: 1.3 K/UL (ref 0.1–1.3)
MONOCYTES NFR BLD: 11 % (ref 4–12)
NEUTS SEG # BLD: 10.1 K/UL (ref 1.7–8.2)
NEUTS SEG NFR BLD: 82 % (ref 43–78)
NRBC # BLD: 0 K/UL (ref 0–0.2)
PLATELET # BLD AUTO: 171 K/UL (ref 150–450)
PMV BLD AUTO: 10.7 FL (ref 9.4–12.3)
POTASSIUM SERPL-SCNC: 4.1 MMOL/L (ref 3.5–5.1)
PROT SERPL-MCNC: 6.9 G/DL (ref 6.3–8.2)
RBC # BLD AUTO: 4.01 M/UL (ref 4.05–5.2)
SODIUM SERPL-SCNC: 132 MMOL/L (ref 136–145)
WBC # BLD AUTO: 12.3 K/UL (ref 4.3–11.1)

## 2024-12-12 PROCEDURE — 85025 COMPLETE CBC W/AUTO DIFF WBC: CPT

## 2024-12-12 PROCEDURE — 6360000002 HC RX W HCPCS: Performed by: STUDENT IN AN ORGANIZED HEALTH CARE EDUCATION/TRAINING PROGRAM

## 2024-12-12 PROCEDURE — 71045 X-RAY EXAM CHEST 1 VIEW: CPT

## 2024-12-12 PROCEDURE — 87502 INFLUENZA DNA AMP PROBE: CPT

## 2024-12-12 PROCEDURE — 51701 INSERT BLADDER CATHETER: CPT

## 2024-12-12 PROCEDURE — 96375 TX/PRO/DX INJ NEW DRUG ADDON: CPT

## 2024-12-12 PROCEDURE — 93005 ELECTROCARDIOGRAM TRACING: CPT | Performed by: STUDENT IN AN ORGANIZED HEALTH CARE EDUCATION/TRAINING PROGRAM

## 2024-12-12 PROCEDURE — 87205 SMEAR GRAM STAIN: CPT

## 2024-12-12 PROCEDURE — 87077 CULTURE AEROBIC IDENTIFY: CPT

## 2024-12-12 PROCEDURE — 96361 HYDRATE IV INFUSION ADD-ON: CPT

## 2024-12-12 PROCEDURE — 81001 URINALYSIS AUTO W/SCOPE: CPT

## 2024-12-12 PROCEDURE — 87186 SC STD MICRODIL/AGAR DIL: CPT

## 2024-12-12 PROCEDURE — 96374 THER/PROPH/DIAG INJ IV PUSH: CPT

## 2024-12-12 PROCEDURE — 81003 URINALYSIS AUTO W/O SCOPE: CPT

## 2024-12-12 PROCEDURE — 87040 BLOOD CULTURE FOR BACTERIA: CPT

## 2024-12-12 PROCEDURE — 80053 COMPREHEN METABOLIC PANEL: CPT

## 2024-12-12 PROCEDURE — 83605 ASSAY OF LACTIC ACID: CPT

## 2024-12-12 PROCEDURE — 2580000003 HC RX 258: Performed by: STUDENT IN AN ORGANIZED HEALTH CARE EDUCATION/TRAINING PROGRAM

## 2024-12-12 PROCEDURE — 99285 EMERGENCY DEPT VISIT HI MDM: CPT

## 2024-12-12 PROCEDURE — 87154 CUL TYP ID BLD PTHGN 6+ TRGT: CPT

## 2024-12-12 RX ORDER — KETOROLAC TROMETHAMINE 15 MG/ML
15 INJECTION, SOLUTION INTRAMUSCULAR; INTRAVENOUS
Status: COMPLETED | OUTPATIENT
Start: 2024-12-12 | End: 2024-12-12

## 2024-12-12 RX ORDER — SODIUM CHLORIDE, SODIUM LACTATE, POTASSIUM CHLORIDE, AND CALCIUM CHLORIDE .6; .31; .03; .02 G/100ML; G/100ML; G/100ML; G/100ML
1000 INJECTION, SOLUTION INTRAVENOUS ONCE
Status: COMPLETED | OUTPATIENT
Start: 2024-12-12 | End: 2024-12-12

## 2024-12-12 RX ADMIN — KETOROLAC TROMETHAMINE 15 MG: 15 INJECTION, SOLUTION INTRAMUSCULAR; INTRAVENOUS at 20:45

## 2024-12-12 RX ADMIN — SODIUM CHLORIDE, POTASSIUM CHLORIDE, SODIUM LACTATE AND CALCIUM CHLORIDE 1000 ML: 600; 310; 30; 20 INJECTION, SOLUTION INTRAVENOUS at 23:15

## 2024-12-12 ASSESSMENT — PAIN - FUNCTIONAL ASSESSMENT: PAIN_FUNCTIONAL_ASSESSMENT: 0-10

## 2024-12-12 ASSESSMENT — PAIN SCALES - GENERAL
PAINLEVEL_OUTOF10: 0
PAINLEVEL_OUTOF10: 1

## 2024-12-12 ASSESSMENT — PAIN DESCRIPTION - LOCATION: LOCATION: GENERALIZED

## 2024-12-12 ASSESSMENT — PAIN DESCRIPTION - DESCRIPTORS: DESCRIPTORS: OTHER (COMMENT)

## 2024-12-12 NOTE — ED TRIAGE NOTES
Patient to triage via WC with CO generalized fatigue with NV x 4 days. Denies diarrhea, abdominal pain, cough congestion, urinary s/s

## 2024-12-13 PROBLEM — N39.0 UTI (URINARY TRACT INFECTION): Status: ACTIVE | Noted: 2024-12-13

## 2024-12-13 LAB
ACB COMPLEX DNA BLD POS QL NAA+NON-PROBE: NOT DETECTED
ACCESSION NUMBER, LLC1M: ABNORMAL
ANION GAP SERPL CALC-SCNC: 13 MMOL/L (ref 7–16)
APPEARANCE UR: ABNORMAL
B FRAGILIS DNA BLD POS QL NAA+NON-PROBE: NOT DETECTED
BACTERIA URNS QL MICRO: ABNORMAL /HPF
BILIRUB UR QL: NEGATIVE
BIOFIRE TEST COMMENT: ABNORMAL
BLACTX-M ISLT/SPM QL: NOT DETECTED
BLAIMP ISLT/SPM QL: NOT DETECTED
BLAKPC ISLT/SPM QL: NOT DETECTED
BLAOXA-48-LIKE ISLT/SPM QL: NOT DETECTED
BLAVIM ISLT/SPM QL: NOT DETECTED
BUN SERPL-MCNC: 23 MG/DL (ref 8–23)
C ALBICANS DNA BLD POS QL NAA+NON-PROBE: NOT DETECTED
C AURIS DNA BLD POS QL NAA+NON-PROBE: NOT DETECTED
C GATTII+NEOFOR DNA BLD POS QL NAA+N-PRB: NOT DETECTED
C GLABRATA DNA BLD POS QL NAA+NON-PROBE: NOT DETECTED
C KRUSEI DNA BLD POS QL NAA+NON-PROBE: NOT DETECTED
C PARAP DNA BLD POS QL NAA+NON-PROBE: NOT DETECTED
C TROPICLS DNA BLD POS QL NAA+NON-PROBE: NOT DETECTED
CALCIUM SERPL-MCNC: 8.6 MG/DL (ref 8.8–10.2)
CASTS URNS QL MICRO: ABNORMAL /LPF
CHLORIDE SERPL-SCNC: 97 MMOL/L (ref 98–107)
CO2 SERPL-SCNC: 21 MMOL/L (ref 20–29)
COLISTIN RES MCR-1 ISLT/SPM QL: NOT DETECTED
COLOR UR: ABNORMAL
CREAT SERPL-MCNC: 1.11 MG/DL (ref 0.6–1.1)
E CLOAC COMP DNA BLD POS NAA+NON-PROBE: NOT DETECTED
E COLI DNA BLD POS QL NAA+NON-PROBE: DETECTED
E FAECALIS DNA BLD POS QL NAA+NON-PROBE: NOT DETECTED
E FAECIUM DNA BLD POS QL NAA+NON-PROBE: NOT DETECTED
EKG ATRIAL RATE: 89 BPM
EKG DIAGNOSIS: NORMAL
EKG P AXIS: 46 DEGREES
EKG P-R INTERVAL: 176 MS
EKG Q-T INTERVAL: 416 MS
EKG QRS DURATION: 138 MS
EKG QTC CALCULATION (BAZETT): 506 MS
EKG R AXIS: -64 DEGREES
EKG T AXIS: 87 DEGREES
EKG VENTRICULAR RATE: 89 BPM
ENTEROBACTERALES DNA BLD POS NAA+N-PRB: DETECTED
EPI CELLS #/AREA URNS HPF: ABNORMAL /HPF
FLUAV RNA SPEC QL NAA+PROBE: NOT DETECTED
FLUBV RNA SPEC QL NAA+PROBE: NOT DETECTED
GLUCOSE SERPL-MCNC: 138 MG/DL (ref 70–99)
GLUCOSE UR STRIP.AUTO-MCNC: NEGATIVE MG/DL
GP B STREP DNA BLD POS QL NAA+NON-PROBE: NOT DETECTED
HAEM INFLU DNA BLD POS QL NAA+NON-PROBE: NOT DETECTED
HGB UR QL STRIP: ABNORMAL
K OXYTOCA DNA BLD POS QL NAA+NON-PROBE: NOT DETECTED
KETONES UR QL STRIP.AUTO: NEGATIVE MG/DL
KLEBSIELLA SP DNA BLD POS QL NAA+NON-PRB: NOT DETECTED
KLEBSIELLA SP DNA BLD POS QL NAA+NON-PRB: NOT DETECTED
L MONOCYTOG DNA BLD POS QL NAA+NON-PROBE: NOT DETECTED
LEUKOCYTE ESTERASE UR QL STRIP.AUTO: ABNORMAL
MAGNESIUM SERPL-MCNC: 1.6 MG/DL (ref 1.8–2.4)
N MEN DNA BLD POS QL NAA+NON-PROBE: NOT DETECTED
NITRITE UR QL STRIP.AUTO: NEGATIVE
OSMOLALITY SERPL: 275 MOSM/KG H2O (ref 280–301)
OTHER OBSERVATIONS: ABNORMAL
P AERUGINOSA DNA BLD POS NAA+NON-PROBE: NOT DETECTED
PH UR STRIP: 5 (ref 5–9)
POTASSIUM SERPL-SCNC: 3.8 MMOL/L (ref 3.5–5.1)
PROT UR STRIP-MCNC: 100 MG/DL
PROTEUS SP DNA BLD POS QL NAA+NON-PROBE: NOT DETECTED
RBC #/AREA URNS HPF: ABNORMAL /HPF
RESISTANT GENE NDM BY PCR: NOT DETECTED
RESISTANT GENE TARGETS: ABNORMAL
S AUREUS DNA BLD POS QL NAA+NON-PROBE: NOT DETECTED
S AUREUS+CONS DNA BLD POS NAA+NON-PROBE: NOT DETECTED
S EPIDERMIDIS DNA BLD POS QL NAA+NON-PRB: NOT DETECTED
S LUGDUNENSIS DNA BLD POS QL NAA+NON-PRB: NOT DETECTED
S MALTOPHILIA DNA BLD POS QL NAA+NON-PRB: NOT DETECTED
S MARCESCENS DNA BLD POS NAA+NON-PROBE: NOT DETECTED
S PNEUM DNA BLD POS QL NAA+NON-PROBE: NOT DETECTED
S PYO DNA BLD POS QL NAA+NON-PROBE: NOT DETECTED
SALMONELLA DNA BLD POS QL NAA+NON-PROBE: NOT DETECTED
SODIUM SERPL-SCNC: 130 MMOL/L (ref 136–145)
SP GR UR REFRACTOMETRY: 1.01 (ref 1–1.02)
STREPTOCOCCUS DNA BLD POS NAA+NON-PROBE: NOT DETECTED
UROBILINOGEN UR QL STRIP.AUTO: 1 EU/DL (ref 0.2–1)
WBC URNS QL MICRO: >100 /HPF
YEAST URNS QL MICRO: ABNORMAL

## 2024-12-13 PROCEDURE — 87086 URINE CULTURE/COLONY COUNT: CPT

## 2024-12-13 PROCEDURE — 36415 COLL VENOUS BLD VENIPUNCTURE: CPT

## 2024-12-13 PROCEDURE — 96368 THER/DIAG CONCURRENT INF: CPT

## 2024-12-13 PROCEDURE — 96361 HYDRATE IV INFUSION ADD-ON: CPT

## 2024-12-13 PROCEDURE — 6360000002 HC RX W HCPCS: Performed by: HOSPITALIST

## 2024-12-13 PROCEDURE — 83735 ASSAY OF MAGNESIUM: CPT

## 2024-12-13 PROCEDURE — 96376 TX/PRO/DX INJ SAME DRUG ADON: CPT

## 2024-12-13 PROCEDURE — 97530 THERAPEUTIC ACTIVITIES: CPT

## 2024-12-13 PROCEDURE — G0378 HOSPITAL OBSERVATION PER HR: HCPCS

## 2024-12-13 PROCEDURE — 2580000003 HC RX 258: Performed by: STUDENT IN AN ORGANIZED HEALTH CARE EDUCATION/TRAINING PROGRAM

## 2024-12-13 PROCEDURE — 6360000002 HC RX W HCPCS: Performed by: PHYSICIAN ASSISTANT

## 2024-12-13 PROCEDURE — 83930 ASSAY OF BLOOD OSMOLALITY: CPT

## 2024-12-13 PROCEDURE — 2580000003 HC RX 258

## 2024-12-13 PROCEDURE — 6370000000 HC RX 637 (ALT 250 FOR IP): Performed by: HOSPITALIST

## 2024-12-13 PROCEDURE — 96375 TX/PRO/DX INJ NEW DRUG ADDON: CPT

## 2024-12-13 PROCEDURE — 97161 PT EVAL LOW COMPLEX 20 MIN: CPT

## 2024-12-13 PROCEDURE — 93005 ELECTROCARDIOGRAM TRACING: CPT

## 2024-12-13 PROCEDURE — 2580000003 HC RX 258: Performed by: HOSPITALIST

## 2024-12-13 PROCEDURE — 96365 THER/PROPH/DIAG IV INF INIT: CPT

## 2024-12-13 PROCEDURE — 96372 THER/PROPH/DIAG INJ SC/IM: CPT

## 2024-12-13 PROCEDURE — 93010 ELECTROCARDIOGRAM REPORT: CPT | Performed by: INTERNAL MEDICINE

## 2024-12-13 PROCEDURE — 6360000002 HC RX W HCPCS

## 2024-12-13 PROCEDURE — 80048 BASIC METABOLIC PNL TOTAL CA: CPT

## 2024-12-13 PROCEDURE — 6360000002 HC RX W HCPCS: Performed by: STUDENT IN AN ORGANIZED HEALTH CARE EDUCATION/TRAINING PROGRAM

## 2024-12-13 PROCEDURE — 96366 THER/PROPH/DIAG IV INF ADDON: CPT

## 2024-12-13 RX ORDER — SODIUM CHLORIDE 9 MG/ML
INJECTION, SOLUTION INTRAVENOUS PRN
Status: DISCONTINUED | OUTPATIENT
Start: 2024-12-13 | End: 2024-12-15 | Stop reason: HOSPADM

## 2024-12-13 RX ORDER — POLYETHYLENE GLYCOL 3350 17 G/17G
17 POWDER, FOR SOLUTION ORAL DAILY PRN
Status: DISCONTINUED | OUTPATIENT
Start: 2024-12-13 | End: 2024-12-15 | Stop reason: HOSPADM

## 2024-12-13 RX ORDER — SODIUM CHLORIDE, SODIUM LACTATE, POTASSIUM CHLORIDE, AND CALCIUM CHLORIDE .6; .31; .03; .02 G/100ML; G/100ML; G/100ML; G/100ML
1000 INJECTION, SOLUTION INTRAVENOUS ONCE
Status: COMPLETED | OUTPATIENT
Start: 2024-12-13 | End: 2024-12-13

## 2024-12-13 RX ORDER — ATORVASTATIN CALCIUM 20 MG/1
20 TABLET, FILM COATED ORAL DAILY
Status: DISCONTINUED | OUTPATIENT
Start: 2024-12-13 | End: 2024-12-15 | Stop reason: HOSPADM

## 2024-12-13 RX ORDER — SODIUM CHLORIDE 0.9 % (FLUSH) 0.9 %
5-40 SYRINGE (ML) INJECTION PRN
Status: DISCONTINUED | OUTPATIENT
Start: 2024-12-13 | End: 2024-12-15 | Stop reason: HOSPADM

## 2024-12-13 RX ORDER — ONDANSETRON 2 MG/ML
4 INJECTION INTRAMUSCULAR; INTRAVENOUS EVERY 6 HOURS PRN
Status: DISCONTINUED | OUTPATIENT
Start: 2024-12-13 | End: 2024-12-15 | Stop reason: HOSPADM

## 2024-12-13 RX ORDER — ENOXAPARIN SODIUM 100 MG/ML
30 INJECTION SUBCUTANEOUS DAILY
Status: DISCONTINUED | OUTPATIENT
Start: 2024-12-13 | End: 2024-12-15 | Stop reason: HOSPADM

## 2024-12-13 RX ORDER — METOPROLOL SUCCINATE 50 MG/1
25 TABLET, EXTENDED RELEASE ORAL DAILY
Status: DISCONTINUED | OUTPATIENT
Start: 2024-12-13 | End: 2024-12-13

## 2024-12-13 RX ORDER — PANTOPRAZOLE SODIUM 40 MG/1
40 TABLET, DELAYED RELEASE ORAL
Status: DISCONTINUED | OUTPATIENT
Start: 2024-12-13 | End: 2024-12-15 | Stop reason: HOSPADM

## 2024-12-13 RX ORDER — MAGNESIUM SULFATE IN WATER 40 MG/ML
2000 INJECTION, SOLUTION INTRAVENOUS ONCE
Status: COMPLETED | OUTPATIENT
Start: 2024-12-13 | End: 2024-12-13

## 2024-12-13 RX ORDER — ACETAMINOPHEN 325 MG/1
650 TABLET ORAL EVERY 6 HOURS PRN
Status: DISCONTINUED | OUTPATIENT
Start: 2024-12-13 | End: 2024-12-15 | Stop reason: HOSPADM

## 2024-12-13 RX ORDER — ACETAMINOPHEN 650 MG/1
650 SUPPOSITORY RECTAL EVERY 6 HOURS PRN
Status: DISCONTINUED | OUTPATIENT
Start: 2024-12-13 | End: 2024-12-15 | Stop reason: HOSPADM

## 2024-12-13 RX ORDER — SODIUM CHLORIDE 9 MG/ML
INJECTION, SOLUTION INTRAVENOUS CONTINUOUS
Status: ACTIVE | OUTPATIENT
Start: 2024-12-13 | End: 2024-12-14

## 2024-12-13 RX ORDER — ASPIRIN 81 MG/1
81 TABLET ORAL DAILY
Status: DISCONTINUED | OUTPATIENT
Start: 2024-12-13 | End: 2024-12-15 | Stop reason: HOSPADM

## 2024-12-13 RX ORDER — SODIUM CHLORIDE 0.9 % (FLUSH) 0.9 %
5-40 SYRINGE (ML) INJECTION EVERY 12 HOURS SCHEDULED
Status: DISCONTINUED | OUTPATIENT
Start: 2024-12-13 | End: 2024-12-15 | Stop reason: HOSPADM

## 2024-12-13 RX ORDER — ONDANSETRON 4 MG/1
4 TABLET, ORALLY DISINTEGRATING ORAL EVERY 8 HOURS PRN
Status: DISCONTINUED | OUTPATIENT
Start: 2024-12-13 | End: 2024-12-15 | Stop reason: HOSPADM

## 2024-12-13 RX ADMIN — ACETAMINOPHEN 650 MG: 325 TABLET ORAL at 04:38

## 2024-12-13 RX ADMIN — ENOXAPARIN SODIUM 30 MG: 100 INJECTION SUBCUTANEOUS at 10:17

## 2024-12-13 RX ADMIN — ASPIRIN 81 MG: 81 TABLET, COATED ORAL at 10:17

## 2024-12-13 RX ADMIN — SODIUM CHLORIDE: 9 INJECTION, SOLUTION INTRAVENOUS at 04:38

## 2024-12-13 RX ADMIN — SODIUM CHLORIDE: 9 INJECTION, SOLUTION INTRAVENOUS at 18:22

## 2024-12-13 RX ADMIN — WATER 1000 MG: 1 INJECTION INTRAMUSCULAR; INTRAVENOUS; SUBCUTANEOUS at 01:16

## 2024-12-13 RX ADMIN — SODIUM CHLORIDE, PRESERVATIVE FREE 10 ML: 5 INJECTION INTRAVENOUS at 21:28

## 2024-12-13 RX ADMIN — CEFTRIAXONE SODIUM 2000 MG: 2 INJECTION, POWDER, FOR SOLUTION INTRAMUSCULAR; INTRAVENOUS at 21:02

## 2024-12-13 RX ADMIN — PANTOPRAZOLE SODIUM 40 MG: 40 TABLET, DELAYED RELEASE ORAL at 06:35

## 2024-12-13 RX ADMIN — SODIUM CHLORIDE, POTASSIUM CHLORIDE, SODIUM LACTATE AND CALCIUM CHLORIDE 1000 ML: 600; 310; 30; 20 INJECTION, SOLUTION INTRAVENOUS at 01:18

## 2024-12-13 RX ADMIN — SODIUM CHLORIDE, PRESERVATIVE FREE 5 ML: 5 INJECTION INTRAVENOUS at 10:33

## 2024-12-13 RX ADMIN — ATORVASTATIN CALCIUM 20 MG: 20 TABLET, FILM COATED ORAL at 21:28

## 2024-12-13 RX ADMIN — MAGNESIUM SULFATE HEPTAHYDRATE 2000 MG: 40 INJECTION, SOLUTION INTRAVENOUS at 20:43

## 2024-12-13 ASSESSMENT — PAIN DESCRIPTION - PAIN TYPE: TYPE: CHRONIC PAIN

## 2024-12-13 ASSESSMENT — PAIN SCALES - GENERAL
PAINLEVEL_OUTOF10: 3
PAINLEVEL_OUTOF10: 0

## 2024-12-13 ASSESSMENT — PAIN - FUNCTIONAL ASSESSMENT: PAIN_FUNCTIONAL_ASSESSMENT: ACTIVITIES ARE NOT PREVENTED

## 2024-12-13 ASSESSMENT — PAIN DESCRIPTION - FREQUENCY: FREQUENCY: CONTINUOUS

## 2024-12-13 ASSESSMENT — PAIN DESCRIPTION - LOCATION: LOCATION: BACK

## 2024-12-13 ASSESSMENT — PAIN DESCRIPTION - ONSET: ONSET: PROGRESSIVE

## 2024-12-13 ASSESSMENT — PAIN DESCRIPTION - DESCRIPTORS: DESCRIPTORS: ACHING

## 2024-12-13 ASSESSMENT — PAIN DESCRIPTION - ORIENTATION: ORIENTATION: RIGHT

## 2024-12-13 NOTE — PROGRESS NOTES
4 Eyes Skin Assessment     NAME:  Juve Liz  YOB: 1940  MEDICAL RECORD NUMBER:  [id number]    The patient is being assessed for  Admission    I agree that at least one RN has performed a thorough Head to Toe Skin Assessment on the patient. ALL assessment sites listed below have been assessed.      Areas assessed by both nurses:    Head, Face, Ears, Shoulders, Back, Chest, Arms, Elbows, Hands, Sacrum. Buttock, Coccyx, Ischium, and Legs. Feet and Heels        Does the Patient have a Wound? No noted wound(s)       Rogelio Prevention initiated by RN: Yes  Wound Care Orders initiated by RN: No    Pressure Injury (Stage 3,4, Unstageable, DTI, NWPT, and Complex wounds) if present, place Wound referral order by RN under : No    New Ostomies, if present place, Ostomy referral order under : No     Nurse 1 eSignature: Electronically signed by CARLEEN JENKINS RN on 12/13/24 at 6:01 AM EST    **SHARE this note so that the co-signing nurse can place an eSignature**    Nurse 2 eSignature: {Esignature:[id number]}

## 2024-12-13 NOTE — CARE COORDINATION
MSN, CM:  spoke with patient this AM about discharge planning.  Patient lives alone in own home with 1 step for entrance.  Patient is independent with all ADL's and requires no equipment for ambulation.  Patient denies any HH, rehab, palliative care or home oxygen currently.  Patient is retired but able to  herself to all appointments.  Patient was admitted for N/V, fatigue, chills, fever 100.8.  CXR revealed mild atelectasis and a large hiatal hernia.  UA revealed UTI with cultures pending.  Blood cultures completed with results pending.  IV abx started.  PT/OT consulted for evaluation and recommendations.  Case Management will continue to follow for any discharge needs.       12/13/24 1117   Service Assessment   Patient Orientation Alert and Oriented   Cognition Alert   History Provided By Patient   Primary Caregiver Self   Accompanied By/Relationship 2 daughters   Support Systems Children;Family Members   Patient's Healthcare Decision Maker is: Legal Next of Kin   PCP Verified by CM Yes   Last Visit to PCP Within last 6 months   Prior Functional Level Independent in ADLs/IADLs   Current Functional Level Other (see comment)  (PT/OT consulted)   Can patient return to prior living arrangement Yes   Ability to make needs known: Good   Family able to assist with home care needs: Yes   Would you like for me to discuss the discharge plan with any other family members/significant others, and if so, who? Yes  (family)   Financial Resources Medicare;Other (Comment)  ([place] [place] supp)   Community Resources None   Social/Functional History   Lives With Alone   Type of Home House   Home Layout One level   Home Access Stairs to enter without rails   Entrance Stairs - Number of Steps 1   Bathroom Shower/Tub Tub/Shower unit   Bathroom Toilet Standard   Bathroom Equipment None   Bathroom Accessibility Accessible   Home Equipment None   Receives Help From Family   Prior Level of Assist for ADLs Independent   Prior Level

## 2024-12-13 NOTE — ED PROVIDER NOTES
Emergency Department Provider Note       PCP: Carolyn Fairchild MD   Age: 84 y.o.   Sex: female     DISPOSITION Decision To Admit 12/13/2024 01:03:31 AM    ICD-10-CM    1. Acute cystitis with hematuria  N30.01           Medical Decision Making     84-year-old female who presents to the ED for fever and generalized weakness.  Patient febrile 100.8, tachycardic at 103 upon arrival.  Improved with IV fluid.  Mild leukocytosis of 12.3.  Mild KELSEY with creatinine 1.46, most recent in 0.94.  UA consistent with urinary tract infection.  Given Rocephin while in ED.  Blood culture sent.  Will admit to hospital service.     1 or more acute illnesses that pose a threat to life or bodily function.   Drug therapy given requiring intensive monitoring for toxicity.  Patient was discharged risks and benefits of hospitalization were considered.  Shared medical decision making was utilized in creating the patients health plan today.  I independently ordered and reviewed each unique test.    I reviewed external records: ED visit note from an outside group.  I reviewed external records: provider visit note from PCP.  I reviewed external records: previous lab results from outside ED.     ED cardiac monitoring rhythm strip was ordered and interpreted:  sinus rhythm, no evidence of an arrhythmia  ST Segments:Nonspecific ST segments - NO STEMI   Rate: 96  I interpreted the X-rays hiatal hernia noted, no pleural effusion or obvious pneumothorax.  ED provider's independent EKG interpretation NSR, nonspecific ST, no ST elevation, no lethal dysrhythmia            History     Ms. Liz is an 84-year-old female with PMH of HTN, CAD status post stent placement, HLD presents to the ED for generalized weakness and fatigue.  Patient states for the past week or so, she has been having worsening weakness.  Began running a low-grade fever of 100.7 yesterday.  Took Tylenol prior to arrival here, still has fever of 100.8.  Does not have any

## 2024-12-13 NOTE — PROGRESS NOTES
Hospitalist Progress Note   Admit Date:  2024  7:36 PM   Name:  Juve Liz   Age:  84 y.o.  Sex:  female  :  1940   MRN:  [id number]   Room:  3/    Presenting/Chief Complaint: Fatigue and Vomiting     Reason(s) for Admission: UTI (urinary tract infection) [N39.0]  Acute cystitis with hematuria [N30.01]     Hospital Course:   Juve Liz is a 84 y.o. female with medical history of hypertension, coronary artery disease, dyslipidemia, who presented to the emergency room with chief complaint of generalized weakness, fatigue, low-grade temperature, chills, nausea going on for past 2 days duration. Reports some bodyaches. In the ER patient was febrile to 100.8, tachycardic to 103. Other vital signs stable. Creatinine was 1.46, WBC was 12,300. UA showed evidence of UTI. Blood cultures obtained which were positive for E. Coli. Patient started on rocephin in the ER.       Subjective & 24hr Events:   Afebrile overnight. Blood cultures returned positive for E. Coli. Patient reports feeling better. Worked with PT who did not recommend any further physical therapy after discharge.       Assessment & Plan:     Sepsis  E. Coli bacteremia  UTI (urinary tract infection)  -UTI positive for infection  -blood cultures returned positive for E. Coli  -continue rocephin  -follow daily CBC  -follow up urine culture, sensitivities     KELSEY  -creatinine 1.46 on admission  -unknown baseline  -trend renal function in AM    HTN  -patient states she no longer takes toprol XL at home  -monitor blood pressure    HLD  -continue statin    GERD  -continue PPI    History of CAD  -continue ASA, statin      Anticipated Discharge Arrangements:   Home    PT/OT evals ordered?  Therapy evals ordered  Diet:  ADULT DIET; Regular  VTE prophylaxis: Lovenox  Code status: Full Code      Non-peripheral Lines and Tubes (if present):          Telemetry (if present):  Cardiac/Telemetry Monitor On: Portable telemetry pack applied

## 2024-12-13 NOTE — H&P
Hospitalist History and Physical   Admit Date:  2024  7:36 PM   Name:  Juve Liz   Age:  84 y.o.  Sex:  female  :  1940   MRN:  [id number]   Room:  [place] [address]    Presenting/Chief Complaint: Fatigue and Vomiting     Reason(s) for Admission: UTI (urinary tract infection) [N39.0]     History of Present Illness:     78 years old female with past medical history of hypertension, artery disease, dyslipidemia presented to emergency room with chief complaint of generalized weakness, fatigue, low-grade temperature, chills, nausea going on for past 2 days duration.  Reports some bodyaches.  No sore throat, runny nose.  Evaluated in emergency room, urinalysis shows evidence of UTI.  Patient reports she gets UTI intermittently.  Lab work shows evidence of white count of 12.3.  Emergency room physician requested observation of this patient due to severity of symptoms.        Assessment & Plan:     UTI: Initiated on ceftriaxone, will keep for observation overnight, if symptoms improves plan for discharge.    Hypertension: Continue on metoprolol.    Coronary artery disease: Continue on aspirin, statin, beta-blockers.    GERD: Continue on Protonix.        Diet: ADULT DIET; Regular  VTE prophylaxis: Lovenox  Code status: Full Code      Non-peripheral Lines and Tubes (if present):             Hospital Problems:  Principal Problem:    UTI (urinary tract infection)  Resolved Problems:    * No resolved hospital problems. *        Objective:   Patient Vitals for the past 24 hrs:   Temp Pulse Resp BP SpO2   24 2345 -- -- -- 126/69 --   24 2330 -- -- -- 135/75 --   24 2318 -- -- -- 127/66 --   24 2230 -- -- -- 102/61 --   24 2215 -- -- -- (!) 107/57 --   24 1839 (!) 100.8 °F (38.2 °C) (!) 103 18 125/86 94 %       Oxygen Therapy  SpO2: 94 %    Estimated body mass index is 24.69 kg/m² as calculated from the following:    Height as of this encounter: 1.575 m (5' 2\").    Weight as of this

## 2024-12-13 NOTE — PROGRESS NOTES
TRANSFER - IN REPORT:    Verbal report received from Ladarius benton Juve Liz  being received from ER for routine progression of patient care      Report consisted of patient's Situation, Background, Assessment and   Recommendations(SBAR).     Information from the following report(s) Nurse Handoff Report was reviewed with the receiving nurse.    Opportunity for questions and clarification was provided.      Assessment completed upon patient's arrival to unit and care assumed.

## 2024-12-13 NOTE — ACP (ADVANCE CARE PLANNING)
Advance Care Planning   General Advance Care Planning (ACP) Conversation    Date of Conversation: 12/12/2024  Conducted with: Patient with Decision Making Capacity  Other persons present: Daughter 2 daughters    Healthcare Decision Maker:    Primary Decision Maker: Claudia Garza - Child - [phone]    Today we documented Decision Maker(s) consistent with Legal Next of Kin hierarchy.  Content/Action Overview:  Has ACP document(s) on file - reflects the patient's care preferences  Reviewed DNR/DNI and patient elects Full Code (Attempt Resuscitation)        Length of Voluntary ACP Conversation in minutes:  <16 minutes (Non-Billable)    Diane Slater RN

## 2024-12-13 NOTE — ED NOTES
TRANSFER - OUT REPORT:    Verbal report given to MANAS Harrell on Juve Liz  being transferred to [facility] for routine progression of patient care       Report consisted of patient's Situation, Background, Assessment and   Recommendations(SBAR).     Information from the following report(s) Nurse Handoff Report was reviewed with the receiving nurse.    Kinder Fall Assessment:                           Lines:   Peripheral IV 12/12/24 Posterior;Right Hand (Active)   Site Assessment Clean, dry & intact 12/12/24 2043   Line Status Blood return noted;Brisk blood return;Flushed;Normal saline locked;Specimen collected 12/12/24 2043   Phlebitis Assessment No symptoms 12/12/24 2043   Infiltration Assessment 0 12/12/24 2043   Dressing Status New dressing applied;Clean, dry & intact 12/12/24 2043   Dressing Type Transparent 12/12/24 2043   Dressing Intervention New 12/12/24 2043        Opportunity for questions and clarification was provided.      Patient transported with:  Registered Nurse          Ladarius Mckeon RN  12/13/24 [id number]

## 2024-12-13 NOTE — PROGRESS NOTES
Patient resting in bed, alert and oriented, cooperative with care. Patient on RA. Patient denies pain or distress, safety measures in place, call light within reach.   Please notify the patient of normal mammogram results.

## 2024-12-13 NOTE — PROGRESS NOTES
ACUTE PHYSICAL THERAPY GOALS:   (Developed with and agreed upon by patient and/or caregiver.)  Pt will perform all bed mobility and transfers Mod (I) c use of LRAD/external supports as needed and no LOB or miss-steps in 7 therapy sessions.  Pt will ambulate 250 ft under (S) with no LOB or miss-steps and breaks as needed in 7 therapy sessions.  Pt will perform standing dynamic balance activities with minimal postural sway in 7 therapy sessions.    ALL GOALS MET on 12/13/24 Eval      PHYSICAL THERAPY Initial Assessment, Daily Note, and AM  (Link to Caseload Tracking: PT Visit Days : 1  Acknowledge Orders  Time In/Out  PT Charge Capture  Rehab Caseload Tracker    Juve Liz is a 84 y.o. female   PRIMARY DIAGNOSIS: UTI (urinary tract infection)  UTI (urinary tract infection) [N39.0]  Acute cystitis with hematuria [N30.01]       Reason for Referral: Generalized Muscle Weakness (M62.81)  Other abnormalities of gait and mobility (R26.89)  Observation: Payor: MEDICARE / Plan: MEDICARE PART A AND B / Product Type: *No Product type* /     ASSESSMENT:     REHAB RECOMMENDATIONS:   Recommendation to date pending progress:  Setting:  No further skilled physical therapy after discharge from hospital    Equipment:    None     ASSESSMENT:  Ms. Liz Is a 84 y.o. female presenting to after being admitted on 12/13/24 for UTI associated with generalized weakness and declining mobility status. At time of initial evaluation, pt presents at approximate baseline LOF with no major deficits beyond slightly diminished activity tolerance limiting her overall functional mobility. Today, pt performed all mobility at Mod (I)/(S) level(s) including ambulation of 250'x1 without use of an AD. Of note, pt ambulating with narrow TITA, slow-shuffling steps and slightly exaggerated sway although she ultimately did well to avoid any LOB/ miss-steps. Furthermore, pt performed all activity on RA and denied any dizziness, lightheadedness or SOB while

## 2024-12-13 NOTE — PROGRESS NOTES
Patient went to the restroom and she was short of breath. She started on 2 liters OF oxygen via N/C.

## 2024-12-14 LAB
ALBUMIN SERPL-MCNC: 2.4 G/DL (ref 3.2–4.6)
ALBUMIN/GLOB SERPL: 0.6 (ref 1–1.9)
ALP SERPL-CCNC: 94 U/L (ref 35–104)
ALT SERPL-CCNC: 15 U/L (ref 8–45)
ANION GAP SERPL CALC-SCNC: 10 MMOL/L (ref 7–16)
AST SERPL-CCNC: 76 U/L (ref 15–37)
BASOPHILS # BLD: 0 K/UL (ref 0–0.2)
BASOPHILS NFR BLD: 0 % (ref 0–2)
BILIRUB SERPL-MCNC: 0.3 MG/DL (ref 0–1.2)
BUN SERPL-MCNC: 19 MG/DL (ref 8–23)
CALCIUM SERPL-MCNC: 8.3 MG/DL (ref 8.8–10.2)
CHLORIDE SERPL-SCNC: 103 MMOL/L (ref 98–107)
CO2 SERPL-SCNC: 21 MMOL/L (ref 20–29)
CREAT SERPL-MCNC: 1.05 MG/DL (ref 0.6–1.1)
DIFFERENTIAL METHOD BLD: ABNORMAL
EKG ATRIAL RATE: 107 BPM
EKG DIAGNOSIS: NORMAL
EKG P AXIS: 50 DEGREES
EKG P-R INTERVAL: 188 MS
EKG Q-T INTERVAL: 354 MS
EKG QRS DURATION: 132 MS
EKG QTC CALCULATION (BAZETT): 472 MS
EKG R AXIS: -57 DEGREES
EKG T AXIS: 73 DEGREES
EKG VENTRICULAR RATE: 107 BPM
EOSINOPHIL # BLD: 0.2 K/UL (ref 0–0.8)
EOSINOPHIL NFR BLD: 2 % (ref 0.5–7.8)
ERYTHROCYTE [DISTWIDTH] IN BLOOD BY AUTOMATED COUNT: 13.4 % (ref 11.9–14.6)
GLOBULIN SER CALC-MCNC: 3.9 G/DL (ref 2.3–3.5)
GLUCOSE SERPL-MCNC: 115 MG/DL (ref 70–99)
HCT VFR BLD AUTO: 35.7 % (ref 35.8–46.3)
HGB BLD-MCNC: 12 G/DL (ref 11.7–15.4)
IMM GRANULOCYTES # BLD AUTO: 0 K/UL (ref 0–0.5)
IMM GRANULOCYTES NFR BLD AUTO: 0 % (ref 0–5)
LYMPHOCYTES # BLD: 0.9 K/UL (ref 0.5–4.6)
LYMPHOCYTES NFR BLD: 10 % (ref 13–44)
MCH RBC QN AUTO: 30.7 PG (ref 26.1–32.9)
MCHC RBC AUTO-ENTMCNC: 33.6 G/DL (ref 31.4–35)
MCV RBC AUTO: 91.3 FL (ref 82–102)
MONOCYTES # BLD: 0.9 K/UL (ref 0.1–1.3)
MONOCYTES NFR BLD: 11 % (ref 4–12)
NEUTS SEG # BLD: 6.4 K/UL (ref 1.7–8.2)
NEUTS SEG NFR BLD: 77 % (ref 43–78)
NRBC # BLD: 0.04 K/UL (ref 0–0.2)
OSMOLALITY UR: 194 MOSM/KG H2O (ref 50–1400)
PLATELET # BLD AUTO: 183 K/UL (ref 150–450)
PMV BLD AUTO: 11.3 FL (ref 9.4–12.3)
POTASSIUM SERPL-SCNC: ABNORMAL MMOL/L (ref 3.5–5.1)
PROT SERPL-MCNC: 6.3 G/DL (ref 6.3–8.2)
RBC # BLD AUTO: 3.91 M/UL (ref 4.05–5.2)
SODIUM SERPL-SCNC: 134 MMOL/L (ref 136–145)
SODIUM UR-SCNC: <20 MMOL/L
WBC # BLD AUTO: 8.4 K/UL (ref 4.3–11.1)

## 2024-12-14 PROCEDURE — G0378 HOSPITAL OBSERVATION PER HR: HCPCS

## 2024-12-14 PROCEDURE — 96372 THER/PROPH/DIAG INJ SC/IM: CPT

## 2024-12-14 PROCEDURE — 2580000003 HC RX 258: Performed by: HOSPITALIST

## 2024-12-14 PROCEDURE — 84300 ASSAY OF URINE SODIUM: CPT

## 2024-12-14 PROCEDURE — 6360000002 HC RX W HCPCS: Performed by: HOSPITALIST

## 2024-12-14 PROCEDURE — 6360000002 HC RX W HCPCS

## 2024-12-14 PROCEDURE — 83935 ASSAY OF URINE OSMOLALITY: CPT

## 2024-12-14 PROCEDURE — 93010 ELECTROCARDIOGRAM REPORT: CPT | Performed by: INTERNAL MEDICINE

## 2024-12-14 PROCEDURE — 96361 HYDRATE IV INFUSION ADD-ON: CPT

## 2024-12-14 PROCEDURE — 6370000000 HC RX 637 (ALT 250 FOR IP): Performed by: HOSPITALIST

## 2024-12-14 PROCEDURE — 85025 COMPLETE CBC W/AUTO DIFF WBC: CPT

## 2024-12-14 PROCEDURE — 96366 THER/PROPH/DIAG IV INF ADDON: CPT

## 2024-12-14 PROCEDURE — 36415 COLL VENOUS BLD VENIPUNCTURE: CPT

## 2024-12-14 PROCEDURE — 80053 COMPREHEN METABOLIC PANEL: CPT

## 2024-12-14 PROCEDURE — 97165 OT EVAL LOW COMPLEX 30 MIN: CPT

## 2024-12-14 PROCEDURE — 2580000003 HC RX 258

## 2024-12-14 RX ORDER — HYDRALAZINE HYDROCHLORIDE 20 MG/ML
5 INJECTION INTRAMUSCULAR; INTRAVENOUS EVERY 6 HOURS PRN
Status: DISCONTINUED | OUTPATIENT
Start: 2024-12-14 | End: 2024-12-15 | Stop reason: HOSPADM

## 2024-12-14 RX ADMIN — ACETAMINOPHEN 650 MG: 325 TABLET ORAL at 20:05

## 2024-12-14 RX ADMIN — SODIUM CHLORIDE, PRESERVATIVE FREE 10 ML: 5 INJECTION INTRAVENOUS at 21:21

## 2024-12-14 RX ADMIN — CEFTRIAXONE SODIUM 2000 MG: 2 INJECTION, POWDER, FOR SOLUTION INTRAMUSCULAR; INTRAVENOUS at 17:06

## 2024-12-14 RX ADMIN — PANTOPRAZOLE SODIUM 40 MG: 40 TABLET, DELAYED RELEASE ORAL at 06:14

## 2024-12-14 RX ADMIN — SODIUM CHLORIDE, PRESERVATIVE FREE 10 ML: 5 INJECTION INTRAVENOUS at 08:21

## 2024-12-14 RX ADMIN — ASPIRIN 81 MG: 81 TABLET, COATED ORAL at 08:17

## 2024-12-14 RX ADMIN — ATORVASTATIN CALCIUM 20 MG: 20 TABLET, FILM COATED ORAL at 21:20

## 2024-12-14 RX ADMIN — ENOXAPARIN SODIUM 30 MG: 100 INJECTION SUBCUTANEOUS at 08:17

## 2024-12-14 ASSESSMENT — PAIN SCALES - GENERAL
PAINLEVEL_OUTOF10: 0

## 2024-12-14 NOTE — PROGRESS NOTES
Hospitalist Progress Note   Admit Date:  2024  7:36 PM   Name:  Juve Liz   Age:  84 y.o.  Sex:  female  :  1940   MRN:  [id number]   Room:  3/    Presenting/Chief Complaint: Fatigue and Vomiting     Reason(s) for Admission: UTI (urinary tract infection) [N39.0]  Acute cystitis with hematuria [N30.01]     Hospital Course:   Juve Liz is a 84 y.o. female with medical history of hypertension, coronary artery disease, dyslipidemia, who presented to the emergency room with chief complaint of generalized weakness, fatigue, low-grade temperature, chills, nausea going on for past 2 days duration. Reports some bodyaches. In the ER patient was febrile to 100.8, tachycardic to 103. Other vital signs stable. Creatinine was 1.46, WBC was 12,300. UA showed evidence of UTI. Blood cultures obtained which were positive for E. Coli. Patient started on rocephin in the ER.   Final cultures and sensitivities pending from blood cultures.      Subjective & 24hr Events:   No overnight events reported. Reports weakness is improving this morning. Family present at bedside and all questions answered. Final blood cultures and sensitivities pending.       Assessment & Plan:     Sepsis  E. Coli bacteremia  UTI (urinary tract infection)  -UTI positive for infection  -blood cultures returned positive for E. Coli  -continue rocephin  -follow daily CBC  -follow up urine culture, sensitivities. Pending.      KELSEY  -creatinine 1.46 on admission  -renal function improved, now within normal limits  -resolved     HTN  -patient states she no longer takes toprol XL at home  -monitor blood pressure     HLD  -continue statin     GERD  -continue PPI     History of CAD  -continue ASA, statin        Anticipated Discharge Arrangements:   Home     PT/OT evals ordered?  Therapy evals ordered  Diet:  ADULT DIET; Regular  VTE prophylaxis: Lovenox  Code status: Full Code      Non-peripheral Lines and Tubes (if present):

## 2024-12-14 NOTE — PROGRESS NOTES
Pt resting in  bed with family at bedside, alert oriented times 3 at this time. Pt on RA, denies pain or distress. Pt encouraged to call for assistance if needed call light in reach, will monitor.

## 2024-12-14 NOTE — PROGRESS NOTES
Pt report face feeling hot, face flushed and reports feeling tired. No acute distress.  Temp 100 at this time. Will monitor

## 2024-12-14 NOTE — PROGRESS NOTES
Patient resting in bed, alert and oriented, cooperative with care. Patient on RA. Patient denies pain or distress, safety measures in place, call light within reach.

## 2024-12-14 NOTE — THERAPY EVALUATION
PTA    EDUCATION:  Education Given To: Patient  Education Provided: Role of Therapy;Plan of Care  Education Method: Verbal  Education Outcome: Verbalized understanding    TOTAL TREATMENT DURATION AND TIME:  Time In: 0940  Time Out: 0949  Minutes: 9    Alexandria Pineda OT

## 2024-12-14 NOTE — PROGRESS NOTES
Remote tele called to report run of ,. Pt sitting up in chair. Pt asymptomatic, now NRS 86. Dr. Bull made aware via perfect serve

## 2024-12-15 VITALS
HEIGHT: 62 IN | WEIGHT: 148.5 LBS | TEMPERATURE: 98.6 F | DIASTOLIC BLOOD PRESSURE: 76 MMHG | OXYGEN SATURATION: 94 % | HEART RATE: 82 BPM | BODY MASS INDEX: 27.33 KG/M2 | RESPIRATION RATE: 20 BRPM | SYSTOLIC BLOOD PRESSURE: 167 MMHG

## 2024-12-15 PROBLEM — R78.81 BACTEREMIA: Status: ACTIVE | Noted: 2024-12-15

## 2024-12-15 LAB
ANION GAP SERPL CALC-SCNC: 12 MMOL/L (ref 7–16)
BACTERIA SPEC CULT: ABNORMAL
BACTERIA SPEC CULT: NORMAL
BUN SERPL-MCNC: 13 MG/DL (ref 8–23)
CALCIUM SERPL-MCNC: 7.9 MG/DL (ref 8.8–10.2)
CHLORIDE SERPL-SCNC: 105 MMOL/L (ref 98–107)
CO2 SERPL-SCNC: 20 MMOL/L (ref 20–29)
CREAT SERPL-MCNC: 0.86 MG/DL (ref 0.6–1.1)
GLUCOSE SERPL-MCNC: 117 MG/DL (ref 70–99)
GRAM STN SPEC: ABNORMAL
POTASSIUM SERPL-SCNC: 4.5 MMOL/L (ref 3.5–5.1)
SERVICE CMNT-IMP: ABNORMAL
SERVICE CMNT-IMP: ABNORMAL
SERVICE CMNT-IMP: NORMAL
SODIUM SERPL-SCNC: 137 MMOL/L (ref 136–145)

## 2024-12-15 PROCEDURE — G0378 HOSPITAL OBSERVATION PER HR: HCPCS

## 2024-12-15 PROCEDURE — 6370000000 HC RX 637 (ALT 250 FOR IP): Performed by: HOSPITALIST

## 2024-12-15 PROCEDURE — 36415 COLL VENOUS BLD VENIPUNCTURE: CPT

## 2024-12-15 PROCEDURE — 80048 BASIC METABOLIC PNL TOTAL CA: CPT

## 2024-12-15 PROCEDURE — 1100000000 HC RM PRIVATE

## 2024-12-15 RX ORDER — CIPROFLOXACIN 500 MG/1
500 TABLET, FILM COATED ORAL 2 TIMES DAILY
Qty: 14 TABLET | Refills: 0 | Status: SHIPPED | OUTPATIENT
Start: 2024-12-15 | End: 2024-12-22

## 2024-12-15 RX ADMIN — ASPIRIN 81 MG: 81 TABLET, COATED ORAL at 08:43

## 2024-12-15 RX ADMIN — PANTOPRAZOLE SODIUM 40 MG: 40 TABLET, DELAYED RELEASE ORAL at 06:25

## 2024-12-15 ASSESSMENT — PAIN SCALES - GENERAL: PAINLEVEL_OUTOF10: 0

## 2024-12-15 NOTE — DISCHARGE SUMMARY
Hospitalist Discharge Summary   Admit Date:  2024  7:36 PM   DC Note date: 12/15/2024  Name:  Juve Liz   Age:  84 y.o.  Sex:  female  :  1940   MRN:  [id number]   Room:  [facility]  PCP:  Carolyn Fairchild MD    Presenting Complaint: Fatigue and Vomiting     Initial Admission Diagnosis: UTI (urinary tract infection) [N39.0]  Acute cystitis with hematuria [N30.01]  Bacteremia [R78.81]     Problem List for this Hospitalization (present on admission):    Principal Problem:    UTI (urinary tract infection)  Active Problems:    Bacteremia  Resolved Problems:    * No resolved hospital problems. *      Hospital Course:  Juve Liz is a 84 y.o. female with medical history of hypertension, coronary artery disease, dyslipidemia, who presented to the emergency room with chief complaint of generalized weakness, fatigue, low-grade temperature, chills, nausea going on for past 2 days duration. Reports some bodyaches. In the ER patient was febrile to 100.8, tachycardic to 103. Other vital signs stable. Creatinine was 1.46, WBC was 12,300. UA showed evidence of UTI. Blood cultures obtained which were positive for E. Coli. Patient started on rocephin in the ER.   Final cultures and sensitivities show pan-sensitive E. Coli which is sensitive to flouroquinolones. Cipro sent to pharmacy on DC to complete course.      Sepsis  E. Coli bacteremia  UTI (urinary tract infection)  -UTI positive for infection  -blood cultures returned positive for E. Coli  -continue rocephin while inpatient  -cultures show sensitive E. Coli  -sent 7 days of cipro to pharmacy to complete course     KELSEY  -creatinine 1.46 on admission  -renal function improved, now within normal limits  -resolved     HTN  -patient states she no longer takes toprol XL at home  -monitor blood pressure     HLD  -continue statin     GERD  -continue PPI     History of CAD  -continue ASA, statin        Anticipated Discharge Arrangements:   Home     PT/OT evals

## 2024-12-15 NOTE — PROGRESS NOTES
This RN at bedside provided written and verbal discharge instructions to patient and patient's daughters. Instructions included new medications, medication schedule, and follow up instructions. Patient verbalizes understanding of instructions. Patient escorted to lobby by staff via wheelchair to daughter's private vehicle.

## 2024-12-15 NOTE — DISCHARGE INSTRUCTIONS
symptoms:  Weight gain of 3 pounds or more overnight or 5 pounds in a week, increased swelling in our hands or feet or shortness of breath while lying flat in bed.  Please call your doctor as soon as you notice any of these symptoms; do not wait until your next office visit.        The discharge information has been reviewed with the patient and caregiver.  The patient and caregiver verbalized understanding.  Discharge medications reviewed with the patient and caregiver and appropriate educational materials and side effects teaching were provided.  ___________________________________________________________________________________________________________________________________

## 2024-12-15 NOTE — CARE COORDINATION
MSN, KRYS:  patient to be discharged home today with [facility].  Patient and family agree with this discharge plan.  Patient has met all milestones for this admission.  Family to transport patient home.       12/15/24 0982   Service Assessment   Patient Orientation Alert and Oriented   Cognition Alert   Services At/After Discharge   Transition of Care Consult (CM Consult) Other  (Palliative Care)   Services At/After Discharge None   Mode of Transport at Discharge Other (see comment)  (family to transport)   Confirm Follow Up Transport Family   Condition of Participation: Discharge Planning   The Plan for Transition of Care is related to the following treatment goals: Palliative Care   The Patient and/or Patient Representative was provided with a Choice of Provider? Patient   The Patient and/Or Patient Representative agree with the Discharge Plan? Yes   Freedom of Choice list was provided with basic dialogue that supports the patient's individualized plan of care/goals, treatment preferences, and shares the quality data associated with the providers?  Yes

## 2024-12-16 ENCOUNTER — CARE COORDINATION (OUTPATIENT)
Dept: CARE COORDINATION | Facility: CLINIC | Age: 84
End: 2024-12-16

## 2024-12-16 NOTE — CARE COORDINATION
Care Transitions Note    Initial Call - Call within 2 business days of discharge: Yes    Attempted to reach patient for transitions of care follow up. Unable to reach patient.    Outreach Attempts:   HIPAA compliant voicemail left for patient, family, patient's daughter .     Patient: Juve Liz    Patient : 1940   MRN: [id number]    Reason for Admission: UTI  Discharge Date: 12/15/24  RURS: Readmission Risk Score: 11.9    Last Discharge Facility       Date Complaint Diagnosis Description Type Department Provider    24 Fatigue; Vomiting Acute cystitis with hematuria ED to Hosp-Admission (Discharged) (ADMITTED) [id number] Wilfred Bull MD; [facility]...            Was this an external facility discharge? No    Follow Up Appointment:   Patient does not have a follow up appointment scheduled at time of call.  Unable to reach patient. According to d/c notes patient needs to see PCP within a week.  Future Appointments         Provider Specialty Dept Phone    2025 9:00 AM [facility] [phone]    2025 10:00 AM Carolyn Fairchild MD Primary Care [phone]            Plan for follow-up on next business day.      Kirti Graves LPN

## 2024-12-16 NOTE — CARE COORDINATION
Care Transitions Note    Initial Call - Call within 2 business days of discharge: Yes    Patient Current Location:  Home: [facility] [place] [address]    LPN Care Coordinator contacted the family, patient's daughter  by telephone to perform post hospital discharge assessment, verified name and  as identifiers. Provided introduction to self, and explanation of the LPN Care Coordinator role.     Patient: Juve Liz    Patient : 1940   MRN: [id number]    Reason for Admission: UTI  Discharge Date: 12/15/24  RURS: Readmission Risk Score: 11.9      Last Discharge Facility       Date Complaint Diagnosis Description Type Department Provider    24 Fatigue; Vomiting Acute cystitis with hematuria ED to Hosp-Admission (Discharged) (ADMITTED) [facility] Wilfred Bull MD; [facility]...            Was this an external facility discharge? No    Additional needs identified to be addressed with provider   No needs identified             Method of communication with provider: none.    Patients top risk factors for readmission: medical condition-UTI    Interventions to address risk factors:   Review of patient management of conditions/medications: diet, hydration, medication compliance and follow up appointment.    Care Summary Note: Spoke with patient 's daughter states fine. Daughter denies any fever, chills, chest pain, shortness of breath or increased fatigue. Daughter states patient was up this morning and had her coffee and breakfast. Daughter states patient has antibiotic and the rest of her medication. Daughter states patient lives alone , but she is staying in the home until recovery. Daughter states will call PCP for a hospital follow up. Will follow up with daughter next week. Daughter has this Care Coordinator contact information for any concerns or questions.    LPN Care Coordinator reviewed discharge instructions with family. The family was given an opportunity to ask questions; all questions

## 2024-12-17 ENCOUNTER — TELEMEDICINE (OUTPATIENT)
Dept: PRIMARY CARE CLINIC | Facility: CLINIC | Age: 84
End: 2024-12-17

## 2024-12-17 ENCOUNTER — PATIENT MESSAGE (OUTPATIENT)
Dept: PRIMARY CARE CLINIC | Facility: CLINIC | Age: 84
End: 2024-12-17

## 2024-12-17 DIAGNOSIS — R30.0 DYSURIA: ICD-10-CM

## 2024-12-17 DIAGNOSIS — Z09 HOSPITAL DISCHARGE FOLLOW-UP: Primary | ICD-10-CM

## 2024-12-17 DIAGNOSIS — Z87.440 HISTORY OF UTI: ICD-10-CM

## 2024-12-17 LAB
BILIRUBIN, URINE, POC: NEGATIVE
BLOOD URINE, POC: NEGATIVE
GLUCOSE URINE, POC: NEGATIVE
KETONES, URINE, POC: NEGATIVE
LEUKOCYTE ESTERASE, URINE, POC: ABNORMAL
NITRITE, URINE, POC: NEGATIVE
PH, URINE, POC: 6.5 (ref 4.6–8)
PROTEIN,URINE, POC: NEGATIVE
SPECIFIC GRAVITY, URINE, POC: 1.02 (ref 1–1.03)
URINALYSIS CLARITY, POC: CLEAR
URINALYSIS COLOR, POC: YELLOW
UROBILINOGEN, POC: ABNORMAL

## 2024-12-17 RX ORDER — CARVEDILOL 6.25 MG/1
6.25 TABLET ORAL 2 TIMES DAILY
COMMUNITY
Start: 2024-11-25 | End: 2025-11-25

## 2024-12-17 RX ORDER — AMLODIPINE BESYLATE 5 MG/1
5 TABLET ORAL DAILY
COMMUNITY
Start: 2024-09-13 | End: 2024-12-17

## 2024-12-17 NOTE — PROGRESS NOTES
[facility] PRIMARY CARE  Carolyn Fairchild M.D.  [address] [place] [address]  Phone:  [phone]  Fax:  [phone]    HOSPITAL FOLLOW UP          I was in the office while conducting this encounter.  The patient was at home.    CHIEF COMPLAINT:  Chief Complaint   Patient presents with    Follow-Up from Hospital     Patient admitted to hospital 12/12-12/15 for generalized weakness, fatigue, low-grade temperature, chills, nausea going on for past 2 days duration. Reports some bodyaches. In the ER patient was febrile to 100.8, tachycardic to 103. UA showed evidence of UTI. Blood cultures obtained which were positive for E. Coli. Patient started on rocephin in the ER. Final cultures and sensitivities show pan-sensitive E. Coli which is sensitive to flouroquinolones. Cipro sent to pharmacy at discharge.          HISTORY OF PRESENT ILLNESS:  Ms. Liz is a 84 y.o. female  who presents for follow up. The patient, Juve Liz, reports feeling better after a recent hospitalization from the 12th to the 15th. The hospitalization was due to body aches and a urinary tract infection (UTI) caused by E. coli. The patient confirms that she was sent home with an antibiotic and has experienced some improvement in strength. Additionally, the patient reports no more fevers since the hospitalization. The patient does not require any medication refills at this time and has no other concerns to report during this follow-up.    HISTORY:  Allergies   Allergen Reactions    Amlodipine Other (See Comments) and Shortness Of Breath    Toradol [Ketorolac Tromethamine] Shortness Of Breath    Promethazine Other (See Comments)    Toprol Xl [Metoprolol] Other (See Comments)    Lisinopril Other (See Comments)    Tramadol Anxiety         REVIEW OF SYSTEMS:  Review of systems is as indicated in HPI otherwise negative.    PHYSICAL EXAM:    Vital Signs: (As obtained by patient/caregiver at home)      12/17/2024     4:58 PM 
Private car

## 2024-12-19 LAB
BACTERIA SPEC CULT: NORMAL
SERVICE CMNT-IMP: NORMAL

## 2024-12-20 NOTE — ED NOTES
Attempt made with case management to contact pt post discharge to inquire about follow up care. Unable to reach pt. VM left. Pt has notes in chart from FU with PCP 12/17.       Estrella Clemente, RN  12/20/24 3587

## 2024-12-23 ENCOUNTER — CARE COORDINATION (OUTPATIENT)
Dept: CARE COORDINATION | Facility: CLINIC | Age: 84
End: 2024-12-23

## 2024-12-23 NOTE — CARE COORDINATION
Care Transitions Note    Follow Up Call     Attempted to reach patient for transitions of care follow up.  Unable to reach patient.      Outreach Attempts:   HIPAA compliant voicemail left for family, patient's daughter.     Care Summary Note: According to Epic notes patient attend a telemedicine call with PCP 12/17/2024    Follow Up Appointment:   Future Appointments         Provider Specialty Dept Phone    2/6/2025 9:00 AM [facility] [phone]    2/13/2025 10:00 AM Carolyn Fairchild MD Primary Care [phone]            Plan for follow-up on next business day.  based on severity of symptoms and risk factors. Plan for next call: follow-up appointment-Review any questions with telemedicine call.    Kirti Graves LPN

## 2024-12-24 ENCOUNTER — CARE COORDINATION (OUTPATIENT)
Dept: CARE COORDINATION | Facility: CLINIC | Age: 84
End: 2024-12-24

## 2024-12-24 NOTE — CARE COORDINATION
Care Transitions Note    Final Call     Patient Current Location:  Home: [place] [address]    LPN Care Coordinator contacted the family, patient's daughter  by telephone. Verified name and  as identifiers.    Patient graduated from the Care Transitions program on 2024.  Patient/family verbalizes confidence in the ability to self-manage at this time..      Advance Care Planning:   Does patient have an Advance Directive: reviewed and current.    Handoff:   Patient was not referred to the ACM team due to no additional needs identified.       Care Summary Note: Spoke with patient's daughter states patient is doing fine. Daughter denies any chest pain, shortness of breath , chills , or fever. Daughter states patient finished all antibiotic and has not showed any further signs of a UTI. Daughter states patient was seen by PCP via Telemedicine and that visit went well. No further ADRIANA calls indicated. Patient graduate from ADRIANA program.    Assessments:  Care Transitions Subsequent and Final Call    Subsequent and Final Calls  Do you have any ongoing symptoms?: No  Have your medications changed?: No  Do you have any questions related to your medications?: No  Do you currently have any active services?: No  Do you have any needs or concerns that I can assist you with?: No  Identified Barriers: None  Care Transitions Interventions  No Identified Needs  Other Interventions:              Upcoming Appointments:    Future Appointments         Provider Specialty Dept Phone    2025 9:00 AM [facility] [phone]    2025 10:00 AM Carolyn Fairchild MD Primary Care [phone]            Patient has agreed to contact primary care provider and/or specialist for any further questions, concerns, or needs.    Kirti Graves LPN

## 2025-01-12 PROBLEM — N39.0 UTI (URINARY TRACT INFECTION): Status: RESOLVED | Noted: 2024-12-13 | Resolved: 2025-01-12

## 2025-02-06 ENCOUNTER — LAB (OUTPATIENT)
Dept: PRIMARY CARE CLINIC | Facility: CLINIC | Age: 85
End: 2025-02-06

## 2025-02-06 DIAGNOSIS — E78.2 MIXED HYPERLIPIDEMIA: ICD-10-CM

## 2025-02-06 DIAGNOSIS — Z79.899 ENCOUNTER FOR LONG-TERM (CURRENT) USE OF MEDICATIONS: ICD-10-CM

## 2025-02-06 DIAGNOSIS — E55.9 VITAMIN D DEFICIENCY: Primary | ICD-10-CM

## 2025-02-06 DIAGNOSIS — R73.03 PREDIABETES: ICD-10-CM

## 2025-02-06 LAB
25(OH)D3 SERPL-MCNC: 55.3 NG/ML (ref 30–100)
ALBUMIN SERPL-MCNC: 3.9 G/DL (ref 3.2–4.6)
ALBUMIN/GLOB SERPL: 1.3 (ref 1–1.9)
ALP SERPL-CCNC: 62 U/L (ref 35–104)
ALT SERPL-CCNC: 15 U/L (ref 8–45)
ANION GAP SERPL CALC-SCNC: 12 MMOL/L (ref 7–16)
AST SERPL-CCNC: 27 U/L (ref 15–37)
BASOPHILS # BLD: 0.06 K/UL (ref 0–0.2)
BASOPHILS NFR BLD: 1.2 % (ref 0–2)
BILIRUB SERPL-MCNC: 0.6 MG/DL (ref 0–1.2)
BUN SERPL-MCNC: 14 MG/DL (ref 8–23)
CALCIUM SERPL-MCNC: 9.5 MG/DL (ref 8.8–10.2)
CHLORIDE SERPL-SCNC: 100 MMOL/L (ref 98–107)
CHOLEST SERPL-MCNC: 160 MG/DL (ref 0–200)
CO2 SERPL-SCNC: 25 MMOL/L (ref 20–29)
CREAT SERPL-MCNC: 0.96 MG/DL (ref 0.6–1.1)
DIFFERENTIAL METHOD BLD: ABNORMAL
EOSINOPHIL # BLD: 0.19 K/UL (ref 0–0.8)
EOSINOPHIL NFR BLD: 3.7 % (ref 0.5–7.8)
ERYTHROCYTE [DISTWIDTH] IN BLOOD BY AUTOMATED COUNT: 14.4 % (ref 11.9–14.6)
EST. AVERAGE GLUCOSE BLD GHB EST-MCNC: 132 MG/DL
GLOBULIN SER CALC-MCNC: 3 G/DL (ref 2.3–3.5)
GLUCOSE SERPL-MCNC: 105 MG/DL (ref 70–99)
HBA1C MFR BLD: 6.2 % (ref 0–5.6)
HCT VFR BLD AUTO: 39.3 % (ref 35.8–46.3)
HDLC SERPL-MCNC: 57 MG/DL (ref 40–60)
HDLC SERPL: 2.8 (ref 0–5)
HGB BLD-MCNC: 12.3 G/DL (ref 11.7–15.4)
IMM GRANULOCYTES # BLD AUTO: 0.01 K/UL (ref 0–0.5)
IMM GRANULOCYTES NFR BLD AUTO: 0.2 % (ref 0–5)
LDLC SERPL CALC-MCNC: 76 MG/DL (ref 0–100)
LYMPHOCYTES # BLD: 1.74 K/UL (ref 0.5–4.6)
LYMPHOCYTES NFR BLD: 34.3 % (ref 13–44)
MCH RBC QN AUTO: 29.4 PG (ref 26.1–32.9)
MCHC RBC AUTO-ENTMCNC: 31.3 G/DL (ref 31.4–35)
MCV RBC AUTO: 93.8 FL (ref 82–102)
MONOCYTES # BLD: 0.62 K/UL (ref 0.1–1.3)
MONOCYTES NFR BLD: 12.2 % (ref 4–12)
NEUTS SEG # BLD: 2.46 K/UL (ref 1.7–8.2)
NEUTS SEG NFR BLD: 48.4 % (ref 43–78)
NRBC # BLD: 0 K/UL (ref 0–0.2)
PLATELET # BLD AUTO: 206 K/UL (ref 150–450)
PMV BLD AUTO: 11.1 FL (ref 9.4–12.3)
POTASSIUM SERPL-SCNC: 4.8 MMOL/L (ref 3.5–5.1)
PROT SERPL-MCNC: 6.8 G/DL (ref 6.3–8.2)
RBC # BLD AUTO: 4.19 M/UL (ref 4.05–5.2)
SODIUM SERPL-SCNC: 137 MMOL/L (ref 136–145)
TRIGL SERPL-MCNC: 139 MG/DL (ref 0–150)
VLDLC SERPL CALC-MCNC: 28 MG/DL (ref 6–23)
WBC # BLD AUTO: 5.1 K/UL (ref 4.3–11.1)

## 2025-02-09 DIAGNOSIS — E78.2 MIXED HYPERLIPIDEMIA: ICD-10-CM

## 2025-02-09 RX ORDER — SIMVASTATIN 40 MG
40 TABLET ORAL NIGHTLY
Qty: 90 TABLET | Refills: 3 | Status: SHIPPED | OUTPATIENT
Start: 2025-02-09

## 2025-02-12 SDOH — HEALTH STABILITY: PHYSICAL HEALTH: ON AVERAGE, HOW MANY MINUTES DO YOU ENGAGE IN EXERCISE AT THIS LEVEL?: 10 MIN

## 2025-02-12 SDOH — HEALTH STABILITY: PHYSICAL HEALTH: ON AVERAGE, HOW MANY DAYS PER WEEK DO YOU ENGAGE IN MODERATE TO STRENUOUS EXERCISE (LIKE A BRISK WALK)?: 1 DAY

## 2025-02-12 ASSESSMENT — PATIENT HEALTH QUESTIONNAIRE - PHQ9
SUM OF ALL RESPONSES TO PHQ QUESTIONS 1-9: 0
1. LITTLE INTEREST OR PLEASURE IN DOING THINGS: NOT AT ALL
SUM OF ALL RESPONSES TO PHQ9 QUESTIONS 1 & 2: 0
SUM OF ALL RESPONSES TO PHQ9 QUESTIONS 1 & 2: 0
SUM OF ALL RESPONSES TO PHQ QUESTIONS 1-9: 0
2. FEELING DOWN, DEPRESSED OR HOPELESS: NOT AT ALL
1. LITTLE INTEREST OR PLEASURE IN DOING THINGS: NOT AT ALL
2. FEELING DOWN, DEPRESSED OR HOPELESS: NOT AT ALL

## 2025-02-12 ASSESSMENT — LIFESTYLE VARIABLES
HOW OFTEN DO YOU HAVE SIX OR MORE DRINKS ON ONE OCCASION: 1
HOW OFTEN DO YOU HAVE A DRINK CONTAINING ALCOHOL: 1
HOW MANY STANDARD DRINKS CONTAINING ALCOHOL DO YOU HAVE ON A TYPICAL DAY: PATIENT DOES NOT DRINK
HOW OFTEN DO YOU HAVE A DRINK CONTAINING ALCOHOL: NEVER
HOW MANY STANDARD DRINKS CONTAINING ALCOHOL DO YOU HAVE ON A TYPICAL DAY: 0

## 2025-02-13 ENCOUNTER — OFFICE VISIT (OUTPATIENT)
Dept: PRIMARY CARE CLINIC | Facility: CLINIC | Age: 85
End: 2025-02-13

## 2025-02-13 VITALS
SYSTOLIC BLOOD PRESSURE: 127 MMHG | TEMPERATURE: 97.3 F | WEIGHT: 133.2 LBS | DIASTOLIC BLOOD PRESSURE: 77 MMHG | BODY MASS INDEX: 24.51 KG/M2 | HEART RATE: 74 BPM | OXYGEN SATURATION: 97 % | HEIGHT: 62 IN

## 2025-02-13 DIAGNOSIS — Z00.00 MEDICARE ANNUAL WELLNESS VISIT, SUBSEQUENT: Primary | ICD-10-CM

## 2025-02-13 NOTE — PROGRESS NOTES
[facility] PRIMARY CARE  Carolyn Fairchild M.D.  [address] [place] [address]  Phone:  [phone]  Fax:  [phone]    CHIEF COMPLAINT:  Chief Complaint   Patient presents with    Medicare AWV     Here for medicare wellness exam      Results     Patient recently had lab work done, would like to discuss results       Establish Cardiologist     Patient states she had a problem with her old cardiologist Dr Diaz, he insisted she take amlodipine which she told him she was allergic to; and it made her very sick. She does not want to see him anymore and is not even sure if she needs a cardiologist.         HISTORY OF PRESENT ILLNESS:  Ms. Liz is a 84 y.o. female  who presents for follow up and her annual Medicare Wellness exam. She has been doing well. Her last A1c was 6.2. Denies chest pain, shortness of breath, headaches or blurred vision.    No other complaints. Taking medications as prescribed. Medications reviewed and updated.     HISTORY:  Allergies   Allergen Reactions    Amlodipine Other (See Comments) and Shortness Of Breath    Toradol [Ketorolac Tromethamine] Shortness Of Breath    Promethazine Other (See Comments)    Toprol Xl [Metoprolol] Other (See Comments)    Lisinopril Other (See Comments)    Tramadol Anxiety       REVIEW OF SYSTEMS:  Review of systems is as indicated in HPI otherwise negative.    PHYSICAL EXAM:  Visit Vitals  /77   Pulse 74   Temp 97.3 °F (36.3 °C) (Temporal)   Ht 1.575 m (5' 2\")   Wt 60.4 kg (133 lb 3.2 oz)   SpO2 97%   BMI 24.36 kg/m²          Medicare Annual Wellness Visit    Juve Liz is here for Medicare AWV (Here for medicare wellness exam/), Results (Patient recently had lab work done, would like to discuss results /), and Establish Cardiologist (Patient states she had a problem with her old cardiologist Dr Diaz, he insisted she take amlodipine which she told him she was allergic to; and it made her very sick. She does not want to see him

## 2025-07-07 ENCOUNTER — APPOINTMENT (OUTPATIENT)
Dept: CT IMAGING | Age: 85
End: 2025-07-07
Payer: MEDICARE

## 2025-07-07 ENCOUNTER — HOSPITAL ENCOUNTER (EMERGENCY)
Age: 85
Discharge: HOME | End: 2025-07-07
Attending: STUDENT IN AN ORGANIZED HEALTH CARE EDUCATION/TRAINING PROGRAM
Payer: MEDICARE

## 2025-07-07 ENCOUNTER — APPOINTMENT (OUTPATIENT)
Dept: GENERAL RADIOLOGY | Age: 85
End: 2025-07-07
Payer: MEDICARE

## 2025-07-07 VITALS
HEART RATE: 72 BPM | BODY MASS INDEX: 23.92 KG/M2 | HEIGHT: 62 IN | WEIGHT: 130 LBS | SYSTOLIC BLOOD PRESSURE: 142 MMHG | TEMPERATURE: 97.9 F | OXYGEN SATURATION: 95 % | RESPIRATION RATE: 14 BRPM | DIASTOLIC BLOOD PRESSURE: 78 MMHG

## 2025-07-07 DIAGNOSIS — S00.83XA CONTUSION OF FOREHEAD, INITIAL ENCOUNTER: ICD-10-CM

## 2025-07-07 DIAGNOSIS — K04.7 PERIAPICAL ABSCESS WITHOUT SINUS TRACT: ICD-10-CM
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
## 2025-07-07 DIAGNOSIS — S60.212A CONTUSION OF LEFT WRIST, INITIAL ENCOUNTER: ICD-10-CM

## 2025-07-07 LAB
ANION GAP SERPL CALC-SCNC: 12 MMOL/L (ref 7–16)
BASOPHILS # BLD: 0.07 K/UL (ref 0–0.2)
BASOPHILS NFR BLD: 0.9 % (ref 0–2)
BUN SERPL-MCNC: 19 MG/DL (ref 8–23)
CALCIUM SERPL-MCNC: 9.1 MG/DL (ref 8.8–10.2)
CHLORIDE SERPL-SCNC: 103 MMOL/L (ref 98–107)
CO2 SERPL-SCNC: 22 MMOL/L (ref 20–29)
CREAT SERPL-MCNC: 1.11 MG/DL (ref 0.6–1.1)
DIFFERENTIAL METHOD BLD: ABNORMAL
EOSINOPHIL # BLD: 0.21 K/UL (ref 0–0.8)
EOSINOPHIL NFR BLD: 2.7 % (ref 0.5–7.8)
ERYTHROCYTE [DISTWIDTH] IN BLOOD BY AUTOMATED COUNT: 14 % (ref 11.9–14.6)
GLUCOSE SERPL-MCNC: 136 MG/DL (ref 70–99)
HCT VFR BLD AUTO: 37.6 % (ref 35.8–46.3)
HGB BLD-MCNC: 12.4 G/DL (ref 11.7–15.4)
IMM GRANULOCYTES # BLD AUTO: 0.02 K/UL (ref 0–0.5)
IMM GRANULOCYTES NFR BLD AUTO: 0.3 % (ref 0–5)
LYMPHOCYTES # BLD: 1.71 K/UL (ref 0.5–4.6)
LYMPHOCYTES NFR BLD: 21.7 % (ref 13–44)
MCH RBC QN AUTO: 30.9 PG (ref 26.1–32.9)
MCHC RBC AUTO-ENTMCNC: 33 G/DL (ref 31.4–35)
MCV RBC AUTO: 93.8 FL (ref 82–102)
MONOCYTES # BLD: 0.94 K/UL (ref 0.1–1.3)
MONOCYTES NFR BLD: 11.9 % (ref 4–12)
NEUTS SEG # BLD: 4.94 K/UL (ref 1.7–8.2)
NEUTS SEG NFR BLD: 62.5 % (ref 43–78)
NRBC # BLD: 0 K/UL (ref 0–0.2)
PLATELET # BLD AUTO: 192 K/UL (ref 150–450)
PMV BLD AUTO: 10.3 FL (ref 9.4–12.3)
POTASSIUM SERPL-SCNC: 4.3 MMOL/L (ref 3.5–5.1)
RBC # BLD AUTO: 4.01 M/UL (ref 4.05–5.2)
SODIUM SERPL-SCNC: 137 MMOL/L (ref 136–145)
WBC # BLD AUTO: 7.9 K/UL (ref 4.3–11.1)

## 2025-07-07 PROCEDURE — 80048 BASIC METABOLIC PNL TOTAL CA: CPT

## 2025-07-07 PROCEDURE — 72125 CT NECK SPINE W/O DYE: CPT

## 2025-07-07 PROCEDURE — 70486 CT MAXILLOFACIAL W/O DYE: CPT

## 2025-07-07 PROCEDURE — 6370000000 HC RX 637 (ALT 250 FOR IP): Performed by: STUDENT IN AN ORGANIZED HEALTH CARE EDUCATION/TRAINING PROGRAM

## 2025-07-07 PROCEDURE — 99285 EMERGENCY DEPT VISIT HI MDM: CPT

## 2025-07-07 PROCEDURE — 85025 COMPLETE CBC W/AUTO DIFF WBC: CPT

## 2025-07-07 PROCEDURE — 73110 X-RAY EXAM OF WRIST: CPT

## 2025-07-07 PROCEDURE — 70450 CT HEAD/BRAIN W/O DYE: CPT

## 2025-07-07 RX ORDER — ACETAMINOPHEN 500 MG
1000 TABLET ORAL
Status: COMPLETED | OUTPATIENT
Start: 2025-07-07 | End: 2025-07-07

## 2025-07-07 RX ORDER — CLINDAMYCIN HYDROCHLORIDE 300 MG/1
300 CAPSULE ORAL 3 TIMES DAILY
Qty: 21 CAPSULE | Refills: 0 | Status: SHIPPED | OUTPATIENT
Start: 2025-07-07 | End: 2025-07-14

## 2025-07-07 RX ADMIN — ACETAMINOPHEN 1000 MG: 500 TABLET, FILM COATED ORAL at 01:54

## 2025-07-07 ASSESSMENT — PAIN DESCRIPTION - ORIENTATION
ORIENTATION: ANTERIOR
ORIENTATION: LEFT

## 2025-07-07 ASSESSMENT — PAIN DESCRIPTION - DESCRIPTORS
DESCRIPTORS: SHOOTING;DISCOMFORT
DESCRIPTORS: ACHING;DULL

## 2025-07-07 ASSESSMENT — PAIN SCALES - GENERAL
PAINLEVEL_OUTOF10: 5
PAINLEVEL_OUTOF10: 5

## 2025-07-07 ASSESSMENT — PAIN DESCRIPTION - LOCATION
LOCATION: HIP
LOCATION: NOSE;FACE

## 2025-07-07 ASSESSMENT — PAIN - FUNCTIONAL ASSESSMENT: PAIN_FUNCTIONAL_ASSESSMENT: 0-10

## 2025-07-07 ASSESSMENT — LIFESTYLE VARIABLES
HOW OFTEN DO YOU HAVE A DRINK CONTAINING ALCOHOL: NEVER
HOW MANY STANDARD DRINKS CONTAINING ALCOHOL DO YOU HAVE ON A TYPICAL DAY: PATIENT DOES NOT DRINK

## 2025-07-07 NOTE — ED TRIAGE NOTES
Pt states she got out of the bed felt a little dizzy then the next thing she remembers is hitting the floor     Pt has bruising to her nose       Pt also c\o left wrist pain

## 2025-07-07 NOTE — ED NOTES
Patient mobility status ambulates with difficulty, uses a walker.     I have reviewed discharge instructions with the patient and caregiver.  The patient and caregiver verbalized understanding.    Patient left ED via Discharge Method: wheelchair to Home with Child.    Opportunity for questions and clarification provided.     Patient given 1 scripts.           Sebastian Dasilva RN  07/07/25 0256

## 2025-07-07 NOTE — ED PROVIDER NOTES
[facility]  Emergency Department    DISPOSITION Decision To Discharge 07/07/2025 02:31:31 AM     ICD-10-CM    1. Fall, initial encounter  W19.XXXA       2. Contusion of forehead, initial encounter  S00.83XA       3. Contusion of left wrist, initial encounter  S60.212A       4. Periapical abscess without sinus tract  K04.7         Discharge Medication List as of 7/7/2025  2:46 AM        START taking these medications    Details   clindamycin (CLEOCIN) 300 MG capsule Take 1 capsule by mouth 3 times daily for 7 days, Disp-21 capsule, R-0Normal           ED Course     ED Course as of 07/07/25 0441   Mon Jul 07, 2025   0112 85-year-old female presents with syncope when trying to get out of bed.  Mild hypertension, otherwise vitals reassuring.  She is asymptomatic at this time and will endorses mild headache and wrist pain from her fall.  Will obtain labs and EKG to rule out emergent cause of syncope as well as CT and x-ray imaging to rule out traumatic injury. [ER]   0153 EKG: Normal sinus rhythm, rate 81.  Incomplete bundle branch block.  No STEMI.  Nonspecific ST and T wave changes.  Time: 0150 [ER]   0233 Labs reassuring; Hgb 12.4. CT head shows incidental periapical abscess right mandibular incisor. She does have a very small area on exam from a fractured tooth. Otherwise CT and XR without traumatic injury. Will start on abx. Stable for dc home. [ER]      ED Course User Index  [ER] Wallace Mei MD     Data Reviewed and Analyzed:  1 acute, uncomplicated illness or injury.  I independently ordered and reviewed each unique test.     I interpreted the X-rays no obvious fracture.  I interpreted the CT Scan noted incidental periapical abscess; no obvious ICH.  I interpreted the labs.  ED provider's independent EKG interpretation see ED course  Patient has minor blunt head trauma and head CT was ordered due to multisystem trauma.       HPI   Juve Liz is a 85 y.o. female with a history of

## 2025-07-07 NOTE — DISCHARGE INSTRUCTIONS
Your bloodwork today was reassuring. Your CT scan showed a small dental abscess in your right lower tooth. Please take the antibiotics as prescribed. Follow up with a dentist in the next few days. Return to the ER if any new or worsening symptoms or any further passing out episodes.    Dental Services     The Emergency Department is not able to provide dental services - tooth extractions, root canal. Though we can provide antibiotics for abccess or infection. Listed below are free or low-cost options.    [facility] [address] [place] [address]   [phone]  Tuesday and Thursday- registration starts at 10am. After registering you are given a time to return  Provides free x-rays, extractions, treatment of infection, and dental hygeine.   Cannot have medicare, medicaid or other medical insurance coverage  Bring proof of [place]** residency (power bill, for example), Social Security Number and household income documents (including food stamps) as well as any current medications.    (**if you do not live in [place], contact the [facility] in your home county for the availability of dental services)    [address] [address] [phone]  Monday and Wednesday 8a-5p Tuesday and Thursday    8a-7p Friday 12-5p  Provides Adult and Childrens services. X-rays, extractions, treatment of infection, restorative care  Accepts medicaid, private insurance, self-pay. Fees are on a sliding scale based on income (need to bring documentation)    [facility] [address] [place] [address]     [phone]  Monday - Friday 8am-5p  Accepts medicaid for dental (but not dentures under 21)  Provides xrays, extractions, partials and dentures    [facility] [address] [place] [address], [address], [place] [address] [phone]  Monday- Friday 9a-5p  First Come- First Served  Accepts medicaid,

## 2025-07-08 ENCOUNTER — CARE COORDINATION (OUTPATIENT)
Dept: CARE COORDINATION | Facility: CLINIC | Age: 85
End: 2025-07-08

## 2025-07-08 NOTE — CARE COORDINATION
Ambulatory Care Coordination Note     7/8/2025 1:39 PM     Patient outreach attempt by this ACM today to offer care management services. [facility] was unable to reach the patient by telephone today;   left voice message requesting a return phone call to this ACM.     ACM: Jelly Lee RN    PCP/Specialist follow up:   Future Appointments         Provider Specialty Dept Phone    8/7/2025 9:00 AM [facility] Primary Care [phone]    8/14/2025 10:00 AM Carolyn Fairchild MD Primary Care [phone]            Follow Up:   Plan for next AC outreach in approximately 1-2 days  to complete:  - outreach attempt to offer care management services.

## 2025-07-08 NOTE — CARE COORDINATION
Ambulatory Care Coordination Note     2025 4:10 PM     Patient Current Location:  Home: [place] [address]     This patient was received as a referral from [facility] .    ACM contacted the patient by telephone. Verified name and  with patient as identifiers. Provided introduction to self, and explanation of the ACM role.   Patient declined care management services at this time.          ACM: Jelly Lee RN     Challenges to be reviewed by the provider   Additional needs identified to be addressed with provider Yes  F/u ER visit               Method of communication with provider: phone.    Utilization: Initial Call - Discharge Date: 25   Discharge Facility: [facility]  Reason for ED Visit: fall  Visit Diagnosis: bruising    Number of ED visits in the last 6 months: 1      Do you have any ongoing symptoms? No  Did you call your PCP prior to going to the ED? No, did not call the PCP office.     Review of Discharge Instructions:   [x] AVS discharge instructions  [x] Right Care, Right Place, Right Time document  [x] Medication changes  [x] Follow up appointments       Care Summary Note:     Pt reports fall in middle of night, unsure of what happened when attempted to get out of bed. Pt monitors BP at home qam, reports low BP of 90/60 sometimes and if low will not take BP med. Will take BP med if BP comes up by noon. /60s today. Pt's dgtrs assist as needed, pt denies need for CCM services. AC scheduled pt f/u for 7/10/25 830am, pt agreeable.       PCP/Specialist follow up:   Future Appointments         Provider Specialty Dept Phone    2025 9:00 AM [facility] [phone]    2025 10:00 AM Carolyn Fairchild MD Primary Care [phone]            Follow Up:   No further outreach planned, pt will contact AC for any questions/concerns/CCM needs.

## 2025-07-10 ENCOUNTER — OFFICE VISIT (OUTPATIENT)
Dept: PRIMARY CARE CLINIC | Facility: CLINIC | Age: 85
End: 2025-07-10

## 2025-07-10 VITALS
WEIGHT: 133.1 LBS | TEMPERATURE: 98.2 F | OXYGEN SATURATION: 98 % | SYSTOLIC BLOOD PRESSURE: 124 MMHG | DIASTOLIC BLOOD PRESSURE: 72 MMHG | HEART RATE: 75 BPM | BODY MASS INDEX: 24.49 KG/M2 | HEIGHT: 62 IN

## 2025-07-10 DIAGNOSIS — Z91.81 HISTORY OF BAD FALL: ICD-10-CM

## 2025-07-10 DIAGNOSIS — Z78.0 MENOPAUSE: ICD-10-CM

## 2025-07-10 DIAGNOSIS — E53.8 B12 DEFICIENCY: ICD-10-CM

## 2025-07-10 DIAGNOSIS — R53.1 WEAKNESS: ICD-10-CM

## 2025-07-10 DIAGNOSIS — R58 ECCHYMOSIS: ICD-10-CM

## 2025-07-10 DIAGNOSIS — Z79.899 ENCOUNTER FOR LONG-TERM (CURRENT) USE OF MEDICATIONS: ICD-10-CM

## 2025-07-10 DIAGNOSIS — S09.90XA INJURY OF HEAD, INITIAL ENCOUNTER: Primary | ICD-10-CM

## 2025-07-10 DIAGNOSIS — I10 ESSENTIAL HYPERTENSION: ICD-10-CM

## 2025-07-10 RX ORDER — CYANOCOBALAMIN 1000 UG/ML
1000 INJECTION, SOLUTION INTRAMUSCULAR; SUBCUTANEOUS ONCE
Status: COMPLETED | OUTPATIENT
Start: 2025-07-10 | End: 2025-07-10

## 2025-07-10 RX ADMIN — CYANOCOBALAMIN 1000 MCG: 1000 INJECTION, SOLUTION INTRAMUSCULAR; SUBCUTANEOUS at 09:32

## 2025-07-10 NOTE — PROGRESS NOTES
[facility] PHYSICIAN SERVICES  [facility] PRIMARY CARE  Dr. Carolyn Fairchild  [address] [place] [address] [address] [place] [address]  Dept: [phone]     ER FOLLOW UP      Patient: Juve Liz  YOB: 1940  Patient Age: 85 y.o.  Patient Sex: female  Medical Record: [id number]  Visit Date: 07/10/2025    Family Practice Clinic Note    Chief Complaint   Patient presents with    Follow-up     Patient went to ER on 7/7/25 after passing out and hitting the floor. Cause of syncope unknown.Patient states she stood up from bed and next thing she knew she was face down on the floor. She hit her face and injured her wrist. Patient states she is still feeling kind of weak from her fall. Wrist is still swollen but ROM is improving.        Visit Vitals  /72   Pulse 75   Temp 98.2 °F (36.8 °C) (Temporal)   Ht 1.575 m (5' 2\")   Wt 60.4 kg (133 lb 1.6 oz)   SpO2 98%   BMI 24.34 kg/m²           2/12/2025     7:56 PM   PHQ-9    Little interest or pleasure in doing things 0   Feeling down, depressed, or hopeless 0   PHQ-2 Score 0    PHQ-9 Total Score 0        Patient-reported       History of Present Illness  The patient presents for evaluation of a fall.    She experienced a fainting episode on 07/07/2025, during which she fell and injured her face and wrist. The cause of the fainting is unknown. She had been sitting outside watching fireworks when she was bitten by mosquitoes. The itching prompted her to apply alcohol to her legs, during which she felt dizzy upon standing up. She leaned against her bed for support but subsequently fell, as indicated by her watch. She was disoriented and found herself lying face down on the floor. Her nose was bleeding slightly, and she had a large bump on her head. Her blood pressure was recorded as 200 systolic at home, but it was 177/71 upon arrival at the hospital. She continues to feel weak and has been applying cold compresses to her face. She reports no vision

## 2025-07-16 ENCOUNTER — RESULTS FOLLOW-UP (OUTPATIENT)
Dept: PRIMARY CARE CLINIC | Facility: CLINIC | Age: 85
End: 2025-07-16

## 2025-07-16 DIAGNOSIS — M85.89 OSTEOPENIA OF MULTIPLE SITES: Primary | ICD-10-CM

## 2025-07-16 RX ORDER — ALENDRONATE SODIUM 70 MG/1
70 TABLET ORAL
Qty: 13 TABLET | Refills: 3 | Status: SHIPPED | OUTPATIENT
Start: 2025-07-16

## 2025-07-16 NOTE — RESULT ENCOUNTER NOTE
Bone density: shows thin bones/osteopenia. Can start Fosamax 70 mg weekly. Will send to the pharmacy.

## 2025-08-07 DIAGNOSIS — Z79.899 ENCOUNTER FOR LONG-TERM (CURRENT) USE OF MEDICATIONS: ICD-10-CM

## 2025-08-07 DIAGNOSIS — R73.03 PREDIABETES: ICD-10-CM

## 2025-08-07 DIAGNOSIS — E55.9 VITAMIN D DEFICIENCY: ICD-10-CM

## 2025-08-07 DIAGNOSIS — E78.2 MIXED HYPERLIPIDEMIA: ICD-10-CM

## 2025-08-07 LAB
25(OH)D3 SERPL-MCNC: 64.4 NG/ML (ref 30–100)
ALBUMIN SERPL-MCNC: 3.6 G/DL (ref 3.2–4.6)
ALBUMIN/GLOB SERPL: 1.3 (ref 1–1.9)
ALP SERPL-CCNC: 69 U/L (ref 35–104)
ALT SERPL-CCNC: 24 U/L (ref 8–45)
ANION GAP SERPL CALC-SCNC: 11 MMOL/L (ref 7–16)
AST SERPL-CCNC: 32 U/L (ref 15–37)
BASOPHILS # BLD: 0.06 K/UL (ref 0–0.2)
BASOPHILS NFR BLD: 1.2 % (ref 0–2)
BILIRUB SERPL-MCNC: 0.6 MG/DL (ref 0–1.2)
BUN SERPL-MCNC: 20 MG/DL (ref 8–23)
CALCIUM SERPL-MCNC: 9.4 MG/DL (ref 8.8–10.2)
CHLORIDE SERPL-SCNC: 101 MMOL/L (ref 98–107)
CHOLEST SERPL-MCNC: 145 MG/DL (ref 0–200)
CO2 SERPL-SCNC: 26 MMOL/L (ref 20–29)
CREAT SERPL-MCNC: 1.06 MG/DL (ref 0.6–1.1)
DIFFERENTIAL METHOD BLD: ABNORMAL
EOSINOPHIL # BLD: 0.3 K/UL (ref 0–0.8)
EOSINOPHIL NFR BLD: 5.8 % (ref 0.5–7.8)
ERYTHROCYTE [DISTWIDTH] IN BLOOD BY AUTOMATED COUNT: 14.3 % (ref 11.9–14.6)
EST. AVERAGE GLUCOSE BLD GHB EST-MCNC: 134 MG/DL
GLOBULIN SER CALC-MCNC: 2.8 G/DL (ref 2.3–3.5)
GLUCOSE SERPL-MCNC: 110 MG/DL (ref 70–99)
HBA1C MFR BLD: 6.3 % (ref 0–5.6)
HCT VFR BLD AUTO: 37.2 % (ref 35.8–46.3)
HDLC SERPL-MCNC: 49 MG/DL (ref 40–60)
HDLC SERPL: 3 (ref 0–5)
HGB BLD-MCNC: 12.1 G/DL (ref 11.7–15.4)
IMM GRANULOCYTES # BLD AUTO: 0.01 K/UL (ref 0–0.5)
IMM GRANULOCYTES NFR BLD AUTO: 0.2 % (ref 0–5)
LDLC SERPL CALC-MCNC: 71 MG/DL (ref 0–100)
LYMPHOCYTES # BLD: 1.76 K/UL (ref 0.5–4.6)
LYMPHOCYTES NFR BLD: 34.2 % (ref 13–44)
MCH RBC QN AUTO: 30.7 PG (ref 26.1–32.9)
MCHC RBC AUTO-ENTMCNC: 32.5 G/DL (ref 31.4–35)
MCV RBC AUTO: 94.4 FL (ref 82–102)
MONOCYTES # BLD: 0.59 K/UL (ref 0.1–1.3)
MONOCYTES NFR BLD: 11.5 % (ref 4–12)
NEUTS SEG # BLD: 2.43 K/UL (ref 1.7–8.2)
NEUTS SEG NFR BLD: 47.1 % (ref 43–78)
NRBC # BLD: 0 K/UL (ref 0–0.2)
PLATELET # BLD AUTO: 217 K/UL (ref 150–450)
PMV BLD AUTO: 11.3 FL (ref 9.4–12.3)
POTASSIUM SERPL-SCNC: 4.4 MMOL/L (ref 3.5–5.1)
PROT SERPL-MCNC: 6.4 G/DL (ref 6.3–8.2)
RBC # BLD AUTO: 3.94 M/UL (ref 4.05–5.2)
SODIUM SERPL-SCNC: 138 MMOL/L (ref 136–145)
TRIGL SERPL-MCNC: 129 MG/DL (ref 0–150)
VLDLC SERPL CALC-MCNC: 26 MG/DL (ref 6–23)
WBC # BLD AUTO: 5.2 K/UL (ref 4.3–11.1)

## 2025-08-14 ENCOUNTER — OFFICE VISIT (OUTPATIENT)
Dept: PRIMARY CARE CLINIC | Facility: CLINIC | Age: 85
End: 2025-08-14

## 2025-08-14 VITALS
BODY MASS INDEX: 24.13 KG/M2 | OXYGEN SATURATION: 97 % | WEIGHT: 131.1 LBS | DIASTOLIC BLOOD PRESSURE: 65 MMHG | SYSTOLIC BLOOD PRESSURE: 138 MMHG | HEART RATE: 60 BPM | HEIGHT: 62 IN | TEMPERATURE: 97.3 F

## 2025-08-14 DIAGNOSIS — R53.82 CHRONIC FATIGUE: ICD-10-CM

## 2025-08-14 DIAGNOSIS — M85.89 OSTEOPENIA OF MULTIPLE SITES: Primary | ICD-10-CM

## 2025-08-14 DIAGNOSIS — Z91.81 AT HIGH RISK FOR FALLS: ICD-10-CM

## 2025-08-14 DIAGNOSIS — I10 ESSENTIAL HYPERTENSION: ICD-10-CM

## 2025-08-14 DIAGNOSIS — Z79.899 ENCOUNTER FOR LONG-TERM (CURRENT) USE OF MEDICATIONS: ICD-10-CM

## 2025-08-14 RX ORDER — CYANOCOBALAMIN 1000 UG/ML
1000 INJECTION, SOLUTION INTRAMUSCULAR; SUBCUTANEOUS ONCE
Status: COMPLETED | OUTPATIENT
Start: 2025-08-14 | End: 2025-08-14

## 2025-08-14 RX ORDER — CARVEDILOL 6.25 MG/1
6.25 TABLET ORAL 2 TIMES DAILY
Qty: 180 TABLET | Refills: 3 | Status: SHIPPED | OUTPATIENT
Start: 2025-08-14 | End: 2026-08-14

## 2025-08-14 RX ORDER — OMEPRAZOLE 40 MG/1
40 CAPSULE, DELAYED RELEASE ORAL DAILY
Qty: 90 CAPSULE | Refills: 3 | Status: SHIPPED | OUTPATIENT
Start: 2025-08-14

## 2025-08-14 RX ADMIN — CYANOCOBALAMIN 1000 MCG: 1000 INJECTION, SOLUTION INTRAMUSCULAR; SUBCUTANEOUS at 10:56

## 2025-08-14 ASSESSMENT — PATIENT HEALTH QUESTIONNAIRE - PHQ9
SUM OF ALL RESPONSES TO PHQ QUESTIONS 1-9: 0
1. LITTLE INTEREST OR PLEASURE IN DOING THINGS: NOT AT ALL
2. FEELING DOWN, DEPRESSED OR HOPELESS: NOT AT ALL
SUM OF ALL RESPONSES TO PHQ QUESTIONS 1-9: 0

## (undated) DEVICE — 2000CC GUARDIAN II: Brand: GUARDIAN

## (undated) DEVICE — STOCKINETTE IMPERV 12X48IN STE -- MEDICHOICE

## (undated) DEVICE — SUTURE STRATAFIX SPRL MCRYL + SZ 3-0 L18IN ABSRB UD PS-2 SXMP1B107

## (undated) DEVICE — SHEET, DRAPE, SPLIT, STERILE: Brand: MEDLINE

## (undated) DEVICE — ELECTRODE NDL 2.8IN COAT VALLEYLAB

## (undated) DEVICE — SYRINGE CATH TIP 50ML

## (undated) DEVICE — SUTURE VCRL SZ 0 L27IN ABSRB UD L36MM CP-1 1/2 CIR REV CUT J267H

## (undated) DEVICE — SPONGE LAP 18X18IN STRL -- 5/PK

## (undated) DEVICE — 3M™ STERI-DRAPE™ INSTRUMENT POUCH 1018: Brand: STERI-DRAPE™

## (undated) DEVICE — STERILE POLYISOPRENE POWDER-FREE SURGICAL GLOVES: Brand: PROTEXIS

## (undated) DEVICE — DRAPE TWL SURG 16X26IN BLU ORB04] ALLCARE INC]

## (undated) DEVICE — 1010 S-DRAPE TOWEL DRAPE 10/BX: Brand: STERI-DRAPE™

## (undated) DEVICE — PACK SURG PROC KNEE USER GPS

## (undated) DEVICE — INSTRUMENT KIT SHLDR HEX PIN GPS

## (undated) DEVICE — T-MAX DISPOSABLE FACE MASK 8 PER BOX

## (undated) DEVICE — BANDAGE COBAN 4 IN COMPR W4INXL5YD FOAM COHESIVE QUIK STK SELF ADH SFT

## (undated) DEVICE — (D)PREP SKN CHLRAPRP APPL 26ML -- CONVERT TO ITEM 371833

## (undated) DEVICE — BUTTON SWITCH PENCIL BLADE ELECTRODE, HOLSTER: Brand: EDGE

## (undated) DEVICE — TOTAL 1-LAYER TRAY, LATEX FOLEY, 16FR 10: Brand: MEDLINE

## (undated) DEVICE — POWDER HEMOSTAT GEL 3.0GR -- SURGICEL

## (undated) DEVICE — WATERPROOF, BACTERIA PROOF DRESSING WITH ABSORBENT SEE THROUGH PAD: Brand: OPSITE POST-OP VISIBLE 15X10CM CTN 20

## (undated) DEVICE — INTENDED FOR TISSUE SEPARATION, AND OTHER PROCEDURES THAT REQUIRE A SHARP SURGICAL BLADE TO PUNCTURE OR CUT.: Brand: BARD-PARKER ® CARBON RIB-BACK BLADES

## (undated) DEVICE — 3M™ STERI-DRAPE™ U-DRAPE 1015: Brand: STERI-DRAPE™

## (undated) DEVICE — SUTURE MCRYL SZ 2-0 L27IN ABSRB UD CP-1 1 L36MM 1/2 CIR REV Y266H

## (undated) DEVICE — CARDINAL HEALTH FLEXIBLE LIGHT HANDLE COVER: Brand: CARDINAL HEALTH

## (undated) DEVICE — TIP CLEANR ELECSURG 1.75X1.75 --

## (undated) DEVICE — REM POLYHESIVE ADULT PATIENT RETURN ELECTRODE: Brand: VALLEYLAB

## (undated) DEVICE — COVER,TABLE,HEAVY DUTY,79"X110",STRL: Brand: MEDLINE

## (undated) DEVICE — SYRINGE, LUER LOCK, 30ML: Brand: MEDLINE

## (undated) DEVICE — ADHESIVE SKIN CLOSURE HI VISC MIC 0.5 CC PREMIERPRO EXOFIN

## (undated) DEVICE — SURGICAL PROCEDURE PACK BASIC ST FRANCIS

## (undated) DEVICE — BANDAGE COMPR SELF ADH 5 YDX4 IN TAN STRL PREMIERPRO LF

## (undated) DEVICE — 2108 SERIES SAGITTAL BLADE (18.6 X 0.64 X 61.1MM)

## (undated) DEVICE — WIRE SMOOTH 0.62X9IN K
Type: IMPLANTABLE DEVICE | Site: SHOULDER | Status: NON-FUNCTIONAL
Removed: 2021-07-09

## (undated) DEVICE — HOOD: Brand: FLYTE

## (undated) DEVICE — SUTURE ETHBND EXCEL SZ 5 L30IN NONABSORBABLE GRN L40MM V-37 MB66G

## (undated) DEVICE — DRAPE,U/SHT,SPLIT,FILM,60X84,STERILE: Brand: MEDLINE

## (undated) DEVICE — NEEDLE HYPO 18GA L1.5IN PNK S STL HUB POLYPR SHLD REG BVL

## (undated) DEVICE — DRILL SURG RVS SHLDR KIT FOR 2-3.2 MM DRL BIT GPS DISP

## (undated) DEVICE — SOLUTION IV 1000ML 0.9% SOD CHL

## (undated) DEVICE — 3M™ IOBAN™ 2 ANTIMICROBIAL INCISE DRAPE 6651EZ: Brand: IOBAN™ 2

## (undated) DEVICE — COVER,MAYO STAND,STERILE: Brand: MEDLINE

## (undated) DEVICE — DRAPE SHT 3 QTR PROXIMA 53X77 --

## (undated) DEVICE — DRAPE,TOP,102X53,STERILE: Brand: MEDLINE

## (undated) DEVICE — YANKAUER,BULB TIP,W/O VENT,RIGID,STERILE: Brand: MEDLINE

## (undated) DEVICE — SMART SLEEVE SURGICAL GOWN, XL, POLYREINFORCED FRONT: Brand: CONVERTORS

## (undated) DEVICE — SHOULDER STABILIZATION KIT,                                    DISPOSABLE 12 PER BOX

## (undated) DEVICE — STERILE POLYISOPRENE POWDER-FREE SURGICAL GLOVES WITH EMOLLIENT COATING: Brand: PROTEXIS